# Patient Record
Sex: FEMALE | Race: BLACK OR AFRICAN AMERICAN | HISPANIC OR LATINO | URBAN - METROPOLITAN AREA
[De-identification: names, ages, dates, MRNs, and addresses within clinical notes are randomized per-mention and may not be internally consistent; named-entity substitution may affect disease eponyms.]

---

## 2021-03-25 ENCOUNTER — OFFICE VISIT (OUTPATIENT)
Dept: URGENT CARE | Age: 51
End: 2021-03-25
Payer: COMMERCIAL

## 2021-03-25 ENCOUNTER — NURSE TRIAGE (OUTPATIENT)
Dept: OTHER | Facility: OTHER | Age: 51
End: 2021-03-25

## 2021-03-25 VITALS — TEMPERATURE: 96.3 F | RESPIRATION RATE: 20 BRPM | OXYGEN SATURATION: 97 % | HEART RATE: 99 BPM

## 2021-03-25 DIAGNOSIS — Z20.822 CONTACT WITH AND (SUSPECTED) EXPOSURE TO COVID-19: Primary | ICD-10-CM

## 2021-03-25 PROCEDURE — U0003 INFECTIOUS AGENT DETECTION BY NUCLEIC ACID (DNA OR RNA); SEVERE ACUTE RESPIRATORY SYNDROME CORONAVIRUS 2 (SARS-COV-2) (CORONAVIRUS DISEASE [COVID-19]), AMPLIFIED PROBE TECHNIQUE, MAKING USE OF HIGH THROUGHPUT TECHNOLOGIES AS DESCRIBED BY CMS-2020-01-R: HCPCS | Performed by: NURSE PRACTITIONER

## 2021-03-25 PROCEDURE — 99213 OFFICE O/P EST LOW 20 MIN: CPT | Performed by: NURSE PRACTITIONER

## 2021-03-25 PROCEDURE — U0005 INFEC AGEN DETEC AMPLI PROBE: HCPCS | Performed by: NURSE PRACTITIONER

## 2021-03-25 NOTE — TELEPHONE ENCOUNTER
Reason for Disposition   [1] HIGH RISK patient (e g , age > 59 years, diabetes, heart or lung disease, weak immune system) AND [2] new or worsening symptoms    Protocols used: CORONAVIRUS (COVID-19) DIAGNOSED OR SUSPECTED-ADULT-[place]

## 2021-03-25 NOTE — TELEPHONE ENCOUNTER
1  Were you within 6 feet or less, for up to 15 minutes or more with a person that has a confirmed COVID-19 test? A friend tested positive 3/24/2021    2  What was the date of your exposure? 3/14/2021  3  Are you experiencing any symptoms attributed to the virus?  (Assess for SOB, cough, fever, difficulty breathing)   Started 3/22/2021  Headache  Diarrhea on 3/22/2021  Vomiting for 3 days  Vomited 6-7 times in past 24 hours  Keeping some fluids down  Inside of mouth is moist  Last urine output was 15 minutes ago  Blood glucose was 192 @ 1055  Body aches  Denies cough, fever or SOB  4  HIGH RISK: Do you have any history heart or lung conditions, weakened immune system, diabetes, Asthma, CHF, HIV, COPD, Chemo, renal failure, sickle cell, etc?   Type II Diabetes  Atrial Fib  Sick sinus syndrome  5  PREGNANCY: Are you pregnant or did you recently give birth?   Denied

## 2021-03-25 NOTE — PROGRESS NOTES
[employer] [facility]        NAME: Tushar Lock is a 46 y o  female  : 1970    MRN: [id number]  DATE: 2021  TIME: 3:13 PM    Assessment and Plan   Contact with and (suspected) exposure to covid-19 [Z20 822]  1  Contact with and (suspected) exposure to covid-19  Novel Coronavirus (Covid-19),PCR [facility] - Office Collection         Patient Instructions     Covid tested; results in 2-3 days via MyChart  Stay quarantined  OTC med for symptoms  Follow up with PCP in 3-5 days  Proceed to  ER if symptoms worsen  Chief Complaint     Chief Complaint   Patient presents with    Diarrhea     pt states started with symptoms 3 days ago, exposed to covid on 3/14    Headache    Generalized Body Aches         History of Present Illness       HPI   Reports she was exposed top someone 2 weeks ago and that person just got diagnosed with covid  Currently having intermittent diarrhea, HA and generalized body-aches  Hx of asthma  Has inhalers at home  Requesting testing for covid 19    Review of Systems   Review of Systems   Constitutional: Negative for appetite change and fever  HENT: Negative for sore throat and trouble swallowing  Respiratory: Negative for cough, shortness of breath and wheezing  Cardiovascular: Negative for chest pain  Gastrointestinal: Positive for diarrhea  Negative for abdominal pain, nausea and vomiting  Musculoskeletal: Positive for myalgias  Neurological: Positive for headaches  Current Medications     No current outpatient medications on file      Current Allergies     Allergies as of 2021 - Reviewed 2021   Allergen Reaction Noted    Clarithromycin GI Intolerance 2020    Acetaminophen GI Intolerance 2017    Cephalosporins Hives 2017    Latex Hives 2017    Oxycodone-acetaminophen GI Intolerance 2017    Penicillins Diarrhea 2017    Trazodone Diarrhea 2017    Naproxen Palpitations 2017 The following portions of the patient's history were reviewed and updated as appropriate: allergies, current medications, past family history, past medical history, past social history, past surgical history and problem list      Past Medical History:   Diagnosis Date    Atrial fibrillation ([facility] )     Diabetes mellitus ([facility] )     Fibromyalgia     Migraines     Neuromuscular disorder ([facility] )     Sick sinus syndrome ([facility] )        History reviewed  No pertinent surgical history  History reviewed  No pertinent family history  Medications have been verified  Objective   Pulse 99   Temp (!) 96 3 °F (35 7 °C)   Resp 20   SpO2 97%   No LMP recorded  Physical Exam     Physical Exam  HENT:      Right Ear: Tympanic membrane and ear canal normal       Left Ear: Tympanic membrane and ear canal normal       Nose: Rhinorrhea present  Mouth/Throat:      Pharynx: No posterior oropharyngeal erythema  Cardiovascular:      Rate and Rhythm: Regular rhythm  Heart sounds: Normal heart sounds  Pulmonary:      Effort: Pulmonary effort is normal  No respiratory distress  Breath sounds: Wheezing present  Neurological:      Mental Status: She is alert

## 2021-03-27 LAB — SARS-COV-2 RNA RESP QL NAA+PROBE: NEGATIVE

## 2021-04-29 ENCOUNTER — NURSE TRIAGE (OUTPATIENT)
Dept: OTHER | Facility: OTHER | Age: 51
End: 2021-04-29

## 2021-04-29 DIAGNOSIS — Z20.822 SUSPECTED SEVERE ACUTE RESPIRATORY SYNDROME CORONAVIRUS 2 (SARS-COV-2) INFECTION: Primary | ICD-10-CM

## 2021-04-29 PROCEDURE — U0005 INFEC AGEN DETEC AMPLI PROBE: HCPCS | Performed by: FAMILY MEDICINE

## 2021-04-29 PROCEDURE — U0003 INFECTIOUS AGENT DETECTION BY NUCLEIC ACID (DNA OR RNA); SEVERE ACUTE RESPIRATORY SYNDROME CORONAVIRUS 2 (SARS-COV-2) (CORONAVIRUS DISEASE [COVID-19]), AMPLIFIED PROBE TECHNIQUE, MAKING USE OF HIGH THROUGHPUT TECHNOLOGIES AS DESCRIBED BY CMS-2020-01-R: HCPCS | Performed by: FAMILY MEDICINE

## 2021-04-29 NOTE — TELEPHONE ENCOUNTER
1  Were you within 6 feet or less, for up to 15 minutes or more with a person that has a confirmed COVID-19 test?   Was notified by the [employer] staff that she had an exposure  2  What was the date of your exposure? 4/22/2021  3  Are you experiencing any symptoms attributed to the virus?  (Assess for SOB, cough, fever, difficulty breathing) Started 4/25/2021  Headache  Diarrhea  4  HIGH RISK: Do you have any history heart or lung conditions, weakened immune system, diabetes, Asthma, CHF, HIV, COPD, Chemo, renal failure, sickle cell, etc?   Type Diabetes  Atrial flutter and fib  Asthma  5  PREGNANCY: Are you pregnant or did you recently give birth? N/A - hysterectomy

## 2021-04-29 NOTE — TELEPHONE ENCOUNTER
Regarding: COVID-19 -Symptomatic - Headache-diarrhea 1 of 2  ----- Message from Madison Gama sent at 4/29/2021  9:24 AM EDT -----  " I have a headache and I started with diarrhea last night '

## 2021-04-30 LAB — SARS-COV-2 RNA RESP QL NAA+PROBE: NEGATIVE

## 2022-06-28 RX ORDER — ALBUTEROL SULFATE 2.5 MG/3ML
2.5 SOLUTION RESPIRATORY (INHALATION) EVERY 4 HOURS PRN
COMMUNITY
Start: 2022-01-11 | End: 2022-08-01 | Stop reason: SDUPTHER

## 2022-06-28 RX ORDER — ALBUTEROL SULFATE 90 UG/1
2 AEROSOL, METERED RESPIRATORY (INHALATION) EVERY 4 HOURS PRN
COMMUNITY
Start: 2022-06-04

## 2022-06-29 ENCOUNTER — OFFICE VISIT (OUTPATIENT)
Dept: INTERNAL MEDICINE CLINIC | Facility: CLINIC | Age: 52
End: 2022-06-29
Payer: COMMERCIAL

## 2022-06-29 ENCOUNTER — TELEPHONE (OUTPATIENT)
Dept: INTERNAL MEDICINE CLINIC | Facility: CLINIC | Age: 52
End: 2022-06-29

## 2022-06-29 VITALS
SYSTOLIC BLOOD PRESSURE: 128 MMHG | WEIGHT: 122.2 LBS | DIASTOLIC BLOOD PRESSURE: 82 MMHG | BODY MASS INDEX: 19.64 KG/M2 | HEIGHT: 66 IN | OXYGEN SATURATION: 99 % | TEMPERATURE: 98.5 F | HEART RATE: 48 BPM

## 2022-06-29 DIAGNOSIS — Z12.31 ENCOUNTER FOR SCREENING MAMMOGRAM FOR BREAST CANCER: ICD-10-CM

## 2022-06-29 DIAGNOSIS — Z12.4 SCREENING FOR CERVICAL CANCER: ICD-10-CM

## 2022-06-29 DIAGNOSIS — E78.2 MIXED HYPERLIPIDEMIA: ICD-10-CM

## 2022-06-29 DIAGNOSIS — I48.92 ATRIAL FLUTTER, UNSPECIFIED TYPE (HCC): ICD-10-CM

## 2022-06-29 DIAGNOSIS — M54.50 LUMBAR PAIN: ICD-10-CM

## 2022-06-29 DIAGNOSIS — Z79.4 TYPE 2 DIABETES MELLITUS WITH HYPOGLYCEMIA WITHOUT COMA, WITH LONG-TERM CURRENT USE OF INSULIN (HCC): ICD-10-CM

## 2022-06-29 DIAGNOSIS — E11.649 TYPE 2 DIABETES MELLITUS WITH HYPOGLYCEMIA WITHOUT COMA, WITH LONG-TERM CURRENT USE OF INSULIN (HCC): ICD-10-CM

## 2022-06-29 DIAGNOSIS — F63.81 INTERMITTENT EXPLOSIVE DISORDER: ICD-10-CM

## 2022-06-29 DIAGNOSIS — K58.9 IRRITABLE BOWEL SYNDROME, UNSPECIFIED TYPE: ICD-10-CM

## 2022-06-29 DIAGNOSIS — M06.9 RHEUMATOID ARTHRITIS INVOLVING BOTH HANDS, UNSPECIFIED WHETHER RHEUMATOID FACTOR PRESENT (HCC): ICD-10-CM

## 2022-06-29 DIAGNOSIS — I48.0 PAROXYSMAL A-FIB (HCC): Primary | ICD-10-CM

## 2022-06-29 DIAGNOSIS — R74.8 ELEVATED LIVER ENZYMES: ICD-10-CM

## 2022-06-29 DIAGNOSIS — F17.200 SMOKING: ICD-10-CM

## 2022-06-29 DIAGNOSIS — Z11.59 NEED FOR HEPATITIS C SCREENING TEST: ICD-10-CM

## 2022-06-29 DIAGNOSIS — F31.9 BIPOLAR AFFECTIVE DISORDER, REMISSION STATUS UNSPECIFIED (HCC): ICD-10-CM

## 2022-06-29 DIAGNOSIS — G43.909 MIGRAINE WITHOUT STATUS MIGRAINOSUS, NOT INTRACTABLE, UNSPECIFIED MIGRAINE TYPE: ICD-10-CM

## 2022-06-29 DIAGNOSIS — F41.9 ANXIETY: ICD-10-CM

## 2022-06-29 DIAGNOSIS — Z11.4 SCREENING FOR HIV (HUMAN IMMUNODEFICIENCY VIRUS): ICD-10-CM

## 2022-06-29 PROBLEM — E11.40 DIABETIC NEUROPATHY ASSOCIATED WITH TYPE 2 DIABETES MELLITUS (HCC): Status: ACTIVE | Noted: 2022-06-29

## 2022-06-29 PROBLEM — I49.5 SICK SINUS SYNDROME (HCC): Status: ACTIVE | Noted: 2022-06-29

## 2022-06-29 PROBLEM — F43.10 PTSD (POST-TRAUMATIC STRESS DISORDER): Status: ACTIVE | Noted: 2022-06-29

## 2022-06-29 PROBLEM — E11.9 DM (DIABETES MELLITUS), TYPE 2 (HCC): Status: ACTIVE | Noted: 2022-06-29

## 2022-06-29 PROCEDURE — 99205 OFFICE O/P NEW HI 60 MIN: CPT

## 2022-06-29 PROCEDURE — 3074F SYST BP LT 130 MM HG: CPT

## 2022-06-29 RX ORDER — AZELASTINE 1 MG/ML
1 SPRAY, METERED NASAL 2 TIMES DAILY
COMMUNITY

## 2022-06-29 RX ORDER — BLOOD-GLUCOSE METER
EACH MISCELLANEOUS
COMMUNITY
Start: 2022-05-03

## 2022-06-29 RX ORDER — NALOXONE HYDROCHLORIDE 0.4 MG/ML
INJECTION, SOLUTION INTRAMUSCULAR; INTRAVENOUS; SUBCUTANEOUS
COMMUNITY
Start: 2022-06-22 | End: 2022-06-30 | Stop reason: CLARIF

## 2022-06-29 RX ORDER — SYRINGE, DISPOSABLE, 3 ML
SYRINGE, EMPTY DISPOSABLE MISCELLANEOUS
COMMUNITY
Start: 2022-05-25

## 2022-06-29 RX ORDER — NICOTINE 21 MG/24HR
1 PATCH, TRANSDERMAL 24 HOURS TRANSDERMAL EVERY 24 HOURS
Qty: 28 PATCH | Refills: 0 | Status: SHIPPED | OUTPATIENT
Start: 2022-06-29

## 2022-06-29 RX ORDER — PREDNISONE 10 MG/1
TABLET ORAL
COMMUNITY
Start: 2022-04-13 | End: 2022-07-21 | Stop reason: ALTCHOICE

## 2022-06-29 RX ORDER — CETIRIZINE HYDROCHLORIDE 10 MG/1
10 TABLET ORAL DAILY
COMMUNITY

## 2022-06-29 RX ORDER — PEN NEEDLE, DIABETIC 32GX 5/32"
NEEDLE, DISPOSABLE MISCELLANEOUS
COMMUNITY
Start: 2022-04-05

## 2022-06-29 RX ORDER — SULFAMETHOXAZOLE AND TRIMETHOPRIM 800; 160 MG/1; MG/1
TABLET ORAL
COMMUNITY
Start: 2022-04-27 | End: 2022-07-21 | Stop reason: ALTCHOICE

## 2022-06-29 RX ORDER — HYDROXYZINE 50 MG/1
TABLET, FILM COATED ORAL
COMMUNITY
Start: 2022-04-05 | End: 2022-07-21 | Stop reason: ALTCHOICE

## 2022-06-29 RX ORDER — PRAZOSIN HYDROCHLORIDE 5 MG/1
5 CAPSULE ORAL
COMMUNITY
Start: 2022-03-02

## 2022-06-29 RX ORDER — TROSPIUM CHLORIDE 20 MG/1
20 TABLET, FILM COATED ORAL 2 TIMES DAILY
COMMUNITY
Start: 2021-11-17 | End: 2022-08-01 | Stop reason: SDUPTHER

## 2022-06-29 RX ORDER — RISPERIDONE 2 MG/1
2 TABLET, FILM COATED ORAL
COMMUNITY
Start: 2022-04-13 | End: 2022-08-01 | Stop reason: SDUPTHER

## 2022-06-29 RX ORDER — METOPROLOL SUCCINATE 50 MG/1
50 TABLET, EXTENDED RELEASE ORAL 2 TIMES DAILY
COMMUNITY

## 2022-06-29 RX ORDER — LORATADINE 10 MG/1
10 TABLET ORAL DAILY
COMMUNITY
Start: 2022-03-02 | End: 2022-06-30 | Stop reason: CLARIF

## 2022-06-29 RX ORDER — OLOPATADINE HYDROCHLORIDE 7 MG/ML
SOLUTION OPHTHALMIC DAILY
COMMUNITY

## 2022-06-29 RX ORDER — LOVASTATIN 10 MG/1
10 TABLET ORAL
COMMUNITY

## 2022-06-29 RX ORDER — MORPHINE SULFATE 15 MG/1
15 TABLET, FILM COATED, EXTENDED RELEASE ORAL 2 TIMES DAILY
COMMUNITY
Start: 2022-04-19 | End: 2022-06-30 | Stop reason: CLARIF

## 2022-06-29 RX ORDER — BLOOD SUGAR DIAGNOSTIC
STRIP MISCELLANEOUS
COMMUNITY
Start: 2022-06-22

## 2022-06-29 RX ORDER — FLASH GLUCOSE SENSOR
KIT MISCELLANEOUS
Qty: 2 EACH | Refills: 11 | Status: SHIPPED | OUTPATIENT
Start: 2022-06-29

## 2022-06-29 RX ORDER — FEXOFENADINE HCL 180 MG/1
180 TABLET ORAL DAILY
COMMUNITY
End: 2022-08-01 | Stop reason: SDUPTHER

## 2022-06-29 RX ORDER — SUMATRIPTAN 100 MG/1
TABLET, FILM COATED ORAL
COMMUNITY
Start: 2022-06-22

## 2022-06-29 RX ORDER — INSULIN GLARGINE 100 [IU]/ML
55 INJECTION, SOLUTION SUBCUTANEOUS 2 TIMES DAILY
COMMUNITY
Start: 2022-05-24

## 2022-06-29 RX ORDER — NYSTATIN 100000 U/G
CREAM TOPICAL 2 TIMES DAILY
COMMUNITY
Start: 2022-01-14

## 2022-06-29 RX ORDER — CLOTRIMAZOLE AND BETAMETHASONE DIPROPIONATE 10; .64 MG/G; MG/G
CREAM TOPICAL 2 TIMES DAILY
COMMUNITY
Start: 2021-12-09 | End: 2022-12-09

## 2022-06-29 RX ORDER — MECLIZINE HYDROCHLORIDE 25 MG/1
25 TABLET ORAL 3 TIMES DAILY PRN
COMMUNITY

## 2022-06-29 RX ORDER — TRIAMCINOLONE ACETONIDE 1 MG/G
1 CREAM TOPICAL 2 TIMES DAILY
COMMUNITY
Start: 2022-04-27 | End: 2023-04-27

## 2022-06-29 RX ORDER — MONTELUKAST SODIUM 10 MG/1
10 TABLET ORAL
COMMUNITY
End: 2022-08-01 | Stop reason: SDUPTHER

## 2022-06-29 RX ORDER — FLUTICASONE PROPIONATE 250 UG/1
1 POWDER, METERED RESPIRATORY (INHALATION) 2 TIMES DAILY
COMMUNITY
End: 2022-08-01 | Stop reason: SDUPTHER

## 2022-06-29 RX ORDER — OXYCODONE HYDROCHLORIDE 10 MG/1
TABLET ORAL
COMMUNITY
Start: 2022-04-06 | End: 2022-06-30 | Stop reason: CLARIF

## 2022-06-29 RX ORDER — SERTRALINE HYDROCHLORIDE 100 MG/1
100 TABLET, FILM COATED ORAL DAILY
COMMUNITY
Start: 2022-04-13 | End: 2022-08-01 | Stop reason: SDUPTHER

## 2022-06-29 RX ORDER — NEEDLES, DISPOSABLE 25GX5/8"
NEEDLE, DISPOSABLE MISCELLANEOUS
COMMUNITY
Start: 2022-05-25

## 2022-06-29 RX ORDER — CLOBETASOL PROPIONATE 0.46 MG/ML
SOLUTION TOPICAL 2 TIMES DAILY
COMMUNITY
Start: 2021-11-11 | End: 2022-11-11

## 2022-06-29 RX ORDER — DOXYCYCLINE HYCLATE 100 MG
TABLET ORAL
COMMUNITY
Start: 2022-05-03 | End: 2022-07-21 | Stop reason: ALTCHOICE

## 2022-06-29 RX ORDER — LANCETS 33 GAUGE
EACH MISCELLANEOUS 2 TIMES DAILY
COMMUNITY
Start: 2022-06-22

## 2022-06-29 RX ORDER — FLASH GLUCOSE SENSOR
1 KIT MISCELLANEOUS ONCE
Qty: 1 EACH | Refills: 0 | Status: SHIPPED | OUTPATIENT
Start: 2022-06-29 | End: 2022-06-29

## 2022-06-29 RX ORDER — DULAGLUTIDE 1.5 MG/.5ML
INJECTION, SOLUTION SUBCUTANEOUS
COMMUNITY
Start: 2022-06-24

## 2022-06-29 RX ORDER — ARIPIPRAZOLE 10 MG/1
10 TABLET ORAL DAILY
COMMUNITY
Start: 2022-04-13 | End: 2022-07-21 | Stop reason: SDUPTHER

## 2022-06-29 RX ORDER — NIACINAMIDE 500 MG
500 TABLET ORAL
COMMUNITY
Start: 2021-10-12

## 2022-06-29 RX ORDER — GABAPENTIN 400 MG/1
400 CAPSULE ORAL 3 TIMES DAILY
COMMUNITY
Start: 2022-02-16 | End: 2022-07-21 | Stop reason: ALTCHOICE

## 2022-06-29 RX ORDER — INSULIN LISPRO 200 [IU]/ML
50 INJECTION, SOLUTION SUBCUTANEOUS
COMMUNITY
Start: 2022-04-13

## 2022-06-29 RX ORDER — LISINOPRIL 5 MG/1
5 TABLET ORAL DAILY
COMMUNITY

## 2022-06-29 RX ORDER — INSULIN GLARGINE 100 [IU]/ML
INJECTION, SOLUTION SUBCUTANEOUS
COMMUNITY
Start: 2022-06-22

## 2022-06-29 RX ORDER — ZOLPIDEM TARTRATE 10 MG/1
10 TABLET ORAL DAILY
COMMUNITY
Start: 2022-04-13 | End: 2022-07-21 | Stop reason: SDUPTHER

## 2022-06-29 RX ORDER — EPINEPHRINE 0.3 MG/.3ML
0.3 INJECTION SUBCUTANEOUS
COMMUNITY

## 2022-06-29 RX ORDER — AMOXICILLIN 250 MG
1 CAPSULE ORAL DAILY
COMMUNITY
Start: 2022-05-25 | End: 2023-05-25

## 2022-06-29 RX ORDER — FLUOCINONIDE 0.5 MG/G
OINTMENT TOPICAL 2 TIMES DAILY
COMMUNITY
Start: 2021-12-09 | End: 2022-12-09

## 2022-06-29 RX ORDER — HYDRALAZINE HYDROCHLORIDE 10 MG/1
10 TABLET, FILM COATED ORAL 2 TIMES DAILY
COMMUNITY
End: 2022-07-21 | Stop reason: ALTCHOICE

## 2022-06-29 RX ORDER — BUSPIRONE HYDROCHLORIDE 15 MG/1
15 TABLET ORAL 3 TIMES DAILY
COMMUNITY
Start: 2022-04-13 | End: 2022-07-21 | Stop reason: SDUPTHER

## 2022-06-29 RX ORDER — ASCORBIC ACID 250 MG
250 TABLET ORAL DAILY
COMMUNITY

## 2022-06-29 RX ORDER — FAMOTIDINE 20 MG/1
20 TABLET, FILM COATED ORAL 2 TIMES DAILY PRN
COMMUNITY
End: 2022-08-01 | Stop reason: SDUPTHER

## 2022-06-29 RX ORDER — NITROGLYCERIN 0.4 MG/1
0.4 TABLET SUBLINGUAL
COMMUNITY
Start: 2022-04-11

## 2022-06-29 RX ORDER — BLOOD PRESSURE TEST KIT
KIT MISCELLANEOUS
COMMUNITY
Start: 2022-06-22

## 2022-06-29 RX ORDER — ALPRAZOLAM 1 MG/1
TABLET ORAL
COMMUNITY
Start: 2022-04-17 | End: 2022-06-30 | Stop reason: SDUPTHER

## 2022-06-29 RX ORDER — DARIFENACIN HYDROBROMIDE 15 MG/1
15 TABLET, EXTENDED RELEASE ORAL DAILY
COMMUNITY
Start: 2022-04-05

## 2022-06-29 RX ORDER — INHALER,ASSIST DEVICE,MED MASK
SPACER (EA) MISCELLANEOUS
COMMUNITY
Start: 2022-05-25
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
## 2022-06-29 NOTE — TELEPHONE ENCOUNTER
PT stated that she was to remind Minus Moulding to check into a Bipap machine for her      Please Advise:    [phone]

## 2022-06-30 ENCOUNTER — TELEPHONE (OUTPATIENT)
Dept: INTERNAL MEDICINE CLINIC | Facility: CLINIC | Age: 52
End: 2022-06-30

## 2022-06-30 DIAGNOSIS — F43.10 PTSD (POST-TRAUMATIC STRESS DISORDER): Primary | ICD-10-CM

## 2022-06-30 DIAGNOSIS — F41.9 ANXIETY: ICD-10-CM

## 2022-06-30 RX ORDER — ALPRAZOLAM 1 MG/1
0.5 TABLET ORAL 3 TIMES DAILY
Qty: 90 TABLET | Refills: 0 | Status: SHIPPED | OUTPATIENT
Start: 2022-06-30 | End: 2022-08-01 | Stop reason: SDUPTHER

## 2022-06-30 NOTE — TELEPHONE ENCOUNTER
Message for [place]; continuous blood glucose    Also: FreeStyle Lydia [place]    It requires prior auth from [employer]

## 2022-06-30 NOTE — TELEPHONE ENCOUNTER
Spoke to administration about patients comments to Lennox Cash  It is advised that we do not Rx any controlled substances for this patient due to the concerns of behavior  She should be getting [place] meds from her psychiatrist   We will not rx any controlled substances

## 2022-07-05 NOTE — TELEPHONE ENCOUNTER
PA has been started for the patients [address] [place] [address], patients key is: [id number]    Also completed the PA for the [facility] portion of the Kit, patients Key is: [id number]       Will check status for the order in a few days for an outcome, in the meantime the patients chart notes and prescription have been faxed to her prescription coverage company

## 2022-07-06 ENCOUNTER — TELEPHONE (OUTPATIENT)
Dept: SLEEP CENTER | Facility: CLINIC | Age: 52
End: 2022-07-06

## 2022-07-06 NOTE — TELEPHONE ENCOUNTER
----- Message from Denise Javier MD sent at 7/5/2022  3:47 PM EDT -----  Approved    ----- Message -----  From: Juwan Yeboah  Sent: 6/30/2022   8:50 AM EDT  To: Sleep Medicine [facility] Provider    This Home sleep study needs approval      If approved please sign and return to clerical pool  If denied please include reasons why  Also provide alternative testing if warranted  Please sign and return to clerical pool

## 2022-07-11 ENCOUNTER — TELEPHONE (OUTPATIENT)
Dept: SLEEP CENTER | Facility: CLINIC | Age: 52
End: 2022-07-11

## 2022-07-11 DIAGNOSIS — I48.92 ATRIAL FLUTTER, UNSPECIFIED TYPE (HCC): Primary | ICD-10-CM

## 2022-07-11 NOTE — TELEPHONE ENCOUNTER
Patient's insurance will not cover a home sleep study  Please place an order for a diagnostic sleep study (SLE3)

## 2022-07-16 ENCOUNTER — HOSPITAL ENCOUNTER (EMERGENCY)
Facility: HOSPITAL | Age: 52
Discharge: HOME | End: 2022-07-16
Attending: EMERGENCY MEDICINE
Payer: COMMERCIAL

## 2022-07-16 ENCOUNTER — APPOINTMENT (EMERGENCY)
Dept: CT IMAGING | Facility: HOSPITAL | Age: 52
End: 2022-07-16
Payer: COMMERCIAL

## 2022-07-16 ENCOUNTER — APPOINTMENT (EMERGENCY)
Dept: RADIOLOGY | Facility: HOSPITAL | Age: 52
End: 2022-07-16
Payer: COMMERCIAL

## 2022-07-16 VITALS
SYSTOLIC BLOOD PRESSURE: 207 MMHG | OXYGEN SATURATION: 99 % | HEART RATE: 99 BPM | RESPIRATION RATE: 18 BRPM | WEIGHT: 122.14 LBS | DIASTOLIC BLOOD PRESSURE: 105 MMHG | TEMPERATURE: 97.9 F | BODY MASS INDEX: 20.02 KG/M2

## 2022-07-16 DIAGNOSIS — G89.29 CHRONIC BACK PAIN: Primary | ICD-10-CM

## 2022-07-16 DIAGNOSIS — R07.89 CHEST TIGHTNESS: ICD-10-CM

## 2022-07-16 DIAGNOSIS — R51.9 ACUTE HEADACHE: ICD-10-CM

## 2022-07-16 DIAGNOSIS — I48.92 ATRIAL FLUTTER (HCC): ICD-10-CM

## 2022-07-16 DIAGNOSIS — M54.9 CHRONIC BACK PAIN: Primary | ICD-10-CM

## 2022-07-16 LAB
2HR DELTA HS TROPONIN: 0 NG/L
ALBUMIN SERPL BCP-MCNC: 3.3 G/DL (ref 3.5–5)
ALP SERPL-CCNC: 80 U/L (ref 46–116)
ALT SERPL W P-5'-P-CCNC: 10 U/L (ref 12–78)
ANION GAP SERPL CALCULATED.3IONS-SCNC: 9 MMOL/L (ref 4–13)
AST SERPL W P-5'-P-CCNC: 13 U/L (ref 5–45)
ATRIAL RATE: 101 BPM
ATRIAL RATE: 99 BPM
BASOPHILS # BLD AUTO: 0.06 THOUSANDS/ΜL (ref 0–0.1)
BASOPHILS NFR BLD AUTO: 1 % (ref 0–1)
BILIRUB DIRECT SERPL-MCNC: 0.09 MG/DL (ref 0–0.2)
BILIRUB SERPL-MCNC: 0.3 MG/DL (ref 0.2–1)
BUN SERPL-MCNC: 6 MG/DL (ref 5–25)
CALCIUM SERPL-MCNC: 8.7 MG/DL (ref 8.3–10.1)
CARDIAC TROPONIN I PNL SERPL HS: 9 NG/L
CARDIAC TROPONIN I PNL SERPL HS: 9 NG/L
CHLORIDE SERPL-SCNC: 104 MMOL/L (ref 100–108)
CO2 SERPL-SCNC: 30 MMOL/L (ref 21–32)
CREAT SERPL-MCNC: 0.83 MG/DL (ref 0.6–1.3)
EOSINOPHIL # BLD AUTO: 0.22 THOUSAND/ΜL (ref 0–0.61)
EOSINOPHIL NFR BLD AUTO: 2 % (ref 0–6)
ERYTHROCYTE [DISTWIDTH] IN BLOOD BY AUTOMATED COUNT: 14.8 % (ref 11.6–15.1)
GFR SERPL CREATININE-BSD FRML MDRD: 80 ML/MIN/1.73SQ M
GLUCOSE SERPL-MCNC: 109 MG/DL (ref 65–140)
HCT VFR BLD AUTO: 41.8 % (ref 34.8–46.1)
HGB BLD-MCNC: 14.1 G/DL (ref 11.5–15.4)
IMM GRANULOCYTES # BLD AUTO: 0.03 THOUSAND/UL (ref 0–0.2)
IMM GRANULOCYTES NFR BLD AUTO: 0 % (ref 0–2)
LYMPHOCYTES # BLD AUTO: 2.18 THOUSANDS/ΜL (ref 0.6–4.47)
LYMPHOCYTES NFR BLD AUTO: 23 % (ref 14–44)
MAGNESIUM SERPL-MCNC: 1.6 MG/DL (ref 1.6–2.6)
MCH RBC QN AUTO: 30.2 PG (ref 26.8–34.3)
MCHC RBC AUTO-ENTMCNC: 33.7 G/DL (ref 31.4–37.4)
MCV RBC AUTO: 90 FL (ref 82–98)
MONOCYTES # BLD AUTO: 0.62 THOUSAND/ΜL (ref 0.17–1.22)
MONOCYTES NFR BLD AUTO: 7 % (ref 4–12)
NEUTROPHILS # BLD AUTO: 6.48 THOUSANDS/ΜL (ref 1.85–7.62)
NEUTS SEG NFR BLD AUTO: 67 % (ref 43–75)
NRBC BLD AUTO-RTO: 0 /100 WBCS
P AXIS: 59 DEGREES
P AXIS: 62 DEGREES
PLATELET # BLD AUTO: 224 THOUSANDS/UL (ref 149–390)
PMV BLD AUTO: 10.9 FL (ref 8.9–12.7)
POTASSIUM SERPL-SCNC: 3 MMOL/L (ref 3.5–5.3)
PR INTERVAL: 152 MS
PR INTERVAL: 176 MS
PROT SERPL-MCNC: 6.6 G/DL (ref 6.4–8.2)
QRS AXIS: 79 DEGREES
QRS AXIS: 83 DEGREES
QRSD INTERVAL: 84 MS
QRSD INTERVAL: 84 MS
QT INTERVAL: 384 MS
QT INTERVAL: 396 MS
QTC INTERVAL: 497 MS
QTC INTERVAL: 508 MS
RBC # BLD AUTO: 4.67 MILLION/UL (ref 3.81–5.12)
SODIUM SERPL-SCNC: 143 MMOL/L (ref 136–145)
T WAVE AXIS: 78 DEGREES
T WAVE AXIS: 91 DEGREES
VENTRICULAR RATE: 101 BPM
VENTRICULAR RATE: 99 BPM
WBC # BLD AUTO: 9.59 THOUSAND/UL (ref 4.31–10.16)

## 2022-07-16 PROCEDURE — 83735 ASSAY OF MAGNESIUM: CPT | Performed by: EMERGENCY MEDICINE

## 2022-07-16 PROCEDURE — 99285 EMERGENCY DEPT VISIT HI MDM: CPT | Performed by: EMERGENCY MEDICINE

## 2022-07-16 PROCEDURE — 93005 ELECTROCARDIOGRAM TRACING: CPT

## 2022-07-16 PROCEDURE — 96375 TX/PRO/DX INJ NEW DRUG ADDON: CPT

## 2022-07-16 PROCEDURE — 71046 X-RAY EXAM CHEST 2 VIEWS: CPT

## 2022-07-16 PROCEDURE — G1004 CDSM NDSC: HCPCS

## 2022-07-16 PROCEDURE — 84484 ASSAY OF TROPONIN QUANT: CPT | Performed by: EMERGENCY MEDICINE

## 2022-07-16 PROCEDURE — 70450 CT HEAD/BRAIN W/O DYE: CPT

## 2022-07-16 PROCEDURE — 99284 EMERGENCY DEPT VISIT MOD MDM: CPT

## 2022-07-16 PROCEDURE — 80076 HEPATIC FUNCTION PANEL: CPT | Performed by: EMERGENCY MEDICINE

## 2022-07-16 PROCEDURE — 85025 COMPLETE CBC W/AUTO DIFF WBC: CPT | Performed by: EMERGENCY MEDICINE

## 2022-07-16 PROCEDURE — 36415 COLL VENOUS BLD VENIPUNCTURE: CPT | Performed by: EMERGENCY MEDICINE

## 2022-07-16 PROCEDURE — 80048 BASIC METABOLIC PNL TOTAL CA: CPT | Performed by: EMERGENCY MEDICINE

## 2022-07-16 PROCEDURE — 96365 THER/PROPH/DIAG IV INF INIT: CPT

## 2022-07-16 PROCEDURE — 93010 ELECTROCARDIOGRAM REPORT: CPT | Performed by: INTERNAL MEDICINE

## 2022-07-16 RX ORDER — KETOROLAC TROMETHAMINE 30 MG/ML
15 INJECTION, SOLUTION INTRAMUSCULAR; INTRAVENOUS ONCE
Status: COMPLETED | OUTPATIENT
Start: 2022-07-16 | End: 2022-07-16

## 2022-07-16 RX ORDER — DIPHENHYDRAMINE HYDROCHLORIDE 50 MG/ML
25 INJECTION INTRAMUSCULAR; INTRAVENOUS ONCE
Status: COMPLETED | OUTPATIENT
Start: 2022-07-16 | End: 2022-07-16

## 2022-07-16 RX ORDER — METOCLOPRAMIDE 10 MG/1
10 TABLET ORAL EVERY 6 HOURS PRN
Qty: 30 TABLET | Refills: 0 | Status: SHIPPED | OUTPATIENT
Start: 2022-07-16 | End: 2022-08-01 | Stop reason: CLARIF

## 2022-07-16 RX ORDER — METOPROLOL SUCCINATE 25 MG/1
50 TABLET, EXTENDED RELEASE ORAL DAILY
Qty: 30 TABLET | Refills: 0 | Status: SHIPPED | OUTPATIENT
Start: 2022-07-16 | End: 2022-08-15 | Stop reason: ALTCHOICE

## 2022-07-16 RX ORDER — METOPROLOL SUCCINATE 25 MG/1
50 TABLET, EXTENDED RELEASE ORAL DAILY
Qty: 30 TABLET | Refills: 0 | Status: SHIPPED | OUTPATIENT
Start: 2022-07-16 | End: 2022-07-16 | Stop reason: SDUPTHER

## 2022-07-16 RX ORDER — MORPHINE SULFATE 15 MG/1
15 TABLET ORAL ONCE
Status: COMPLETED | OUTPATIENT
Start: 2022-07-16 | End: 2022-07-16

## 2022-07-16 RX ORDER — POTASSIUM CHLORIDE 20 MEQ/1
40 TABLET, EXTENDED RELEASE ORAL ONCE
Status: COMPLETED | OUTPATIENT
Start: 2022-07-16 | End: 2022-07-16

## 2022-07-16 RX ORDER — IBUPROFEN 600 MG/1
600 TABLET ORAL EVERY 6 HOURS PRN
Qty: 30 TABLET | Refills: 0 | Status: SHIPPED | OUTPATIENT
Start: 2022-07-16 | End: 2022-07-16 | Stop reason: SDUPTHER

## 2022-07-16 RX ORDER — METOCLOPRAMIDE HYDROCHLORIDE 5 MG/ML
10 INJECTION INTRAMUSCULAR; INTRAVENOUS ONCE
Status: COMPLETED | OUTPATIENT
Start: 2022-07-16 | End: 2022-07-16

## 2022-07-16 RX ORDER — MAGNESIUM SULFATE HEPTAHYDRATE 40 MG/ML
2 INJECTION, SOLUTION INTRAVENOUS ONCE
Status: COMPLETED | OUTPATIENT
Start: 2022-07-16 | End: 2022-07-16

## 2022-07-16 RX ORDER — IBUPROFEN 600 MG/1
600 TABLET ORAL EVERY 6 HOURS PRN
Qty: 30 TABLET | Refills: 0 | Status: SHIPPED | OUTPATIENT
Start: 2022-07-16 | End: 2022-09-12 | Stop reason: SDUPTHER

## 2022-07-16 RX ORDER — METOCLOPRAMIDE 10 MG/1
10 TABLET ORAL EVERY 6 HOURS PRN
Qty: 30 TABLET | Refills: 0 | Status: SHIPPED | OUTPATIENT
Start: 2022-07-16 | End: 2022-07-16 | Stop reason: SDUPTHER

## 2022-07-16 RX ADMIN — DIPHENHYDRAMINE HYDROCHLORIDE 25 MG: 50 INJECTION, SOLUTION INTRAMUSCULAR; INTRAVENOUS at 09:43

## 2022-07-16 RX ADMIN — POTASSIUM CHLORIDE 40 MEQ: 1500 TABLET, EXTENDED RELEASE ORAL at 11:52

## 2022-07-16 RX ADMIN — METOCLOPRAMIDE 10 MG: 5 INJECTION, SOLUTION INTRAMUSCULAR; INTRAVENOUS at 09:44

## 2022-07-16 RX ADMIN — MORPHINE SULFATE 15 MG: 15 TABLET ORAL at 09:42

## 2022-07-16 RX ADMIN — KETOROLAC TROMETHAMINE 15 MG: 30 INJECTION, SOLUTION INTRAMUSCULAR at 10:18

## 2022-07-16 RX ADMIN — SODIUM CHLORIDE 1000 ML: 0.9 INJECTION, SOLUTION INTRAVENOUS at 09:42

## 2022-07-16 RX ADMIN — MAGNESIUM SULFATE HEPTAHYDRATE 2 G: 40 INJECTION, SOLUTION INTRAVENOUS at 09:44

## 2022-07-16 NOTE — ED NOTES
Patient transported to [address] [place] [address]  [facility]  07/16/22 6444

## 2022-07-16 NOTE — ED PROVIDER NOTES
History  Chief Complaint   Patient presents with    Back Pain     Pt came in EMS  EMS reports pt c/o chronic back pain, and had chest pain but had taken 2 nitro and xanax and the pain went away  Patient is a 22-year-old female with past medical history of chronic back pain, fibromyalgia, neuromuscular disorder, occipital neuralgia, insulin-dependent diabetes, hypertension, hyperlipidemia, anxiety, intermittent explosive disorder, atrial fibrillation, migraines, prior intracranial hemorrhage when on Coumadin in 2014, coronary artery disease, presents to the emergency department by ambulance with multiple complaints  Patient states that she woke up today with a headache that she feels in the back of her neck that radiates her head all the way to the frontal region behind her eyes  She states she has had blurry vision and nausea with the headache  She tried taking Aleve without relief  She then started to get very shaky and developed chest tightness and heaviness as well as some difficulty breathing  She took 1 sublingual nitroglycerin and Xanax which did not help so she called 911  Patient did check her glucose this morning and it was 82 prior to eating but after eating it was only 71  She reports this is very low for her  She admits to recently moving to [place] from [place] in May and her new endocrinologist changed around her insulin regimen  Review of systems, she reports chronic back pain as well as chronic poor appetite for the past 1-2 years ever since having COVID infection in 2020  She also reports chronic nausea and vomiting for the past 1 year  She states she last vomited yesterday  Patient reports she is supposed to be on Xarelto but has not been on it since May when she moved  She also has out of her gabapentin which she reports she takes for seizures        History provided by:  Patient and EMS personnel   used: No        Prior to Admission Medications Prescriptions Last Dose Informant Patient Reported? Taking? ALPRAZolam (XANAX) 1 mg tablet   No No   Sig: Take 0 5 tablets (0 5 mg total) by mouth 3 (three) times a day   ARIPiprazole (ABILIFY) 10 mg tablet  Self Yes No   Sig: Take 10 mg by mouth daily   Alcohol Swabs PADS  Self Yes No   Si (five) times a day   BD Pen Needle Nkechi 2nd Gen 32G X 4 MM MISC  Self Yes No   Sig: USE AS DIRECTED FOR BEFORE A MEAL AND AT BEDTIME GLUCOSE INJECTION   BD PrecisionGlide Needle 23G X 1-1/2" MISC  Self Yes No   Sig: USE AS DIRECTED TO ADMINISTER NALOXONE INJECTION  MAY DISPENSE 23-25 GAUGE 1-1 5" NEEDLE  Blood Glucose Monitoring Suppl (ONE TOUCH ULTRA 2) w/Device KIT  Self Yes No   Sig: Use as directed   Cholecalciferol 125 MCG (5000 UT) TABS  Self Yes No   Sig: every 24 hours   Continuous Blood Gluc Sensor (FreeStyle Lydia Sensor System) MISC   No No   Si- 14 day sensors   EPINEPHrine (EPIPEN) 0 3 mg/0 3 mL SOAJ  Self Yes No   Sig: Inject 0 3 mg into a muscle   Fluticasone Propionate, Inhal, (Flovent Diskus) 250 MCG/BLIST AEPB  Self Yes No   Sig: Inhale 1 puff 2 (two) times a day   Lantus SoloStar 100 units/mL injection pen  Self Yes No   Sig: INJECT 55 UNITS UNDER THE SKIN 2 (TWO) TIMES A DAY AT LUNCH AND AT BEDTIME  Olopatadine HCl (Pataday) 0 7 % SOLN  Self Yes No   Sig: Apply to eye daily   OneTouch Delica Lancets 56U MISC  Self Yes No   Si (two) times a day   OneTouch Ultra test strip  Self Yes No   Sig: TEST TWICE DAILY OR AS DIRECTED BY MD   SUMAtriptan (IMITREX) 100 mg tablet  Self Yes No   Sig: TAKE 1 TABLET (100 MG TOTAL) BY MOUTH ONE TIME AS NEEDED FOR MIGRAINE  MAY REPEAT IN 2 HOURS IF UNRESOLVED  DO NOT EXCEED 200 MG IN 24 HOURS   Spacer/Aero-Holding Chambers (AeroChamber Plus Geoff-Vu w/Mask) MISC  Self Yes No   Si EACH (1 APPLICATION TOTAL) BY MISCELLANEOUS ROUTE DAILY AS NEEDED (ASTHMA EXACERBATION)     Syringe, Disposable, (2-3CC SYRINGE) 3 ML MISC  Self Yes No   Sig: USE AS DIRECTED TO ADMINISTER NALOXONE INJECTION  Trulicity 1 5 SP/0 2PR SOPN  Self Yes No   Sig: INJECT 1 5 MG UNDER THE SKIN EVERY 7 DAYS  albuterol (2 5 mg/3 mL) 0 083 % nebulizer solution  Self Yes No   Sig: Inhale 2 5 mg every 4 (four) hours as needed   albuterol (PROVENTIL HFA,VENTOLIN HFA) 90 mcg/act inhaler  Self Yes No   Sig: Inhale 2 puffs every 4 (four) hours as needed   ascorbic acid (VITAMIN C) 250 MG tablet  Self Yes No   Sig: Take 250 mg by mouth daily   azelastine (ASTELIN) 0 1 % nasal spray  Self Yes No   Si spray into each nostril 2 (two) times a day   busPIRone (BUSPAR) 15 mg tablet  Self Yes No   Sig: Take 15 mg by mouth Three times a day   cetirizine (ZyrTEC) 10 mg tablet  Self Yes No   Sig: Take 10 mg by mouth daily   clobetasol (TEMOVATE) 0 05 % external solution  Self Yes No   Sig: Apply topically 2 (two) times a day   clotrimazole-betamethasone (LOTRISONE) 1-0 05 % cream  Self Yes No   Sig: Apply topically 2 (two) times a day   darifenacin (ENABLEX) 15 MG 24 hr tablet  Self Yes No   Sig: Take 15 mg by mouth daily   doxycycline hyclate (VIBRA-TABS) 100 mg tablet  Self Yes No   Sig: TAKE 1 TABLET (100 MG TOTAL) BY MOUTH 2 (TWO) TIMES A DAY FOR 7 DAYS     famotidine (PEPCID) 20 mg tablet  Self Yes No   Sig: Take 20 mg by mouth 2 (two) times a day as needed   fexofenadine (ALLEGRA) 180 MG tablet  Self Yes No   Sig: Take 180 mg by mouth daily   fluocinonide (LIDEX) 0 05 % ointment  Self Yes No   Sig: Apply topically 2 (two) times a day   gabapentin (NEURONTIN) 400 mg capsule  Self Yes No   Sig: Take 400 mg by mouth Three times a day   hydrALAZINE (APRESOLINE) 10 mg tablet  Self Yes No   Sig: Take 10 mg by mouth 2 (two) times a day   hydrOXYzine HCL (ATARAX) 50 mg tablet  Self Yes No   insulin glargine (Lantus SoloStar) 100 units/mL injection pen  Self Yes No   Sig: Inject 55 Units under the skin 2 (two) times a day   insuln lispro (HumaLOG KwikPen) 200 units/mL CONCENTRATED U-200 injection pen  Self Yes No Sig: Inject 50 Units under the skin   ipratropium (ATROVENT) 0 02 % nebulizer solution  Self Yes No   Sig: USE 1 VIAL VIA NEBULIZER EVERY 6 HOURS AS NEEDED FOR SHORTNESS OF BREATH   lisinopril (ZESTRIL) 5 mg tablet  Self Yes No   Sig: Take 5 mg by mouth daily   lovastatin (MEVACOR) 10 MG tablet  Self Yes No   Sig: Take 10 mg by mouth   meclizine (ANTIVERT) 25 mg tablet  Self Yes No   Sig: Take 25 mg by mouth Three times daily as needed   metoprolol succinate (TOPROL-XL) 50 mg 24 hr tablet  Self Yes No   Sig: Take 50 mg by mouth 2 (two) times a day   montelukast (SINGULAIR) 10 mg tablet  Self Yes No   Sig: Take 10 mg by mouth   niacinamide 500 mg tablet  Self Yes No   Sig: Take 500 mg by mouth   nicotine (NICODERM CQ) 21 mg/24 hr TD 24 hr patch  Self No No   Sig: Place 1 patch on the skin every 24 hours   nitroglycerin (NITROSTAT) 0 4 mg SL tablet  Self Yes No   Sig: Place 0 4 mg under the tongue   nystatin (MYCOSTATIN) cream  Self Yes No   Sig: Apply topically 2 (two) times a day   prazosin (MINIPRESS) 5 mg capsule  Self Yes No   Sig: Take 5 mg by mouth   predniSONE 10 mg tablet  Self Yes No   Si po qd for 3 days, then 5 po qd X 3 days, then 4 po qd X 3 day, then 3 po qd X 3 days, then 2 po qd X 3 days, then 1 po qd X 3 days Pls dispense #63   risperiDONE (RisperDAL) 2 mg tablet  Self Yes No   Sig: Take 2 mg by mouth   rivaroxaban (XARELTO) 20 mg tablet  Self Yes No   Sig: Take 20 mg by mouth   senna-docusate sodium (SENOKOT S) 8 6-50 mg per tablet  Self Yes No   Sig: Take 1 tablet by mouth daily   sertraline (ZOLOFT) 100 mg tablet  Self Yes No   Sig: Take 100 mg by mouth daily   sulfamethoxazole-trimethoprim (BACTRIM DS) 800-160 mg per tablet  Self Yes No   triamcinolone (KENALOG) 0 1 % cream  Self Yes No   Sig: Apply 1 application topically 2 (two) times a day   trospium chloride (SANCTURA) 20 mg tablet  Self Yes No   Sig: Take 20 mg by mouth 2 (two) times a day   zolpidem (AMBIEN) 10 mg tablet  Self Yes No Sig: Take 10 mg by mouth daily      Facility-Administered Medications: None       Past Medical History:   Diagnosis Date    Atrial fibrillation ([facility] )     Diabetes mellitus ([facility]     Fibromyalgia     Migraines     Neuromuscular disorder ([facility])     Sick sinus syndrome ([facility] )        History reviewed  No pertinent surgical history  History reviewed  No pertinent family history  I have reviewed and agree with the history as documented  E-Cigarette/Vaping    E-Cigarette Use Never User      E-Cigarette/Vaping Substances    Nicotine No     THC No     CBD No     Flavoring No     Other No     Unknown No      Social History     Tobacco Use    Smoking status: Current Every Day Smoker     Packs/day: 2 00     Types: Cigarettes     Start date: 1992    Smokeless tobacco: Never Used    Tobacco comment: Patient stated that she is ready to quit smoking and she needs help because she smokes about 2 packs and a half each and everyday   Vaping Use    Vaping Use: Never used   Substance Use Topics    Alcohol use: Not Currently    Drug use: Never       Review of Systems   Constitutional: Positive for appetite change  Negative for chills, diaphoresis and fever  HENT: Negative for congestion, ear pain, hearing loss, rhinorrhea, sore throat and tinnitus  Eyes: Positive for visual disturbance  Negative for photophobia and redness  Respiratory: Positive for chest tightness  Negative for cough, shortness of breath and wheezing  Cardiovascular: Positive for chest pain  Negative for palpitations and leg swelling  Gastrointestinal: Positive for nausea and vomiting  Negative for abdominal distention, abdominal pain, blood in stool, constipation and diarrhea  Genitourinary: Negative for dysuria, flank pain, frequency and hematuria  Musculoskeletal: Positive for back pain  Negative for neck pain and neck stiffness  Skin: Negative for color change, pallor, rash and wound     Allergic/Immunologic: Negative for immunocompromised state  Neurological: Positive for headaches  Negative for dizziness, seizures, syncope, facial asymmetry, speech difficulty, weakness, light-headedness and numbness  Hematological: Negative for adenopathy  Does not bruise/bleed easily  Psychiatric/Behavioral: Negative for confusion and decreased concentration  All other systems reviewed and are negative  Physical Exam  Physical Exam  Vitals and nursing note reviewed  Constitutional:       General: She is not in acute distress  Appearance: Normal appearance  She is well-developed  She is not ill-appearing, toxic-appearing or diaphoretic  HENT:      Head: Normocephalic and atraumatic  Right Ear: External ear normal       Left Ear: External ear normal       Mouth/Throat:      Mouth: Mucous membranes are dry  Pharynx: Oropharynx is clear  No oropharyngeal exudate  Eyes:      Extraocular Movements: Extraocular movements intact  Conjunctiva/sclera: Conjunctivae normal    Neck:      Vascular: No JVD  Cardiovascular:      Rate and Rhythm: Normal rate and regular rhythm  Pulses: Normal pulses  Heart sounds: Normal heart sounds  No murmur heard  No friction rub  No gallop  Pulmonary:      Effort: Pulmonary effort is normal  No respiratory distress  Breath sounds: Normal breath sounds  No wheezing, rhonchi or rales  Abdominal:      General: There is no distension  Palpations: Abdomen is soft  Tenderness: There is no abdominal tenderness  There is no guarding or rebound  Musculoskeletal:         General: No swelling or tenderness  Normal range of motion  Cervical back: Normal range of motion and neck supple  No rigidity  Right lower leg: No edema  Left lower leg: No edema  Skin:     General: Skin is warm and dry  Coloration: Skin is not pale  Findings: No erythema or rash  Neurological:      General: No focal deficit present        Mental Status: She is alert and oriented to person, place, and time  Sensory: No sensory deficit  Motor: No weakness     Psychiatric:         Mood and Affect: Mood normal          Behavior: Behavior normal          Vital Signs  ED Triage Vitals   Temperature Pulse Respirations Blood Pressure SpO2   07/16/22 0858 07/16/22 0858 07/16/22 0858 07/16/22 0858 07/16/22 0858   97 9 °F (36 6 °C) 105 18 (!) 194/98 99 %      Temp Source Heart Rate Source Patient Position - Orthostatic VS BP Location FiO2 (%)   07/16/22 0858 07/16/22 0858 07/16/22 1245 07/16/22 1245 --   Oral Monitor Lying Right arm       Pain Score       07/16/22 0942       7         Vitals:    07/16/22 0858 07/16/22 1245   BP: (!) 194/98 (!) 207/105   BP Location:  Right arm   Pulse: 105 99   Resp: 18 18   Temp: 97 9 °F (36 6 °C)    TempSrc: Oral    SpO2: 99% 99%   Weight: 55 4 kg (122 lb 2 2 oz)        Visual Acuity  Visual Acuity    Flowsheet Row Most Recent Value   R Pupil Size (mm) 2          ED Medications  Medications   sodium chloride 0 9 % bolus 1,000 mL (0 mL Intravenous Stopped 7/16/22 1045)   diphenhydrAMINE (BENADRYL) injection 25 mg (25 mg Intravenous Given 7/16/22 0943)   metoclopramide (REGLAN) injection 10 mg (10 mg Intravenous Given 7/16/22 0944)   magnesium sulfate 2 g/50 mL IVPB (premix) 2 g (0 g Intravenous Stopped 7/16/22 1044)   morphine (MSIR) IR tablet 15 mg (15 mg Oral Given 7/16/22 0942)   ketorolac (TORADOL) injection 15 mg (15 mg Intravenous Given 7/16/22 1018)   potassium chloride (K-DUR,KLOR-CON) CR tablet 40 mEq (40 mEq Oral Given 7/16/22 1152)       Diagnostic Studies  Results Reviewed     Procedure Component Value Units Date/Time    HS Troponin I 2hr [[id number]]  (Normal) Collected: 07/16/22 1152    Lab Status: Final result Specimen: Blood from Arm, Right Updated: 07/16/22 1223     hs TnI 2hr 9 ng/L      Delta 2hr hsTnI 0 ng/L     HS Troponin 0hr (reflex protocol) [[id number]]  (Normal) Collected: 07/16/22 0949    Lab Status: Final result Specimen: Blood from Arm, Right Updated: 07/16/22 1025     hs TnI 0hr 9 ng/L     Basic metabolic panel [[id number]]  (Abnormal) Collected: 07/16/22 0949    Lab Status: Final result Specimen: Blood from Arm, Right Updated: 07/16/22 1022     Sodium 143 mmol/L      Potassium 3 0 mmol/L      Chloride 104 mmol/L      CO2 30 mmol/L      ANION GAP 9 mmol/L      BUN 6 mg/dL      Creatinine 0 83 mg/dL      Glucose 109 mg/dL      Calcium 8 7 mg/dL      eGFR 80 ml/min/1 73sq m     Narrative:      [facility] guidelines for Chronic Kidney Disease (CKD):     Stage 1 with normal or high GFR (GFR > 90 mL/min/1 73 square meters)    Stage 2 Mild CKD (GFR = 60-89 mL/min/1 73 square meters)    Stage 3A Moderate CKD (GFR = 45-59 mL/min/1 73 square meters)    Stage 3B Moderate CKD (GFR = 30-44 mL/min/1 73 square meters)    Stage 4 Severe CKD (GFR = 15-29 mL/min/1 73 square meters)    Stage 5 End Stage CKD (GFR <15 mL/min/1 73 square meters)  Note: GFR calculation is accurate only with a steady state creatinine    Hepatic function panel [[id number]]  (Abnormal) Collected: 07/16/22 0949    Lab Status: Final result Specimen: Blood from Arm, Right Updated: 07/16/22 1022     Total Bilirubin 0 30 mg/dL      Bilirubin, Direct 0 09 mg/dL      Alkaline Phosphatase 80 U/L      AST 13 U/L      ALT 10 U/L      Total Protein 6 6 g/dL      Albumin 3 3 g/dL     Magnesium [[id number]]  (Normal) Collected: 07/16/22 0949    Lab Status: Final result Specimen: Blood from Arm, Right Updated: 07/16/22 1022     Magnesium 1 6 mg/dL     CBC and differential [[id number]] Collected: 07/16/22 0949    Lab Status: Final result Specimen: Blood from Arm, Right Updated: 07/16/22 0957     WBC 9 59 Thousand/uL      RBC 4 67 Million/uL      Hemoglobin 14 1 g/dL      Hematocrit 41 8 %      MCV 90 fL      MCH 30 2 pg      MCHC 33 7 g/dL      RDW 14 8 %      MPV 10 9 fL      Platelets 610 Thousands/uL      nRBC 0 /100 WBCs      Neutrophils Relative 67 %      Immat GRANS % 0 %      Lymphocytes Relative 23 %      Monocytes Relative 7 %      Eosinophils Relative 2 %      Basophils Relative 1 %      Neutrophils Absolute 6 48 Thousands/µL      Immature Grans Absolute 0 03 Thousand/uL      Lymphocytes Absolute 2 18 Thousands/µL      Monocytes Absolute 0 62 Thousand/µL      Eosinophils Absolute 0 22 Thousand/µL      Basophils Absolute 0 06 Thousands/µL                  XR chest 2 views   ED Interpretation by Eros Ritchie DO (07/16 0945)   No acute abnormality in the chest       Final Result by Tom Deleon MD (07/16 1007)      No acute cardiopulmonary disease  Workstation performed: [id number]         CT head wo contrast   Final Result by Teodoro Russell DO (07/16 9576)      No acute intracranial abnormality                    Workstation performed: [id number]                    Procedures  ECG 12 Lead Documentation Only    Date/Time: 7/16/2022 10:04 AM  Performed by: Eros Ritchie DO  Authorized by: Eros Ritchie DO     ECG reviewed by me, the ED Provider: yes    Patient location:  ED  Previous ECG:     Previous ECG:  Unavailable  Rate:     ECG rate:  101    ECG rate assessment: tachycardic    Rhythm:     Rhythm: sinus tachycardia    Ectopy:     Ectopy: PAC    QRS:     QRS axis:  Normal    QRS intervals:  Normal  Conduction:     Conduction: normal    ST segments:     ST segments:  Normal  T waves:     T waves: normal      ECG 12 Lead Documentation Only    Date/Time: 7/16/2022 12:42 PM  Performed by: Eros Ritchie DO  Authorized by: Eros Ritchie DO     ECG reviewed by me, the ED Provider: yes    Patient location:  ED  Previous ECG:     Previous ECG:  Compared to current    Similarity:  No change  Rate:     ECG rate:  99    ECG rate assessment: normal    Rhythm:     Rhythm: sinus rhythm    Ectopy:     Ectopy: PAC    QRS:     QRS axis:  Normal    QRS intervals: Normal  Conduction:     Conduction: normal    ST segments:     ST segments:  Normal  T waves:     T waves: normal    Other findings:     Other findings: prolonged qTc interval               ED Course  ED Course as of 07/16/22 1249   Sat Jul 16, 2022   1045 hs TnI 0hr: 9  Will repeat in 2 hours  1056 Glucose, Random: 821   6394 Patient reassessed and updated of normal workup  Patient is feeling a lot better and has improvement in her headache and chest discomfort  Discussed symptom management at home and when to return to the ER  HEART Risk Score    Flowsheet Row Most Recent Value   Heart Score Risk Calculator    History 0 Filed at: 07/16/2022 1241   ECG 0 Filed at: 07/16/2022 1241   Age 1 Filed at: 07/16/2022 1241   Risk Factors 2 Filed at: 07/16/2022 1241   Troponin 0 Filed at: 07/16/2022 1241   HEART Score 3 Filed at: 07/16/2022 1241                    MDM  Number of Diagnoses or Management Options  Acute headache  Atrial flutter (HCC)  Chest tightness  Chronic back pain  Diagnosis management comments: 66-year-old female with multiple medical comorbidities including chronic pain however has been out of her narcotic pain medication for 1-2 months, presents to the emergency department for acute headache upon awakening as well as chest tightness, dyspnea and shakiness  She did not get much relief from sublingual nitro which she took at home and also admits to taking Xanax  Patient does not appear to be in any distress and has normal neurologic and cardiopulmonary examination  Will workup with head CT given her history of intracranial hemorrhage in 2014  Most likely this is a migraine headache  In regards to the chest tightness, this could be anxiety, panic, ACS, symptomatic hypo or hyperglycemia  Will workup with cardiac labs, fingerstick glucose, chest x-ray, EKG  Will give IV fluids and migraine cocktail excluding Decadron due to risk of hyperglycemia    Patient is requesting pain medication and will give p o  Morphine for her chronic back pain in the ED however will be unable to prescribe any narcotics for home use and will refer to pain management specialist     Prior to discharge, patient requested refill for her metoprolol  I advised she follow up with her PCP to get her other medications re-prescribed  Amount and/or Complexity of Data Reviewed  Clinical lab tests: ordered and reviewed  Tests in the radiology section of CPT®: ordered and reviewed  Tests in the medicine section of CPT®: reviewed and ordered  Obtain history from someone other than the patient: yes  Independent visualization of images, tracings, or specimens: yes        Disposition  Final diagnoses:   Chronic back pain   Chest tightness   Acute headache   Atrial flutter ([address] )     Time reflects when diagnosis was documented in both MDM as applicable and the Disposition within this note     Time User Action Codes Description Comment    7/16/2022 12:39 PM Sherrilee Erie E Add [M54 9,  G89 29] Chronic back pain     7/16/2022 12:39 PM Sherrilee Bayron E Add [R07 89] Chest tightness     7/16/2022 12:39 PM Sherrilee Bayron E Add [R51 9] Acute headache     7/16/2022 12:49 PM Susana Filter Add [I48 92] Atrial flutter [facility])       ED Disposition     ED Disposition   Discharge    Condition   Stable    Date/Time   Sat Jul 16, 2022 12:39 PM    [address] [place] discharge to home/self care                 Follow-up Information     Follow up With Specialties Details Why Contact Info Additional Information    CRISTINO Pantoja Nurse Practitioner Schedule an appointment as soon as possible for a visit   [address] Pain Medicine Schedule an appointment as soon as possible for a visit   [address] [facility] [phone] [place], [address] [facility] Emergency Department Emergency Medicine Go to  If symptoms worsen [address] Emergency Department, [address] [place] [address], [place], [place], [address]          Patient's Medications   Discharge Prescriptions    IBUPROFEN (MOTRIN) 600 MG TABLET    Take 1 tablet (600 mg total) by mouth every 6 (six) hours as needed for mild pain or moderate pain       Start Date: 7/16/2022 End Date: --       Order Dose: 600 mg       Quantity: 30 tablet    Refills: 0    METOCLOPRAMIDE (REGLAN) 10 MG TABLET    Take 1 tablet (10 mg total) by mouth every 6 (six) hours as needed (nausea or vomiting, or migraine headache)       Start Date: 7/16/2022 End Date: --       Order Dose: 10 mg       Quantity: 30 tablet    Refills: 0    METOPROLOL SUCCINATE (TOPROL-XL) 25 MG 24 HR TABLET    Take 2 tablets (50 mg total) by mouth daily       Start Date: 7/16/2022 End Date: --       Order Dose: 50 mg       Quantity: 30 tablet    Refills: 0       No discharge procedures on file      PDMP Review       Value Time User    PDMP Reviewed  Yes 6/30/2022  3:58 PM Elidia Bello, [address]          ED Provider  Electronically Signed by           Maria C Allison, DO  07/16/22 30[address] [facility], DO  07/16/22 3021 [facility], DO  07/16/22 1249

## 2022-07-21 ENCOUNTER — TELEMEDICINE (OUTPATIENT)
Dept: INTERNAL MEDICINE CLINIC | Facility: CLINIC | Age: 52
End: 2022-07-21
Payer: COMMERCIAL

## 2022-07-21 DIAGNOSIS — Z79.4 TYPE 2 DIABETES MELLITUS WITH HYPOGLYCEMIA WITHOUT COMA, WITH LONG-TERM CURRENT USE OF INSULIN (HCC): ICD-10-CM

## 2022-07-21 DIAGNOSIS — H44.50: ICD-10-CM

## 2022-07-21 DIAGNOSIS — E46 PROTEIN-CALORIE MALNUTRITION, UNSPECIFIED SEVERITY (HCC): Primary | ICD-10-CM

## 2022-07-21 DIAGNOSIS — K02.62 DENTAL CARIES EXTENDING INTO DENTIN: ICD-10-CM

## 2022-07-21 DIAGNOSIS — F43.10 PTSD (POST-TRAUMATIC STRESS DISORDER): Primary | ICD-10-CM

## 2022-07-21 DIAGNOSIS — F41.9 ANXIETY: ICD-10-CM

## 2022-07-21 DIAGNOSIS — E11.649 TYPE 2 DIABETES MELLITUS WITH HYPOGLYCEMIA WITHOUT COMA, WITH LONG-TERM CURRENT USE OF INSULIN (HCC): ICD-10-CM

## 2022-07-21 PROCEDURE — 99215 OFFICE O/P EST HI 40 MIN: CPT

## 2022-07-21 RX ORDER — BUSPIRONE HYDROCHLORIDE 15 MG/1
15 TABLET ORAL 3 TIMES DAILY
Qty: 90 TABLET | Refills: 3 | Status: SHIPPED | OUTPATIENT
Start: 2022-07-21

## 2022-07-21 RX ORDER — ZOLPIDEM TARTRATE 10 MG/1
10 TABLET ORAL DAILY
Qty: 30 TABLET | Refills: 0 | Status: SHIPPED | OUTPATIENT
Start: 2022-07-21 | End: 2022-09-01 | Stop reason: SDUPTHER

## 2022-07-21 RX ORDER — GABAPENTIN 400 MG/1
400 CAPSULE ORAL 3 TIMES DAILY
Qty: 90 CAPSULE | Refills: 3 | Status: SHIPPED | OUTPATIENT
Start: 2022-07-21 | End: 2022-08-15

## 2022-07-21 RX ORDER — ARIPIPRAZOLE 10 MG/1
10 TABLET ORAL DAILY
Qty: 90 TABLET | Refills: 3 | Status: SHIPPED | OUTPATIENT
Start: 2022-07-21

## 2022-07-21 RX ORDER — LACTOSE-REDUCED FOOD 0.05 G-1.5
1 LIQUID (ML) ORAL 3 TIMES DAILY
Qty: 296 ML | Refills: 5 | Status: SHIPPED | OUTPATIENT
Start: 2022-07-21

## 2022-07-21 RX ORDER — OLOPATADINE HYDROCHLORIDE 1 MG/ML
1 SOLUTION/ DROPS OPHTHALMIC 2 TIMES DAILY
Qty: 5 ML | Refills: 5 | Status: SHIPPED | OUTPATIENT
Start: 2022-07-21 | End: 2022-08-15 | Stop reason: ALTCHOICE

## 2022-07-21 RX ORDER — DOXYCYCLINE HYCLATE 100 MG
100 TABLET ORAL 2 TIMES DAILY
Qty: 14 TABLET | Refills: 0 | Status: SHIPPED | OUTPATIENT
Start: 2022-07-21 | End: 2022-07-28

## 2022-07-21 NOTE — PROGRESS NOTES
Virtual Regular Visit    Verification of patient location:    Patient is located in the following state in which I hold an active license [place]      Assessment/Plan:  Psychiatric health  History of PTSD, intermittent explosive disorder, and bipolar  I discussed with her that until she is established with providers in this state we will only fill the Abilify as well as BuSpar on a regular basis however the benzodiazepine will only be filled short-term until she can get established with a psychiatrist   Patient is understanding of these instructions    Diabetes  Patient has been monitoring her blood sugars daily they have been ranging between   She has significantly changed her diet however she does drink regular Pepsi occasionally  She recently saw endocrinology  Hypertension  On metoprolol and lisinopril  She does not check her blood pressures at home      Problem List Items Addressed This Visit        Endocrine    DM (diabetes mellitus), type 2 (HCC)    Relevant Medications    gabapentin (NEURONTIN) 400 mg capsule       Other    Anxiety    Relevant Medications    ARIPiprazole (ABILIFY) 10 mg tablet    busPIRone (BUSPAR) 15 mg tablet      Other Visit Diagnoses     Protein-calorie malnutrition, unspecified severity ([facility] [address] )    -  Primary    Relevant Medications    Nutritional Supplements (Glucerna Hunger Smart Shake) LIQD    Eye degeneration        Relevant Medications    olopatadine (PATANOL) 0 1 % ophthalmic solution    Dental caries extending into dentin        Relevant Medications    doxycycline hyclate (VIBRA-TABS) 100 mg tablet               Reason for visit is No chief complaint on file  Encounter provider CRISTINO Velazquez    Provider located at [address] [place] [address]      Recent Visits  No visits were found meeting these conditions    Showing recent visits within past 7 days and meeting all other requirements  Future Appointments  No visits were found meeting these conditions  Showing future appointments within next 150 days and meeting all other requirements       The patient was identified by name and date of birth  Dede Evans was informed that this is a telemedicine visit and that the visit is being conducted through [address] [place] [address] [facility] and patient was informed that this is a secure, HIPAA-compliant platform  She agrees to proceed     My office door was closed  No one else was in the room  She acknowledged consent and understanding of privacy and security of the video platform  The patient has agreed to participate and understands they can discontinue the visit at any time  Patient is aware this is a billable service  Subjective  Dahlia Jaime Morrissey is a 45 y o  female   Patient presents today via video visit for a follow-up  He recently was in the emergency room  due to low blood sugars  She recently saw her endocrinologist who changed her insulins around put her on lispro, took her off her sliding scale, kept her on Lantus as well as her Trulicity  Patient states that her blood sugar dropped into the 40s due to these changes  She is insistent on not following the recommendation of the endocrinologist and she is just now taking Trulicity  Her blood sugars for 10 days were reported as 140, 128, 104, 113, 113, 96, and 102, 123, 169, 129  I did di her hemoglobin A1c was 12 therefore the recommended medication from the endocrinologist is what can help bring this down  Patient states that she has been feeling better since she has been taking the medications away she is on her own  She has significantly changed her diet  And has been increasing her intake of protein through to Glucerna    Patient is waiting for share ride so she can come into the office and have a face-to-face visit she is also waiting for her continuous glucose monitor which is being held up by insurance approval   She is sending me a updated list of her medications and what medication she needs she did move here from [place] and does not have a psychiatrist to prescribe a certain medications for her PTSD, intermittent explosive disorder, and bipolar  I did discuss with her that I can refill her Abilify as well as BuSpar but as far as the benzodiazepine it would be a short interval of these medications until she can get established with Psychiatry  Past Medical History:   Diagnosis Date    Atrial fibrillation ([facility] )     [facility] mellitus [facility])     Fibromyalgia     Migraines     Neuromuscular disorder ([facility] )     Sick sinus syndrome ([facility] )        History reviewed  No pertinent surgical history      Current Outpatient Medications   Medication Sig Dispense Refill    ARIPiprazole (ABILIFY) 10 mg tablet Take 1 tablet (10 mg total) by mouth daily 90 tablet 3    busPIRone (BUSPAR) 15 mg tablet Take 1 tablet (15 mg total) by mouth 3 (three) times a day 90 tablet 3    doxycycline hyclate (VIBRA-TABS) 100 mg tablet Take 1 tablet (100 mg total) by mouth 2 (two) times a day for 7 days 14 tablet 0    gabapentin (NEURONTIN) 400 mg capsule Take 1 capsule (400 mg total) by mouth 3 (three) times a day 90 capsule 3    Nutritional Supplements (Glucerna Hunger Smart Shake) LIQD Take 1 Can by mouth 3 (three) times a day 296 mL 5    olopatadine (PATANOL) 0 1 % ophthalmic solution Administer 1 drop to both eyes 2 (two) times a day 5 mL 5    albuterol (2 5 mg/3 mL) 0 083 % nebulizer solution Inhale 2 5 mg every 4 (four) hours as needed      albuterol (PROVENTIL HFA,VENTOLIN HFA) 90 mcg/act inhaler Inhale 2 puffs every 4 (four) hours as needed      Alcohol Swabs PADS 5 (five) times a day      ALPRAZolam (XANAX) 1 mg tablet Take 0 5 tablets (0 5 mg total) by mouth 3 (three) times a day 90 tablet 0    ascorbic acid (VITAMIN C) 250 MG tablet Take 250 mg by mouth daily      azelastine (ASTELIN) 0 1 % nasal spray 1 spray into each nostril 2 (two) times a day      BD Pen Needle Nkechi 2nd Gen 32G X 4 MM MISC USE AS DIRECTED FOR BEFORE A MEAL AND AT BEDTIME GLUCOSE INJECTION      BD PrecisionGlide Needle 23G X 1-1/2" MIS USE AS DIRECTED TO ADMINISTER NALOXONE INJECTION  MAY DISPENSE 23-25 GAUGE 1-1 5" NEEDLE   Blood Glucose Monitoring Suppl (ONE TOUCH ULTRA 2) w/Device KIT Use as directed      cetirizine (ZyrTEC) 10 mg tablet Take 10 mg by mouth daily      Cholecalciferol 125 MCG (5000 UT) TABS every 24 hours      clobetasol (TEMOVATE) 0 05 % external solution Apply topically 2 (two) times a day      clotrimazole-betamethasone (LOTRISONE) 1-0 05 % cream Apply topically 2 (two) times a day      Continuous Blood Gluc Sensor ([employer] Sensor System) MISC 2- 14 day sensors 2 each 11    darifenacin (ENABLEX) 15 MG 24 hr tablet Take 15 mg by mouth daily      EPINEPHrine (EPIPEN) 0 3 mg/0 3 mL SOAJ Inject 0 3 mg into a muscle      famotidine (PEPCID) 20 mg tablet Take 20 mg by mouth 2 (two) times a day as needed      fexofenadine (ALLEGRA) 180 MG tablet Take 180 mg by mouth daily      fluocinonide (LIDEX) 0 05 % ointment Apply topically 2 (two) times a day      Fluticasone Propionate, Inhal, (Flovent Diskus) 250 MCG/BLIST AEPB Inhale 1 puff 2 (two) times a day      ibuprofen (MOTRIN) 600 mg tablet Take 1 tablet (600 mg total) by mouth every 6 (six) hours as needed for mild pain or moderate pain 30 tablet 0    insulin glargine (Lantus SoloStar) 100 units/mL injection pen Inject 55 Units under the skin 2 (two) times a day      insuln lispro (HumaLOG KwikPen) 200 units/mL CONCENTRATED U-200 injection pen Inject 50 Units under the skin      ipratropium (ATROVENT) 0 02 % nebulizer solution USE 1 VIAL VIA NEBULIZER EVERY 6 HOURS AS NEEDED FOR SHORTNESS OF BREATH      Lantus SoloStar 100 units/mL injection pen INJECT 55 UNITS UNDER THE SKIN 2 (TWO) TIMES A DAY AT LUNCH AND AT BEDTIME        lisinopril (ZESTRIL) 5 mg tablet Take 5 mg by mouth daily      lovastatin (MEVACOR) 10 MG tablet Take 10 mg by mouth      meclizine (ANTIVERT) 25 mg tablet Take 25 mg by mouth Three times daily as needed      metoclopramide (Reglan) 10 mg tablet Take 1 tablet (10 mg total) by mouth every 6 (six) hours as needed (nausea or vomiting, or migraine headache) 30 tablet 0    metoprolol succinate (TOPROL-XL) 25 mg 24 hr tablet Take 2 tablets (50 mg total) by mouth daily 30 tablet 0    metoprolol succinate (TOPROL-XL) 50 mg 24 hr tablet Take 50 mg by mouth 2 (two) times a day      montelukast (SINGULAIR) 10 mg tablet Take 10 mg by mouth      niacinamide 500 mg tablet Take 500 mg by mouth      nicotine (NICODERM CQ) 21 mg/24 hr TD 24 hr patch Place 1 patch on the skin every 24 hours 28 patch 0    nitroglycerin (NITROSTAT) 0 4 mg SL tablet Place 0 4 mg under the tongue      nystatin (MYCOSTATIN) cream Apply topically 2 (two) times a day      Olopatadine HCl (Pataday) 0 7 % SOLN Apply to eye daily      OneTouch Delica Lancets 22Z MISC 2 (two) times a day      OneTouch Ultra test strip TEST TWICE DAILY OR AS DIRECTED BY MD      prazosin (MINIPRESS) 5 mg capsule Take 5 mg by mouth      risperiDONE (RisperDAL) 2 mg tablet Take 2 mg by mouth      rivaroxaban (XARELTO) 20 mg tablet Take 20 mg by mouth      senna-docusate sodium (SENOKOT S) 8 6-50 mg per tablet Take 1 tablet by mouth daily      sertraline (ZOLOFT) 100 mg tablet Take 100 mg by mouth daily      Spacer/Aero-Holding Chambers (AeroChamber Plus Geoff-Vu w/Mask) MISC 1 EACH (1 APPLICATION TOTAL) BY MISCELLANEOUS ROUTE DAILY AS NEEDED (ASTHMA EXACERBATION)   SUMAtriptan (IMITREX) 100 mg tablet TAKE 1 TABLET (100 MG TOTAL) BY MOUTH ONE TIME AS NEEDED FOR MIGRAINE  MAY REPEAT IN 2 HOURS IF UNRESOLVED  DO NOT EXCEED 200 MG IN 24 HOURS      Syringe, Disposable, (2-3CC SYRINGE) 3 ML MISC USE AS DIRECTED TO ADMINISTER NALOXONE INJECTION        triamcinolone (KENALOG) 0 1 % cream Apply 1 application topically 2 (two) times a day      trospium chloride (SANCTURA) 20 mg tablet Take 20 mg by mouth 2 (two) times a day      Trulicity 1 5 CP/8 6NZ SOPN INJECT 1 5 MG UNDER THE SKIN EVERY 7 DAYS   zolpidem (AMBIEN) 10 mg tablet Take 10 mg by mouth daily       No current facility-administered medications for this visit  Allergies   Allergen Reactions    Other Anaphylaxis     Capzasin HP -- severe burning and swelling of the skin     Clarithromycin GI Intolerance    Acetaminophen GI Intolerance    Bactrim [Sulfamethoxazole-Trimethoprim] Itching, GI Intolerance, Dizziness, Confusion and Lightheadedness     Patient passes out when on this medication for several days     Cephalosporins Hives    Latex Hives    Oxycodone-Acetaminophen GI Intolerance    Penicillins Diarrhea    Trazodone Diarrhea    Capsaicin Rash    Naproxen Palpitations       Review of Systems   Constitutional: Negative for appetite change, chills, diaphoresis, fatigue, fever and unexpected weight change  HENT: Negative for postnasal drip and sneezing  Eyes: Negative for visual disturbance  Respiratory: Negative for chest tightness and shortness of breath  Cardiovascular: Negative for chest pain, palpitations and leg swelling  Gastrointestinal: Negative for abdominal pain and blood in stool  Endocrine: Negative for cold intolerance, heat intolerance, polydipsia, polyphagia and polyuria  Genitourinary: Negative for difficulty urinating, dysuria, frequency and urgency  Musculoskeletal: Negative for arthralgias and myalgias  Skin: Negative for rash and wound  Neurological: Negative for dizziness, weakness, light-headedness and headaches  Hematological: Negative for adenopathy  Psychiatric/Behavioral: Negative for confusion, dysphoric mood and sleep disturbance  The patient is nervous/anxious  Video Exam    There were no vitals filed for this visit      Physical Exam  Constitutional: General: She is not in acute distress  Appearance: She is well-developed  She is not diaphoretic  Eyes:      General: No scleral icterus  Right eye: No discharge  Left eye: No discharge  Conjunctiva/sclera: Conjunctivae normal    Pulmonary:      Effort: Pulmonary effort is normal  No respiratory distress  Neurological:      Mental Status: She is alert and oriented to person, place, and time  Psychiatric:         Behavior: Behavior normal           I spent 30 minutes directly with the patient during this visit    [address] verbally agrees to participate in [employer]  Pt is aware that [employer] could be limited without vital signs or the ability to perform a full hands-on physical [place] understands she or the provider may request at any time to terminate the video visit and request the patient to seek care or treatment in person

## 2022-07-29 DIAGNOSIS — F41.9 ANXIETY: ICD-10-CM

## 2022-07-29 RX ORDER — ALPRAZOLAM 1 MG/1
0.5 TABLET ORAL 3 TIMES DAILY
Qty: 90 TABLET | Refills: 0 | OUTPATIENT
Start: 2022-07-29

## 2022-08-01 ENCOUNTER — TELEPHONE (OUTPATIENT)
Dept: INTERNAL MEDICINE CLINIC | Facility: CLINIC | Age: 52
End: 2022-08-01

## 2022-08-01 DIAGNOSIS — K21.9 GASTROESOPHAGEAL REFLUX DISEASE WITHOUT ESOPHAGITIS: ICD-10-CM

## 2022-08-01 DIAGNOSIS — F41.9 ANXIETY: ICD-10-CM

## 2022-08-01 DIAGNOSIS — N32.81 OVERACTIVE BLADDER: ICD-10-CM

## 2022-08-01 DIAGNOSIS — T78.40XS ALLERGY, SEQUELA: ICD-10-CM

## 2022-08-01 DIAGNOSIS — R06.00 DYSPNEA, UNSPECIFIED TYPE: ICD-10-CM

## 2022-08-01 DIAGNOSIS — F63.81 INTERMITTENT EXPLOSIVE DISORDER: Primary | ICD-10-CM

## 2022-08-01 DIAGNOSIS — F17.200 SMOKING: ICD-10-CM

## 2022-08-01 DIAGNOSIS — F43.10 PTSD (POST-TRAUMATIC STRESS DISORDER): ICD-10-CM

## 2022-08-01 DIAGNOSIS — M54.9 BACK PAIN, UNSPECIFIED BACK LOCATION, UNSPECIFIED BACK PAIN LATERALITY, UNSPECIFIED CHRONICITY: ICD-10-CM

## 2022-08-01 RX ORDER — SERTRALINE HYDROCHLORIDE 100 MG/1
100 TABLET, FILM COATED ORAL DAILY
Qty: 90 TABLET | Refills: 3 | Status: SHIPPED | OUTPATIENT
Start: 2022-08-01

## 2022-08-01 RX ORDER — RISPERIDONE 2 MG/1
2 TABLET, FILM COATED ORAL
Qty: 90 TABLET | Refills: 3 | Status: SHIPPED | OUTPATIENT
Start: 2022-08-01

## 2022-08-01 RX ORDER — TROSPIUM CHLORIDE 20 MG/1
20 TABLET, FILM COATED ORAL 2 TIMES DAILY
Qty: 90 TABLET | Refills: 3 | Status: SHIPPED | OUTPATIENT
Start: 2022-08-01

## 2022-08-01 RX ORDER — HYDRALAZINE HYDROCHLORIDE 50 MG/1
50 TABLET, FILM COATED ORAL 3 TIMES DAILY
COMMUNITY

## 2022-08-01 RX ORDER — FAMOTIDINE 20 MG/1
20 TABLET, FILM COATED ORAL 2 TIMES DAILY PRN
Qty: 90 TABLET | Refills: 3 | Status: SHIPPED | OUTPATIENT
Start: 2022-08-01

## 2022-08-01 RX ORDER — ALBUTEROL SULFATE 2.5 MG/3ML
2.5 SOLUTION RESPIRATORY (INHALATION) EVERY 4 HOURS PRN
Qty: 3 ML | Refills: 5 | Status: SHIPPED | OUTPATIENT
Start: 2022-08-01 | End: 2022-08-29

## 2022-08-01 RX ORDER — CYCLOBENZAPRINE HCL 5 MG
5 TABLET ORAL 3 TIMES DAILY PRN
COMMUNITY
End: 2022-08-01 | Stop reason: SDUPTHER

## 2022-08-01 RX ORDER — FEXOFENADINE HCL 180 MG/1
180 TABLET ORAL DAILY
Qty: 90 TABLET | Refills: 3 | Status: SHIPPED | OUTPATIENT
Start: 2022-08-01

## 2022-08-01 RX ORDER — MONTELUKAST SODIUM 10 MG/1
10 TABLET ORAL
Qty: 90 TABLET | Refills: 3 | Status: SHIPPED | OUTPATIENT
Start: 2022-08-01

## 2022-08-01 RX ORDER — CYCLOBENZAPRINE HCL 5 MG
5 TABLET ORAL 3 TIMES DAILY PRN
Qty: 90 TABLET | Refills: 0 | Status: SHIPPED | OUTPATIENT
Start: 2022-08-01 | End: 2022-09-02

## 2022-08-01 RX ORDER — ALPRAZOLAM 1 MG/1
0.5 TABLET ORAL 3 TIMES DAILY
Qty: 45 TABLET | Refills: 0 | Status: SHIPPED | OUTPATIENT
Start: 2022-08-01 | End: 2022-08-15

## 2022-08-01 RX ORDER — PANTOPRAZOLE SODIUM 40 MG/1
40 TABLET, DELAYED RELEASE ORAL DAILY
COMMUNITY
End: 2022-08-01 | Stop reason: SDUPTHER

## 2022-08-01 RX ORDER — PANTOPRAZOLE SODIUM 40 MG/1
40 TABLET, DELAYED RELEASE ORAL DAILY
Qty: 90 TABLET | Refills: 3 | Status: SHIPPED | OUTPATIENT
Start: 2022-08-01

## 2022-08-05 DIAGNOSIS — E55.9 VITAMIN D DEFICIENCY: Primary | ICD-10-CM

## 2022-08-05 RX ORDER — ERGOCALCIFEROL 1.25 MG/1
50000 CAPSULE ORAL WEEKLY
Qty: 12 CAPSULE | Refills: 0 | OUTPATIENT
Start: 2022-08-05

## 2022-08-05 NOTE — TELEPHONE ENCOUNTER
----- Message from Angelo sent at 8/5/2022  6:45 AM EDT -----  Regarding: Alok Gavin I am attaching copy of pill bottle of medication I need   Thank you

## 2022-08-05 NOTE — TELEPHONE ENCOUNTER
Quinton Garcia see a recent vitamin D level, the request for 50,000 units of Vit D is usually only after a vit d level is completed and the levels are less than 16  We can do a level when you get blood work next time   For now I would take Vit D 2,000 units daily

## 2022-08-10 ENCOUNTER — PATIENT MESSAGE (OUTPATIENT)
Dept: INTERNAL MEDICINE CLINIC | Facility: CLINIC | Age: 52
End: 2022-08-10

## 2022-08-10 DIAGNOSIS — R21 RASH: Primary | ICD-10-CM

## 2022-08-10 RX ORDER — PREDNISONE 10 MG/1
TABLET ORAL
Qty: 28 TABLET | Refills: 0 | Status: SHIPPED | OUTPATIENT
Start: 2022-08-10 | End: 2022-09-15 | Stop reason: ALTCHOICE

## 2022-08-15 ENCOUNTER — OFFICE VISIT (OUTPATIENT)
Dept: INTERNAL MEDICINE CLINIC | Facility: CLINIC | Age: 52
End: 2022-08-15
Payer: COMMERCIAL

## 2022-08-15 ENCOUNTER — TELEPHONE (OUTPATIENT)
Dept: OTHER | Facility: OTHER | Age: 52
End: 2022-08-15

## 2022-08-15 ENCOUNTER — TELEPHONE (OUTPATIENT)
Dept: INTERNAL MEDICINE CLINIC | Facility: CLINIC | Age: 52
End: 2022-08-15

## 2022-08-15 ENCOUNTER — APPOINTMENT (OUTPATIENT)
Dept: LAB | Facility: CLINIC | Age: 52
End: 2022-08-15
Payer: COMMERCIAL

## 2022-08-15 VITALS
OXYGEN SATURATION: 97 % | DIASTOLIC BLOOD PRESSURE: 88 MMHG | HEIGHT: 66 IN | WEIGHT: 138 LBS | HEART RATE: 84 BPM | BODY MASS INDEX: 22.18 KG/M2 | SYSTOLIC BLOOD PRESSURE: 142 MMHG | TEMPERATURE: 98.9 F

## 2022-08-15 DIAGNOSIS — T78.2XXS ANAPHYLAXIS, SEQUELA: ICD-10-CM

## 2022-08-15 DIAGNOSIS — M06.9 RHEUMATOID ARTHRITIS INVOLVING BOTH HANDS, UNSPECIFIED WHETHER RHEUMATOID FACTOR PRESENT (HCC): ICD-10-CM

## 2022-08-15 DIAGNOSIS — Z79.4 TYPE 2 DIABETES MELLITUS WITH HYPOGLYCEMIA WITHOUT COMA, WITH LONG-TERM CURRENT USE OF INSULIN (HCC): Primary | ICD-10-CM

## 2022-08-15 DIAGNOSIS — I48.0 PAROXYSMAL A-FIB (HCC): ICD-10-CM

## 2022-08-15 DIAGNOSIS — E13.42 DIABETIC POLYNEUROPATHY ASSOCIATED WITH OTHER SPECIFIED DIABETES MELLITUS (HCC): ICD-10-CM

## 2022-08-15 DIAGNOSIS — F41.9 ANXIETY: ICD-10-CM

## 2022-08-15 DIAGNOSIS — Z79.4 ENCOUNTER FOR LONG-TERM (CURRENT) USE OF INSULIN (HCC): ICD-10-CM

## 2022-08-15 DIAGNOSIS — F43.10 PTSD (POST-TRAUMATIC STRESS DISORDER): ICD-10-CM

## 2022-08-15 DIAGNOSIS — E55.9 VITAMIN D DEFICIENCY: ICD-10-CM

## 2022-08-15 DIAGNOSIS — E78.2 MIXED HYPERLIPIDEMIA: ICD-10-CM

## 2022-08-15 DIAGNOSIS — M79.672 LEFT FOOT PAIN: ICD-10-CM

## 2022-08-15 DIAGNOSIS — Z12.4 SCREENING FOR CERVICAL CANCER: ICD-10-CM

## 2022-08-15 DIAGNOSIS — Z12.11 SCREENING FOR COLORECTAL CANCER: ICD-10-CM

## 2022-08-15 DIAGNOSIS — E11.649 TYPE 2 DIABETES MELLITUS WITH HYPOGLYCEMIA WITHOUT COMA, WITH LONG-TERM CURRENT USE OF INSULIN (HCC): Primary | ICD-10-CM

## 2022-08-15 DIAGNOSIS — Z12.12 SCREENING FOR COLORECTAL CANCER: ICD-10-CM

## 2022-08-15 DIAGNOSIS — R44.3 HALLUCINATION: ICD-10-CM

## 2022-08-15 DIAGNOSIS — J45.909 UNCOMPLICATED ASTHMA, UNSPECIFIED ASTHMA SEVERITY, UNSPECIFIED WHETHER PERSISTENT: ICD-10-CM

## 2022-08-15 DIAGNOSIS — F63.81 INTERMITTENT EXPLOSIVE DISORDER: ICD-10-CM

## 2022-08-15 DIAGNOSIS — Z13.29 SCREENING FOR THYROID DISORDER: ICD-10-CM

## 2022-08-15 LAB
ALBUMIN SERPL BCP-MCNC: 3.5 G/DL (ref 3.5–5)
ALP SERPL-CCNC: 118 U/L (ref 46–116)
ALT SERPL W P-5'-P-CCNC: 16 U/L (ref 12–78)
ANION GAP SERPL CALCULATED.3IONS-SCNC: 7 MMOL/L (ref 4–13)
AST SERPL W P-5'-P-CCNC: 12 U/L (ref 5–45)
BACTERIA UR QL AUTO: ABNORMAL /HPF
BILIRUB SERPL-MCNC: 0.29 MG/DL (ref 0.2–1)
BILIRUB UR QL STRIP: NEGATIVE
BUN SERPL-MCNC: 18 MG/DL (ref 5–25)
CALCIUM SERPL-MCNC: 9.1 MG/DL (ref 8.3–10.1)
CAOX CRY URNS QL MICRO: ABNORMAL /HPF
CHLORIDE SERPL-SCNC: 109 MMOL/L (ref 96–108)
CHOLEST SERPL-MCNC: 102 MG/DL
CLARITY UR: CLEAR
CO2 SERPL-SCNC: 27 MMOL/L (ref 21–32)
COLOR UR: YELLOW
CREAT SERPL-MCNC: 1 MG/DL (ref 0.6–1.3)
GFR SERPL CREATININE-BSD FRML MDRD: 64 ML/MIN/1.73SQ M
GLUCOSE P FAST SERPL-MCNC: 94 MG/DL (ref 65–99)
GLUCOSE UR STRIP-MCNC: NEGATIVE MG/DL
HDLC SERPL-MCNC: 42 MG/DL
HGB UR QL STRIP.AUTO: NEGATIVE
KETONES UR STRIP-MCNC: NEGATIVE MG/DL
LDLC SERPL CALC-MCNC: 38 MG/DL (ref 0–100)
LEUKOCYTE ESTERASE UR QL STRIP: ABNORMAL
MUCOUS THREADS UR QL AUTO: ABNORMAL
NITRITE UR QL STRIP: NEGATIVE
NON-SQ EPI CELLS URNS QL MICRO: ABNORMAL /HPF
NONHDLC SERPL-MCNC: 60 MG/DL
PH UR STRIP.AUTO: 6.5 [PH]
POTASSIUM SERPL-SCNC: 3.3 MMOL/L (ref 3.5–5.3)
PROT SERPL-MCNC: 6.9 G/DL (ref 6.4–8.4)
PROT UR STRIP-MCNC: ABNORMAL MG/DL
RBC #/AREA URNS AUTO: ABNORMAL /HPF
SL AMB POCT HEMOGLOBIN AIC: 6.5 (ref ?–6.5)
SODIUM SERPL-SCNC: 143 MMOL/L (ref 135–147)
SP GR UR STRIP.AUTO: 1.02 (ref 1–1.03)
T4 FREE SERPL-MCNC: 1.11 NG/DL (ref 0.76–1.46)
TRIGL SERPL-MCNC: 108 MG/DL
TSH SERPL DL<=0.05 MIU/L-ACNC: 0.91 UIU/ML (ref 0.45–4.5)
UROBILINOGEN UR STRIP-ACNC: <2 MG/DL
WBC #/AREA URNS AUTO: ABNORMAL /HPF

## 2022-08-15 PROCEDURE — 84443 ASSAY THYROID STIM HORMONE: CPT

## 2022-08-15 PROCEDURE — 84439 ASSAY OF FREE THYROXINE: CPT

## 2022-08-15 PROCEDURE — 80061 LIPID PANEL: CPT

## 2022-08-15 PROCEDURE — 83036 HEMOGLOBIN GLYCOSYLATED A1C: CPT

## 2022-08-15 PROCEDURE — 81001 URINALYSIS AUTO W/SCOPE: CPT

## 2022-08-15 PROCEDURE — 80053 COMPREHEN METABOLIC PANEL: CPT

## 2022-08-15 PROCEDURE — 99215 OFFICE O/P EST HI 40 MIN: CPT

## 2022-08-15 PROCEDURE — 36415 COLL VENOUS BLD VENIPUNCTURE: CPT

## 2022-08-15 RX ORDER — OLOPATADINE HYDROCHLORIDE 7 MG/ML
SOLUTION OPHTHALMIC
Qty: 2.5 ML | Refills: 3 | Status: SHIPPED | OUTPATIENT
Start: 2022-08-15

## 2022-08-15 RX ORDER — ALPRAZOLAM 1 MG/1
1 TABLET ORAL 3 TIMES DAILY
Qty: 60 TABLET | Refills: 0 | Status: SHIPPED | OUTPATIENT
Start: 2022-08-15 | End: 2022-09-12 | Stop reason: SDUPTHER

## 2022-08-15 RX ORDER — GABAPENTIN 400 MG/1
CAPSULE ORAL
Qty: 90 CAPSULE | Refills: 3 | Status: SHIPPED | OUTPATIENT
Start: 2022-08-15

## 2022-08-15 RX ORDER — VITAMIN B COMPLEX
TABLET ORAL
Qty: 60 TABLET | Refills: 3 | Status: SHIPPED | OUTPATIENT
Start: 2022-08-15

## 2022-08-15 RX ORDER — EPINEPHRINE 0.3 MG/.3ML
0.3 INJECTION SUBCUTANEOUS ONCE
Qty: 1 EACH | Refills: 0 | Status: SHIPPED | OUTPATIENT
Start: 2022-08-15 | End: 2022-09-15

## 2022-08-15 RX ORDER — PRAZOSIN HYDROCHLORIDE 5 MG/1
5 CAPSULE ORAL
Qty: 30 CAPSULE | Refills: 3 | Status: SHIPPED | OUTPATIENT
Start: 2022-08-15

## 2022-08-15 NOTE — TELEPHONE ENCOUNTER
Called the patients prescription coverage plan to see if an appeals could be completed for the patients [employer] and reader kit  Spoke with Shanti who transferred me to [facility] and then I was transferred to another agent  I decided to try and complete the form via fax and written exchange for a possible appeals   Paperwork has been faxed to the patients coverage

## 2022-08-15 NOTE — TELEPHONE ENCOUNTER
Patient called in to schedule OV appointment with referrals for all specialties from PCP  Please follow up with patient

## 2022-08-15 NOTE — PROGRESS NOTES
INTERNAL MEDICINE FOLLOW-UP VISIT  [facility] - [facility] [address]    NAME: Rosie Mcmillan  AGE: 45 y o  SEX: female  : 1970     DATE: 8/15/2022     Assessment and Plan:     Type 2 diabetes mellitus with hypoglycemia without coma, with long-term current use of insulin (HCC)  HGA1C 6 5  Sees endocrinology  UA and culture ordered today due to patient stating she fell and has some hallucinations    Paroxysmal A-fib ([address] )  On Xarelto    Rheumatoid arthritis involving both hands, unspecified whether rheumatoid factor present [facility])  Consulted rheumatology for f/u    Intermittent explosive disorder  Awaiting psychiatry visit, they are awaiting records from previous providers in [address]  Xanax as needed, started on a smaller dose when she established however patient states this is not effective, will prescribe this medication only until she is established with a psychiatry provider    Mixed hyperlipidemia  Heart healthy diet    Uncomplicated asthma, unspecified asthma severity, unspecified whether persistent  Nebulizer ordered through [facility]ie [facility] will contact patient     Referral for gynecology provided  Referral for ortho due to foot pain, was seen in urgent care and is requesting to be followed up with ortho    Patient was counseled that benzodiazepine, and ambien are high risk medications and controlled substances and pain medication will not be prescribed with this combination  She is awaiting pain management consult  She may take tylenol for pain  Gabapentin was increased to 800 mg for pain control to BLE       Chief Complaint:     Chief Complaint   Patient presents with    Follow-up     Patient needs nebulizer machine ordered and prescribed    Blood Pressure Check     Patient needs a BP machine ordered and prescribed       History of Present Illness:     Patient presents today in the office for a follow-up  She comes in today with several issues   She is requesting pain medication  She was previously seen by pain management prior to moving to this area  She is also on benzodiazepine as well as Ambien regularly  She states that her insurance company said we can prescribe pain medication as long as urine drug screen is performed  I discussed with patient that with the medication she is on she is at high risk for polypharmacy and drug interactions that may cause injury as well as fatality  I discussed with patient that I will only continue to prescribe the benzodiazepine until she sees psychiatry  She has been eating better, her HGA1C is down to 6 5, she follows with endocrinology  She recently saw cardiology as well  She is in need of rheumatology to follow due to her RA  The following portions of the patient's history were reviewed and updated as appropriate: allergies, current medications, past family history, past medical history, past social history, past surgical history and problem list      Review of Systems:     Review of Systems   Constitutional: Negative for appetite change, chills, diaphoresis, fatigue, fever and unexpected weight change  HENT: Negative for postnasal drip and sneezing  Eyes: Negative for visual disturbance  Respiratory: Negative for chest tightness and shortness of breath  Cardiovascular: Negative for chest pain, palpitations and leg swelling  Gastrointestinal: Negative for abdominal pain and blood in stool  Endocrine: Negative for cold intolerance, heat intolerance, polydipsia, polyphagia and polyuria  Genitourinary: Negative for difficulty urinating, dysuria, frequency and urgency  Musculoskeletal: Negative for arthralgias and myalgias  Skin: Negative for rash and wound  Neurological: Negative for dizziness, weakness, light-headedness and headaches  Hematological: Negative for adenopathy  Psychiatric/Behavioral: Negative for confusion, dysphoric mood and sleep disturbance   The patient is not nervous/anxious  Past Medical History:     Past Medical History:   Diagnosis Date    Atrial fibrillation ([facility] )     Diabetes mellitus ([facility] )     Fibromyalgia     Migraines     Neuromuscular disorder (HCC)     Sick sinus syndrome (HCC)         Current Medications:     Current Outpatient Medications:     albuterol (2 5 mg/3 mL) 0 083 % nebulizer solution, Take 3 mL (2 5 mg total) by nebulization every 4 (four) hours as needed for shortness of breath, Disp: 3 mL, Rfl: 5    albuterol (PROVENTIL HFA,VENTOLIN HFA) 90 mcg/act inhaler, Inhale 2 puffs every 4 (four) hours as needed, Disp: , Rfl:     Alcohol Swabs PADS, 5 (five) times a day, Disp: , Rfl:     ALPRAZolam (XANAX) 1 mg tablet, Take 1 tablet (1 mg total) by mouth 3 (three) times a day, Disp: 60 tablet, Rfl: 0    ARIPiprazole (ABILIFY) 10 mg tablet, Take 1 tablet (10 mg total) by mouth daily, Disp: 90 tablet, Rfl: 3    ascorbic acid (VITAMIN C) 250 MG tablet, Take 250 mg by mouth daily, Disp: , Rfl:     atorvastatin (LIPITOR) 10 mg tablet, Take 10 mg by mouth daily, Disp: , Rfl:     BD Pen Needle Nkechi 2nd Gen 32G X 4 MM MISC, USE AS DIRECTED FOR BEFORE A MEAL AND AT BEDTIME GLUCOSE INJECTION, Disp: , Rfl:     BD PrecisionGlide Needle 23G X 1-1/2" MISC, USE AS DIRECTED TO ADMINISTER NALOXONE INJECTION    MAY DISPENSE 23-25 GAUGE 1-1 5" NEEDLE , Disp: , Rfl:     Blood Glucose Monitoring Suppl (ONE TOUCH ULTRA 2) w/Device KIT, Use as directed, Disp: , Rfl:     busPIRone (BUSPAR) 15 mg tablet, Take 1 tablet (15 mg total) by mouth 3 (three) times a day, Disp: 90 tablet, Rfl: 3    cholecalciferol (VITAMIN D3) 25 mcg (1,000 units) tablet, Take 2 capsules daily, Disp: 60 tablet, Rfl: 3    Cholecalciferol 125 MCG (5000 UT) TABS, every 24 hours, Disp: , Rfl:     Continuous Blood Gluc Sensor ([employer] Lydia Sensor System) MISC, 2- 14 day sensors, Disp: 2 each, Rfl: 11    cyclobenzaprine (FLEXERIL) 5 mg tablet, Take 1 tablet (5 mg total) by mouth 3 (three) times a day as needed for muscle spasms, Disp: 90 tablet, Rfl: 0    EPINEPHrine (EPIPEN) 0 3 mg/0 3 mL SOAJ, Inject 0 3 mL (0 3 mg total) into a muscle once for 1 dose, Disp: 1 each, Rfl: 0    fexofenadine (ALLEGRA) 180 MG tablet, Take 1 tablet (180 mg total) by mouth daily, Disp: 90 tablet, Rfl: 3    gabapentin (NEURONTIN) 400 mg capsule, Take 2 capsules 3 times a day, Disp: 90 capsule, Rfl: 3    hydrALAZINE (APRESOLINE) 50 mg tablet, Take 50 mg by mouth 3 (three) times a day, Disp: , Rfl:     ibuprofen (MOTRIN) 600 mg tablet, Take 1 tablet (600 mg total) by mouth every 6 (six) hours as needed for mild pain or moderate pain, Disp: 30 tablet, Rfl: 0    insulin glargine (Lantus SoloStar) 100 units/mL injection pen, Inject 55 Units under the skin 2 (two) times a day, Disp: , Rfl:     insuln lispro (HumaLOG KwikPen) 200 units/mL CONCENTRATED U-200 injection pen, Inject 50 Units under the skin, Disp: , Rfl:     ipratropium (ATROVENT) 0 02 % nebulizer solution, USE 1 VIAL VIA NEBULIZER EVERY 6 HOURS AS NEEDED FOR SHORTNESS OF BREATH, Disp: , Rfl:     Lantus SoloStar 100 units/mL injection pen, INJECT 55 UNITS UNDER THE SKIN 2 (TWO) TIMES A DAY AT LUNCH AND AT BEDTIME , Disp: , Rfl:     metoprolol succinate (TOPROL-XL) 50 mg 24 hr tablet, Take 100 mg by mouth 2 (two) times a day, Disp: , Rfl:     nitroglycerin (NITROSTAT) 0 4 mg SL tablet, Place 0 4 mg under the tongue, Disp: , Rfl:     Nutritional Supplements (Glucerna Hunger Smart Shake) LIQD, Take 1 Can by mouth 3 (three) times a day, Disp: 296 mL, Rfl: 5    Olopatadine HCl (Pataday) 0 7 % SOLN, 1 drop each eye twice a day, Disp: 2 5 mL, Rfl: 3    OneTouch Delica Lancets 13O MISC, 2 (two) times a day, Disp: , Rfl:     OneTouch Ultra test strip, TEST TWICE DAILY OR AS DIRECTED BY MD, Disp: , Rfl:     pantoprazole (PROTONIX) 40 mg tablet, Take 1 tablet (40 mg total) by mouth daily, Disp: 90 tablet, Rfl: 3    prazosin (MINIPRESS) 5 mg capsule, Take 1 capsule (5 mg total) by mouth daily at bedtime, Disp: 30 capsule, Rfl: 3    risperiDONE (RisperDAL) 2 mg tablet, Take 1 tablet (2 mg total) by mouth daily at bedtime, Disp: 90 tablet, Rfl: 3    rivaroxaban (XARELTO) 20 mg tablet, Take 20 mg by mouth, Disp: , Rfl:     sertraline (ZOLOFT) 100 mg tablet, Take 1 tablet (100 mg total) by mouth daily, Disp: 90 tablet, Rfl: 3    trospium chloride (SANCTURA) 20 mg tablet, Take 1 tablet (20 mg total) by mouth 2 (two) times a day, Disp: 90 tablet, Rfl: 3    clobetasol (TEMOVATE) 0 05 % external solution, Apply topically 2 (two) times a day (Patient not taking: Reported on 8/15/2022), Disp: , Rfl:     clotrimazole-betamethasone (LOTRISONE) 1-0 05 % cream, Apply topically 2 (two) times a day (Patient not taking: Reported on 8/15/2022), Disp: , Rfl:     darifenacin (ENABLEX) 15 MG 24 hr tablet, Take 15 mg by mouth daily, Disp: , Rfl:     famotidine (PEPCID) 20 mg tablet, Take 1 tablet (20 mg total) by mouth 2 (two) times a day as needed for indigestion, Disp: 90 tablet, Rfl: 3    fluocinonide (LIDEX) 0 05 % ointment, Apply topically 2 (two) times a day, Disp: , Rfl:     Fluticasone Propionate, Inhal, (Flovent Diskus) 250 MCG/BLIST AEPB, Inhale 1 puff 2 (two) times a day, Disp: 60 blister, Rfl: 3    lisinopril (ZESTRIL) 5 mg tablet, Take 5 mg by mouth daily, Disp: , Rfl:     lovastatin (MEVACOR) 10 MG tablet, Take 10 mg by mouth, Disp: , Rfl:     meclizine (ANTIVERT) 25 mg tablet, Take 25 mg by mouth Three times daily as needed, Disp: , Rfl:     metoclopramide (REGLAN) 10 mg tablet, TAKE 1 TABLET (10 MG TOTAL) BY MOUTH EVERY 6 (SIX) HOURS AS NEEDED (NAUSEA OR VOMITING, OR MIGRAINE HEADACHE), Disp: , Rfl:     montelukast (SINGULAIR) 10 mg tablet, Take 1 tablet (10 mg total) by mouth daily at bedtime, Disp: 90 tablet, Rfl: 3    naloxone (NARCAN) 0 4 mg/mL injection, INJECT 1 ML IN SHOULDER OR THIGH   REPEAT AFTER 2-3 MINUTES IF NO OR MINIMAL RESPONSE , Disp: , Rfl:     nystatin (MYCOSTATIN) cream, Apply topically 2 (two) times a day, Disp: , Rfl:     Prasterone (Intrarosa) 6 5 MG INST, Insert 6 5 mg into the vagina daily, Disp: , Rfl:     predniSONE 10 mg tablet, Take 4 tablets for 3 days then 3 tablets for 3 days then 2 tablet for 3 days 1 for 1 day, Disp: 28 tablet, Rfl: 0    senna-docusate sodium (SENOKOT S) 8 6-50 mg per tablet, Take 1 tablet by mouth daily, Disp: , Rfl:     Spacer/Aero-Holding Chambers (AeroChamber Plus Geoff-Vu w/Mask) MISC, 1 EACH (1 APPLICATION TOTAL) BY MISCELLANEOUS ROUTE DAILY AS NEEDED (ASTHMA EXACERBATION)  , Disp: , Rfl:     SUMAtriptan (IMITREX) 100 mg tablet, TAKE 1 TABLET (100 MG TOTAL) BY MOUTH ONE TIME AS NEEDED FOR MIGRAINE  MAY REPEAT IN 2 HOURS IF UNRESOLVED  DO NOT EXCEED 200 MG IN 24 HOURS, Disp: , Rfl:     Syringe, Disposable, (2-3CC SYRINGE) 3 ML MISC, USE AS DIRECTED TO ADMINISTER NALOXONE INJECTION  , Disp: , Rfl:     triamcinolone (KENALOG) 0 1 % cream, Apply 1 application topically 2 (two) times a day, Disp: , Rfl:     triamcinolone (KENALOG) 0 5 % cream, , Disp: , Rfl:     Trulicity 1 4 YO/8 9GV SOPN, INJECT 1 5 MG UNDER THE SKIN EVERY 7 DAYS , Disp: , Rfl:     zolpidem (AMBIEN) 10 mg tablet, Take 1 tablet (10 mg total) by mouth daily, Disp: 30 tablet, Rfl: 0     Allergies:      Allergies   Allergen Reactions    Other Anaphylaxis     Capzasin HP -- severe burning and swelling of the skin     Clarithromycin GI Intolerance    Acetaminophen GI Intolerance    Bactrim [Sulfamethoxazole-Trimethoprim] Itching, GI Intolerance, Dizziness, Confusion and Lightheadedness     Patient passes out when on this medication for several days     Cephalosporins Hives    Latex Hives    Oxycodone-Acetaminophen GI Intolerance    Penicillins Diarrhea    Trazodone Diarrhea    Capsaicin Rash    Naproxen Palpitations        Physical Exam:     /88 (BP Location: Left arm, Patient Position: Sitting, Cuff Size: Standard) Comment: bp Pulse 84   Temp 98 9 °F (37 2 °C) (Temporal) Comment: no  Ht 5' 5 5" (1 664 m)   Wt 62 6 kg (138 lb)   SpO2 97%   BMI 22 62 kg/m²     Physical Exam  Constitutional:       Appearance: She is well-developed  HENT:      Head: Normocephalic and atraumatic  Eyes:      Pupils: Pupils are equal, round, and reactive to light  Neck:      Thyroid: No thyromegaly  Cardiovascular:      Rate and Rhythm: Normal rate and regular rhythm  Heart sounds: No murmur heard  Pulmonary:      Effort: Pulmonary effort is normal       Breath sounds: Normal breath sounds  Abdominal:      General: Bowel sounds are normal       Palpations: Abdomen is soft  Musculoskeletal:         General: Normal range of motion  Cervical back: Normal range of motion and neck supple  Lymphadenopathy:      Cervical: No cervical adenopathy  Skin:     General: Skin is warm and dry  Neurological:      Mental Status: She is alert and oriented to person, place, and time  Data:     Laboratory Results: I have personally reviewed the pertinent laboratory results/reports   Radiology/Other Diagnostic Testing Results: I have personally reviewed pertinent reports        CRISTINO Jimenez  [facility]

## 2022-08-16 ENCOUNTER — TELEPHONE (OUTPATIENT)
Dept: INTERNAL MEDICINE CLINIC | Facility: CLINIC | Age: 52
End: 2022-08-16

## 2022-08-16 LAB
EST. AVERAGE GLUCOSE BLD GHB EST-MCNC: 137 MG/DL
HBA1C MFR BLD: 6.4 %

## 2022-08-16 PROCEDURE — 3044F HG A1C LEVEL LT 7.0%: CPT

## 2022-08-16 NOTE — TELEPHONE ENCOUNTER
----- Message from Jennifer Garcia, [address] sent at 8/16/2022  7:59 AM EDT -----  The urine is showing some bacteria and white blood cells    we need to wait for the urine culture to come back and see if any bacteria grew or if it is just a contaminated specimen meaning some of your skin cells got mixed in

## 2022-08-16 NOTE — TELEPHONE ENCOUNTER
CALLED PT  AND WAS NOTIFIED AND STATED JUST SEND HER ONE MSG   BACK ON ALL HER TEST RESULTS SHE WAS SENDING YOU MSG'S EVERYTIME THEY WOULD SEND HER RESULTS AND THEY HAD RED ON THEM

## 2022-08-17 ENCOUNTER — TELEPHONE (OUTPATIENT)
Dept: INTERNAL MEDICINE CLINIC | Facility: CLINIC | Age: 52
End: 2022-08-17

## 2022-08-17 NOTE — TELEPHONE ENCOUNTER
She had this the other day  Ill wait for pix  If she has no change in SOB or new chest pain we can just watch it  Dont eat salt, limit processed already made foods, drink lots of water, keep elevated  If SOB or chest pain of course she should be seen a tt ER since we have no availability   No virtual, I would want her assessed if she is complaining of this

## 2022-08-17 NOTE — TELEPHONE ENCOUNTER
Pt has edema in her ankles and feet  She saw Janeth Norwood on Mon Aug 15  She's sending pics attached through her My Chart    I did offer her an appt- she wanted to do a virtual   I said we only do those for patients that have COVID

## 2022-08-18 LAB
DME PARACHUTE DELIVERY DATE ACTUAL: NORMAL
DME PARACHUTE DELIVERY DATE REQUESTED: NORMAL
DME PARACHUTE ITEM DESCRIPTION: NORMAL
DME PARACHUTE ORDER STATUS: NORMAL
DME PARACHUTE SUPPLIER NAME: NORMAL
DME PARACHUTE SUPPLIER PHONE: NORMAL

## 2022-08-26 ENCOUNTER — TELEPHONE (OUTPATIENT)
Dept: INTERNAL MEDICINE CLINIC | Facility: CLINIC | Age: 52
End: 2022-08-26

## 2022-08-26 RX ORDER — INSULIN LISPRO 100 [IU]/ML
INJECTION, SOLUTION INTRAVENOUS; SUBCUTANEOUS
COMMUNITY
Start: 2022-07-30 | End: 2022-09-15 | Stop reason: ALTCHOICE

## 2022-08-26 RX ORDER — METOPROLOL SUCCINATE 100 MG/1
100 TABLET, EXTENDED RELEASE ORAL DAILY
COMMUNITY
Start: 2022-08-02

## 2022-08-26 RX ORDER — PEN NEEDLE, DIABETIC 31 GX5/16"
NEEDLE, DISPOSABLE MISCELLANEOUS
COMMUNITY
Start: 2022-07-27 | End: 2022-10-18

## 2022-08-26 RX ORDER — AZITHROMYCIN 500 MG/1
TABLET, FILM COATED ORAL
COMMUNITY
Start: 2022-08-16 | End: 2022-11-01

## 2022-08-26 RX ORDER — AZITHROMYCIN 500 MG/1
TABLET, FILM COATED ORAL
COMMUNITY
Start: 2022-08-16 | End: 2022-11-29

## 2022-08-27 DIAGNOSIS — T78.40XS ALLERGY, SEQUELA: ICD-10-CM

## 2022-08-29 RX ORDER — ALBUTEROL SULFATE 2.5 MG/3ML
2.5 SOLUTION RESPIRATORY (INHALATION) EVERY 4 HOURS PRN
Qty: 90 ML | Refills: 5 | Status: SHIPPED | OUTPATIENT
Start: 2022-08-29 | End: 2022-09-30 | Stop reason: SDUPTHER

## 2022-08-30 ENCOUNTER — TELEPHONE (OUTPATIENT)
Dept: PSYCHIATRY | Facility: CLINIC | Age: 52
End: 2022-08-30

## 2022-08-30 NOTE — TELEPHONE ENCOUNTER
contacted patient in regards to referral and in attempts to add  pattient to proper waitlist  spoke w  patient whom stated they already have seeked services elsewhere

## 2022-08-31 ENCOUNTER — PREP FOR PROCEDURE (OUTPATIENT)
Dept: GASTROENTEROLOGY | Facility: CLINIC | Age: 52
End: 2022-08-31

## 2022-08-31 ENCOUNTER — OFFICE VISIT (OUTPATIENT)
Dept: GASTROENTEROLOGY | Facility: CLINIC | Age: 52
End: 2022-08-31
Payer: COMMERCIAL

## 2022-08-31 VITALS
DIASTOLIC BLOOD PRESSURE: 84 MMHG | WEIGHT: 147 LBS | HEART RATE: 88 BPM | BODY MASS INDEX: 23.63 KG/M2 | SYSTOLIC BLOOD PRESSURE: 132 MMHG | HEIGHT: 66 IN | OXYGEN SATURATION: 98 %

## 2022-08-31 DIAGNOSIS — E83.110 HEREDITARY HEMOCHROMATOSIS (HCC): Primary | ICD-10-CM

## 2022-08-31 DIAGNOSIS — Z80.0 FAMILY HISTORY OF COLON CANCER: ICD-10-CM

## 2022-08-31 PROCEDURE — 99244 OFF/OP CNSLTJ NEW/EST MOD 40: CPT | Performed by: INTERNAL MEDICINE

## 2022-08-31 NOTE — PATIENT INSTRUCTIONS
Scheduled date of EGD/colonoscopy (as of today):11/10/22  Physician performing EGD/colonoscopy:Sarbjit  Location of EGD/colonoscopy:[facility]  Desired bowel prep reviewed with patient:Colt/Miralax  Instructions reviewed with patient by:[facility]  Clearances:   none

## 2022-08-31 NOTE — PROGRESS NOTES
[facility] - Outpatient Consultation  Edith Angulo 45 y o  female MRN: [id number]  Encounter: [id number]          ASSESSMENT AND PLAN:      1  Hereditary hemochromatosis ([address] )  - Iron Panel (Includes Ferritin, Iron Sat%, Iron, and TIBC); Future  - This diagnosis is questionable  Current Iron sat low normal despite the fact that she hasn't had a phlebotomy in 1 1/2 years    2  Family history of colon cancer  - Colonoscopy; Future    3  Increased alk phos    4  Gastroesophageal reflux disease  - EGD      ______________________________________________________________________    HPI:  This 15-year-old female comes to see me for the 1st time  She has a history of irritable bowel syndrome as well as gastroesophageal reflux disease  She states that her the irritable bowel syndrome is characterized mostly by constipation  She denies any significant weight loss  She has rare episodes of rectal bleeding  She admits to both nausea and vomiting but states that her heartburn symptoms are under control as long she takes her pantoprazole  She admits to lower abdominal pains  Her last colonoscopy was 2017 and it showed no evidence of polyps  She also tells me that she was diagnosed with hereditary hemochromatosis but the diagnosis was not based on biopsy  She states she was receiving phlebotomies up until 1 and half years ago and has not been phlebotomized since  Her most recent iron saturation level was a 24%  Her most recent liver enzyme panel showed an alkaline phosphatase 2 points higher than normal and both her bilirubin level and aminotransferase levels were normal   There is a positive family history for colon cancer involving her paternal grandfather  REVIEW OF SYSTEMS:    CONSTITUTIONAL: Denies any fever, chills, rigors, and weight loss  HEENT: No earache or tinnitus  Denies hearing loss or visual disturbances  CARDIOVASCULAR: No chest pain or palpitations  RESPIRATORY: Denies any cough, hemoptysis, shortness of breath or dyspnea on exertion  GASTROINTESTINAL: As noted in the History of Present Illness  GENITOURINARY: No problems with urination  Denies any hematuria or dysuria  NEUROLOGIC: No dizziness or vertigo, denies headaches  MUSCULOSKELETAL: Denies any muscle or joint pain  SKIN: Denies skin rashes or itching  ENDOCRINE: Denies excessive thirst  Denies intolerance to heat or cold  PSYCHOSOCIAL: Denies depression or anxiety  Denies any recent memory loss  Historical Information   Past Medical History:   Diagnosis Date    Atrial fibrillation ([facility] )     Diabetes mellitus ([facility])     Fibromyalgia     Migraines     Neuromuscular disorder ([facility])     Sick sinus syndrome ([facility] )      No past surgical history on file  Social History   Social History     Substance and Sexual Activity   Alcohol Use Not Currently     Social History     Substance and Sexual Activity   Drug Use Never     Social History     Tobacco Use   Smoking Status Current Every Day Smoker    Packs/day: 2 00    Types: Cigarettes    Start date: 1992   Smokeless Tobacco Never Used   Tobacco Comment    Patient stated that she is ready to quit smoking and she needs help because she smokes about 2 packs and a half each and everyday     No family history on file      Meds/Allergies       Current Outpatient Medications:     atorvastatin (LIPITOR) 10 mg tablet    busPIRone (BUSPAR) 15 mg tablet    EPINEPHrine (EPIPEN) 0 3 mg/0 3 mL SOAJ    gabapentin (NEURONTIN) 400 mg capsule    metoprolol succinate (TOPROL-XL) 100 mg 24 hr tablet    montelukast (SINGULAIR) 10 mg tablet    Multiple Vitamins-Minerals (Centrum Silver 50+Women) TABS    pantoprazole (PROTONIX) 40 mg tablet    prazosin (MINIPRESS) 5 mg capsule    risperiDONE (RisperDAL) 2 mg tablet    rivaroxaban (XARELTO) 20 mg tablet    sertraline (ZOLOFT) 100 mg tablet    Spacer/Aero-Holding Chambers (AeroChamber Plus Geoff-Vu w/Mask) MISC    SUMAtriptan (IMITREX) 100 mg tablet    trospium chloride (SANCTURA) 20 mg tablet    Trulicity 1 5 AD/1 4KK SOPN    zolpidem (AMBIEN) 10 mg tablet    albuterol (2 5 mg/3 mL) 0 083 % nebulizer solution    albuterol (PROVENTIL HFA,VENTOLIN HFA) 90 mcg/act inhaler    Alcohol Swabs PADS    ALPRAZolam (XANAX) 1 mg tablet    ARIPiprazole (ABILIFY) 10 mg tablet    ascorbic acid (VITAMIN C) 250 MG tablet    azithromycin (ZITHROMAX) 500 MG tablet    azithromycin (ZITHROMAX) 500 MG tablet    B-D ULTRAFINE III SHORT PEN 31G X 8 MM MISC    BD Pen Needle Nkechi 2nd Gen 32G X 4 MM MISC    BD PrecisionGlide Needle 23G X 1-1/2" MISC    Blood Glucose Monitoring Suppl (ONE TOUCH ULTRA 2) w/Device KIT    cholecalciferol (VITAMIN D3) 25 mcg (1,000 units) tablet    Cholecalciferol (Vitamin D3) 25 MCG (1000 UT) CAPS    Cholecalciferol 125 MCG (5000 UT) TABS    clobetasol (TEMOVATE) 0 05 % external solution    clotrimazole-betamethasone (LOTRISONE) 1-0 05 % cream    Continuous Blood Gluc Sensor ([employer] Lydia Sensor System) MISC    cyclobenzaprine (FLEXERIL) 5 mg tablet    darifenacin (ENABLEX) 15 MG 24 hr tablet    famotidine (PEPCID) 20 mg tablet    fexofenadine (ALLEGRA) 180 MG tablet    fluocinonide (LIDEX) 0 05 % ointment    Fluticasone Propionate, Inhal, (Flovent Diskus) 250 MCG/BLIST AEPB    hydrALAZINE (APRESOLINE) 50 mg tablet    ibuprofen (MOTRIN) 600 mg tablet    insulin glargine (Lantus SoloStar) 100 units/mL injection pen    insulin lispro (HumaLOG) 100 units/mL injection pen    insuln lispro (HumaLOG KwikPen) 200 units/mL CONCENTRATED U-200 injection pen    ipratropium (ATROVENT) 0 02 % nebulizer solution    Lantus SoloStar 100 units/mL injection pen    lisinopril (ZESTRIL) 5 mg tablet    lovastatin (MEVACOR) 10 MG tablet    meclizine (ANTIVERT) 25 mg tablet    metoclopramide (REGLAN) 10 mg tablet    metoprolol succinate (TOPROL-XL) 50 mg 24 hr tablet    naloxone (NARCAN) 0 4 mg/mL injection    nitroglycerin (NITROSTAT) 0 4 mg SL tablet    Nutritional Supplements (Glucerna Hunger Smart Shake) LIQD    nystatin (MYCOSTATIN) cream    Olopatadine HCl (Pataday) 0 7 % SOLN    OneTouch Delica Lancets 69N MISC    OneTouch Ultra test strip    potassium chloride (K-DUR,KLOR-CON) 20 mEq tablet    Prasterone (Intrarosa) 6 5 MG INST    predniSONE 10 mg tablet    senna-docusate sodium (SENOKOT S) 8 6-50 mg per tablet    Syringe, Disposable, (2-3CC SYRINGE) 3 ML MISC    triamcinolone (KENALOG) 0 1 % cream    triamcinolone (KENALOG) 0 5 % cream    Allergies   Allergen Reactions    Other Anaphylaxis     Capzasin HP -- severe burning and swelling of the skin     Clarithromycin GI Intolerance    Acetaminophen GI Intolerance    Bactrim [Sulfamethoxazole-Trimethoprim] Itching, GI Intolerance, Dizziness, Confusion and Lightheadedness     Patient passes out when on this medication for several days     Cephalosporins Hives    Latex Hives    Oxycodone-Acetaminophen GI Intolerance    Penicillins Diarrhea    Trazodone Diarrhea    Capsaicin Rash    Naproxen Palpitations           Objective     Blood pressure 132/84, pulse 88, height 5' 5 5" (1 664 m), weight 66 7 kg (147 lb), SpO2 98 %  Body mass index is 24 09 kg/m²  PHYSICAL EXAM:      General Appearance:   Alert, cooperative, no distress   HEENT:   Normocephalic, atraumatic, anicteric      Neck:  Supple, symmetrical, trachea midline   Lungs:   Clear to auscultation bilaterally; no rales, rhonchi or wheezing; respirations unlabored    Heart[de-identified]   Regular rate and rhythm; no murmur, rub, or gallop     Abdomen:   Soft, non-tender, non-distended; normal bowel sounds; no masses, no organomegaly    Genitalia:   Deferred    Rectal:   Deferred    Extremities:  No cyanosis, clubbing or edema    Pulses:  2+ and symmetric    Skin:  No jaundice, rashes, or lesions    Lymph nodes:  No palpable cervical lymphadenopathy        Lab Results:   No visits with results within 1 Day(s) from this visit  Latest known visit with results is:   Appointment on 08/15/2022   Component Date Value    Sodium 08/15/2022 143     Potassium 08/15/2022 3 3 (A)    Chloride 08/15/2022 109 (A)    CO2 08/15/2022 27     ANION GAP 08/15/2022 7     BUN 08/15/2022 18     Creatinine 08/15/2022 1 00     Glucose, Fasting 08/15/2022 94     Calcium 08/15/2022 9 1     AST 08/15/2022 12     ALT 08/15/2022 16     Alkaline Phosphatase 08/15/2022 118 (A)    Total Protein 08/15/2022 6 9     Albumin 08/15/2022 3 5     Total Bilirubin 08/15/2022 0 29     eGFR 08/15/2022 64     Hemoglobin A1C 08/15/2022 6 4 (A)    EAG 08/15/2022 137     Cholesterol 08/15/2022 102     Triglycerides 08/15/2022 108     HDL, Direct 08/15/2022 42 (A)    LDL Calculated 08/15/2022 38     Non-HDL-Chol (CHOL-HDL) 08/15/2022 60     TSH 3RD GENERATON 08/15/2022 0 906     Free T4 08/15/2022 1 11          Radiology Results:   No results found

## 2022-08-31 NOTE — LETTER
September 2, 2022     [address], [address] [place] [address]    Patient: Dede Evans   YOB: 1970   Date of Visit: 8/31/2022       Dear Dr Cristy Lucas: Thank you for referring Dede Evans to me for evaluation  Below are my notes for this consultation  If you have questions, please do not hesitate to call me  I look forward to following your patient along with you  Sincerely,        Almas Thompson DO        CC: No Recipients  Odalis Ortiz  8/31/2022  3:33 PM  Signed  [facility] - Outpatient Consultation  Dede Evans 45 y o  female MRN: [id number]  Encounter: [id number]          ASSESSMENT AND PLAN:      1  Hereditary hemochromatosis ([address] )  - Iron Panel (Includes Ferritin, Iron Sat%, Iron, and TIBC); Future  - This diagnosis is questionable  Current Iron sat low normal despite the fact that she hasn't had a phlebotomy in 1 1/2 years    2  Family history of colon cancer  - Colonoscopy; Future    3  Increased alk phos    4  Gastroesophageal reflux disease  - EGD      ______________________________________________________________________    HPI:  This 57-year-old female comes to see me for the 1st time  She has a history of irritable bowel syndrome as well as gastroesophageal reflux disease  She states that her the irritable bowel syndrome is characterized mostly by constipation  She denies any significant weight loss  She has rare episodes of rectal bleeding  She admits to both nausea and vomiting but states that her heartburn symptoms are under control as long she takes her pantoprazole  She admits to lower abdominal pains  Her last colonoscopy was 2017 and it showed no evidence of polyps  She also tells me that she was diagnosed with hereditary hemochromatosis but the diagnosis was not based on biopsy    She states she was receiving phlebotomies up until 1 and half years ago and has not been phlebotomized since  Her most recent iron saturation level was a 24%  Her most recent liver enzyme panel showed an alkaline phosphatase 2 points higher than normal and both her bilirubin level and aminotransferase levels were normal   There is a positive family history for colon cancer involving her paternal grandfather  REVIEW OF SYSTEMS:    CONSTITUTIONAL: Denies any fever, chills, rigors, and weight loss  HEENT: No earache or tinnitus  Denies hearing loss or visual disturbances  CARDIOVASCULAR: No chest pain or palpitations  RESPIRATORY: Denies any cough, hemoptysis, shortness of breath or dyspnea on exertion  GASTROINTESTINAL: As noted in the History of Present Illness  GENITOURINARY: No problems with urination  Denies any hematuria or dysuria  NEUROLOGIC: No dizziness or vertigo, denies headaches  MUSCULOSKELETAL: Denies any muscle or joint pain  SKIN: Denies skin rashes or itching  ENDOCRINE: Denies excessive thirst  Denies intolerance to heat or cold  PSYCHOSOCIAL: Denies depression or anxiety  Denies any recent memory loss  Historical Information   Past Medical History:   Diagnosis Date    Atrial fibrillation ([place] [address] )     Diabetes mellitus ([facility])     Fibromyalgia     Migraines     Neuromuscular disorder ([facility])     Sick sinus syndrome ([place] [address] )      No past surgical history on file  Social History   Social History     Substance and Sexual Activity   Alcohol Use Not Currently     Social History     Substance and Sexual Activity   Drug Use Never     Social History     Tobacco Use   Smoking Status Current Every Day Smoker    Packs/day: 2 00    Types: Cigarettes    Start date: 1992   Smokeless Tobacco Never Used   Tobacco Comment    Patient stated that she is ready to quit smoking and she needs help because she smokes about 2 packs and a half each and everyday     No family history on file      Meds/Allergies       Current Outpatient Medications:     atorvastatin (LIPITOR) 10 mg tablet    busPIRone (BUSPAR) 15 mg tablet    EPINEPHrine (EPIPEN) 0 3 mg/0 3 mL SOAJ    gabapentin (NEURONTIN) 400 mg capsule    metoprolol succinate (TOPROL-XL) 100 mg 24 hr tablet    montelukast (SINGULAIR) 10 mg tablet    Multiple Vitamins-Minerals (Centrum Silver 50+Women) TABS    pantoprazole (PROTONIX) 40 mg tablet    prazosin (MINIPRESS) 5 mg capsule    risperiDONE (RisperDAL) 2 mg tablet    rivaroxaban (XARELTO) 20 mg tablet    sertraline (ZOLOFT) 100 mg tablet    Spacer/Aero-Holding Chambers (AeroChamber Plus Geoff-Vu w/Mask) MISC    SUMAtriptan (IMITREX) 100 mg tablet    trospium chloride (SANCTURA) 20 mg tablet    Trulicity 1 5 SA/6 6LA SOPN    zolpidem (AMBIEN) 10 mg tablet    albuterol (2 5 mg/3 mL) 0 083 % nebulizer solution    albuterol (PROVENTIL HFA,VENTOLIN HFA) 90 mcg/act inhaler    Alcohol Swabs PADS    ALPRAZolam (XANAX) 1 mg tablet    ARIPiprazole (ABILIFY) 10 mg tablet    ascorbic acid (VITAMIN C) 250 MG tablet    azithromycin (ZITHROMAX) 500 MG tablet    azithromycin (ZITHROMAX) 500 MG tablet    B-D ULTRAFINE III SHORT PEN 31G X 8 MM MISC    BD Pen Needle Nkechi 2nd Gen 32G X 4 MM MISC    BD PrecisionGlide Needle 23G X 1-1/2" MISC    Blood Glucose Monitoring Suppl (ONE TOUCH ULTRA 2) w/Device KIT    cholecalciferol (VITAMIN D3) 25 mcg (1,000 units) tablet    Cholecalciferol (Vitamin D3) 25 MCG (1000 UT) CAPS    Cholecalciferol 125 MCG (5000 UT) TABS    clobetasol (TEMOVATE) 0 05 % external solution    clotrimazole-betamethasone (LOTRISONE) 1-0 05 % cream    Continuous Blood Gluc Sensor (Love Home Swap Lydia Sensor System) MISC    cyclobenzaprine (FLEXERIL) 5 mg tablet    darifenacin (ENABLEX) 15 MG 24 hr tablet    famotidine (PEPCID) 20 mg tablet    fexofenadine (ALLEGRA) 180 MG tablet    fluocinonide (LIDEX) 0 05 % ointment    Fluticasone Propionate, Inhal, (Flovent Diskus) 250 MCG/BLIST AEPB    hydrALAZINE (APRESOLINE) 50 mg tablet    ibuprofen (MOTRIN) 600 mg tablet    insulin glargine (Lantus SoloStar) 100 units/mL injection pen    insulin lispro (HumaLOG) 100 units/mL injection pen    insuln lispro (HumaLOG KwikPen) 200 units/mL CONCENTRATED U-200 injection pen    ipratropium (ATROVENT) 0 02 % nebulizer solution    Lantus SoloStar 100 units/mL injection pen    lisinopril (ZESTRIL) 5 mg tablet    lovastatin (MEVACOR) 10 MG tablet    meclizine (ANTIVERT) 25 mg tablet    metoclopramide (REGLAN) 10 mg tablet    metoprolol succinate (TOPROL-XL) 50 mg 24 hr tablet    naloxone (NARCAN) 0 4 mg/mL injection    nitroglycerin (NITROSTAT) 0 4 mg SL tablet    Nutritional Supplements (Glucerna Hunger Smart Shake) LIQD    nystatin (MYCOSTATIN) cream    Olopatadine HCl (Pataday) 0 7 % SOLN    OneTouch Delica Lancets 45E MISC    OneTouch Ultra test strip    potassium chloride (K-DUR,KLOR-CON) 20 mEq tablet    Prasterone (Intrarosa) 6 5 MG INST    predniSONE 10 mg tablet    senna-docusate sodium (SENOKOT S) 8 6-50 mg per tablet    Syringe, Disposable, (2-3CC SYRINGE) 3 ML MISC    triamcinolone (KENALOG) 0 1 % cream    triamcinolone (KENALOG) 0 5 % cream    Allergies   Allergen Reactions    Other Anaphylaxis     Capzasin HP -- severe burning and swelling of the skin     Clarithromycin GI Intolerance    Acetaminophen GI Intolerance    Bactrim [Sulfamethoxazole-Trimethoprim] Itching, GI Intolerance, Dizziness, Confusion and Lightheadedness     Patient passes out when on this medication for several days     Cephalosporins Hives    Latex Hives    Oxycodone-Acetaminophen GI Intolerance    Penicillins Diarrhea    Trazodone Diarrhea    Capsaicin Rash    Naproxen Palpitations           Objective     Blood pressure 132/84, pulse 88, height 5' 5 5" (1 664 m), weight 66 7 kg (147 lb), SpO2 98 %  Body mass index is 24 09 kg/m²          PHYSICAL EXAM:      General Appearance:   Alert, cooperative, no distress   HEENT:   Normocephalic, atraumatic, anicteric    Neck:  Supple, symmetrical, trachea midline   Lungs:   Clear to auscultation bilaterally; no rales, rhonchi or wheezing; respirations unlabored    Heart[de-identified]   Regular rate and rhythm; no murmur, rub, or gallop  Abdomen:   Soft, non-tender, non-distended; normal bowel sounds; no masses, no organomegaly    Genitalia:   Deferred    Rectal:   Deferred    Extremities:  No cyanosis, clubbing or edema    Pulses:  2+ and symmetric    Skin:  No jaundice, rashes, or lesions    Lymph nodes:  No palpable cervical lymphadenopathy        Lab Results:   No visits with results within 1 Day(s) from this visit  Latest known visit with results is:   Appointment on 08/15/2022   Component Date Value    Sodium 08/15/2022 143     Potassium 08/15/2022 3 3 (A)    Chloride 08/15/2022 109 (A)    CO2 08/15/2022 27     ANION GAP 08/15/2022 7     BUN 08/15/2022 18     Creatinine 08/15/2022 1 00     Glucose, Fasting 08/15/2022 94     Calcium 08/15/2022 9 1     AST 08/15/2022 12     ALT 08/15/2022 16     Alkaline Phosphatase 08/15/2022 118 (A)    Total Protein 08/15/2022 6 9     Albumin 08/15/2022 3 5     Total Bilirubin 08/15/2022 0 29     eGFR 08/15/2022 64     Hemoglobin A1C 08/15/2022 6 4 (A)    EAG 08/15/2022 137     Cholesterol 08/15/2022 102     Triglycerides 08/15/2022 108     HDL, Direct 08/15/2022 42 (A)    LDL Calculated 08/15/2022 38     Non-HDL-Chol (CHOL-HDL) 08/15/2022 60     TSH 3RD GENERATON 08/15/2022 0 906     Free T4 08/15/2022 1 11          Radiology Results:   No results found

## 2022-09-01 ENCOUNTER — OFFICE VISIT (OUTPATIENT)
Dept: OBGYN CLINIC | Facility: CLINIC | Age: 52
End: 2022-09-01
Payer: COMMERCIAL

## 2022-09-01 ENCOUNTER — TELEPHONE (OUTPATIENT)
Dept: INTERNAL MEDICINE CLINIC | Facility: CLINIC | Age: 52
End: 2022-09-01

## 2022-09-01 ENCOUNTER — APPOINTMENT (OUTPATIENT)
Dept: RADIOLOGY | Facility: CLINIC | Age: 52
End: 2022-09-01
Payer: COMMERCIAL

## 2022-09-01 VITALS
WEIGHT: 146 LBS | HEART RATE: 92 BPM | BODY MASS INDEX: 23.46 KG/M2 | HEIGHT: 66 IN | SYSTOLIC BLOOD PRESSURE: 128 MMHG | DIASTOLIC BLOOD PRESSURE: 88 MMHG

## 2022-09-01 DIAGNOSIS — S93.505A SPRAIN OF FIFTH TOE OF LEFT FOOT, INITIAL ENCOUNTER: ICD-10-CM

## 2022-09-01 DIAGNOSIS — M19.072 ARTHRITIS OF FIRST METATARSOPHALANGEAL (MTP) JOINT OF LEFT FOOT: ICD-10-CM

## 2022-09-01 DIAGNOSIS — F43.10 PTSD (POST-TRAUMATIC STRESS DISORDER): ICD-10-CM

## 2022-09-01 DIAGNOSIS — R20.0 NUMBNESS AND TINGLING OF BOTH LOWER EXTREMITIES: ICD-10-CM

## 2022-09-01 DIAGNOSIS — M25.872 ANKLE IMPINGEMENT SYNDROME, LEFT: ICD-10-CM

## 2022-09-01 DIAGNOSIS — S90.32XA CONTUSION OF LEFT FOOT, INITIAL ENCOUNTER: ICD-10-CM

## 2022-09-01 DIAGNOSIS — M54.9 BACK PAIN, UNSPECIFIED BACK LOCATION, UNSPECIFIED BACK PAIN LATERALITY, UNSPECIFIED CHRONICITY: ICD-10-CM

## 2022-09-01 DIAGNOSIS — R20.2 NUMBNESS AND TINGLING OF BOTH LOWER EXTREMITIES: ICD-10-CM

## 2022-09-01 DIAGNOSIS — M19.072 ARTHRITIS OF LEFT ANKLE: ICD-10-CM

## 2022-09-01 DIAGNOSIS — M79.672 LEFT FOOT PAIN: ICD-10-CM

## 2022-09-01 DIAGNOSIS — M25.372 ANKLE INSTABILITY, LEFT: ICD-10-CM

## 2022-09-01 DIAGNOSIS — M25.372 ANKLE INSTABILITY, LEFT: Primary | ICD-10-CM

## 2022-09-01 PROCEDURE — 73610 X-RAY EXAM OF ANKLE: CPT

## 2022-09-01 PROCEDURE — 99203 OFFICE O/P NEW LOW 30 MIN: CPT | Performed by: FAMILY MEDICINE

## 2022-09-01 RX ORDER — ZOLPIDEM TARTRATE 10 MG/1
10 TABLET ORAL DAILY
Qty: 30 TABLET | Refills: 0 | Status: SHIPPED | OUTPATIENT
Start: 2022-09-01 | End: 2022-10-03

## 2022-09-01 NOTE — TELEPHONE ENCOUNTER
Pharmacy needs a call back if patient is still taking this med zolpidem (AMBIEN) 10 mg tablet you can reach them at [phone]

## 2022-09-01 NOTE — PROGRESS NOTES
Assessment/Plan:  Assessment/Plan   Diagnoses and all orders for this visit:    Ankle instability, left  -     Ambulatory Referral to Orthopedic Surgery  -     Cancel: XR foot 3+ vw left; Future  -     Brace    Arthritis of left ankle  -     Diclofenac Sodium (VOLTAREN) 1 %; Apply 2 g topically 4 (four) times a day    Ankle impingement syndrome, left    Numbness and tingling of both lower extremities  -     EMG 2 limb lower extremity; Future        58-year-old female with left ankle pain and instability more than several years duration  Discussed with patient physical exam, radiographs, impression and plan  X-rays left ankle noted for degenerative changes with osteophytes at the medial aspect talus and lateral aspect of the talus with narrowing at the medial malleolar-talar space and lateral malleolus-fibular spaces  Physical exam left ankle noted for healed longitudinal incision along the retromalleolar aspect of the peroneal tendon  She has tenderness at the anterior ankle, ATFL, lateral malleolus, and peroneal tendon  She has intact range of motion and strength of the foot and ankle  Passive external rotation and syndesmosis squeeze are unremarkable  She is intact neurovascularly  Clinical impression is that she may be symptomatic combination of degenerative changes and impingement/altered mechanics in the ankle  I provided her lace-up ankle brace to help with stability in the ankle  She is to apply topical diclofenac gel 3 times a day for  I will refer her for EMG study to evaluate for neuropathy/nerve impingement  She will follow up with me after getting EMG study done  Subjective:   Patient ID: Jason Sorto is a 45 y o  female  Chief Complaint   Patient presents with    Left Ankle - Pain    Left Foot - Numbness    Right Foot - Numbness       58-year-old female presents evaluation of left ankle pain and instability more than several years duration    She is status post ankle surgery several years ago  She has been sitting with pain described as localized to the lateral aspect the ankle and radiating to the anterior aspect, worse with prolonged standing and ambulation, associated with instability, associated with numbness and tingling, and improved with resting  She was previously wearing ankle braces however she lost lot of her medical equipment in the process of moving  She also reports having done formal therapy in the past   Reports numbness both lower extremities, left worse than right that is most bothersome at nighttime  Ankle Pain  This is a chronic problem  The current episode started more than 1 year ago  The problem occurs daily  The problem has been gradually worsening  Associated symptoms include arthralgias and numbness  Pertinent negatives include no abdominal pain, chest pain, chills, fever, joint swelling, rash, sore throat or weakness  The symptoms are aggravated by standing and walking  She has tried rest for the symptoms  The following portions of the patient's history were reviewed and updated as appropriate: She  has a past medical history of Atrial fibrillation ([facility] ), Diabetes mellitus ([facility] ), Fibromyalgia, Migraines, [facility]rom[facility] ), and Sick sinus syndrome ([facility] )  She  has no past surgical history on file  Her family history is not on file  She  reports that she has been smoking cigarettes  She started smoking about 30 years ago  She has been smoking about 2 00 packs per day  She has never used smokeless tobacco  She reports previous alcohol use  She reports that she does not use drugs  She is allergic to other, clarithromycin, acetaminophen, bactrim [sulfamethoxazole-trimethoprim], cephalosporins, latex, oxycodone-acetaminophen, penicillins, trazodone, capsaicin, and naproxen       Review of Systems   Constitutional: Negative for chills and fever  HENT: Negative for sore throat  Eyes: Negative for visual disturbance     Respiratory: Negative for shortness of breath  Cardiovascular: Negative for chest pain  Gastrointestinal: Negative for abdominal pain  Genitourinary: Negative for flank pain  Musculoskeletal: Positive for arthralgias  Negative for joint swelling  Skin: Negative for rash and wound  Neurological: Positive for numbness  Negative for weakness  Hematological: Does not bruise/bleed easily  Psychiatric/Behavioral: Negative for self-injury  Objective:  Vitals:    09/01/22 0947   BP: 128/88   Pulse: 92   Weight: 66 2 kg (146 lb)   Height: 5' 5 5" (1 664 m)     Left Ankle Exam     Muscle Strength   The patient has normal left ankle strength  Observations   Left Ankle/Foot   Negative for deformity  Tenderness   Left Ankle/Foot   Tenderness in the anterior ankle, anterior talofibular ligament, lateral malleolus and peroneal tendon  No tenderness in the Achilles insertion, fifth metatarsal base, calcaneofibular ligament, deltoid ligament, dorsum foot, medial malleolus, posterior tibial tendon, posterior talofibular ligament and proximal Achilles  Active Range of Motion   Left Ankle/Foot   Normal active range of motion    Strength/Myotome Testing     Left Ankle/Foot   Normal strength    Tests   Left Ankle/Foot   Positive for metatarsal squeeze  Negative for anterior drawer, calcaneal squeeze, posterior drawer, syndesmosis squeeze and syndesmosis external rotation  Physical Exam  Vitals and nursing note reviewed  Constitutional:       General: She is not in acute distress  Appearance: She is well-developed  She is not ill-appearing or diaphoretic  HENT:      Head: Normocephalic  Right Ear: External ear normal       Left Ear: External ear normal    Eyes:      Conjunctiva/sclera: Conjunctivae normal    Neck:      Trachea: No tracheal deviation  Cardiovascular:      Rate and Rhythm: Normal rate  Pulmonary:      Effort: Pulmonary effort is normal  No respiratory distress  Abdominal:      General: There is no distension  Musculoskeletal:         General: Tenderness present  No swelling, deformity or signs of injury  Left ankle: Tenderness present over the lateral malleolus and ATF ligament  No medial malleolus, CF ligament, posterior TF ligament or base of 5th metatarsal tenderness  Anterior drawer test negative  Left foot: No deformity  Skin:     General: Skin is warm and dry  Coloration: Skin is not jaundiced or pale  Neurological:      Mental Status: She is alert and oriented to person, place, and time  Psychiatric:         Mood and Affect: Mood normal          Behavior: Behavior normal          Thought Content: Thought content normal          Judgment: Judgment normal          I have personally reviewed pertinent films in PACS and my interpretation is Medial and lateral talar osteophytes

## 2022-09-02 RX ORDER — CYCLOBENZAPRINE HCL 5 MG
5 TABLET ORAL 3 TIMES DAILY PRN
Qty: 90 TABLET | Refills: 0 | Status: SHIPPED | OUTPATIENT
Start: 2022-09-02

## 2022-09-07 ENCOUNTER — TELEPHONE (OUTPATIENT)
Dept: PAIN MEDICINE | Facility: CLINIC | Age: 52
End: 2022-09-07

## 2022-09-07 NOTE — TELEPHONE ENCOUNTER
Provider leaving practive (Dr Luna Mercado)    [facility] for pt to cb and reschedule with another provider

## 2022-09-12 DIAGNOSIS — F41.9 ANXIETY: ICD-10-CM

## 2022-09-12 DIAGNOSIS — R51.9 ACUTE HEADACHE: ICD-10-CM

## 2022-09-12 RX ORDER — ALPRAZOLAM 1 MG/1
1 TABLET ORAL 4 TIMES DAILY PRN
Qty: 60 TABLET | Refills: 0 | Status: SHIPPED | OUTPATIENT
Start: 2022-09-12

## 2022-09-12 RX ORDER — IBUPROFEN 600 MG/1
600 TABLET ORAL EVERY 6 HOURS PRN
Qty: 30 TABLET | Refills: 0 | Status: SHIPPED | OUTPATIENT
Start: 2022-09-12 | End: 2022-09-29

## 2022-09-12 NOTE — TELEPHONE ENCOUNTER
Potassium chloride- trouble swallowing  20mEq  Cuts in half and still has trouble swallowing     XANAX should be taken 4 times a day    MICHELEL pen needles; shorter ones- never ordered by Edward Birch

## 2022-09-15 ENCOUNTER — TELEPHONE (OUTPATIENT)
Dept: INTERNAL MEDICINE CLINIC | Facility: CLINIC | Age: 52
End: 2022-09-15

## 2022-09-15 ENCOUNTER — APPOINTMENT (OUTPATIENT)
Dept: LAB | Facility: CLINIC | Age: 52
End: 2022-09-15
Payer: COMMERCIAL

## 2022-09-15 ENCOUNTER — OFFICE VISIT (OUTPATIENT)
Dept: INTERNAL MEDICINE CLINIC | Facility: CLINIC | Age: 52
End: 2022-09-15
Payer: COMMERCIAL

## 2022-09-15 VITALS
BODY MASS INDEX: 23.46 KG/M2 | HEART RATE: 93 BPM | DIASTOLIC BLOOD PRESSURE: 82 MMHG | HEIGHT: 66 IN | WEIGHT: 146 LBS | OXYGEN SATURATION: 98 % | TEMPERATURE: 98.4 F | SYSTOLIC BLOOD PRESSURE: 122 MMHG

## 2022-09-15 DIAGNOSIS — Z11.59 NEED FOR HEPATITIS C SCREENING TEST: ICD-10-CM

## 2022-09-15 DIAGNOSIS — G43.909 MIGRAINE WITHOUT STATUS MIGRAINOSUS, NOT INTRACTABLE, UNSPECIFIED MIGRAINE TYPE: Primary | ICD-10-CM

## 2022-09-15 DIAGNOSIS — Z79.4 TYPE 2 DIABETES MELLITUS WITH HYPOGLYCEMIA WITHOUT COMA, WITH LONG-TERM CURRENT USE OF INSULIN (HCC): ICD-10-CM

## 2022-09-15 DIAGNOSIS — E11.319 DIABETIC RETINOPATHY OF LEFT EYE ASSOCIATED WITH TYPE 2 DIABETES MELLITUS, MACULAR EDEMA PRESENCE UNSPECIFIED, UNSPECIFIED RETINOPATHY SEVERITY (HCC): Primary | ICD-10-CM

## 2022-09-15 DIAGNOSIS — E11.649 TYPE 2 DIABETES MELLITUS WITH HYPOGLYCEMIA WITHOUT COMA, WITH LONG-TERM CURRENT USE OF INSULIN (HCC): ICD-10-CM

## 2022-09-15 DIAGNOSIS — E11.44 DIABETIC AMYOTROPHY ASSOCIATED WITH TYPE 2 DIABETES MELLITUS (HCC): Primary | ICD-10-CM

## 2022-09-15 DIAGNOSIS — R44.3 HALLUCINATION: ICD-10-CM

## 2022-09-15 DIAGNOSIS — I48.0 PAROXYSMAL A-FIB (HCC): ICD-10-CM

## 2022-09-15 DIAGNOSIS — F31.9 BIPOLAR AFFECTIVE DISORDER, REMISSION STATUS UNSPECIFIED (HCC): ICD-10-CM

## 2022-09-15 DIAGNOSIS — G43.909 MIGRAINE WITHOUT STATUS MIGRAINOSUS, NOT INTRACTABLE, UNSPECIFIED MIGRAINE TYPE: ICD-10-CM

## 2022-09-15 DIAGNOSIS — Z12.4 SCREENING FOR CERVICAL CANCER: ICD-10-CM

## 2022-09-15 DIAGNOSIS — E78.2 MIXED HYPERLIPIDEMIA: ICD-10-CM

## 2022-09-15 DIAGNOSIS — Z11.4 SCREENING FOR HIV (HUMAN IMMUNODEFICIENCY VIRUS): ICD-10-CM

## 2022-09-15 DIAGNOSIS — E83.110 HEREDITARY HEMOCHROMATOSIS (HCC): ICD-10-CM

## 2022-09-15 DIAGNOSIS — Z78.0 ENCOUNTER FOR OSTEOPOROSIS SCREENING IN ASYMPTOMATIC POSTMENOPAUSAL PATIENT: ICD-10-CM

## 2022-09-15 DIAGNOSIS — Z13.820 ENCOUNTER FOR OSTEOPOROSIS SCREENING IN ASYMPTOMATIC POSTMENOPAUSAL PATIENT: ICD-10-CM

## 2022-09-15 DIAGNOSIS — N32.89 BLADDER SPASM: ICD-10-CM

## 2022-09-15 DIAGNOSIS — E87.6 LOW BLOOD POTASSIUM: ICD-10-CM

## 2022-09-15 DIAGNOSIS — R21 RASH: ICD-10-CM

## 2022-09-15 DIAGNOSIS — M06.9 RHEUMATOID ARTHRITIS INVOLVING BOTH HANDS, UNSPECIFIED WHETHER RHEUMATOID FACTOR PRESENT (HCC): ICD-10-CM

## 2022-09-15 DIAGNOSIS — K58.9 IRRITABLE BOWEL SYNDROME, UNSPECIFIED TYPE: Primary | ICD-10-CM

## 2022-09-15 LAB
FERRITIN SERPL-MCNC: 56 NG/ML (ref 8–388)
IRON SATN MFR SERPL: 24 % (ref 15–50)
IRON SERPL-MCNC: 81 UG/DL (ref 50–170)
LEFT EYE DIABETIC RETINOPATHY: ABNORMAL
LEFT EYE IMAGE QUALITY: ABNORMAL
LEFT EYE MACULAR EDEMA: ABNORMAL
LEFT EYE OTHER RETINOPATHY: ABNORMAL
RIGHT EYE DIABETIC RETINOPATHY: ABNORMAL
RIGHT EYE IMAGE QUALITY: ABNORMAL
RIGHT EYE MACULAR EDEMA: ABNORMAL
RIGHT EYE OTHER RETINOPATHY: ABNORMAL
SEVERITY (EYE EXAM): ABNORMAL
TIBC SERPL-MCNC: 343 UG/DL (ref 250–450)

## 2022-09-15 PROCEDURE — 83540 ASSAY OF IRON: CPT

## 2022-09-15 PROCEDURE — 92250 FUNDUS PHOTOGRAPHY W/I&R: CPT

## 2022-09-15 PROCEDURE — 99214 OFFICE O/P EST MOD 30 MIN: CPT

## 2022-09-15 PROCEDURE — 83550 IRON BINDING TEST: CPT

## 2022-09-15 PROCEDURE — 2024F 7 FLD RTA PHOTO EVC RTNOPTHY: CPT | Performed by: PODIATRIST

## 2022-09-15 PROCEDURE — 36415 COLL VENOUS BLD VENIPUNCTURE: CPT

## 2022-09-15 PROCEDURE — 82728 ASSAY OF FERRITIN: CPT

## 2022-09-15 PROCEDURE — 87389 HIV-1 AG W/HIV-1&-2 AB AG IA: CPT

## 2022-09-15 PROCEDURE — 86803 HEPATITIS C AB TEST: CPT

## 2022-09-15 PROCEDURE — 87086 URINE CULTURE/COLONY COUNT: CPT

## 2022-09-15 RX ORDER — DARIFENACIN HYDROBROMIDE 15 MG/1
15 TABLET, EXTENDED RELEASE ORAL DAILY
Qty: 90 TABLET | Refills: 3 | Status: SHIPPED | OUTPATIENT
Start: 2022-09-15

## 2022-09-15 RX ORDER — GABAPENTIN 100 MG/1
100 CAPSULE ORAL
Qty: 30 CAPSULE | Refills: 3 | Status: SHIPPED | OUTPATIENT
Start: 2022-09-15

## 2022-09-15 RX ORDER — POTASSIUM CHLORIDE 750 MG/1
20 CAPSULE, EXTENDED RELEASE ORAL 2 TIMES DAILY
Qty: 90 CAPSULE | Refills: 3 | Status: SHIPPED | OUTPATIENT
Start: 2022-09-15 | End: 2022-10-03

## 2022-09-15 RX ORDER — TRIAMCINOLONE ACETONIDE 1 MG/G
1 CREAM TOPICAL 2 TIMES DAILY
Qty: 30 G | Refills: 1 | Status: SHIPPED | OUTPATIENT
Start: 2022-09-15 | End: 2023-09-15

## 2022-09-15 NOTE — TELEPHONE ENCOUNTER
Spoke with: patient  Re:  IRIS results / instructions  Provider's message/resuts/instructions/inquiries relayed in full detail    Comments:

## 2022-09-15 NOTE — PROGRESS NOTES
INTERNAL MEDICINE FOLLOW-UP VISIT  [facility] - [facility] OF [address] [place] [address]    NAME: Aneesh Garcia  AGE: 45 y o  SEX: female  : 1970     DATE: 9/15/2022     Assessment and Plan:   1  Irritable bowel syndrome, unspecified type  No issues at this time  Monitoring her diet    2  Type 2 diabetes mellitus with hypoglycemia without coma, with long-term current use of insulin ([facility] [address] )  Follows with endocrinology  - IRIS Diabetic eye exam- done today  - Ambulatory Referral to Podiatry; Future- has decreased sensation in bilateral heels  Do not walk barefooted always wear shoes and socks  3  Paroxysmal A-fib (HCC)  On metoprolol as well as Xarelto  Follows with cardiology    4  Migraine without status migrainosus, not intractable, unspecified migraine type  Stable    5  Bipolar affective disorder, remission status unspecified ([facility] [address] )  Follows with therapy online virtually  Has an appointment with Psychiatry who will be starting to take care of her medications    6  Rheumatoid arthritis involving both hands, unspecified whether rheumatoid factor present [facility])  Referral provided for Rheumatology, instructed patient to make appointment    7  Mixed hyperlipidemia  Tolerating the statin  LDL at goal    Health maintenance  Colonoscopy scheduled in November  DEXA scan scheduled in October  Mammogram scheduled for September  OBGYN appointment for cervical screening referral provided today    Follow-up in 4 months     Chief Complaint:     Chief Complaint   Patient presents with    Follow-up     Was in ED     Medication Refill     Patient stated that the Potassium pill is too big and wants to know if she can get another prescription from another  that may have a smaller pill that can be taken, or if she can get it in liquid       History of Present Illness:     Patient presents today in the office for a follow-up  She has chronic complaints of generalized pain    She also has areas of lipomas on her lower extremity and arms  They cause no pain  She was given a referral for Rheumatology to follow up with her RA  She has been following with therapy and is scheduled for Psychiatry as well  The following portions of the patient's history were reviewed and updated as appropriate: allergies, current medications, past family history, past medical history, past social history, past surgical history and problem list      Review of Systems:     Review of Systems   Constitutional: Negative for appetite change, chills, diaphoresis, fatigue, fever and unexpected weight change  HENT: Negative for postnasal drip and sneezing  Eyes: Negative for visual disturbance  Respiratory: Negative for chest tightness and shortness of breath  Cardiovascular: Negative for chest pain, palpitations and leg swelling  Gastrointestinal: Negative for abdominal pain and blood in stool  Endocrine: Negative for cold intolerance, heat intolerance, polydipsia, polyphagia and polyuria  Genitourinary: Negative for difficulty urinating, dysuria, frequency and urgency  Musculoskeletal: Negative for arthralgias and myalgias  Skin: Negative for rash and wound  Neurological: Negative for dizziness, weakness, light-headedness and headaches  Hematological: Negative for adenopathy  Psychiatric/Behavioral: Negative for confusion, dysphoric mood and sleep disturbance  The patient is not nervous/anxious           Past Medical History:     Past Medical History:   Diagnosis Date    Atrial fibrillation ([facility] [address] )     Diabetes mellitus (HCC)     Fibromyalgia     Migraines     Neuromuscular disorder (HCC)     Sick sinus syndrome (HCC)         Current Medications:     Current Outpatient Medications:     albuterol (2 5 mg/3 mL) 0 083 % nebulizer solution, TAKE 3 ML (2 5 MG TOTAL) BY NEBULIZATION EVERY 4 (FOUR) HOURS AS NEEDED FOR SHORTNESS OF BREATH, Disp: 90 mL, Rfl: 5    albuterol (PROVENTIL HFA,VENTOLIN HFA) 90 mcg/act inhaler, Inhale 2 puffs every 4 (four) hours as needed, Disp: , Rfl:     ALPRAZolam (XANAX) 1 mg tablet, Take 1 tablet (1 mg total) by mouth 4 (four) times a day as needed for anxiety, Disp: 60 tablet, Rfl: 0    ARIPiprazole (ABILIFY) 10 mg tablet, Take 1 tablet (10 mg total) by mouth daily, Disp: 90 tablet, Rfl: 3    ascorbic acid (VITAMIN C) 250 MG tablet, Take 250 mg by mouth daily, Disp: , Rfl:     atorvastatin (LIPITOR) 10 mg tablet, Take 10 mg by mouth daily, Disp: , Rfl:     azithromycin (ZITHROMAX) 500 MG tablet, , Disp: , Rfl:     azithromycin (ZITHROMAX) 500 MG tablet, Take one hour before dental work, Disp: , Rfl:     B-D ULTRAFINE III SHORT PEN 31G X 8 MM MISC, USE WITH INSULIN 5 TIMES A DAY, Disp: , Rfl:     BD Pen Needle Nkechi 2nd Gen 32G X 4 MM MISC, USE AS DIRECTED FOR BEFORE A MEAL AND AT BEDTIME GLUCOSE INJECTION, Disp: , Rfl:     BD PrecisionGlide Needle 23G X 1-1/2" MISC, USE AS DIRECTED TO ADMINISTER NALOXONE INJECTION    MAY DISPENSE 23-25 GAUGE 1-1 5" NEEDLE , Disp: , Rfl:     Blood Glucose Monitoring Suppl (ONE TOUCH ULTRA 2) w/Device KIT, Use as directed, Disp: , Rfl:     busPIRone (BUSPAR) 15 mg tablet, Take 1 tablet (15 mg total) by mouth 3 (three) times a day, Disp: 90 tablet, Rfl: 3    cholecalciferol (VITAMIN D3) 25 mcg (1,000 units) tablet, Take 2 capsules daily, Disp: 60 tablet, Rfl: 3    Cholecalciferol (Vitamin D3) 25 MCG (1000 UT) CAPS, Take 2 capsules by mouth daily, Disp: , Rfl:     Cholecalciferol 125 MCG (5000 UT) TABS, every 24 hours, Disp: , Rfl:     Continuous Blood Gluc Sensor (FreeStyle Lydia Sensor System) MISC, 2- 14 day sensors, Disp: 2 each, Rfl: 11    cyclobenzaprine (FLEXERIL) 5 mg tablet, TAKE 1 TABLET (5 MG TOTAL) BY MOUTH 3 (THREE) TIMES A DAY AS NEEDED FOR MUSCLE SPASMS, Disp: 90 tablet, Rfl: 0    darifenacin (ENABLEX) 15 MG 24 hr tablet, Take 1 tablet (15 mg total) by mouth daily, Disp: 90 tablet, Rfl: 3    Diclofenac Sodium (VOLTAREN) 1 %, Apply 2 g topically 4 (four) times a day, Disp: 150 g, Rfl: 1    EPINEPHrine (EPIPEN) 0 3 mg/0 3 mL SOAJ, Inject 0 3 mL (0 3 mg total) into a muscle once for 1 dose, Disp: 1 each, Rfl: 0    famotidine (PEPCID) 20 mg tablet, Take 1 tablet (20 mg total) by mouth 2 (two) times a day as needed for indigestion, Disp: 90 tablet, Rfl: 3    fexofenadine (ALLEGRA) 180 MG tablet, Take 1 tablet (180 mg total) by mouth daily, Disp: 90 tablet, Rfl: 3    Fluticasone Propionate, Inhal, (Flovent Diskus) 250 MCG/BLIST AEPB, Inhale 1 puff 2 (two) times a day, Disp: 60 blister, Rfl: 3    gabapentin (NEURONTIN) 400 mg capsule, Take 2 capsules 3 times a day, Disp: 90 capsule, Rfl: 3    hydrALAZINE (APRESOLINE) 50 mg tablet, Take 50 mg by mouth 3 (three) times a day, Disp: , Rfl:     ibuprofen (MOTRIN) 600 mg tablet, Take 1 tablet (600 mg total) by mouth every 6 (six) hours as needed for mild pain or moderate pain, Disp: 30 tablet, Rfl: 0    Lantus SoloStar 100 units/mL injection pen, INJECT 55 UNITS UNDER THE SKIN 2 (TWO) TIMES A DAY AT LUNCH AND AT BEDTIME , Disp: , Rfl:     lisinopril (ZESTRIL) 5 mg tablet, Take 5 mg by mouth daily, Disp: , Rfl:     lovastatin (MEVACOR) 10 MG tablet, Take 10 mg by mouth, Disp: , Rfl:     meclizine (ANTIVERT) 25 mg tablet, Take 25 mg by mouth Three times daily as needed, Disp: , Rfl:     metoclopramide (REGLAN) 10 mg tablet, TAKE 1 TABLET (10 MG TOTAL) BY MOUTH EVERY 6 (SIX) HOURS AS NEEDED (NAUSEA OR VOMITING, OR MIGRAINE HEADACHE), Disp: , Rfl:     metoprolol succinate (TOPROL-XL) 100 mg 24 hr tablet, Take 100 mg by mouth daily, Disp: , Rfl:     montelukast (SINGULAIR) 10 mg tablet, Take 1 tablet (10 mg total) by mouth daily at bedtime, Disp: 90 tablet, Rfl: 3    Multiple Vitamins-Minerals (Centrum Silver 50+Women) TABS, Take 1 tablet by mouth daily, Disp: , Rfl:     naloxone (NARCAN) 0 4 mg/mL injection, INJECT 1 ML IN SHOULDER OR THIGH   REPEAT AFTER 2-3 MINUTES IF NO OR MINIMAL RESPONSE , Disp: , Rfl:     Nutritional Supplements (Glucerna Hunger Smart Shake) LIQD, Take 1 Can by mouth 3 (three) times a day, Disp: 296 mL, Rfl: 5    nystatin (MYCOSTATIN) cream, Apply topically 2 (two) times a day, Disp: , Rfl:     Olopatadine HCl (Pataday) 0 7 % SOLN, 1 drop each eye twice a day, Disp: 2 5 mL, Rfl: 3    OneTouch Delica Lancets 40B MISC, 2 (two) times a day, Disp: , Rfl:     OneTouch Ultra test strip, TEST TWICE DAILY OR AS DIRECTED BY MD, Disp: , Rfl:     pantoprazole (PROTONIX) 40 mg tablet, Take 1 tablet (40 mg total) by mouth daily, Disp: 90 tablet, Rfl: 3    potassium chloride (K-DUR,KLOR-CON) 20 mEq tablet, Take 1 tablet (20 mEq total) by mouth daily, Disp: 30 tablet, Rfl: 1    potassium chloride (MICRO-K) 10 MEQ CR capsule, Take 2 capsules (20 mEq total) by mouth 2 (two) times a day, Disp: 90 capsule, Rfl: 3    Prasterone (Intrarosa) 6 5 MG INST, Insert 6 5 mg into the vagina daily, Disp: , Rfl:     prazosin (MINIPRESS) 5 mg capsule, Take 1 capsule (5 mg total) by mouth daily at bedtime, Disp: 30 capsule, Rfl: 3    risperiDONE (RisperDAL) 2 mg tablet, Take 1 tablet (2 mg total) by mouth daily at bedtime, Disp: 90 tablet, Rfl: 3    rivaroxaban (XARELTO) 20 mg tablet, Take 20 mg by mouth, Disp: , Rfl:     senna-docusate sodium (SENOKOT S) 8 6-50 mg per tablet, Take 1 tablet by mouth daily, Disp: , Rfl:     sertraline (ZOLOFT) 100 mg tablet, Take 1 tablet (100 mg total) by mouth daily, Disp: 90 tablet, Rfl: 3    Spacer/Aero-Holding Chambers (AeroChamber Plus Geoff-Vu w/Mask) MISC, 1 EACH (1 APPLICATION TOTAL) BY MISCELLANEOUS ROUTE DAILY AS NEEDED (ASTHMA EXACERBATION)  , Disp: , Rfl:     SUMAtriptan (IMITREX) 100 mg tablet, TAKE 1 TABLET (100 MG TOTAL) BY MOUTH ONE TIME AS NEEDED FOR MIGRAINE  MAY REPEAT IN 2 HOURS IF UNRESOLVED   DO NOT EXCEED 200 MG IN 24 HOURS, Disp: , Rfl:     Syringe, Disposable, (2-3CC SYRINGE) 3 ML MISC, USE AS DIRECTED TO ADMINISTER NALOXONE INJECTION  , Disp: , Rfl:     triamcinolone (KENALOG) 0 1 % cream, Apply 1 application topically 2 (two) times a day, Disp: , Rfl:     triamcinolone (KENALOG) 0 5 % cream, , Disp: , Rfl:     trospium chloride (SANCTURA) 20 mg tablet, Take 1 tablet (20 mg total) by mouth 2 (two) times a day, Disp: 90 tablet, Rfl: 3    Trulicity 1 5 FI/2 9UY SOPN, INJECT 1 5 MG UNDER THE SKIN EVERY 7 DAYS , Disp: , Rfl:     zolpidem (AMBIEN) 10 mg tablet, Take 1 tablet (10 mg total) by mouth daily, Disp: 30 tablet, Rfl: 0     Allergies: Allergies   Allergen Reactions    Other Anaphylaxis     Capzasin HP -- severe burning and swelling of the skin     Clarithromycin GI Intolerance    Acetaminophen GI Intolerance    Bactrim [Sulfamethoxazole-Trimethoprim] Itching, GI Intolerance, Dizziness, Confusion and Lightheadedness     Patient passes out when on this medication for several days     Cephalosporins Hives    Latex Hives    Oxycodone-Acetaminophen GI Intolerance    Penicillins Diarrhea    Trazodone Diarrhea    Capsaicin Rash    Naproxen Palpitations        Physical Exam:     /82 (BP Location: Left arm, Patient Position: Sitting, Cuff Size: Standard) Comment: bp  Pulse 93   Temp 98 4 °F (36 9 °C) (Temporal) Comment: no  Ht 5' 5 5" (1 664 m)   Wt 66 2 kg (146 lb)   SpO2 98%   BMI 23 93 kg/m²     Physical Exam  Constitutional:       Appearance: She is well-developed  HENT:      Head: Normocephalic and atraumatic  Eyes:      Pupils: Pupils are equal, round, and reactive to light  Neck:      Thyroid: No thyromegaly  Cardiovascular:      Rate and Rhythm: Normal rate and regular rhythm  Pulses: no weak pulses          Dorsalis pedis pulses are 2+ on the right side and 2+ on the left side  Posterior tibial pulses are 2+ on the right side and 2+ on the left side  Heart sounds: No murmur heard  Pulmonary:      Effort: Pulmonary effort is normal       Breath sounds: Normal breath sounds  Abdominal:      General: Bowel sounds are normal       Palpations: Abdomen is soft  Musculoskeletal:         General: Normal range of motion  Cervical back: Normal range of motion and neck supple  Feet:      Right foot:      Skin integrity: No ulcer, skin breakdown, erythema, warmth, callus or dry skin  Left foot:      Skin integrity: No ulcer, skin breakdown, erythema, warmth, callus or dry skin  Lymphadenopathy:      Cervical: No cervical adenopathy  Skin:     General: Skin is warm and dry  Neurological:      Mental Status: She is alert and oriented to person, place, and time  Diabetic Foot Exam    Patient's shoes and socks removed  Right Foot/Ankle   Right Foot Inspection  Skin Exam: skin normal and skin intact  No dry skin, no warmth, no callus, no erythema, no maceration, no abnormal color, no pre-ulcer, no ulcer and no callus  Toe Exam: ROM and strength within normal limits  Sensory   Vibration: diminished  Monofilament testing: diminished    Vascular  Capillary refills: < 3 seconds  The right DP pulse is 2+  The right PT pulse is 2+  Left Foot/Ankle  Left Foot Inspection  Skin Exam: skin normal and skin intact  No dry skin, no warmth, no erythema, no maceration, normal color, no pre-ulcer, no ulcer and no callus  Toe Exam: ROM and strength within normal limits  Sensory   Vibration: diminished  Monofilament testing: diminished    Vascular  Capillary refills: < 3 seconds  The left DP pulse is 2+  The left PT pulse is 2+  Assign Risk Category  No deformity present  Loss of protective sensation  No weak pulses  Risk: 1       Data:     Laboratory Results: I have personally reviewed the pertinent laboratory results/reports   Radiology/Other Diagnostic Testing Results: I have personally reviewed pertinent reports        CRISTINO Velazquez  [facility] [address] [place] [address]

## 2022-09-15 NOTE — TELEPHONE ENCOUNTER
PA has been started for the [facility] system via covermymeds  Patient key is: [id number]  Paperwork has been signed and will be faxed to [facility] for the system  PA for the Glucerna Hunger Smart Shake liquid has been started and the patients vo is: Zara Willingham   Paperwork will be signed by the physician and then faxed to [place] [id number]

## 2022-09-15 NOTE — TELEPHONE ENCOUNTER
----- Message from Edis Ram, [address] sent at 9/15/2022  2:13 PM EDT -----  Please let her know that she has signs of diabetic retinopathy to her left eye and she needs to follow with an eye doctor   I will refer her to [facility]

## 2022-09-15 NOTE — RESULT ENCOUNTER NOTE
Please let her know that she has signs of diabetic retinopathy to her left eye and she needs to follow with an eye doctor   I will refer her to [facility]

## 2022-09-16 LAB
BACTERIA UR CULT: NORMAL
HCV AB SER QL: NORMAL
HIV 1+2 AB+HIV1 P24 AG SERPL QL IA: NORMAL

## 2022-09-20 ENCOUNTER — OFFICE VISIT (OUTPATIENT)
Dept: VASCULAR SURGERY | Facility: CLINIC | Age: 52
End: 2022-09-20
Payer: COMMERCIAL

## 2022-09-20 VITALS
RESPIRATION RATE: 22 BRPM | BODY MASS INDEX: 24.27 KG/M2 | SYSTOLIC BLOOD PRESSURE: 126 MMHG | HEART RATE: 92 BPM | DIASTOLIC BLOOD PRESSURE: 88 MMHG | WEIGHT: 151 LBS | HEIGHT: 66 IN

## 2022-09-20 DIAGNOSIS — M79.89 LEG SWELLING: Primary | ICD-10-CM

## 2022-09-20 PROCEDURE — 99205 OFFICE O/P NEW HI 60 MIN: CPT | Performed by: SURGERY

## 2022-09-20 NOTE — ASSESSMENT & PLAN NOTE
Leg swelling per report, unimpressive on exam   Has a lymphedema pump currently and has questions on settings  Will refer to PT for lymphedema treatment  rx for stockings today  Will order venous insufficiency duplex and see back in 3 months

## 2022-09-20 NOTE — PATIENT INSTRUCTIONS
Refer to PT for lymphedema therapy  Venous insufficiency duplex  RTC  3months with Vascular  Rx for compression stockings

## 2022-09-20 NOTE — PROGRESS NOTES
Assessment/Plan:    Leg swelling  Leg swelling per report, unimpressive on exam   Has a lymphedema pump currently and has questions on settings  Will refer to PT for lymphedema treatment  rx for stockings today  Will order venous insufficiency duplex and see back in 3 months       Diagnoses and all orders for this visit:    Leg swelling          Subjective:      Patient ID: Arpita Treviño is a 45 y o  female  Patient is new to our practice and was self referred for edema  Pt has not had testing  Pt c/o BLE pain and swelling  Pt has a lymphedema pump and uses it daily  Pt does not wear compression stockings  Pt smokes 1 ppd  HPI   History of leg swelling and has lymphedema pump at home  She relocated here and had questions on how to use the pump  Denies prior vein surgery, denies varicose veins, denies claudication, hx of DVT, TIA/CVA and family history of AAA  The following portions of the patient's history were reviewed and updated as appropriate: allergies, current medications, past family history, past medical history, past social history, past surgical history and problem list     Review of Systems   Constitutional: Negative  HENT: Negative  Eyes: Negative  Respiratory: Negative  Cardiovascular: Positive for leg swelling  Gastrointestinal: Negative  Endocrine: Negative  Genitourinary: Negative  Musculoskeletal: Positive for arthralgias, back pain, gait problem, joint swelling, myalgias, neck pain and neck stiffness  Skin: Negative  Allergic/Immunologic: Negative  Neurological: Positive for weakness and numbness  Negative for dizziness  Hematological: Negative  Psychiatric/Behavioral: Negative  I have reviewed the ROS above and made changes as needed        Objective:      /88 (BP Location: Left arm, Patient Position: Sitting)   Pulse 92   Resp 22   Ht 5' 5 5" (1 664 m)   Wt 68 5 kg (151 lb)   BMI 24 75 kg/m²          Physical Exam General  Exam: alert, awake, oriented, no distress, consistent with stated age    [de-identified]  Exam: warm, dry, no gross lesions, no bruises and normal color    Head and Neck  Exam: supple, no bruits, trachea midline, no JVD, no mass or palpable nodes    Eye  Exam: extraoccular movements intact, no scleral icterus, sclera clear, pupils equal round and reactive to light    ENMT  Exam: oral mucosa pink and moist    Chest and Lung  Exam: chest normal without deformity, bilaterally expansive, clear to auscultation    Cardiovascular  Exam: regular rate, regular rhythm, no murmurs, no rubs or gallops    Adbomen  Exam: soft, non-tender, non-distended, no pulsatile abdominal masses, no abdominal bruit    Peripheral Vascular  Exam: no clubbing of the digits of the upper extremity, no cyanosis, no edema, both feet are warm, radial pulses 2+ bilaterally, skin well perfused, without and no varicosities      No widened popliteal pulse noted bilaterally    Upper Extremity:  Palpation: Radial pulse- Bilateral 2+    Lower Extremity:  Palpation: Femoral pulse- Bilateral 2+         Popliteal pulse - Bilateral 2+        Dorsalis Pedis - Bilateral 2+        Posterior tibial - Bilateral 2+    Neurologic  Exam:alert, non-focal, oriented x 3, cranial nerves II-XII grossly intact

## 2022-09-21 DIAGNOSIS — E11.649 TYPE 2 DIABETES MELLITUS WITH HYPOGLYCEMIA WITHOUT COMA, WITH LONG-TERM CURRENT USE OF INSULIN (HCC): Primary | ICD-10-CM

## 2022-09-21 DIAGNOSIS — Z79.4 TYPE 2 DIABETES MELLITUS WITH HYPOGLYCEMIA WITHOUT COMA, WITH LONG-TERM CURRENT USE OF INSULIN (HCC): Primary | ICD-10-CM

## 2022-09-21 DIAGNOSIS — M79.89 LEG SWELLING: Primary | ICD-10-CM

## 2022-09-21 NOTE — PROGRESS NOTES
Pt called to request rx for compression stockings be changed to 3 pairs   New order placed and faxed to [address] [phone] per pt request

## 2022-09-27 ENCOUNTER — OFFICE VISIT (OUTPATIENT)
Dept: PODIATRY | Facility: CLINIC | Age: 52
End: 2022-09-27
Payer: COMMERCIAL

## 2022-09-27 VITALS
WEIGHT: 154.2 LBS | DIASTOLIC BLOOD PRESSURE: 98 MMHG | HEART RATE: 98 BPM | HEIGHT: 65 IN | BODY MASS INDEX: 25.69 KG/M2 | SYSTOLIC BLOOD PRESSURE: 126 MMHG

## 2022-09-27 DIAGNOSIS — Z79.4 TYPE 2 DIABETES MELLITUS WITH HYPOGLYCEMIA WITHOUT COMA, WITH LONG-TERM CURRENT USE OF INSULIN (HCC): ICD-10-CM

## 2022-09-27 DIAGNOSIS — E11.649 TYPE 2 DIABETES MELLITUS WITH HYPOGLYCEMIA WITHOUT COMA, WITH LONG-TERM CURRENT USE OF INSULIN (HCC): ICD-10-CM

## 2022-09-27 DIAGNOSIS — E11.42 DIABETIC POLYNEUROPATHY ASSOCIATED WITH TYPE 2 DIABETES MELLITUS (HCC): Primary | ICD-10-CM

## 2022-09-27 PROCEDURE — 3074F SYST BP LT 130 MM HG: CPT | Performed by: PODIATRIST

## 2022-09-27 PROCEDURE — 3080F DIAST BP >= 90 MM HG: CPT | Performed by: PODIATRIST

## 2022-09-27 PROCEDURE — 99243 OFF/OP CNSLTJ NEW/EST LOW 30: CPT | Performed by: PODIATRIST

## 2022-09-27 NOTE — LETTER
September 27, 2022     Jeana Osborn, [address] [place] [address]    Patient: Ricardo Hammond   YOB: 1970   Date of Visit: 9/27/2022       Dear Dr Jamil [facility]: Thank you for referring Ricardo Hammond to me for evaluation  Below are my notes for this consultation  If you have questions, please do not hesitate to call me  I look forward to following your patient along with you  Sincerely,        Jesús Higuera DPM        CC: No Recipients  Balwinder Haider  9/27/2022  1:10 PM  Signed  Assessment/Plan:    A diabetic foot examination was performed of the patient  She is in the moderate risk category  I agree with venous insufficiency ultrasound testing to her bilateral extremities  She is also scheduled for physical therapy for her bilateral lower extremity lymphedema  She is already on gabapentin for diabetic peripheral neuropathy pain  She states that she was recently increased to 100 mg during the day and 900 mg at night  Her hemoglobin A1c was reviewed note the at 6 4  Her hemoglobin A1c back in August 17, 2021 was at 12 3  I suggested referral to pain management and she states that she has multiple referrals to the service but is having trouble getting her records faxed over to the doctor's office from [place]  Should currently debride her own toenails and states that she has no issues doing so  Does occasionally have ingrown toenails and I expressed to her that if this were to occur she is to follow up with my office for further management  Recommend follow-up in about 3 months for repeat diabetic foot evaluation  A prescription was given to the patient today her new diabetic shoes with custom-molded insoles  Her last pair was done in [place] over a year ago and are very worn  Diabetic Foot Exam    Patient's shoes and socks removed      Right Foot/Ankle   Right Foot Inspection  Skin Exam: skin normal and skin intact  No dry skin, no warmth, no callus, no erythema, no maceration, no abnormal color, no pre-ulcer, no ulcer and no callus  Toe Exam: tenderness  No swelling and erythema    Sensory   Vibration: diminished  Proprioception: intact  Monofilament testing: diminished    Vascular  Capillary refills: < 3 seconds  The right DP pulse is 2+  The right PT pulse is 1+  Left Foot/Ankle  Left Foot Inspection  Skin Exam: skin normal and skin intact  No dry skin, no warmth, no erythema, no maceration, normal color, no pre-ulcer, no ulcer and no callus  Toe Exam: tenderness  No swelling and no erythema  Sensory   Vibration: diminished  Proprioception: intact  Monofilament testing: diminished    Vascular  Capillary refills: < 3 seconds  The left DP pulse is 2+  The left PT pulse is 1+  Assign Risk Category  No deformity present  Loss of protective sensation  No weak pulses  Risk: 1     Diagnoses and all orders for this visit:    Diabetic polyneuropathy associated with type 2 diabetes mellitus ([facility] [address] )  -     Diabetic Shoe    Type 2 diabetes mellitus with hypoglycemia without coma, with long-term current use of insulin ([facility] [address] )  -     Ambulatory Referral to Podiatry  -     Diabetic Shoe          Subjective:      Patient ID: Leobardo Erickson is a 45 y o  female  The patient presents today for a diabetic foot evaluation  Her chief complaint is need for new diabetic shoe gear  She also notes a history of chronic pain to her bilateral lower extremities  She was affect recently evaluated by vascular surgeon who recommended referral to physical therapy for lymphedema management and ordered venous Doppler studies to rule out venous insufficiency  She notes bilateral foot pain that is worse at night that is consistent with peripheral neuropathic pain likely stemming from heard longstanding diabetes        The following portions of the patient's history were reviewed and updated as appropriate: allergies, current medications, past family history, past medical history, past social history, past surgical history and problem list       PAST MEDICAL HISTORY:  Past Medical History:   Diagnosis Date    Atrial fibrillation ([facility] )     Diabetes mellitus ([place] [address] )     Fibromyalgia     Migraines     Neuromuscular disorder ([place] [address] )     Sick sinus syndrome ([place] [address] )        PAST SURGICAL HISTORY:  History reviewed  No pertinent surgical history  ALLERGIES:  Other, Clarithromycin, Acetaminophen, Bactrim [sulfamethoxazole-trimethoprim], Cephalosporins, Latex, Oxycodone-acetaminophen, Penicillins, Trazodone, Capsaicin, and Naproxen    MEDICATIONS:  Current Outpatient Medications   Medication Sig Dispense Refill    albuterol (2 5 mg/3 mL) 0 083 % nebulizer solution TAKE 3 ML (2 5 MG TOTAL) BY NEBULIZATION EVERY 4 (FOUR) HOURS AS NEEDED FOR SHORTNESS OF BREATH 90 mL 5    albuterol (PROVENTIL HFA,VENTOLIN HFA) 90 mcg/act inhaler Inhale 2 puffs every 4 (four) hours as needed      ALPRAZolam (XANAX) 1 mg tablet Take 1 tablet (1 mg total) by mouth 4 (four) times a day as needed for anxiety 60 tablet 0    ARIPiprazole (ABILIFY) 10 mg tablet Take 1 tablet (10 mg total) by mouth daily 90 tablet 3    ascorbic acid (VITAMIN C) 250 MG tablet Take 250 mg by mouth daily      atorvastatin (LIPITOR) 10 mg tablet Take 10 mg by mouth daily      azithromycin (ZITHROMAX) 500 MG tablet       azithromycin (ZITHROMAX) 500 MG tablet Take one hour before dental work      B-D ULTRAFINE III SHORT PEN 31G X 8 MM MISC USE WITH INSULIN 5 TIMES A DAY      BD Pen Needle Nkechi 2nd Gen 32G X 4 MM MISC USE AS DIRECTED FOR BEFORE A MEAL AND AT BEDTIME GLUCOSE INJECTION      BD PrecisionGlide Needle 23G X 1-1/2" MISC USE AS DIRECTED TO ADMINISTER NALOXONE INJECTION  MAY DISPENSE 23-25 GAUGE 1-1 5" NEEDLE        Blood Glucose Monitoring Suppl (ONE TOUCH ULTRA 2) w/Device KIT Use as directed      busPIRone (BUSPAR) 15 mg tablet Take 1 tablet (15 mg total) by mouth 3 (three) times a day 90 tablet 3    cholecalciferol (VITAMIN D3) 25 mcg (1,000 units) tablet Take 2 capsules daily 60 tablet 3    Cholecalciferol (Vitamin D3) 25 MCG (1000 UT) CAPS Take 2 capsules by mouth daily      Cholecalciferol 125 MCG (5000 UT) TABS every 24 hours      Continuous Blood Gluc Sensor (KnockaTV Lydia Sensor System) MISC 2- 14 day sensors 2 each 11    cyclobenzaprine (FLEXERIL) 5 mg tablet TAKE 1 TABLET (5 MG TOTAL) BY MOUTH 3 (THREE) TIMES A DAY AS NEEDED FOR MUSCLE SPASMS 90 tablet 0    darifenacin (ENABLEX) 15 MG 24 hr tablet Take 1 tablet (15 mg total) by mouth daily 90 tablet 3    Diclofenac Sodium (VOLTAREN) 1 % Apply 2 g topically 4 (four) times a day 150 g 1    famotidine (PEPCID) 20 mg tablet Take 1 tablet (20 mg total) by mouth 2 (two) times a day as needed for indigestion 90 tablet 3    fexofenadine (ALLEGRA) 180 MG tablet Take 1 tablet (180 mg total) by mouth daily 90 tablet 3    Fluticasone Propionate, Inhal, (Flovent Diskus) 250 MCG/BLIST AEPB Inhale 1 puff 2 (two) times a day 60 blister 3    gabapentin (Neurontin) 100 mg capsule Take 1 capsule (100 mg total) by mouth daily at bedtime Take additional 100 mg capsule to total 900 mg at bedtime 30 capsule 3    gabapentin (NEURONTIN) 400 mg capsule Take 2 capsules 3 times a day 90 capsule 3    hydrALAZINE (APRESOLINE) 50 mg tablet Take 50 mg by mouth 3 (three) times a day      ibuprofen (MOTRIN) 600 mg tablet Take 1 tablet (600 mg total) by mouth every 6 (six) hours as needed for mild pain or moderate pain 30 tablet 0    Lantus SoloStar 100 units/mL injection pen INJECT 55 UNITS UNDER THE SKIN 2 (TWO) TIMES A DAY AT LUNCH AND AT BEDTIME        lisinopril (ZESTRIL) 5 mg tablet Take 5 mg by mouth daily      lovastatin (MEVACOR) 10 MG tablet Take 10 mg by mouth      meclizine (ANTIVERT) 25 mg tablet Take 25 mg by mouth Three times daily as needed      metoclopramide (REGLAN) 10 mg tablet TAKE 1 TABLET (10 MG TOTAL) BY MOUTH EVERY 6 (SIX) HOURS AS NEEDED (NAUSEA OR VOMITING, OR MIGRAINE HEADACHE)      metoprolol succinate (TOPROL-XL) 100 mg 24 hr tablet Take 100 mg by mouth daily      montelukast (SINGULAIR) 10 mg tablet Take 1 tablet (10 mg total) by mouth daily at bedtime 90 tablet 3    Multiple Vitamins-Minerals (Centrum Silver 50+Women) TABS Take 1 tablet by mouth daily      naloxone (NARCAN) 0 4 mg/mL injection INJECT 1 ML IN SHOULDER OR THIGH  REPEAT AFTER 2-3 MINUTES IF NO OR MINIMAL RESPONSE   Nutritional Supplements (Glucerna Hunger Smart Shake) LIQD Take 1 Can by mouth 3 (three) times a day 296 mL 5    nystatin (MYCOSTATIN) cream Apply topically 2 (two) times a day      Olopatadine HCl (Pataday) 0 7 % SOLN 1 drop each eye twice a day 2 5 mL 3    OneTouch Delica Lancets 32E MISC 2 (two) times a day      OneTouch Ultra test strip TEST TWICE DAILY OR AS DIRECTED BY MD      pantoprazole (PROTONIX) 40 mg tablet Take 1 tablet (40 mg total) by mouth daily 90 tablet 3    potassium chloride (K-DUR,KLOR-CON) 20 mEq tablet Take 1 tablet (20 mEq total) by mouth daily 30 tablet 1    potassium chloride (MICRO-K) 10 MEQ CR capsule Take 2 capsules (20 mEq total) by mouth 2 (two) times a day 90 capsule 3    Prasterone (Intrarosa) 6 5 MG INST Insert 6 5 mg into the vagina daily      prazosin (MINIPRESS) 5 mg capsule Take 1 capsule (5 mg total) by mouth daily at bedtime 30 capsule 3    risperiDONE (RisperDAL) 2 mg tablet Take 1 tablet (2 mg total) by mouth daily at bedtime 90 tablet 3    rivaroxaban (XARELTO) 20 mg tablet Take 20 mg by mouth      senna-docusate sodium (SENOKOT S) 8 6-50 mg per tablet Take 1 tablet by mouth daily      sertraline (ZOLOFT) 100 mg tablet Take 1 tablet (100 mg total) by mouth daily 90 tablet 3    Spacer/Aero-Holding Chambers (AeroChamber Plus Geoff-Vu w/Mask) MISC 1 EACH (1 APPLICATION TOTAL) BY MISCELLANEOUS ROUTE DAILY AS NEEDED (ASTHMA EXACERBATION)        SUMAtriptan (IMITREX) 100 mg tablet TAKE 1 TABLET (100 MG TOTAL) BY MOUTH ONE TIME AS NEEDED FOR MIGRAINE  MAY REPEAT IN 2 HOURS IF UNRESOLVED  DO NOT EXCEED 200 MG IN 24 HOURS      Syringe, Disposable, (2-3CC SYRINGE) 3 ML MISC USE AS DIRECTED TO ADMINISTER NALOXONE INJECTION   triamcinolone (KENALOG) 0 1 % cream Apply 1 application topically 2 (two) times a day 30 g 1    triamcinolone (KENALOG) 0 5 % cream       trospium chloride (SANCTURA) 20 mg tablet Take 1 tablet (20 mg total) by mouth 2 (two) times a day 90 tablet 3    Trulicity 1 5 NI/8 9LS SOPN INJECT 1 5 MG UNDER THE SKIN EVERY 7 DAYS   zolpidem (AMBIEN) 10 mg tablet Take 1 tablet (10 mg total) by mouth daily 30 tablet 0    EPINEPHrine (EPIPEN) 0 3 mg/0 3 mL SOAJ Inject 0 3 mL (0 3 mg total) into a muscle once for 1 dose 1 each 0     No current facility-administered medications for this visit         SOCIAL HISTORY:  Social History     Socioeconomic History    Marital status: Single     Spouse name: None    Number of children: None    Years of education: None    Highest education level: None   Occupational History    None   Tobacco Use    Smoking status: Current Every Day Smoker     Packs/day: 2 00     Types: Cigarettes     Start date: 10    Smokeless tobacco: Never Used    Tobacco comment: Patient stated that she is ready to quit smoking and she needs help because she smokes about 2 packs and a half each and everyday   Vaping Use    Vaping Use: Never used   Substance and Sexual Activity    Alcohol use: Not Currently    Drug use: Never    Sexual activity: None   Other Topics Concern    None   Social History Narrative    None     Social Determinants of Health     Financial Resource Strain: Not on file   Food Insecurity: Not on file   Transportation Needs: Not on file   Physical Activity: Not on file   Stress: Not on file   Social Connections: Not on file   Intimate Partner Violence: Not on file   Housing Stability: Not on file        Review of Systems Constitutional: Negative for chills and fever  HENT: Negative for ear pain and sore throat  Eyes: Negative for pain and visual disturbance  Respiratory: Negative for cough and shortness of breath  Cardiovascular: Negative for chest pain and palpitations  Gastrointestinal: Negative for abdominal pain and vomiting  Musculoskeletal: Negative for arthralgias and back pain  Skin: Negative for color change and rash  Neurological: Negative for seizures and syncope  Psychiatric/Behavioral: Negative  All other systems reviewed and are negative  Objective:      /98   Pulse 98   Ht 5' 5" (1 651 m) Comment: verbal  Wt 69 9 kg (154 lb 3 2 oz)   BMI 25 66 kg/m²          Physical Exam  Constitutional:       Appearance: Normal appearance  HENT:      Head: Normocephalic and atraumatic  Nose: Nose normal    Cardiovascular:      Pulses: no weak pulses          Dorsalis pedis pulses are 2+ on the right side and 2+ on the left side  Posterior tibial pulses are 1+ on the right side and 1+ on the left side  Pulmonary:      Effort: Pulmonary effort is normal    Musculoskeletal:      Right foot: Decreased range of motion  Left foot: Decreased range of motion  Feet:      Right foot:      Skin integrity: No ulcer, skin breakdown, erythema, warmth, callus or dry skin  Left foot:      Skin integrity: No ulcer, skin breakdown, erythema, warmth, callus or dry skin  Comments: A diabetic foot examination was performed of the patient  She is in the moderate risk category  There are no open skin lesions no pre trophic changes to either foot  The patient does exhibit neuritic pain to both feet with decreased epicritic and vibratory sensation bilaterally  There are no gross deformities to either foot  Mild equinus is noted bilaterally  I encouraged gentle stretching of the Achilles tendons bilaterally  Skin:     General: Skin is warm        Capillary Refill: Capillary refill takes less than 2 seconds  Neurological:      General: No focal deficit present  Mental Status: She is alert and oriented to person, place, and time  Psychiatric:         Mood and Affect: Mood normal          Behavior: Behavior normal          Thought Content:  Thought content normal

## 2022-09-27 NOTE — PROGRESS NOTES
Assessment/Plan:    A diabetic foot examination was performed of the patient  She is in the moderate risk category  I agree with venous insufficiency ultrasound testing to her bilateral extremities  She is also scheduled for physical therapy for her bilateral lower extremity lymphedema  She is already on gabapentin for diabetic peripheral neuropathy pain  She states that she was recently increased to 100 mg during the day and 900 mg at night  Her hemoglobin A1c was reviewed note the at 6 4  Her hemoglobin A1c back in August 17, 2021 was at 12 3  I suggested referral to pain management and she states that she has multiple referrals to the service but is having trouble getting her records faxed over to the doctor's office from [place]  Should currently debride her own toenails and states that she has no issues doing so  Does occasionally have ingrown toenails and I expressed to her that if this were to occur she is to follow up with my office for further management  Recommend follow-up in about 3 months for repeat diabetic foot evaluation  A prescription was given to the patient today her new diabetic shoes with custom-molded insoles  Her last pair was done in [place] over a year ago and are very worn  Diabetic Foot Exam    Patient's shoes and socks removed  Right Foot/Ankle   Right Foot Inspection  Skin Exam: skin normal and skin intact  No dry skin, no warmth, no callus, no erythema, no maceration, no abnormal color, no pre-ulcer, no ulcer and no callus  Toe Exam: tenderness  No swelling and erythema    Sensory   Vibration: diminished  Proprioception: intact  Monofilament testing: diminished    Vascular  Capillary refills: < 3 seconds  The right DP pulse is 2+  The right PT pulse is 1+  Left Foot/Ankle  Left Foot Inspection  Skin Exam: skin normal and skin intact  No dry skin, no warmth, no erythema, no maceration, normal color, no pre-ulcer, no ulcer and no callus       Toe Exam: tenderness  No swelling and no erythema  Sensory   Vibration: diminished  Proprioception: intact  Monofilament testing: diminished    Vascular  Capillary refills: < 3 seconds  The left DP pulse is 2+  The left PT pulse is 1+  Assign Risk Category  No deformity present  Loss of protective sensation  No weak pulses  Risk: 1     Diagnoses and all orders for this visit:    Diabetic polyneuropathy associated with type 2 diabetes mellitus ([place] [address] )  -     Diabetic Shoe    Type 2 diabetes mellitus with hypoglycemia without coma, with long-term current use of insulin ([place] [address] )  -     Ambulatory Referral to Podiatry  -     Diabetic Shoe          Subjective:      Patient ID: Fernanda Simon is a 45 y o  female  The patient presents today for a diabetic foot evaluation  Her chief complaint is need for new diabetic shoe gear  She also notes a history of chronic pain to her bilateral lower extremities  She was affect recently evaluated by vascular surgeon who recommended referral to physical therapy for lymphedema management and ordered venous Doppler studies to rule out venous insufficiency  She notes bilateral foot pain that is worse at night that is consistent with peripheral neuropathic pain likely stemming from heard longstanding diabetes  The following portions of the patient's history were reviewed and updated as appropriate: allergies, current medications, past family history, past medical history, past social history, past surgical history and problem list       PAST MEDICAL HISTORY:  Past Medical History:   Diagnosis Date    Atrial fibrillation ([place] [address] )     Diabetes mellitus ([place] [address] )     Fibromyalgia     Migraines     Neuromuscular disorder ([place] [address] )     Sick sinus syndrome ([place] [address] )        PAST SURGICAL HISTORY:  History reviewed  No pertinent surgical history       ALLERGIES:  Other, Clarithromycin, Acetaminophen, Bactrim [sulfamethoxazole-trimethoprim], Cephalosporins, Latex, Oxycodone-acetaminophen, Penicillins, Trazodone, Capsaicin, and Naproxen    MEDICATIONS:  Current Outpatient Medications   Medication Sig Dispense Refill    albuterol (2 5 mg/3 mL) 0 083 % nebulizer solution TAKE 3 ML (2 5 MG TOTAL) BY NEBULIZATION EVERY 4 (FOUR) HOURS AS NEEDED FOR SHORTNESS OF BREATH 90 mL 5    albuterol (PROVENTIL HFA,VENTOLIN HFA) 90 mcg/act inhaler Inhale 2 puffs every 4 (four) hours as needed      ALPRAZolam (XANAX) 1 mg tablet Take 1 tablet (1 mg total) by mouth 4 (four) times a day as needed for anxiety 60 tablet 0    ARIPiprazole (ABILIFY) 10 mg tablet Take 1 tablet (10 mg total) by mouth daily 90 tablet 3    ascorbic acid (VITAMIN C) 250 MG tablet Take 250 mg by mouth daily      atorvastatin (LIPITOR) 10 mg tablet Take 10 mg by mouth daily      azithromycin (ZITHROMAX) 500 MG tablet       azithromycin (ZITHROMAX) 500 MG tablet Take one hour before dental work      B-D ULTRAFINE III SHORT PEN 31G X 8 MM MISC USE WITH INSULIN 5 TIMES A DAY      BD Pen Needle Nkechi 2nd Gen 32G X 4 MM MISC USE AS DIRECTED FOR BEFORE A MEAL AND AT BEDTIME GLUCOSE INJECTION      BD PrecisionGlide Needle 23G X 1-1/2" MISC USE AS DIRECTED TO ADMINISTER NALOXONE INJECTION  MAY DISPENSE 23-25 GAUGE 1-1 5" NEEDLE        Blood Glucose Monitoring Suppl (ONE TOUCH ULTRA 2) w/Device KIT Use as directed      busPIRone (BUSPAR) 15 mg tablet Take 1 tablet (15 mg total) by mouth 3 (three) times a day 90 tablet 3    cholecalciferol (VITAMIN D3) 25 mcg (1,000 units) tablet Take 2 capsules daily 60 tablet 3    Cholecalciferol (Vitamin D3) 25 MCG (1000 UT) CAPS Take 2 capsules by mouth daily      Cholecalciferol 125 MCG (5000 UT) TABS every 24 hours      Continuous Blood Gluc Sensor ([employer]se Sensor System) MISC 2- 14 day sensors 2 each 11    cyclobenzaprine (FLEXERIL) 5 mg tablet TAKE 1 TABLET (5 MG TOTAL) BY MOUTH 3 (THREE) TIMES A DAY AS NEEDED FOR MUSCLE SPASMS 90 tablet 0    darifenacin (ENABLEX) 15 MG 24 hr tablet Take 1 tablet (15 mg total) by mouth daily 90 tablet 3    Diclofenac Sodium (VOLTAREN) 1 % Apply 2 g topically 4 (four) times a day 150 g 1    famotidine (PEPCID) 20 mg tablet Take 1 tablet (20 mg total) by mouth 2 (two) times a day as needed for indigestion 90 tablet 3    fexofenadine (ALLEGRA) 180 MG tablet Take 1 tablet (180 mg total) by mouth daily 90 tablet 3    Fluticasone Propionate, Inhal, (Flovent Diskus) 250 MCG/BLIST AEPB Inhale 1 puff 2 (two) times a day 60 blister 3    gabapentin (Neurontin) 100 mg capsule Take 1 capsule (100 mg total) by mouth daily at bedtime Take additional 100 mg capsule to total 900 mg at bedtime 30 capsule 3    gabapentin (NEURONTIN) 400 mg capsule Take 2 capsules 3 times a day 90 capsule 3    hydrALAZINE (APRESOLINE) 50 mg tablet Take 50 mg by mouth 3 (three) times a day      ibuprofen (MOTRIN) 600 mg tablet Take 1 tablet (600 mg total) by mouth every 6 (six) hours as needed for mild pain or moderate pain 30 tablet 0    Lantus SoloStar 100 units/mL injection pen INJECT 55 UNITS UNDER THE SKIN 2 (TWO) TIMES A DAY AT LUNCH AND AT BEDTIME   lisinopril (ZESTRIL) 5 mg tablet Take 5 mg by mouth daily      lovastatin (MEVACOR) 10 MG tablet Take 10 mg by mouth      meclizine (ANTIVERT) 25 mg tablet Take 25 mg by mouth Three times daily as needed      metoclopramide (REGLAN) 10 mg tablet TAKE 1 TABLET (10 MG TOTAL) BY MOUTH EVERY 6 (SIX) HOURS AS NEEDED (NAUSEA OR VOMITING, OR MIGRAINE HEADACHE)      metoprolol succinate (TOPROL-XL) 100 mg 24 hr tablet Take 100 mg by mouth daily      montelukast (SINGULAIR) 10 mg tablet Take 1 tablet (10 mg total) by mouth daily at bedtime 90 tablet 3    Multiple Vitamins-Minerals (Centrum Silver 50+Women) TABS Take 1 tablet by mouth daily      naloxone (NARCAN) 0 4 mg/mL injection INJECT 1 ML IN SHOULDER OR THIGH  REPEAT AFTER 2-3 MINUTES IF NO OR MINIMAL RESPONSE        Nutritional Supplements (Glucerna Hunger Smart Shake) LIQD Take 1 Can by mouth 3 (three) times a day 296 mL 5    nystatin (MYCOSTATIN) cream Apply topically 2 (two) times a day      Olopatadine HCl (Pataday) 0 7 % SOLN 1 drop each eye twice a day 2 5 mL 3    OneTouch Delica Lancets 28Y MISC 2 (two) times a day      OneTouch Ultra test strip TEST TWICE DAILY OR AS DIRECTED BY MD      pantoprazole (PROTONIX) 40 mg tablet Take 1 tablet (40 mg total) by mouth daily 90 tablet 3    potassium chloride (K-DUR,KLOR-CON) 20 mEq tablet Take 1 tablet (20 mEq total) by mouth daily 30 tablet 1    potassium chloride (MICRO-K) 10 MEQ CR capsule Take 2 capsules (20 mEq total) by mouth 2 (two) times a day 90 capsule 3    Prasterone (Intrarosa) 6 5 MG INST Insert 6 5 mg into the vagina daily      prazosin (MINIPRESS) 5 mg capsule Take 1 capsule (5 mg total) by mouth daily at bedtime 30 capsule 3    risperiDONE (RisperDAL) 2 mg tablet Take 1 tablet (2 mg total) by mouth daily at bedtime 90 tablet 3    rivaroxaban (XARELTO) 20 mg tablet Take 20 mg by mouth      senna-docusate sodium (SENOKOT S) 8 6-50 mg per tablet Take 1 tablet by mouth daily      sertraline (ZOLOFT) 100 mg tablet Take 1 tablet (100 mg total) by mouth daily 90 tablet 3    Spacer/Aero-Holding Chambers (AeroChamber Plus Geoff-Vu w/Mask) MISC 1 EACH (1 APPLICATION TOTAL) BY MISCELLANEOUS ROUTE DAILY AS NEEDED (ASTHMA EXACERBATION)   SUMAtriptan (IMITREX) 100 mg tablet TAKE 1 TABLET (100 MG TOTAL) BY MOUTH ONE TIME AS NEEDED FOR MIGRAINE  MAY REPEAT IN 2 HOURS IF UNRESOLVED  DO NOT EXCEED 200 MG IN 24 HOURS      Syringe, Disposable, (2-3CC SYRINGE) 3 ML MISC USE AS DIRECTED TO ADMINISTER NALOXONE INJECTION        triamcinolone (KENALOG) 0 1 % cream Apply 1 application topically 2 (two) times a day 30 g 1    triamcinolone (KENALOG) 0 5 % cream       trospium chloride (SANCTURA) 20 mg tablet Take 1 tablet (20 mg total) by mouth 2 (two) times a day 90 tablet 3    Trulicity 1 5 LP/1 0OA SOPN INJECT 1 5 MG UNDER THE SKIN EVERY 7 DAYS   zolpidem (AMBIEN) 10 mg tablet Take 1 tablet (10 mg total) by mouth daily 30 tablet 0    EPINEPHrine (EPIPEN) 0 3 mg/0 3 mL SOAJ Inject 0 3 mL (0 3 mg total) into a muscle once for 1 dose 1 each 0     No current facility-administered medications for this visit  SOCIAL HISTORY:  Social History     Socioeconomic History    Marital status: Single     Spouse name: None    Number of children: None    Years of education: None    Highest education level: None   Occupational History    None   Tobacco Use    Smoking status: Current Every Day Smoker     Packs/day: 2 00     Types: Cigarettes     Start date: 12    Smokeless tobacco: Never Used    Tobacco comment: Patient stated that she is ready to quit smoking and she needs help because she smokes about 2 packs and a half each and everyday   Vaping Use    Vaping Use: Never used   Substance and Sexual Activity    Alcohol use: Not Currently    Drug use: Never    Sexual activity: None   Other Topics Concern    None   Social History Narrative    None     Social Determinants of Health     Financial Resource Strain: Not on file   Food Insecurity: Not on file   Transportation Needs: Not on file   Physical Activity: Not on file   Stress: Not on file   Social Connections: Not on file   Intimate Partner Violence: Not on file   Housing Stability: Not on file        Review of Systems   Constitutional: Negative for chills and fever  HENT: Negative for ear pain and sore throat  Eyes: Negative for pain and visual disturbance  Respiratory: Negative for cough and shortness of breath  Cardiovascular: Negative for chest pain and palpitations  Gastrointestinal: Negative for abdominal pain and vomiting  Musculoskeletal: Negative for arthralgias and back pain  Skin: Negative for color change and rash  Neurological: Negative for seizures and syncope  Psychiatric/Behavioral: Negative  All other systems reviewed and are negative  Objective:      /98   Pulse 98   Ht 5' 5" (1 651 m) Comment: verbal  Wt 69 9 kg (154 lb 3 2 oz)   BMI 25 66 kg/m²          Physical Exam  Constitutional:       Appearance: Normal appearance  HENT:      Head: Normocephalic and atraumatic  Nose: Nose normal    Cardiovascular:      Pulses: no weak pulses          Dorsalis pedis pulses are 2+ on the right side and 2+ on the left side  Posterior tibial pulses are 1+ on the right side and 1+ on the left side  Pulmonary:      Effort: Pulmonary effort is normal    Musculoskeletal:      Right foot: Decreased range of motion  Left foot: Decreased range of motion  Feet:      Right foot:      Skin integrity: No ulcer, skin breakdown, erythema, warmth, callus or dry skin  Left foot:      Skin integrity: No ulcer, skin breakdown, erythema, warmth, callus or dry skin  Comments: A diabetic foot examination was performed of the patient  She is in the moderate risk category  There are no open skin lesions no pre trophic changes to either foot  The patient does exhibit neuritic pain to both feet with decreased epicritic and vibratory sensation bilaterally  There are no gross deformities to either foot  Mild equinus is noted bilaterally  I encouraged gentle stretching of the Achilles tendons bilaterally  Skin:     General: Skin is warm  Capillary Refill: Capillary refill takes less than 2 seconds  Neurological:      General: No focal deficit present  Mental Status: She is alert and oriented to person, place, and time  Psychiatric:         Mood and Affect: Mood normal          Behavior: Behavior normal          Thought Content:  Thought content normal

## 2022-09-28 ENCOUNTER — HOSPITAL ENCOUNTER (OUTPATIENT)
Dept: SLEEP CENTER | Facility: CLINIC | Age: 52
Discharge: HOME | End: 2022-09-28

## 2022-09-29 DIAGNOSIS — R51.9 ACUTE HEADACHE: ICD-10-CM

## 2022-09-29 DIAGNOSIS — G47.33 OSA (OBSTRUCTIVE SLEEP APNEA): Primary | ICD-10-CM

## 2022-09-29 DIAGNOSIS — T78.40XS ALLERGY, SEQUELA: ICD-10-CM

## 2022-09-29 RX ORDER — IBUPROFEN 600 MG/1
600 TABLET ORAL EVERY 6 HOURS PRN
Qty: 30 TABLET | Refills: 0 | Status: SHIPPED | OUTPATIENT
Start: 2022-09-29

## 2022-09-29 NOTE — PROGRESS NOTES
Called PT at 8:30 PM   She states she did not have transportation and did not have number to call Sleep Lab to postpone sleep study  She will call after 9 AM to reschedule

## 2022-09-30 DIAGNOSIS — E11.649 TYPE 2 DIABETES MELLITUS WITH HYPOGLYCEMIA WITHOUT COMA, WITH LONG-TERM CURRENT USE OF INSULIN (HCC): Primary | ICD-10-CM

## 2022-09-30 DIAGNOSIS — Z79.4 TYPE 2 DIABETES MELLITUS WITH HYPOGLYCEMIA WITHOUT COMA, WITH LONG-TERM CURRENT USE OF INSULIN (HCC): Primary | ICD-10-CM

## 2022-09-30 RX ORDER — ALBUTEROL SULFATE 2.5 MG/3ML
2.5 SOLUTION RESPIRATORY (INHALATION) EVERY 4 HOURS PRN
Qty: 90 ML | Refills: 5 | Status: SHIPPED | OUTPATIENT
Start: 2022-09-30 | End: 2022-12-29

## 2022-09-30 NOTE — TELEPHONE ENCOUNTER
Called [facility] for an update on the patients PA for the [employer] system and the [address]  Patient has been denied the [employer] system because her A1C is under 7 and I was provided a fax number to fax request for health [employer] to their DME department at : [phone]  Patient requested a copy of her referral for Rheumatology and for her Sleep Study  Fax has also been sent over to the office for the Sleep study because the patient stated they did not receive the faxed referral that was originally sent  Patient was notified and patient stated that she needs replacements for the Caporal  tunnel bracelets and she needs arthritis gloves because her hands have been hurting her really bad due to the cold weather change and she has lost hers or they have been stolen  Patient requesting 2 pairs instead of one for the gloves, please advise

## 2022-10-01 DIAGNOSIS — E87.6 LOW BLOOD POTASSIUM: ICD-10-CM

## 2022-10-01 DIAGNOSIS — F43.10 PTSD (POST-TRAUMATIC STRESS DISORDER): ICD-10-CM

## 2022-10-03 ENCOUNTER — TELEPHONE (OUTPATIENT)
Dept: INTERNAL MEDICINE CLINIC | Facility: CLINIC | Age: 52
End: 2022-10-03

## 2022-10-03 ENCOUNTER — EVALUATION (OUTPATIENT)
Dept: PHYSICAL THERAPY | Age: 52
End: 2022-10-03
Payer: COMMERCIAL

## 2022-10-03 DIAGNOSIS — G43.909 MIGRAINE WITHOUT STATUS MIGRAINOSUS, NOT INTRACTABLE, UNSPECIFIED MIGRAINE TYPE: Primary | ICD-10-CM

## 2022-10-03 DIAGNOSIS — I89.0 LYMPHEDEMA: Primary | ICD-10-CM

## 2022-10-03 PROCEDURE — 97163 PT EVAL HIGH COMPLEX 45 MIN: CPT

## 2022-10-03 PROCEDURE — 97110 THERAPEUTIC EXERCISES: CPT

## 2022-10-03 RX ORDER — IBUPROFEN 600 MG/1
600 TABLET ORAL EVERY 6 HOURS PRN
Qty: 30 TABLET | Refills: 0 | Status: SHIPPED | OUTPATIENT
Start: 2022-10-03

## 2022-10-03 RX ORDER — POTASSIUM CHLORIDE 750 MG/1
20 CAPSULE, EXTENDED RELEASE ORAL 2 TIMES DAILY
Qty: 90 CAPSULE | Refills: 3 | Status: SHIPPED | OUTPATIENT
Start: 2022-10-03

## 2022-10-03 RX ORDER — PEN NEEDLE, DIABETIC 32GX 5/32"
NEEDLE, DISPOSABLE MISCELLANEOUS
Qty: 100 EACH | Refills: 3 | OUTPATIENT
Start: 2022-10-03

## 2022-10-03 RX ORDER — NEEDLES, DISPOSABLE 25GX5/8"
NEEDLE, DISPOSABLE MISCELLANEOUS
Refills: 0 | OUTPATIENT
Start: 2022-10-03

## 2022-10-03 RX ORDER — ZOLPIDEM TARTRATE 10 MG/1
10 TABLET ORAL DAILY
Qty: 30 TABLET | Refills: 0 | Status: SHIPPED | OUTPATIENT
Start: 2022-10-03 | End: 2023-01-11

## 2022-10-03 RX ORDER — PEN NEEDLE, DIABETIC 31 GX5/16"
NEEDLE, DISPOSABLE MISCELLANEOUS
Refills: 0 | OUTPATIENT
Start: 2022-10-03

## 2022-10-03 NOTE — TELEPHONE ENCOUNTER
Called the patient and informed her that she needs to contact her Orthopedic Specialist, Dr Fontana for the prescription for the Carpol Tunnel bracelets and the Arthritic gloves

## 2022-10-04 RX ORDER — METOCLOPRAMIDE 10 MG/1
10 TABLET ORAL 4 TIMES DAILY
Qty: 30 TABLET | Refills: 0 | Status: SHIPPED | OUTPATIENT
Start: 2022-10-04

## 2022-10-04 NOTE — TELEPHONE ENCOUNTER
Patient stated that her whole body is swollen and her throat and (L) ear has been hurting so she thinks she maybe getting a ear infection and wants to know if you could prescribe something for that     Also, she stated that she sent you a message on [employer] about getting a refill of Metoclopramide 10 mg tablets that were prescribed to her in the ED for headaches and vomiting as well, please advise

## 2022-10-04 NOTE — PROGRESS NOTES
PT Evaluation     Today's date: 10/3/2022  Patient name: Zainab Menon  : 1970  MRN: [id number]  Referring provider: Isra Perez DO  Dx:   Encounter Diagnosis     ICD-10-CM    1  Lymphedema  I89 0                   Assessment/Plan    Subjective Evaluation    History of Present Illness  Onset date: exacerbation of bilateral le lymphedema in the past 1 year , present since at least 2017  Recurrent probem    Quality of life: fair          Objective    Flowsheet Rows    Flowsheet Row Most Recent Value   PT/OT G-Codes    Current Score 46   Projected Score 49   Assessment Type Evaluation   G code set Mobility: Walking & Moving Around   Mobility: Walking and Moving Around Current Status () CK   Mobility: Walking and Moving Around Goal Status () CK             Precautions:      Allergies  Reviewed by Prosper Al at 11:28 PM   Severity Reactions Comments   Other High Anaphylaxis Capzasin HP -- severe burning and swelling of the skin    Clarithromycin Medium GI Intolerance    Acetaminophen Not Specified GI Intolerance    Bactrim [sulfamethoxazole-trimethoprim] Not Specified Itching, GI Intolerance, Dizziness, Confusion, Lightheadedness Patient passes out when on this medication for several days    Cephalosporins Not Specified Hives    Latex Not Specified Hives    Oxycodone-acetaminophen Not Specified GI Intolerance    Penicillins Not Specified Diarrhea    Trazodone Not Specified Diarrhea    Capsaicin Low Rash    Naproxen              Manuals 10/3/22             I eval             Mld massage and soft tissue mobilization both le's              Pt has prescription for bilateral compression socks                           Neuro Re-Ed, ,there exer              Nu step              Le lymphedema there exer  10 reps see packet                                                                             Ther Ex Ther Activity                                       Gait Training                                       Modalities             Trial bilateral le compression pump with le elevation 30-40 mmHG

## 2022-10-05 NOTE — TELEPHONE ENCOUNTER
Patient has an appointment on the 13th of this month but Alfonso said he will call her to see if she would like to come in sooner since she doesn't feel well and you have a spot open for tomorrow

## 2022-10-10 ENCOUNTER — HOSPITAL ENCOUNTER (OUTPATIENT)
Dept: NON INVASIVE DIAGNOSTICS | Facility: CLINIC | Age: 52
Discharge: HOME | End: 2022-10-10
Payer: COMMERCIAL

## 2022-10-10 DIAGNOSIS — M79.89 LEG SWELLING: ICD-10-CM

## 2022-10-10 PROCEDURE — 93970 EXTREMITY STUDY: CPT

## 2022-10-10 PROCEDURE — 93970 EXTREMITY STUDY: CPT | Performed by: SURGERY

## 2022-10-11 DIAGNOSIS — Z79.4 TYPE 2 DIABETES MELLITUS WITH HYPOGLYCEMIA WITHOUT COMA, WITH LONG-TERM CURRENT USE OF INSULIN (HCC): Primary | ICD-10-CM

## 2022-10-11 DIAGNOSIS — E11.649 TYPE 2 DIABETES MELLITUS WITH HYPOGLYCEMIA WITHOUT COMA, WITH LONG-TERM CURRENT USE OF INSULIN (HCC): Primary | ICD-10-CM

## 2022-10-11 RX ORDER — FLASH GLUCOSE SCANNING READER
1 EACH MISCELLANEOUS CONTINUOUS
Qty: 1 EACH | Refills: 0 | Status: SHIPPED | OUTPATIENT
Start: 2022-10-11

## 2022-10-11 RX ORDER — FLASH GLUCOSE SENSOR
1 KIT MISCELLANEOUS
Qty: 1 EACH | Refills: 6 | Status: SHIPPED | OUTPATIENT
Start: 2022-10-11

## 2022-10-11 NOTE — TELEPHONE ENCOUNTER
Received an additional notice of denial from [employer] which is the patients prescription coverage plan  Requested to speak with a pharmacist from the patients prescription coverage to see if we could get the PA overturned  Spoke with Donna Crawford who contacted the pharmacy physician through [employer] and the denial for the patients [employer] system has been overturned to approval  Phil Farfan in the DME department denies getting a fax for PA for the patients nutritional drinks so that will be refaxed over to their department today   Will lucretia them tomorrow to check if the fax has been received

## 2022-10-12 ENCOUNTER — TELEPHONE (OUTPATIENT)
Dept: INTERNAL MEDICINE CLINIC | Facility: CLINIC | Age: 52
End: 2022-10-12

## 2022-10-12 NOTE — TELEPHONE ENCOUNTER
Contacted [facility] to see if the patients suleiman GOMEZ had been received via fax that was sent over yesterday  I spoke with Jeanne Painter, she transferred me to the DME department for further assistance, spoke with Shira Irwin and she stated that they were still sifting through their faxes from yesterday and recommended that I fax it to another number because it would process faster as per, Shira Irwin from their DME dept  I faxed the form to [phone] and then to the number she provided today which was [phone] as a back up to make sure they would receive the patients information  Paperwork has been faxed over one again

## 2022-10-16 ENCOUNTER — HOSPITAL ENCOUNTER (OUTPATIENT)
Dept: ULTRASOUND IMAGING | Facility: HOSPITAL | Age: 52
Discharge: HOME | End: 2022-10-16
Payer: COMMERCIAL

## 2022-10-16 DIAGNOSIS — R74.8 ELEVATED LIVER ENZYMES: ICD-10-CM

## 2022-10-16 PROCEDURE — 76700 US EXAM ABDOM COMPLETE: CPT

## 2022-10-18 DIAGNOSIS — Z79.4 TYPE 2 DIABETES MELLITUS WITH HYPOGLYCEMIA WITHOUT COMA, WITH LONG-TERM CURRENT USE OF INSULIN (HCC): Primary | ICD-10-CM

## 2022-10-18 DIAGNOSIS — E11.649 TYPE 2 DIABETES MELLITUS WITH HYPOGLYCEMIA WITHOUT COMA, WITH LONG-TERM CURRENT USE OF INSULIN (HCC): Primary | ICD-10-CM

## 2022-10-18 RX ORDER — PEN NEEDLE, DIABETIC 31 GX5/16"
NEEDLE, DISPOSABLE MISCELLANEOUS
Qty: 200 EACH | Refills: 3 | Status: SHIPPED | OUTPATIENT
Start: 2022-10-18

## 2022-10-18 RX ORDER — INSULIN LISPRO 100 [IU]/ML
INJECTION, SOLUTION INTRAVENOUS; SUBCUTANEOUS
Qty: 15 ML | Refills: 3 | Status: SHIPPED | OUTPATIENT
Start: 2022-10-18

## 2022-10-18 RX ORDER — DULAGLUTIDE 1.5 MG/.5ML
INJECTION, SOLUTION SUBCUTANEOUS
Qty: 2 ML | Refills: 3 | Status: SHIPPED | OUTPATIENT
Start: 2022-10-18

## 2022-10-18 RX ORDER — INSULIN GLARGINE 100 [IU]/ML
INJECTION, SOLUTION SUBCUTANEOUS
Qty: 45 ML | Refills: 3 | Status: SHIPPED | OUTPATIENT
Start: 2022-10-18

## 2022-10-20 ENCOUNTER — TELEPHONE (OUTPATIENT)
Dept: INTERNAL MEDICINE CLINIC | Facility: CLINIC | Age: 52
End: 2022-10-20

## 2022-10-20 NOTE — TELEPHONE ENCOUNTER
----- Message from Jennifer Garcia, [address] sent at 10/20/2022 12:53 PM EDT -----  US abdomen showing possible gallstone with some "sludge" which is a thick substance that your gallbladder gets congested with  You are currently a patient with Dr Lidia Mccormack, you can follow with him as he is GI

## 2022-10-24 ENCOUNTER — OFFICE VISIT (OUTPATIENT)
Dept: PHYSICAL THERAPY | Age: 52
End: 2022-10-24
Payer: COMMERCIAL

## 2022-10-24 DIAGNOSIS — I89.0 LYMPHEDEMA: Primary | ICD-10-CM

## 2022-10-24 PROCEDURE — 97110 THERAPEUTIC EXERCISES: CPT

## 2022-10-24 NOTE — PROGRESS NOTES
Daily Note     Today's date: 10/24/2022  Patient name: Amador Doss  : 1970  MRN: [id number]  Referring provider: Rena Sharma DO  Dx:   Encounter Diagnosis     ICD-10-CM    1  Lymphedema  I89 0                   Subjective: Pt  Having blood sugar issues today with low numbers and needing several pieces of candy, with blood sugar rising to 57  Unable to work with patient today  Monitored patients vitals and discussed food  Pt  States her bus came 40 minutes early and she was unable to eat her breakfast     Objective: See treatment diary below      Assessment: Tolerated treatment well  Patient would benefit from continued PT      Plan: Continue per plan of care  Precautions:      Allergies  Reviewed by Ale Poole at 11:28 PM   Severity Reactions Comments   Other High Anaphylaxis Capzasin HP -- severe burning and swelling of the skin    Clarithromycin Medium GI Intolerance    Acetaminophen Not Specified GI Intolerance    Bactrim [sulfamethoxazole-trimethoprim] Not Specified Itching, GI Intolerance, Dizziness, Confusion, Lightheadedness Patient passes out when on this medication for several days    Cephalosporins Not Specified Hives    Latex Not Specified Hives    Oxycodone-acetaminophen Not Specified GI Intolerance    Penicillins Not Specified Diarrhea    Trazodone Not Specified Diarrhea    Capsaicin Low Rash    Naproxen              Manuals 10/3/22 10/24            I eval             Mld massage and soft tissue mobilization both le's              Pt has prescription for bilateral compression socks                           Neuro Re-Ed, ,there exer              Nu step   13 min           Le lymphedema there exer  10 reps see packet                                                                             Ther Ex                                                                                                                     Ther Activity                                       Gait Training Modalities             Trial bilateral le compression pump with le elevation 30-40 mmHG

## 2022-11-01 ENCOUNTER — OFFICE VISIT (OUTPATIENT)
Dept: PHYSICAL THERAPY | Age: 52
End: 2022-11-01

## 2022-11-01 DIAGNOSIS — I89.0 LYMPHEDEMA: Primary | ICD-10-CM

## 2022-11-01 NOTE — PROGRESS NOTES
Daily Note     Today's date: 2022  Patient name: Don Burns  : 1970  MRN: [id number]  Referring provider: Prosper Dumont DO  Dx:   Encounter Diagnosis     ICD-10-CM    1  Lymphedema  I89 0                   Subjective: "My sugar level is good today 121 " Pt feeling well today  Objective: See treatment diary below      Assessment: Tolerated treatment well  Patient would benefit from continued PT      Plan: Continue per plan of care  Precautions:      Allergies  Reviewed by Jakub Garcia at 11:28 PM   Severity Reactions Comments   Other High Anaphylaxis Capzasin HP -- severe burning and swelling of the skin    Clarithromycin Medium GI Intolerance    Acetaminophen Not Specified GI Intolerance    Bactrim [sulfamethoxazole-trimethoprim] Not Specified Itching, GI Intolerance, Dizziness, Confusion, Lightheadedness Patient passes out when on this medication for several days    Cephalosporins Not Specified Hives    Latex Not Specified Hives    Oxycodone-acetaminophen Not Specified GI Intolerance    Penicillins Not Specified Diarrhea    Trazodone Not Specified Diarrhea    Capsaicin Low Rash    Naproxen              Manuals 10/3/22 10/24 11/1           RUSTY engel             Mld massage and soft tissue mobilization both le's    45 min man          Pt has prescription for bilateral compression socks                           Neuro Re-Ed, ,there exer              Nu step   13 min 15 min r 3          Le lymphedema there exer  10 reps see packet                                                                             Ther Ex                                                                                                                     Ther Activity                                       Gait Training                                       Modalities             Trial bilateral le compression pump with le elevation 30-40 mmHG

## 2022-11-04 DIAGNOSIS — M06.9 RHEUMATOID ARTHRITIS INVOLVING BOTH HANDS, UNSPECIFIED WHETHER RHEUMATOID FACTOR PRESENT (HCC): Primary | ICD-10-CM

## 2022-11-07 ENCOUNTER — APPOINTMENT (OUTPATIENT)
Dept: RADIOLOGY | Facility: CLINIC | Age: 52
End: 2022-11-07

## 2022-11-07 ENCOUNTER — OFFICE VISIT (OUTPATIENT)
Dept: PHYSICAL THERAPY | Age: 52
End: 2022-11-07

## 2022-11-07 ENCOUNTER — OFFICE VISIT (OUTPATIENT)
Dept: OBGYN CLINIC | Facility: CLINIC | Age: 52
End: 2022-11-07

## 2022-11-07 VITALS
DIASTOLIC BLOOD PRESSURE: 98 MMHG | SYSTOLIC BLOOD PRESSURE: 160 MMHG | HEART RATE: 100 BPM | BODY MASS INDEX: 27.16 KG/M2 | HEIGHT: 65 IN | WEIGHT: 163 LBS

## 2022-11-07 DIAGNOSIS — M65.332 ACQUIRED TRIGGER FINGER OF BOTH MIDDLE FINGERS: ICD-10-CM

## 2022-11-07 DIAGNOSIS — M77.8 TENDINITIS OF BOTH WRISTS: Primary | ICD-10-CM

## 2022-11-07 DIAGNOSIS — G56.03 CARPAL TUNNEL SYNDROME ON BOTH SIDES: ICD-10-CM

## 2022-11-07 DIAGNOSIS — I89.0 LYMPHEDEMA: Primary | ICD-10-CM

## 2022-11-07 DIAGNOSIS — M06.9 RHEUMATOID ARTHRITIS INVOLVING BOTH HANDS, UNSPECIFIED WHETHER RHEUMATOID FACTOR PRESENT (HCC): ICD-10-CM

## 2022-11-07 DIAGNOSIS — M65.331 ACQUIRED TRIGGER FINGER OF BOTH MIDDLE FINGERS: ICD-10-CM

## 2022-11-07 DIAGNOSIS — M79.641 PAIN IN BOTH HANDS: ICD-10-CM

## 2022-11-07 DIAGNOSIS — M79.642 PAIN IN BOTH HANDS: ICD-10-CM

## 2022-11-07 RX ADMIN — LIDOCAINE HYDROCHLORIDE 0.5 ML: 10 INJECTION, SOLUTION INFILTRATION; PERINEURAL at 16:26

## 2022-11-07 RX ADMIN — TRIAMCINOLONE ACETONIDE 20 MG: 40 INJECTION, SUSPENSION INTRA-ARTICULAR; INTRAMUSCULAR at 16:26

## 2022-11-07 NOTE — PROGRESS NOTES
Assessment/Plan:  Assessment/Plan   Diagnoses and all orders for this visit:    Tendinitis of both wrists  -     Ambulatory Referral to Rheumatology  -     XR hand 3+ vw left; Future  -     XR hand 3+ vw right; Future    Acquired trigger finger of both middle fingers  -     Hand/upper extremity injection: bilateral long A1    Carpal tunnel syndrome on both sides        45year-old right-dominant female with pain and swelling both hands more than few months duration  Discussed with patient physical exam, radiographs, impression and plan  X-rays of the hands are unremarkable for significant degenerative changes  Physical exam right hand noted for mild tenderness at the wrist and dorsum of the hand as well as 3rd digit  She has generalized tenderness volar aspect both wrist and at the 3rd digit A1 pulley both hands  There is palpable flexor tendon nodule with reproduction of clicking with flexion and extension  She has intact range of motion and strength both wrist and digits both hands  She is intact neurovascularly  There is positive carpal tunnel compression bilaterally  Clinical impression is that she is symptomatic from tendinitis contributing to trigger finger and carpal tunnel  I offered patient steroid injections to which she agreed  I administered mixtures 0 5 cc 1% lidocaine and 0 5 cc Kenalog to the 3rd digit A1 pulley both hands without complication  She was advised she is allowed up to 2 injections to the same site prior to surgery being recommended  Recommend she continue with applying topical diclofenac gel  She is to see Rheumatology as referred by primary care provider  She will follow up me as needed  Subjective:   Patient ID: Celestino Ferrell is a 45 y o  female    Chief Complaint   Patient presents with   • Right Hand - Pain, Swelling   • Left Hand - Pain       78-year-old right-hand-dominant female presents evaluation of pain and swelling both hands more than few months duration  She denies particular trauma or inciting event  Pain described as gradual in onset, generalized to the wrist radiating distally to the palm of the hand and 3rd digit both hands, associated with swelling, associated stiffness that he worse in the morning, worse with gripping activities, and improved resting  She was reports locking and snapping 3rd digit both hands  She has been applying ice to help with symptoms  She reports that heat did not help  She was referred to Rheumatology by primary care provider  Hand Pain  This is a new problem  The current episode started more than 1 month ago  The problem occurs daily  The problem has been gradually worsening  Associated symptoms include arthralgias, joint swelling and weakness  Pertinent negatives include no numbness  Exacerbated by: Gripping  She has tried rest, ice and heat for the symptoms  The treatment provided mild relief  The following portions of the patient's history were reviewed and updated as appropriate: She  has a past medical history of Atrial fibrillation ([facility] [address] ), Diabetes mellitus ([facility] ), Fibromyalgia, Migraines, Neuromuscular disorder ([facility] [address] [address] ), and Sick sinus syndrome ([facility] [address] )  She  has no past surgical history on file  Her family history is not on file  She  reports that she has been smoking cigarettes  She started smoking about 30 years ago  She has been smoking about 2 00 packs per day  She has never used smokeless tobacco  She reports previous alcohol use  She reports that she does not use drugs  She is allergic to other, clarithromycin, acetaminophen, bactrim [sulfamethoxazole-trimethoprim], cephalosporins, latex, oxycodone-acetaminophen, penicillins, trazodone, capsaicin, and naproxen       Review of Systems   Musculoskeletal: Positive for arthralgias and joint swelling  Neurological: Positive for weakness  Negative for numbness         Objective:  Vitals:    11/07/22 1515 11/07/22 1523   BP: (!) 161/101 160/98   Pulse: 101 100   Weight: 73 9 kg (163 lb)    Height: 5' 5" (1 651 m)      Right Hand Exam     Muscle Strength   The patient has normal right wrist strength  Other   Sensation: normal  Pulse: present      Left Hand Exam     Muscle Strength   The patient has normal left wrist strength  Other   Sensation: normal  Pulse: present          Strength/Myotome Testing     Left Wrist/Hand   Normal wrist strength    Right Wrist/Hand   Normal wrist strength      Physical Exam  Vitals and nursing note reviewed  Constitutional:       General: She is not in acute distress  Appearance: She is well-developed  She is not ill-appearing or diaphoretic  HENT:      Head: Normocephalic  Right Ear: External ear normal       Left Ear: External ear normal    Eyes:      Conjunctiva/sclera: Conjunctivae normal    Neck:      Trachea: No tracheal deviation  Cardiovascular:      Rate and Rhythm: Normal rate  Pulmonary:      Effort: Pulmonary effort is normal  No respiratory distress  Abdominal:      General: There is no distension  Musculoskeletal:         General: Swelling and tenderness present  No deformity or signs of injury  Comments:  mild tenderness at the wrist and dorsum of the hand as well as 3rd digit  She has generalized tenderness volar aspect both wrist and at the 3rd digit A1 pulley both hands  There is palpable flexor tendon nodule with reproduction of clicking with flexion and extension  She has intact range of motion and strength both wrist and digits both hands  Skin:     General: Skin is warm and dry  Coloration: Skin is not jaundiced or pale  Neurological:      Mental Status: She is alert and oriented to person, place, and time  Psychiatric:         Mood and Affect: Mood normal          Behavior: Behavior normal          Thought Content:  Thought content normal          Judgment: Judgment normal          I have personally reviewed pertinent films in PACS and my interpretation is No significant degenerative changes of the hands  Hand/upper extremity injection: bilateral long A1  Universal Protocol:  Consent: Verbal consent obtained  Risks and benefits: risks, benefits and alternatives were discussed  Consent given by: patient  Time out: Immediately prior to procedure a "time out" was called to verify the correct patient, procedure, equipment, support staff and site/side marked as required  Patient understanding: patient states understanding of the procedure being performed  Patient consent: the patient's understanding of the procedure matches consent given  Procedure consent: procedure consent matches procedure scheduled  Relevant documents: relevant documents present and verified  Test results: test results available and properly labeled  Site marked: the operative site was marked  Radiology Images displayed and confirmed   If images not available, report reviewed: imaging studies available  Required items: required blood products, implants, devices, and special equipment available  Patient identity confirmed: verbally with patient    Supporting Documentation  Indications: pain and tendon swelling   Procedure Details  Condition:trigger finger Location: long finger - bilateral long A1   Preparation: Patient was prepped and draped in the usual sterile fashion  Needle size: 27 G  Ultrasound guidance: no  Approach: volar    Medications (Right): 0 5 mL lidocaine 1 %; 20 mg triamcinolone acetonide 40 mg/mLMedications (Left): 0 5 mL lidocaine 1 %; 20 mg triamcinolone acetonide 40 mg/mL   Patient tolerance: patient tolerated the procedure well with no immediate complications  Dressing:  Sterile dressing applied

## 2022-11-08 ENCOUNTER — APPOINTMENT (OUTPATIENT)
Dept: PHYSICAL THERAPY | Age: 52
End: 2022-11-08

## 2022-11-09 RX ORDER — LIDOCAINE HYDROCHLORIDE 10 MG/ML
0.5 INJECTION, SOLUTION INFILTRATION; PERINEURAL
Status: COMPLETED | OUTPATIENT
Start: 2022-11-07 | End: 2022-11-07

## 2022-11-09 RX ORDER — TRIAMCINOLONE ACETONIDE 40 MG/ML
20 INJECTION, SUSPENSION INTRA-ARTICULAR; INTRAMUSCULAR
Status: COMPLETED | OUTPATIENT
Start: 2022-11-07 | End: 2022-11-07

## 2022-11-15 ENCOUNTER — TELEPHONE (OUTPATIENT)
Dept: NEUROLOGY | Facility: CLINIC | Age: 52
End: 2022-11-15

## 2022-11-15 ENCOUNTER — TELEPHONE (OUTPATIENT)
Dept: SLEEP CENTER | Facility: CLINIC | Age: 52
End: 2022-11-15

## 2022-11-15 DIAGNOSIS — G43.909 MIGRAINE WITHOUT STATUS MIGRAINOSUS, NOT INTRACTABLE, UNSPECIFIED MIGRAINE TYPE: ICD-10-CM

## 2022-11-15 RX ORDER — METOCLOPRAMIDE 10 MG/1
10 TABLET ORAL 4 TIMES DAILY
Qty: 30 TABLET | Refills: 0 | Status: SHIPPED | OUTPATIENT
Start: 2022-11-15 | End: 2022-11-15 | Stop reason: SDUPTHER

## 2022-11-15 NOTE — TELEPHONE ENCOUNTER
PT called in the r/s her EMG so its done in [facility]  I transferred her to central scheduling to r/s that appt

## 2022-11-15 NOTE — TELEPHONE ENCOUNTER
Patient is getting tested today for Covid  I told her if she is symptomatic or test positive she will not be able to get study  I told her to call us asap if needing to cancel and she said she would

## 2022-11-16 RX ORDER — METOCLOPRAMIDE 10 MG/1
10 TABLET ORAL 4 TIMES DAILY
Qty: 30 TABLET | Refills: 0 | Status: SHIPPED | OUTPATIENT
Start: 2022-11-16

## 2022-11-16 RX ORDER — IBUPROFEN 600 MG/1
600 TABLET ORAL EVERY 6 HOURS PRN
Qty: 30 TABLET | Refills: 0 | Status: SHIPPED | OUTPATIENT
Start: 2022-11-16

## 2022-11-20 ENCOUNTER — HOSPITAL ENCOUNTER (OUTPATIENT)
Dept: SLEEP CENTER | Facility: CLINIC | Age: 52
Discharge: HOME | End: 2022-11-20

## 2022-11-20 DIAGNOSIS — I48.92 ATRIAL FLUTTER, UNSPECIFIED TYPE (HCC): ICD-10-CM

## 2022-11-20 DIAGNOSIS — G47.33 OSA (OBSTRUCTIVE SLEEP APNEA): ICD-10-CM

## 2022-11-21 NOTE — PROGRESS NOTES
P    Sleep Study Documentation    Pre-Sleep Study       Sleep testing procedure explained to patient:YES    Patient napped prior to study:YES- less than 30 minutes  Napped after 2PM: no    Caffeine:Dayshift worker after 12PM   Caffeine use:YES- coffee  6 ounces and soda  12 to 26 ounces    Alcohol:Dayshift workers after 5PM: Alcohol use:NO    Typical day for patient:YES       Study Documentation    Sleep Study Indications: Pt was on Bipap put lost it since 2022 Tired snoring witnessed apneas    Sleep Study: Diagnostic   Snore: Moderate  Supplemental O2: no      Minimum SaO2 88  Baseline SaO2 93            EKG abnormalities: yes:  EPOCH example and comments:  947 pac s    EEG abnormalities: no    Sleep Study Recorded < 2 hours: N/A    Sleep Study Recorded > 2 hours but incomplete study: N/A    Sleep Study Recorded 6 hours but no sleep obtained: NO          Post-Sleep Study    Medication used at bedtime or during sleep study:YES prescription sleep aid and other prescription medications    Patient reports time it took to fall asleep:less than 20 minutes    Patient reports waking up during study:1 to 2 times  Patient reports returning to sleep without difficulty  Patient reports sleeping 4 to 6 hours without dreaming  Patient reports sleep during study:better than usual    Patient rated sleepiness: Not sleepy or tired    PAP treatment:no

## 2022-11-22 ENCOUNTER — TELEPHONE (OUTPATIENT)
Dept: INTERNAL MEDICINE CLINIC | Facility: CLINIC | Age: 52
End: 2022-11-22

## 2022-11-22 NOTE — TELEPHONE ENCOUNTER
Needs dr to  referral for tomorrow- has an appt with Neurology  Diagnosis:   E11 4    F#: [phone]

## 2022-11-23 ENCOUNTER — TELEPHONE (OUTPATIENT)
Dept: SLEEP CENTER | Facility: CLINIC | Age: 52
End: 2022-11-23

## 2022-11-23 DIAGNOSIS — E11.649 TYPE 2 DIABETES MELLITUS WITH HYPOGLYCEMIA WITHOUT COMA, WITH LONG-TERM CURRENT USE OF INSULIN (HCC): ICD-10-CM

## 2022-11-23 DIAGNOSIS — G56.03 CARPAL TUNNEL SYNDROME ON BOTH SIDES: Primary | ICD-10-CM

## 2022-11-23 DIAGNOSIS — Z79.4 TYPE 2 DIABETES MELLITUS WITH HYPOGLYCEMIA WITHOUT COMA, WITH LONG-TERM CURRENT USE OF INSULIN (HCC): ICD-10-CM

## 2022-11-23 RX ORDER — GABAPENTIN 400 MG/1
CAPSULE ORAL
Qty: 90 CAPSULE | Refills: 3 | Status: SHIPPED | OUTPATIENT
Start: 2022-11-23

## 2022-11-23 NOTE — TELEPHONE ENCOUNTER
----- Message from David Rayo MD sent at 11/22/2022  9:03 PM EST -----  Approve      ----- Message -----  From: Roberto Gamble  Sent: 11/15/2022   9:11 AM EST  To: Sleep Medicine [facility] Provider    This Diagnostic sleep study needs approval      If approved please sign and return to clerical pool  If denied please include reasons why  Also provide alternative testing if warranted  Please sign and return to clerical pool

## 2022-11-28 ENCOUNTER — TELEPHONE (OUTPATIENT)
Dept: INTERNAL MEDICINE CLINIC | Facility: CLINIC | Age: 52
End: 2022-11-28

## 2022-11-28 ENCOUNTER — APPOINTMENT (OUTPATIENT)
Dept: PHYSICAL THERAPY | Age: 52
End: 2022-11-28

## 2022-11-28 LAB

## 2022-11-28 NOTE — TELEPHONE ENCOUNTER
"Requested Prescriptions   Pending Prescriptions Disp Refills     simvastatin (ZOCOR) 20 MG tablet [Pharmacy Med Name: SIMVASTATIN 20MG TABLETS] 90 tablet 3     Sig: TAKE 1 TABLET(20 MG) BY MOUTH DAILY  Last Written Prescription Date:  12/27/19  Last Fill Quantity: 90,  # refills: 3   Last office visit: 12/10/2019 with prescribing provider:  Lexii   Future Office Visit:   Next 5 appointments (look out 90 days)    Feb 20, 2020 12:00 PM CST  Return Visit with Trice Cole [facility] ([facility]) [address] [id number]  [phone]              Statins Protocol Failed - 12/27/2019 11:24 AM        Failed - LDL on file in past 12 months     Recent Labs   Lab Test 12/06/18  1120   *             Passed - No abnormal creatine kinase in past 12 months     Recent Labs   Lab Test 02/15/13  1350                   Passed - Recent (12 mo) or future (30 days) visit within the authorizing provider's specialty     Patient has had an office visit with the authorizing provider or a provider within the authorizing providers department within the previous 12 mos or has a future within next 30 days. See \"Patient Info\" tab in inbasket, or \"Choose Columns\" in Meds & Orders section of the refill encounter.              Passed - Medication is active on med list        Passed - Patient is age 18 or older        Passed - No active pregnancy on record        Passed - No positive pregnancy test in past 12 months        " Patient notified of order placed through [facility] for CPAP and will be on the look out for delivery

## 2022-11-28 NOTE — TELEPHONE ENCOUNTER
----- Message from [id number] sent at 11/28/2022 10:54 AM EST -----  Order for CPAP placed through [facility],

## 2022-11-29 ENCOUNTER — OFFICE VISIT (OUTPATIENT)
Dept: INTERNAL MEDICINE CLINIC | Facility: CLINIC | Age: 52
End: 2022-11-29

## 2022-11-29 ENCOUNTER — TELEPHONE (OUTPATIENT)
Dept: INTERNAL MEDICINE CLINIC | Facility: CLINIC | Age: 52
End: 2022-11-29

## 2022-11-29 ENCOUNTER — APPOINTMENT (OUTPATIENT)
Dept: PHYSICAL THERAPY | Age: 52
End: 2022-11-29

## 2022-11-29 VITALS
HEART RATE: 77 BPM | HEIGHT: 65 IN | BODY MASS INDEX: 26.76 KG/M2 | TEMPERATURE: 98.4 F | WEIGHT: 160.6 LBS | DIASTOLIC BLOOD PRESSURE: 60 MMHG | SYSTOLIC BLOOD PRESSURE: 96 MMHG | OXYGEN SATURATION: 97 %

## 2022-11-29 DIAGNOSIS — G47.33 OSA (OBSTRUCTIVE SLEEP APNEA): ICD-10-CM

## 2022-11-29 DIAGNOSIS — Z23 ENCOUNTER FOR IMMUNIZATION: Primary | ICD-10-CM

## 2022-11-29 DIAGNOSIS — J45.909 ASTHMA, UNSPECIFIED ASTHMA SEVERITY, UNSPECIFIED WHETHER COMPLICATED, UNSPECIFIED WHETHER PERSISTENT: Primary | ICD-10-CM

## 2022-11-29 DIAGNOSIS — M06.9 RHEUMATOID ARTHRITIS INVOLVING BOTH HANDS, UNSPECIFIED WHETHER RHEUMATOID FACTOR PRESENT (HCC): ICD-10-CM

## 2022-11-29 DIAGNOSIS — I48.0 PAROXYSMAL A-FIB (HCC): ICD-10-CM

## 2022-11-29 DIAGNOSIS — E11.42 DIABETIC POLYNEUROPATHY ASSOCIATED WITH TYPE 2 DIABETES MELLITUS (HCC): ICD-10-CM

## 2022-11-29 DIAGNOSIS — E78.2 MIXED HYPERLIPIDEMIA: ICD-10-CM

## 2022-11-29 PROBLEM — I45.9 CARDIAC CONDUCTION DISORDER: Status: ACTIVE | Noted: 2022-04-08

## 2022-11-29 PROBLEM — Z59.9 FINANCIAL DIFFICULTIES: Status: ACTIVE | Noted: 2022-04-27

## 2022-11-29 PROBLEM — J41.8 MIXED SIMPLE AND MUCOPURULENT CHRONIC BRONCHITIS (HCC): Status: ACTIVE | Noted: 2019-01-08

## 2022-11-29 PROBLEM — F51.04 PSYCHOPHYSIOLOGICAL INSOMNIA: Status: ACTIVE | Noted: 2017-08-11

## 2022-11-29 PROBLEM — N32.81 OVERACTIVE BLADDER: Status: ACTIVE | Noted: 2021-04-15

## 2022-11-29 PROBLEM — L50.1 CHRONIC IDIOPATHIC URTICARIA: Status: ACTIVE | Noted: 2021-08-17

## 2022-11-29 PROBLEM — G47.39 TREATMENT-EMERGENT CENTRAL SLEEP APNEA: Status: ACTIVE | Noted: 2019-03-08

## 2022-11-29 PROBLEM — G56.02 LEFT CARPAL TUNNEL SYNDROME: Status: ACTIVE | Noted: 2022-02-16

## 2022-11-29 PROBLEM — U07.1 COVID-19: Status: ACTIVE | Noted: 2020-12-06

## 2022-11-29 PROBLEM — E78.00 PURE HYPERCHOLESTEROLEMIA: Status: ACTIVE | Noted: 2017-09-22

## 2022-11-29 PROBLEM — N60.12 BILATERAL FIBROCYSTIC BREAST DISEASE (FCBD): Status: ACTIVE | Noted: 2017-09-06

## 2022-11-29 PROBLEM — J42 CHRONIC BRONCHITIS (HCC): Status: ACTIVE | Noted: 2021-04-27

## 2022-11-29 PROBLEM — I25.10 CORONARY ARTERY DISEASE INVOLVING NATIVE CORONARY ARTERY OF NATIVE HEART WITHOUT ANGINA PECTORIS: Status: ACTIVE | Noted: 2022-08-02

## 2022-11-29 PROBLEM — H04.123 DRY EYE SYNDROME, BILATERAL: Status: ACTIVE | Noted: 2018-09-12

## 2022-11-29 PROBLEM — N39.46 MIXED STRESS AND URGE URINARY INCONTINENCE: Status: ACTIVE | Noted: 2017-09-06

## 2022-11-29 PROBLEM — J30.1 SEASONAL ALLERGIC RHINITIS DUE TO POLLEN: Status: ACTIVE | Noted: 2017-09-06

## 2022-11-29 PROBLEM — G89.4 CHRONIC PAIN DISORDER: Status: ACTIVE | Noted: 2022-08-02

## 2022-11-29 PROBLEM — M47.26 OSTEOARTHRITIS OF SPINE WITH RADICULOPATHY, LUMBAR REGION: Status: ACTIVE | Noted: 2021-09-01

## 2022-11-29 PROBLEM — F17.200 CURRENT EVERY DAY SMOKER: Status: ACTIVE | Noted: 2021-04-27

## 2022-11-29 PROBLEM — N60.11 BILATERAL FIBROCYSTIC BREAST DISEASE (FCBD): Status: ACTIVE | Noted: 2017-09-06

## 2022-11-29 PROBLEM — R53.82 CHRONIC FATIGUE: Status: ACTIVE | Noted: 2021-11-11

## 2022-11-29 PROBLEM — F11.90 CHRONIC, CONTINUOUS USE OF OPIOIDS: Status: ACTIVE | Noted: 2021-09-01

## 2022-11-29 PROBLEM — I51.9 HEART DISEASE: Status: ACTIVE | Noted: 2021-04-27

## 2022-11-29 PROBLEM — M51.16 LUMBAR DISC DISEASE WITH RADICULOPATHY: Status: ACTIVE | Noted: 2021-09-01

## 2022-11-29 PROBLEM — I63.9 CEREBROVASCULAR ACCIDENT (CVA) (HCC): Status: ACTIVE | Noted: 2022-04-08

## 2022-11-29 LAB — SL AMB POCT HEMOGLOBIN AIC: 7.2 (ref ?–6.5)

## 2022-11-29 RX ORDER — PREDNISONE 10 MG/1
TABLET ORAL
Qty: 18 TABLET | Refills: 0 | Status: SHIPPED | OUTPATIENT
Start: 2022-11-29

## 2022-11-29 RX ORDER — PREDNISONE 10 MG/1
TABLET ORAL
Qty: 30 TABLET | Refills: 0 | Status: SHIPPED | OUTPATIENT
Start: 2022-11-29

## 2022-11-29 NOTE — PROGRESS NOTES
INTERNAL MEDICINE FOLLOW-UP VISIT  [facility]    NAME: Alejo Bradford  AGE: 45 y o  SEX: female  : 1970     DATE: 2022     Assessment and Plan:   1  Diabetic polyneuropathy associated with type 2 diabetes mellitus (HCC)  7 2 HGA1C today in the office, big improvement  Continue to limit sweets and sugars in your diet, limit carbohydrates  Increase physical activity such as walking continue on same prescribed medication    2  XU (obstructive sleep apnea)  Sleep study completed, needs to set up with sleep doctor, needs BiPAP as she said CPAP does not work    3  Paroxysmal A-fib (HCC)  Xarelto use, seek medical attention if blood is found in bowel or bladder  Follows with cardiology     4  Mixed hyperlipidemia  Lipitor use, limit carbs in diet, limit sweets     5  Rheumatoid arthritis involving both hands, unspecified whether rheumatoid factor present [facility])  Awaiting for appt which is   Will start low-dose steroid 10 mg per day  Also consider Cymbalta after discussion with Psychiatry    6  Behavior health  Hindu counseling       Gallbladder sludge found on CT scan, will follow with GI      BMI Counseling: Body mass index is 26 73 kg/m²  The BMI is above normal  Nutrition recommendations include decreasing portion sizes, encouraging healthy choices of fruits and vegetables, consuming healthier snacks, limiting drinks that contain sugar, moderation in carbohydrate intake, reducing intake of saturated and trans fat and reducing intake of cholesterol  Exercise recommendations include exercising 3-5 times per week  No pharmacotherapy was ordered  Rationale for BMI follow-up plan is due to patient being overweight or obese  Tobacco Cessation Counseling: Tobacco cessation counseling was provided  The patient is sincerely urged to quit consumption of tobacco  She is not ready to quit tobacco  Medication options not discussed          Return in about 3 months (around 2/28/2023) for Annual physical        Chief Complaint:     Chief Complaint   Patient presents with   • Follow-up     Long visit and evaluation      History of Present Illness:     Patient is here today for a follow-up  She continues to have chronic pain issues  She was seen by pain management however was disappointed to find out he did not prescribe her medications  She seen several pain management specialist through the years and no other treatment is helped her except opioid management  She has been controlling her diabetes well with diet and medication  She recently had a sleep study  She is waiting for sleep provider to follow up    The following portions of the patient's history were reviewed and updated as appropriate: allergies, current medications, past family history, past medical history, past social history, past surgical history and problem list      Review of Systems:     Review of Systems   Constitutional: Negative for appetite change, chills, diaphoresis, fatigue, fever and unexpected weight change  HENT: Negative for postnasal drip and sneezing  Eyes: Negative for visual disturbance  Respiratory: Negative for chest tightness and shortness of breath  Cardiovascular: Negative for chest pain, palpitations and leg swelling  Gastrointestinal: Negative for abdominal pain and blood in stool  Endocrine: Negative for cold intolerance, heat intolerance, polydipsia, polyphagia and polyuria  Genitourinary: Negative for difficulty urinating, dysuria, frequency and urgency  Musculoskeletal: Positive for arthralgias and myalgias  Skin: Negative for rash and wound  Neurological: Negative for dizziness, weakness, light-headedness and headaches  Hematological: Negative for adenopathy  Psychiatric/Behavioral: Negative for confusion, dysphoric mood and sleep disturbance  The patient is not nervous/anxious           Past Medical History:     Past Medical History: Diagnosis Date   • [facility] ([facility] )    • Diabetes mellitus ([facility] )    • Fibromyalgia    • Migraines    • Neuromuscular disorder (HCC)    • Sick sinus syndrome (HCC)         Current Medications:     Current Outpatient Medications:   •  albuterol (2 5 mg/3 mL) 0 083 % nebulizer solution, TAKE 3 ML (2 5 MG TOTAL) BY NEBULIZATION EVERY 4 (FOUR) HOURS AS NEEDED FOR SHORTNESS OF BREATH, Disp: 90 mL, Rfl: 5  •  albuterol (PROVENTIL HFA,VENTOLIN HFA) 90 mcg/act inhaler, Inhale 2 puffs every 4 (four) hours as needed, Disp: , Rfl:   •  Alcohol Swabs (Pharmacist Choice Alcohol) PADS, 5 (five) times a day, Disp: , Rfl:   •  ALPRAZolam (XANAX) 1 mg tablet, Take 1 tablet (1 mg total) by mouth 4 (four) times a day as needed for anxiety, Disp: 60 tablet, Rfl: 0  •  ARIPiprazole (ABILIFY) 10 mg tablet, Take 1 tablet (10 mg total) by mouth daily, Disp: 90 tablet, Rfl: 3  •  ascorbic acid (VITAMIN C) 250 MG tablet, Take 250 mg by mouth daily, Disp: , Rfl:   •  atorvastatin (LIPITOR) 10 mg tablet, Take 10 mg by mouth daily, Disp: , Rfl:   •  B-D ULTRAFINE III SHORT PEN 31G X 8 MM MISC, USE WITH INSULIN 5 TIMES A DAY, Disp: 200 each, Rfl: 3  •  BD Pen Needle Nkechi 2nd Gen 32G X 4 MM MISC, USE AS DIRECTED FOR BEFORE A MEAL AND AT BEDTIME GLUCOSE INJECTION, Disp: , Rfl:   •  BD PrecisionGlide Needle 23G X 1-1/2" MISC, USE AS DIRECTED TO ADMINISTER NALOXONE INJECTION    MAY DISPENSE 23-25 GAUGE 1-1 5" NEEDLE , Disp: , Rfl:   •  Blood Glucose Monitoring Suppl (ONE TOUCH ULTRA 2) w/Device KIT, Use as directed, Disp: , Rfl:   •  busPIRone (BUSPAR) 15 mg tablet, Take 1 tablet (15 mg total) by mouth 3 (three) times a day, Disp: 90 tablet, Rfl: 3  •  Cholecalciferol (Vitamin D3) 25 MCG (1000 UT) CAPS, Take 2 capsules by mouth daily, Disp: , Rfl:   •  Cholecalciferol 125 MCG (5000 UT) TABS, every 24 hours, Disp: , Rfl:   •  Continuous Blood Gluc  (FreeStyle Lydia 2 Yancey) ALEKS, Use 1 Device continuous, Disp: 1 each, Rfl: 0  • Continuous Blood Gluc Sensor (FreeStyle Lydia 2 Sensor) MISC, Use 1 each every 14 (fourteen) days, Disp: 1 each, Rfl: 6  •  cyclobenzaprine (FLEXERIL) 5 mg tablet, TAKE 1 TABLET (5 MG TOTAL) BY MOUTH 3 (THREE) TIMES A DAY AS NEEDED FOR MUSCLE SPASMS, Disp: 90 tablet, Rfl: 0  •  darifenacin (ENABLEX) 15 MG 24 hr tablet, Take 1 tablet (15 mg total) by mouth daily, Disp: 90 tablet, Rfl: 3  •  Diclofenac Sodium (VOLTAREN) 1 %, Apply 2 g topically 4 (four) times a day, Disp: 150 g, Rfl: 1  •  famotidine (PEPCID) 20 mg tablet, Take 1 tablet (20 mg total) by mouth 2 (two) times a day as needed for indigestion, Disp: 90 tablet, Rfl: 3  •  fexofenadine (ALLEGRA) 180 MG tablet, Take 1 tablet (180 mg total) by mouth daily, Disp: 90 tablet, Rfl: 3  •  Fluticasone Propionate, Inhal, (Flovent Diskus) 250 MCG/BLIST AEPB, Inhale 1 puff 2 (two) times a day, Disp: 60 blister, Rfl: 3  •  gabapentin (Neurontin) 100 mg capsule, Take 1 capsule (100 mg total) by mouth daily at bedtime Take additional 100 mg capsule to total 900 mg at bedtime, Disp: 30 capsule, Rfl: 3  •  gabapentin (NEURONTIN) 400 mg capsule, TAKE 2 CAPSULES 3 TIMES A DAY (Patient taking differently: 800 mg Patient takes 800mg in the morning and 900mg in at bedtime), Disp: 90 capsule, Rfl: 3  •  hydrALAZINE (APRESOLINE) 50 mg tablet, Take 50 mg by mouth 3 (three) times a day, Disp: , Rfl:   •  ibuprofen (MOTRIN) 600 mg tablet, Take 1 tablet (600 mg total) by mouth every 6 (six) hours as needed for mild pain, Disp: 30 tablet, Rfl: 0  •  insulin lispro (HumaLOG) 100 units/mL injection pen, INJECT 20 UNITS UNDER THE SKIN 3 (THREE) TIMES A DAY BEFORE MEALS INDICATIONS: TYPE 2 DIABETES MELLITUS , Disp: 15 mL, Rfl: 3  •  Lantus SoloStar 100 units/mL SOPN, INJECT 55 UNITS UNDER THE SKIN 2 (TWO) TIMES A DAY AT LUNCH AND AT BEDTIME , Disp: 45 mL, Rfl: 3  •  lisinopril (ZESTRIL) 5 mg tablet, Take 5 mg by mouth daily, Disp: , Rfl:   •  lovastatin (MEVACOR) 10 MG tablet, Take 10 mg by mouth, Disp: , Rfl:   •  meclizine (ANTIVERT) 25 mg tablet, Take 25 mg by mouth Three times daily as needed, Disp: , Rfl:   •  metoclopramide (REGLAN) 10 mg tablet, Take 1 tablet (10 mg total) by mouth 4 (four) times a day, Disp: 30 tablet, Rfl: 0  •  metoprolol succinate (TOPROL-XL) 100 mg 24 hr tablet, Take 100 mg by mouth daily, Disp: , Rfl:   •  montelukast (SINGULAIR) 10 mg tablet, Take 1 tablet (10 mg total) by mouth daily at bedtime, Disp: 90 tablet, Rfl: 3  •  Multiple Vitamins-Minerals (Centrum Silver 50+Women) TABS, Take 1 tablet by mouth daily, Disp: , Rfl:   •  naloxone (NARCAN) 0 4 mg/mL injection, INJECT 1 ML IN SHOULDER OR THIGH   REPEAT AFTER 2-3 MINUTES IF NO OR MINIMAL RESPONSE , Disp: , Rfl:   •  Nutritional Supplements (Glucerna Hunger Smart Shake) LIQD, Take 1 Can by mouth 3 (three) times a day, Disp: 296 mL, Rfl: 5  •  nystatin (MYCOSTATIN) cream, Apply topically 2 (two) times a day, Disp: , Rfl:   •  Olopatadine HCl (Pataday) 0 7 % SOLN, 1 drop each eye twice a day, Disp: 2 5 mL, Rfl: 3  •  OneTouch Delica Lancets 43I MISC, 2 (two) times a day, Disp: , Rfl:   •  OneTouch Ultra test strip, TEST TWICE DAILY OR AS DIRECTED BY MD, Disp: , Rfl:   •  pantoprazole (PROTONIX) 40 mg tablet, Take 1 tablet (40 mg total) by mouth daily, Disp: 90 tablet, Rfl: 3  •  potassium chloride (K-DUR,KLOR-CON) 20 mEq tablet, Take 1 tablet (20 mEq total) by mouth daily, Disp: 30 tablet, Rfl: 1  •  potassium chloride (MICRO-K) 10 MEQ CR capsule, TAKE 2 CAPSULES (20 MEQ TOTAL) BY MOUTH 2 (TWO) TIMES A DAY, Disp: 90 capsule, Rfl: 3  •  Prasterone (Intrarosa) 6 5 MG INST, Insert 6 5 mg into the vagina daily, Disp: , Rfl:   •  prazosin (MINIPRESS) 5 mg capsule, Take 1 capsule (5 mg total) by mouth daily at bedtime, Disp: 30 capsule, Rfl: 3  •  predniSONE 10 mg tablet, Take 3 tablets for 3 days then 2 tablets for 3 days then 1 tablet for 3 days, Disp: 18 tablet, Rfl: 0  •  predniSONE 10 mg tablet, 1 tablet daily, start this medication after prednisone taper is completed, Disp: 30 tablet, Rfl: 0  •  risperiDONE (RisperDAL) 2 mg tablet, Take 1 tablet (2 mg total) by mouth daily at bedtime, Disp: 90 tablet, Rfl: 3  •  rivaroxaban (XARELTO) 20 mg tablet, Take 20 mg by mouth, Disp: , Rfl:   •  senna-docusate sodium (SENOKOT S) 8 6-50 mg per tablet, Take 1 tablet by mouth daily, Disp: , Rfl:   •  sertraline (ZOLOFT) 100 mg tablet, Take 1 tablet (100 mg total) by mouth daily, Disp: 90 tablet, Rfl: 3  •  Spacer/Aero-Holding Chambers (AeroChamber Plus Geoff-Vu w/Mask) MISC, 1 EACH (1 APPLICATION TOTAL) BY MISCELLANEOUS ROUTE DAILY AS NEEDED (ASTHMA EXACERBATION)  , Disp: , Rfl:   •  SUMAtriptan (IMITREX) 100 mg tablet, TAKE 1 TABLET (100 MG TOTAL) BY MOUTH ONE TIME AS NEEDED FOR MIGRAINE  MAY REPEAT IN 2 HOURS IF UNRESOLVED  DO NOT EXCEED 200 MG IN 24 HOURS, Disp: , Rfl:   •  Syringe, Disposable, (2-3CC SYRINGE) 3 ML MISC, USE AS DIRECTED TO ADMINISTER NALOXONE INJECTION  , Disp: , Rfl:   •  triamcinolone (KENALOG) 0 1 % cream, Apply 1 application topically 2 (two) times a day, Disp: 30 g, Rfl: 1  •  triamcinolone (KENALOG) 0 5 % cream, , Disp: , Rfl:   •  trospium chloride (SANCTURA) 20 mg tablet, Take 1 tablet (20 mg total) by mouth 2 (two) times a day, Disp: 90 tablet, Rfl: 3  •  Trulicity 1 5 NE/5 8SG SOPN, INJECT 1 5 MG UNDER THE SKIN EVERY 7 DAYS , Disp: 2 mL, Rfl: 3  •  zolpidem (AMBIEN) 10 mg tablet, TAKE 1 TABLET (10 MG TOTAL) BY MOUTH DAILY, Disp: 30 tablet, Rfl: 0  •  EPINEPHrine (EPIPEN) 0 3 mg/0 3 mL SOAJ, Inject 0 3 mL (0 3 mg total) into a muscle once for 1 dose, Disp: 1 each, Rfl: 0     Allergies:      Allergies   Allergen Reactions   • Other Anaphylaxis     Capzasin HP -- severe burning and swelling of the skin    • Clarithromycin GI Intolerance   • Acetaminophen GI Intolerance   • Bactrim [Sulfamethoxazole-Trimethoprim] Itching, GI Intolerance, Dizziness, Confusion and Lightheadedness     Patient passes out when on this medication for several days    • Cephalosporins Hives   • Latex Hives   • Oxycodone-Acetaminophen GI Intolerance   • Penicillins Diarrhea   • Trazodone Diarrhea   • Capsaicin Rash   • Naproxen Palpitations        Physical Exam:     BP 96/60 (BP Location: Left arm, Patient Position: Sitting, Cuff Size: Standard)   Pulse 77   Temp 98 4 °F (36 9 °C) (Temporal)   Ht 5' 5" (1 651 m)   Wt 72 8 kg (160 lb 9 6 oz)   SpO2 97%   BMI 26 73 kg/m²     Physical Exam  Constitutional:       Appearance: She is well-developed and well-nourished  HENT:      Head: Normocephalic and atraumatic  Eyes:      Pupils: Pupils are equal, round, and reactive to light  Neck:      Thyroid: No thyromegaly  Cardiovascular:      Rate and Rhythm: Normal rate and regular rhythm  Heart sounds: No murmur heard  Pulmonary:      Effort: Pulmonary effort is normal       Breath sounds: Normal breath sounds  Abdominal:      General: Bowel sounds are normal       Palpations: Abdomen is soft  Musculoskeletal:         General: Normal range of motion  Cervical back: Normal range of motion and neck supple  Lymphadenopathy:      Cervical: No cervical adenopathy  Skin:     General: Skin is warm and dry  Neurological:      Mental Status: She is alert and oriented to person, place, and time  Data:     Laboratory Results: I have personally reviewed the pertinent laboratory results/reports   Radiology/Other Diagnostic Testing Results: I have personally reviewed pertinent reports        CRISTINO Rodriguez  [facility]

## 2022-11-30 ENCOUNTER — TELEPHONE (OUTPATIENT)
Dept: INTERNAL MEDICINE CLINIC | Facility: CLINIC | Age: 52
End: 2022-11-30

## 2022-11-30 NOTE — TELEPHONE ENCOUNTER
First message: Wants to know the status of her compression gloves; saw Dania Wise yesterday      2nd message:   Dania Wise has to change the diagnosis for the pain management referral- they won't see her unless it says: other pain condition

## 2022-12-01 DIAGNOSIS — M79.18 MYOFASCIAL PAIN SYNDROME: ICD-10-CM

## 2022-12-01 DIAGNOSIS — M79.18 MYOFASCIAL PAIN SYNDROME: Primary | ICD-10-CM

## 2022-12-01 DIAGNOSIS — M51.36 LUMBAR DEGENERATIVE DISC DISEASE: Primary | ICD-10-CM

## 2022-12-01 DIAGNOSIS — G89.4 CHRONIC PAIN DISORDER: ICD-10-CM

## 2022-12-01 NOTE — TELEPHONE ENCOUNTER
Called pt   And was notified and stated she did this yesterday and she stopped the zoloft and thought you were ordering the Cymbalta for her

## 2022-12-05 ENCOUNTER — TELEPHONE (OUTPATIENT)
Dept: INTERNAL MEDICINE CLINIC | Facility: CLINIC | Age: 52
End: 2022-12-05

## 2022-12-05 DIAGNOSIS — E11.9 INSULIN DEPENDENT TYPE 2 DIABETES MELLITUS (HCC): Primary | ICD-10-CM

## 2022-12-05 DIAGNOSIS — Z79.4 INSULIN DEPENDENT TYPE 2 DIABETES MELLITUS (HCC): Primary | ICD-10-CM

## 2022-12-05 RX ORDER — BLOOD-GLUCOSE SENSOR
EACH MISCELLANEOUS
Qty: 3 EACH | Refills: 6 | Status: SHIPPED | OUTPATIENT
Start: 2022-12-05

## 2022-12-05 RX ORDER — BLOOD-GLUCOSE,RECEIVER,CONT
1 EACH MISCELLANEOUS
Qty: 1 EACH | Refills: 6 | Status: SHIPPED | OUTPATIENT
Start: 2022-12-05

## 2022-12-05 NOTE — TELEPHONE ENCOUNTER
CALLED AND HAVING TROUBLE WITH THE [facility]STYLE 2, THEY WILL COVER DEXCOM NEEDS NEW ORDER SENT IN FOR TRANSMITTER AN DEVICE FOR 90 DAYS AND NEEDS SENSOR #3FOR 30 DAYS

## 2022-12-05 NOTE — TELEPHONE ENCOUNTER
Pharmacy needs a call back in regards to devices that are not covered under her insurance    Call [facility] [address] at [phone]

## 2022-12-07 DIAGNOSIS — F43.10 PTSD (POST-TRAUMATIC STRESS DISORDER): ICD-10-CM

## 2022-12-07 DIAGNOSIS — F17.200 SMOKING: ICD-10-CM

## 2022-12-07 RX ORDER — PRAZOSIN HYDROCHLORIDE 5 MG/1
5 CAPSULE ORAL
Qty: 30 CAPSULE | Refills: 3 | Status: SHIPPED | OUTPATIENT
Start: 2022-12-07

## 2022-12-07 RX ORDER — FLUTICASONE PROPIONATE 250 UG/1
POWDER, METERED RESPIRATORY (INHALATION)
Qty: 60 EACH | Refills: 1 | Status: SHIPPED | OUTPATIENT
Start: 2022-12-07

## 2022-12-08 ENCOUNTER — TELEPHONE (OUTPATIENT)
Dept: OTHER | Facility: OTHER | Age: 52
End: 2022-12-08

## 2022-12-08 NOTE — TELEPHONE ENCOUNTER
Patient called saying that she is calling back the office because she had got a message through her my chart stating that she needs to give the office a call to schedule an new patient appointment, patient is asking if the office can give her a call back to schedule her new patient appointment

## 2022-12-19 ENCOUNTER — TELEPHONE (OUTPATIENT)
Dept: INTERNAL MEDICINE CLINIC | Facility: CLINIC | Age: 52
End: 2022-12-19

## 2022-12-19 DIAGNOSIS — Z79.4 TYPE 2 DIABETES MELLITUS WITH HYPOGLYCEMIA WITHOUT COMA, WITH LONG-TERM CURRENT USE OF INSULIN (HCC): ICD-10-CM

## 2022-12-19 DIAGNOSIS — R35.0 FREQUENT URINATION: Primary | ICD-10-CM

## 2022-12-19 DIAGNOSIS — E11.649 TYPE 2 DIABETES MELLITUS WITH HYPOGLYCEMIA WITHOUT COMA, WITH LONG-TERM CURRENT USE OF INSULIN (HCC): ICD-10-CM

## 2022-12-19 LAB

## 2022-12-19 RX ORDER — INSULIN LISPRO 100 [IU]/ML
INJECTION, SOLUTION INTRAVENOUS; SUBCUTANEOUS
Qty: 15 ML | Refills: 3 | Status: SHIPPED | OUTPATIENT
Start: 2022-12-19

## 2022-12-19 NOTE — TELEPHONE ENCOUNTER
[facility]  Is someone going to deliver it? Needs a contact p# with who's handling it

## 2022-12-21 DIAGNOSIS — E87.6 LOW BLOOD POTASSIUM: ICD-10-CM

## 2022-12-21 DIAGNOSIS — F43.10 PTSD (POST-TRAUMATIC STRESS DISORDER): ICD-10-CM

## 2022-12-21 RX ORDER — POTASSIUM CHLORIDE 750 MG/1
20 CAPSULE, EXTENDED RELEASE ORAL 2 TIMES DAILY
Qty: 90 CAPSULE | Refills: 3 | Status: SHIPPED | OUTPATIENT
Start: 2022-12-21

## 2022-12-21 RX ORDER — ZOLPIDEM TARTRATE 10 MG/1
10 TABLET ORAL DAILY
Qty: 30 TABLET | Refills: 0 | Status: SHIPPED | OUTPATIENT
Start: 2022-12-21 | End: 2023-03-31

## 2022-12-27 ENCOUNTER — TELEPHONE (OUTPATIENT)
Dept: UROLOGY | Facility: AMBULATORY SURGERY CENTER | Age: 52
End: 2022-12-27

## 2022-12-27 LAB
DME PARACHUTE DELIVERY DATE REQUESTED: NORMAL
DME PARACHUTE DELIVERY DATE REQUESTED: NORMAL
DME PARACHUTE ITEM DESCRIPTION: NORMAL
DME PARACHUTE ORDER STATUS: NORMAL
DME PARACHUTE ORDER STATUS: NORMAL
DME PARACHUTE SUPPLIER NAME: NORMAL
DME PARACHUTE SUPPLIER NAME: NORMAL
DME PARACHUTE SUPPLIER PHONE: NORMAL
DME PARACHUTE SUPPLIER PHONE: NORMAL

## 2022-12-27 NOTE — TELEPHONE ENCOUNTER
What is the reason for the patient’s appointment? NP- Frequent Urination    Patient can be reached at [phone]    What office location does the patient prefer? [facility] we accept the patient's insurance or is the patient Self-Pay? Has the patient had any previous Urologist(s)? Yes, in [place]    Have patient records been requested? No    Has the patient had any outside testing done? No    Does the patient have a personal history of cancer?  No

## 2023-01-03 ENCOUNTER — TELEPHONE (OUTPATIENT)
Dept: NEUROLOGY | Facility: CLINIC | Age: 53
End: 2023-01-03

## 2023-01-06 ENCOUNTER — TELEPHONE (OUTPATIENT)
Dept: INTERNAL MEDICINE CLINIC | Facility: CLINIC | Age: 53
End: 2023-01-06

## 2023-01-06 NOTE — TELEPHONE ENCOUNTER
is Nadeen  I'm calling from [address]    looking to get a copy of the Sleep Study sent to us  You sent an order over or a CPAP  I don't know if she still needs a CPAP, if she needs a bipap, if she needs a bipap, we also need the titration studies with that  If somebody could  call back at [phone] with an update on this patient so I can finish processing this order OR   if you could just push a whole new order through [facility] along with all the testings that we need

## 2023-01-10 ENCOUNTER — OFFICE VISIT (OUTPATIENT)
Dept: VASCULAR SURGERY | Facility: CLINIC | Age: 53
End: 2023-01-10

## 2023-01-10 VITALS
WEIGHT: 161 LBS | SYSTOLIC BLOOD PRESSURE: 120 MMHG | HEIGHT: 65 IN | HEART RATE: 80 BPM | DIASTOLIC BLOOD PRESSURE: 82 MMHG | BODY MASS INDEX: 26.82 KG/M2 | TEMPERATURE: 98.2 F

## 2023-01-10 DIAGNOSIS — M79.89 LEG SWELLING: Primary | ICD-10-CM

## 2023-01-10 NOTE — ASSESSMENT & PLAN NOTE
Reported left leg swelling    LEVDR negative for insufficiency     Established for lymphedema therapy with PT    Continue compression stockings  Follow up with Vascular PRN

## 2023-01-10 NOTE — PATIENT INSTRUCTIONS
Normal venous insufficiency duplex  Continue lymphedema therapy  Will call in new rx for compression stockings

## 2023-01-10 NOTE — PROGRESS NOTES
Assessment/Plan:    Leg swelling  Reported left leg swelling    LEVDR negative for insufficiency     Established for lymphedema therapy with PT    Continue compression stockings  Follow up with Vascular PRN       Diagnoses and all orders for this visit:    Leg swelling  -     Compression Stocking          Subjective:      Patient ID: Reese Spears is a 45 y o  female  Patient presents today for f/u visit to re-assess BLE swelling w/ use of compression  Also review venous insuffiencey duplex done 10/10  Patient states that she never got the compression stockings  She was however able to start lymph therapy, which she states has helped w/ swelling  HPI    The following portions of the patient's history were reviewed and updated as appropriate: allergies, current medications, past family history, past medical history, past social history, past surgical history and problem list     Review of Systems   Constitutional: Negative  HENT: Negative  Eyes: Negative  Respiratory: Positive for cough and shortness of breath  Cardiovascular: Positive for leg swelling  Gastrointestinal: Negative  Endocrine: Negative  Genitourinary: Negative  Musculoskeletal: Positive for back pain, gait problem and neck pain  Skin: Negative  Allergic/Immunologic: Negative  Hematological: Negative  Psychiatric/Behavioral: Negative  I have reviewed the ROS above and made changes as needed        Objective:      /82 (BP Location: Left arm, Patient Position: Sitting)   Pulse 80   Temp 98 2 °F (36 8 °C) (Tympanic)   Ht 5' 5" (1 651 m)   Wt 73 kg (161 lb)   BMI 26 79 kg/m²          Physical Exam      General  Exam: alert, awake, oriented, no distress, consistent with stated age    Integumentary  Exam: warm, dry, no gross lesions, no bruises and normal color    Head and Neck  Exam: supple, no bruits, trachea midline, no JVD, no mass or palpable nodes    Eye  Exam: extraoccular movements intact, no scleral icterus, sclera clear, pupils equal round and reactive to light    ENMT  Exam: oral mucosa pink and moist    Chest and Lung  Exam: chest normal without deformity, bilaterally expansive, clear to auscultation    Cardiovascular  Exam: regular rate, regular rhythm, no murmurs, no rubs or gallops    Adbomen  Exam: soft, non-tender, non-distended, no pulsatile abdominal masses, no abdominal bruit    Peripheral Vascular  Exam: no clubbing of the digits of the upper extremity, no cyanosis, no edema, both feet are warm, radial pulses 2+ bilaterally, skin well perfused, without and no varicosities      No widened popliteal pulse noted bilaterally    Upper Extremity:  Palpation: Radial pulse- Bilateral 2+    Lower Extremity:  Palpation: Femoral pulse- Bilateral 2+         Popliteal pulse - Bilateral 2+        Dorsalis Pedis - Bilateral 2+        Posterior tibial - Bilateral 2+    Neurologic  Exam:alert, non-focal, oriented x 3, cranial nerves II-XII grossly intact

## 2023-01-11 DIAGNOSIS — K21.9 GASTROESOPHAGEAL REFLUX DISEASE WITHOUT ESOPHAGITIS: ICD-10-CM

## 2023-01-11 DIAGNOSIS — Z79.4 TYPE 2 DIABETES MELLITUS WITH HYPOGLYCEMIA WITHOUT COMA, WITH LONG-TERM CURRENT USE OF INSULIN (HCC): ICD-10-CM

## 2023-01-11 DIAGNOSIS — T78.2XXS ANAPHYLAXIS, SEQUELA: ICD-10-CM

## 2023-01-11 DIAGNOSIS — E11.649 TYPE 2 DIABETES MELLITUS WITH HYPOGLYCEMIA WITHOUT COMA, WITH LONG-TERM CURRENT USE OF INSULIN (HCC): ICD-10-CM

## 2023-01-11 DIAGNOSIS — N32.81 OVERACTIVE BLADDER: ICD-10-CM

## 2023-01-11 RX ORDER — FAMOTIDINE 20 MG/1
20 TABLET, FILM COATED ORAL 2 TIMES DAILY PRN
Qty: 90 TABLET | Refills: 3 | Status: SHIPPED | OUTPATIENT
Start: 2023-01-11

## 2023-01-11 RX ORDER — INSULIN LISPRO 100 [IU]/ML
INJECTION, SOLUTION INTRAVENOUS; SUBCUTANEOUS
Qty: 15 ML | Refills: 3 | Status: SHIPPED | OUTPATIENT
Start: 2023-01-11

## 2023-01-11 RX ORDER — INSULIN GLARGINE 100 [IU]/ML
INJECTION, SOLUTION SUBCUTANEOUS
Qty: 45 ML | Refills: 3 | Status: SHIPPED | OUTPATIENT
Start: 2023-01-11

## 2023-01-11 RX ORDER — EPINEPHRINE 0.3 MG/.3ML
0.3 INJECTION SUBCUTANEOUS ONCE
Qty: 2 ML | Refills: 0 | Status: SHIPPED | OUTPATIENT
Start: 2023-01-11 | End: 2023-01-11

## 2023-01-11 RX ORDER — PEN NEEDLE, DIABETIC 32GX 5/32"
NEEDLE, DISPOSABLE MISCELLANEOUS
Qty: 200 EACH | Refills: 3 | Status: SHIPPED | OUTPATIENT
Start: 2023-01-11

## 2023-01-11 RX ORDER — DULAGLUTIDE 1.5 MG/.5ML
INJECTION, SOLUTION SUBCUTANEOUS
Qty: 2 ML | Refills: 3 | Status: SHIPPED | OUTPATIENT
Start: 2023-01-11

## 2023-01-11 RX ORDER — TROSPIUM CHLORIDE 20 MG/1
20 TABLET, FILM COATED ORAL 2 TIMES DAILY
Qty: 90 TABLET | Refills: 3 | Status: SHIPPED | OUTPATIENT
Start: 2023-01-11

## 2023-01-16 DIAGNOSIS — F43.10 PTSD (POST-TRAUMATIC STRESS DISORDER): ICD-10-CM

## 2023-01-16 DIAGNOSIS — E11.649 TYPE 2 DIABETES MELLITUS WITH HYPOGLYCEMIA WITHOUT COMA, WITH LONG-TERM CURRENT USE OF INSULIN (HCC): ICD-10-CM

## 2023-01-16 DIAGNOSIS — Z79.4 TYPE 2 DIABETES MELLITUS WITH HYPOGLYCEMIA WITHOUT COMA, WITH LONG-TERM CURRENT USE OF INSULIN (HCC): ICD-10-CM

## 2023-01-16 DIAGNOSIS — E11.44 DIABETIC AMYOTROPHY ASSOCIATED WITH TYPE 2 DIABETES MELLITUS (HCC): ICD-10-CM

## 2023-01-16 RX ORDER — GABAPENTIN 400 MG/1
CAPSULE ORAL
Qty: 90 CAPSULE | Refills: 3 | Status: SHIPPED | OUTPATIENT
Start: 2023-01-16

## 2023-01-16 RX ORDER — ZOLPIDEM TARTRATE 10 MG/1
10 TABLET ORAL DAILY
Qty: 30 TABLET | Refills: 0 | Status: SHIPPED | OUTPATIENT
Start: 2023-01-16 | End: 2023-04-26

## 2023-01-16 RX ORDER — GABAPENTIN 100 MG/1
100 CAPSULE ORAL
Qty: 30 CAPSULE | Refills: 3 | Status: SHIPPED | OUTPATIENT
Start: 2023-01-16

## 2023-01-18 DIAGNOSIS — B37.9 YEAST INFECTION: Primary | ICD-10-CM

## 2023-01-18 LAB

## 2023-01-18 RX ORDER — FLUCONAZOLE 150 MG/1
150 TABLET ORAL ONCE
Qty: 1 TABLET | Refills: 0 | Status: SHIPPED | OUTPATIENT
Start: 2023-01-18 | End: 2023-01-18

## 2023-01-25 ENCOUNTER — TELEPHONE (OUTPATIENT)
Dept: INTERNAL MEDICINE CLINIC | Facility: CLINIC | Age: 53
End: 2023-01-25

## 2023-01-25 ENCOUNTER — OFFICE VISIT (OUTPATIENT)
Dept: UROLOGY | Facility: CLINIC | Age: 53
End: 2023-01-25

## 2023-01-25 VITALS — HEIGHT: 65 IN | BODY MASS INDEX: 26.82 KG/M2 | WEIGHT: 161 LBS

## 2023-01-25 DIAGNOSIS — N39.45 CONTINUOUS LEAKAGE OF URINE: Primary | ICD-10-CM

## 2023-01-25 DIAGNOSIS — R31.0 GROSS HEMATURIA: ICD-10-CM

## 2023-01-25 DIAGNOSIS — R35.0 FREQUENT URINATION: ICD-10-CM

## 2023-01-25 LAB
DME PARACHUTE DELIVERY DATE REQUESTED: NORMAL
DME PARACHUTE ITEM DESCRIPTION: NORMAL
DME PARACHUTE ORDER STATUS: NORMAL
DME PARACHUTE SUPPLIER NAME: NORMAL
DME PARACHUTE SUPPLIER PHONE: NORMAL
SL AMB  POCT GLUCOSE, UA: 2000
SL AMB LEUKOCYTE ESTERASE,UA: ABNORMAL
SL AMB POCT BILIRUBIN,UA: ABNORMAL
SL AMB POCT BLOOD,UA: ABNORMAL
SL AMB POCT CLARITY,UA: ABNORMAL
SL AMB POCT COLOR,UA: YELLOW
SL AMB POCT KETONES,UA: ABNORMAL
SL AMB POCT NITRITE,UA: ABNORMAL
SL AMB POCT PH,UA: 5
SL AMB POCT SPECIFIC GRAVITY,UA: 1.01
SL AMB POCT URINE PROTEIN: ABNORMAL
SL AMB POCT UROBILINOGEN: 0.2

## 2023-01-25 RX ORDER — OXYBUTYNIN CHLORIDE 10 MG/1
10 TABLET, EXTENDED RELEASE ORAL
Qty: 90 TABLET | Refills: 3 | Status: SHIPPED | OUTPATIENT
Start: 2023-01-25

## 2023-01-25 RX ORDER — INCONTINENCE PAD,LINER,DISP
EACH MISCELLANEOUS 4 TIMES DAILY PRN
Qty: 120 EACH | Refills: 10 | Status: SHIPPED | OUTPATIENT
Start: 2023-01-25

## 2023-01-25 RX ORDER — RISPERIDONE 3 MG/1
3 TABLET ORAL 2 TIMES DAILY
COMMUNITY
Start: 2023-01-24

## 2023-01-25 RX ORDER — BUSPIRONE HYDROCHLORIDE 30 MG/1
30 TABLET ORAL 2 TIMES DAILY
COMMUNITY
Start: 2023-01-24

## 2023-01-25 RX ORDER — NYSTATIN 100000 U/G
CREAM TOPICAL 2 TIMES DAILY
Qty: 30 G | Refills: 0 | Status: SHIPPED | OUTPATIENT
Start: 2023-01-25

## 2023-01-25 RX ORDER — INCONTINENCE PAD,LINER,DISP
EACH MISCELLANEOUS 4 TIMES DAILY PRN
Qty: 120 EACH | Refills: 10 | Status: SHIPPED | OUTPATIENT
Start: 2023-01-25 | End: 2023-01-25 | Stop reason: SDUPTHER

## 2023-01-25 NOTE — TELEPHONE ENCOUNTER
PHARMACY  NEEDS  TO  KNOW  WHEN  SHE  NEEDS  TO  TAKE  THIS  RX    RISPERIDONE 3 MG     MORNING/ NOON/ OR  NIGHT

## 2023-01-25 NOTE — PROGRESS NOTES
UROLOGY CONSULTATION NOTE     Patient Identifiers: Tyler Burnett (MRN: [id number])  Service Requesting Consultation:    CRISTINO Draper     Service Providing Consultation:  Urology, Nilsa Howard PA-C  Consults  Date of Service: 2023    Reason for Consultation: Incontinence and hematuria    History of Present Illness:     Tyler Burnett is a 45 y o  female with history of uncontrolled diabetes and urinary incontinence  She came from [place] and was on anticholinergics and saw urologist at that time  Given that she do CIC which she apparently was unable to do  She reports continuous leakage and goes through nearly 300 diapers a month  PVR is 100 mL  She reports that her diabetes was uncontrolled for years and her A1c was over 13  It is under better control now with A1c around 7 2  She smokes 2 packs of cigarettes a day  Urine shows blood and leukocytes  He has 3 children all by   Past Medical, Past Surgical History:     Past Medical History:   Diagnosis Date   • Atrial fibrillation ([facility] )    • Diabetes mellitus ([facility] )    • Fibromyalgia    • Migraines    • Neuromuscular disorder ([facility] )    • Sick sinus syndrome ([facility] )    :    History reviewed   No pertinent surgical history :    Medications, Allergies:     Current Outpatient Medications:   •  Incontinence Supply Disposable (Depend Undergarment Ex Absorb) MISC, Use 4 (four) times a day as needed (incontinence), Disp: 120 each, Rfl: 10  •  nystatin (MYCOSTATIN) cream, Apply topically 2 (two) times a day, Disp: 30 g, Rfl: 0  •  oxybutynin (DITROPAN-XL) 10 MG 24 hr tablet, Take 1 tablet (10 mg total) by mouth daily at bedtime, Disp: 90 tablet, Rfl: 3  •  albuterol (PROVENTIL HFA,VENTOLIN HFA) 90 mcg/act inhaler, Inhale 2 puffs every 4 (four) hours as needed, Disp: , Rfl:   •  Alcohol Swabs (Pharmacist Choice Alcohol) PADS, 5 (five) times a day, Disp: , Rfl:   •  ALPRAZolam (XANAX) 1 mg tablet, Take 1 tablet (1 mg total) by mouth 4 (four) times a day as needed for anxiety, Disp: 60 tablet, Rfl: 0  •  ARIPiprazole (ABILIFY) 10 mg tablet, Take 1 tablet (10 mg total) by mouth daily, Disp: 90 tablet, Rfl: 3  •  ascorbic acid (VITAMIN C) 250 MG tablet, Take 250 mg by mouth daily, Disp: , Rfl:   •  atorvastatin (LIPITOR) 10 mg tablet, Take 10 mg by mouth daily, Disp: , Rfl:   •  BD Pen Needle Nkechi 2nd Gen 32G X 4 MM MISC, USE AS DIRECTED FOR BEFORE A MEAL AND AT BEDTIME GLUCOSE INJECTION, Disp: , Rfl:   •  BD Pen Needle Nkechi 2nd Gen 32G X 4 MM MISC, USE WITH INSULIN 5 TIMES A DAY, Disp: 200 each, Rfl: 3  •  BD PrecisionGlide Needle 23G X 1-1/2" MISC, USE AS DIRECTED TO ADMINISTER NALOXONE INJECTION    MAY DISPENSE 23-25 GAUGE 1-1 5" NEEDLE , Disp: , Rfl:   •  Blood Glucose Monitoring Suppl (ONE TOUCH ULTRA 2) w/Device KIT, Use as directed, Disp: , Rfl:   •  busPIRone (BUSPAR) 30 MG tablet, Take 30 mg by mouth 2 (two) times a day, Disp: , Rfl:   •  Cholecalciferol (Vitamin D3) 25 MCG (1000 UT) CAPS, Take 2 capsules by mouth daily, Disp: , Rfl:   •  Cholecalciferol 125 MCG (5000 UT) TABS, every 24 hours, Disp: , Rfl:   •  Continuous Blood Gluc  (Dexcom G6 ) ALEKS, Use 1 Device 4 (four) times a day (with meals and at bedtime), Disp: 1 each, Rfl: 6  •  Continuous Blood Gluc Sensor (Dexcom G6 Sensor) MISC, Change sensor every 10 days, Disp: 3 each, Rfl: 6  •  cyclobenzaprine (FLEXERIL) 5 mg tablet, TAKE 1 TABLET (5 MG TOTAL) BY MOUTH 3 (THREE) TIMES A DAY AS NEEDED FOR MUSCLE SPASMS (Patient not taking: Reported on 1/10/2023), Disp: 90 tablet, Rfl: 0  •  Diclofenac Sodium (VOLTAREN) 1 %, Apply 2 g topically 4 (four) times a day, Disp: 150 g, Rfl: 1  •  EPINEPHrine (EPIPEN) 0 3 mg/0 3 mL SOAJ, INJECT 0 3 ML (0 3 MG TOTAL) INTO A MUSCLE ONCE FOR 1 DOSE, Disp: 2 mL, Rfl: 0  •  famotidine (PEPCID) 20 mg tablet, TAKE 1 TABLET (20 MG TOTAL) BY MOUTH 2 (TWO) TIMES A DAY AS NEEDED FOR INDIGESTION, Disp: 90 tablet, Rfl: 3  • fexofenadine (ALLEGRA) 180 MG tablet, Take 1 tablet (180 mg total) by mouth daily, Disp: 90 tablet, Rfl: 3  •  Flovent Diskus 250 MCG/ACT diskus inhaler, INHALE 1 PUFF 2 (TWO) TIMES A DAY, Disp: 60 each, Rfl: 1  •  gabapentin (NEURONTIN) 100 mg capsule, TAKE 1 CAPSULE (100 MG TOTAL) BY MOUTH DAILY AT BEDTIME TAKE ADDITIONAL 100 MG CAPSULE TO TOTAL 900 MG AT BEDTIME, Disp: 30 capsule, Rfl: 3  •  gabapentin (NEURONTIN) 400 mg capsule, TAKE 2 CAPSULES 3 TIMES A DAY, Disp: 90 capsule, Rfl: 3  •  hydrALAZINE (APRESOLINE) 50 mg tablet, Take 50 mg by mouth 3 (three) times a day, Disp: , Rfl:   •  ibuprofen (MOTRIN) 600 mg tablet, Take 1 tablet (600 mg total) by mouth every 6 (six) hours as needed for mild pain, Disp: 30 tablet, Rfl: 0  •  insulin lispro (HumaLOG) 100 units/mL injection pen, INJECT 20 UNITS UNDER THE SKIN 3 (THREE) TIMES A DAY BEFORE MEALS INDICATIONS: TYPE 2 DIABETES MELLITUS , Disp: 15 mL, Rfl: 3  •  Lantus SoloStar 100 units/mL SOPN, INJECT 55 UNITS UNDER THE SKIN 2 (TWO) TIMES A DAY AT LUNCH AND AT BEDTIME , Disp: 45 mL, Rfl: 3  •  lisinopril (ZESTRIL) 5 mg tablet, Take 5 mg by mouth daily, Disp: , Rfl:   •  lovastatin (MEVACOR) 10 MG tablet, Take 10 mg by mouth, Disp: , Rfl:   •  meclizine (ANTIVERT) 25 mg tablet, Take 25 mg by mouth Three times daily as needed, Disp: , Rfl:   •  metoclopramide (REGLAN) 10 mg tablet, Take 1 tablet (10 mg total) by mouth 4 (four) times a day, Disp: 30 tablet, Rfl: 0  •  metoprolol succinate (TOPROL-XL) 100 mg 24 hr tablet, Take 100 mg by mouth daily, Disp: , Rfl:   •  montelukast (SINGULAIR) 10 mg tablet, Take 1 tablet (10 mg total) by mouth daily at bedtime, Disp: 90 tablet, Rfl: 3  •  Multiple Vitamins-Minerals (Centrum Silver 50+Women) TABS, Take 1 tablet by mouth daily, Disp: , Rfl:   •  naloxone (NARCAN) 0 4 mg/mL injection, INJECT 1 ML IN SHOULDER OR THIGH   REPEAT AFTER 2-3 MINUTES IF NO OR MINIMAL RESPONSE , Disp: , Rfl:   •  Nutritional Supplements (Glucerna Hunger Smart Shake) LIQD, Take 1 Can by mouth 3 (three) times a day, Disp: 296 mL, Rfl: 5  •  nystatin (MYCOSTATIN) cream, Apply topically 2 (two) times a day, Disp: , Rfl:   •  Olopatadine HCl (Pataday) 0 7 % SOLN, 1 drop each eye twice a day, Disp: 2 5 mL, Rfl: 3  •  OneTouch Delica Lancets 72N MISC, 2 (two) times a day, Disp: , Rfl:   •  OneTouch Ultra test strip, TEST TWICE DAILY OR AS DIRECTED BY MD, Disp: , Rfl:   •  pantoprazole (PROTONIX) 40 mg tablet, Take 1 tablet (40 mg total) by mouth daily, Disp: 90 tablet, Rfl: 3  •  potassium chloride (K-DUR,KLOR-CON) 20 mEq tablet, Take 1 tablet (20 mEq total) by mouth daily, Disp: 30 tablet, Rfl: 1  •  potassium chloride (MICRO-K) 10 MEQ CR capsule, TAKE 2 CAPSULES (20 MEQ TOTAL) BY MOUTH 2 (TWO) TIMES A DAY, Disp: 90 capsule, Rfl: 3  •  Prasterone (Intrarosa) 6 5 MG INST, Insert 6 5 mg into the vagina daily, Disp: , Rfl:   •  prazosin (MINIPRESS) 5 mg capsule, TAKE 1 CAPSULE (5 MG TOTAL) BY MOUTH DAILY AT BEDTIME, Disp: 30 capsule, Rfl: 3  •  predniSONE 10 mg tablet, 1 tablet daily, start this medication after prednisone taper is completed, Disp: 30 tablet, Rfl: 0  •  risperiDONE (RisperDAL) 2 mg tablet, Take 1 tablet (2 mg total) by mouth daily at bedtime, Disp: 90 tablet, Rfl: 3  •  rivaroxaban (XARELTO) 20 mg tablet, Take 20 mg by mouth, Disp: , Rfl:   •  senna-docusate sodium (SENOKOT S) 8 6-50 mg per tablet, Take 1 tablet by mouth daily (Patient not taking: Reported on 1/10/2023), Disp: , Rfl:   •  sertraline (ZOLOFT) 100 mg tablet, Take 1 tablet (100 mg total) by mouth daily, Disp: 90 tablet, Rfl: 3  •  Spacer/Aero-Holding Chambers (AeroChamber Plus Geoff-Vu w/Mask) MISC, 1 EACH (1 APPLICATION TOTAL) BY MISCELLANEOUS ROUTE DAILY AS NEEDED (ASTHMA EXACERBATION)  , Disp: , Rfl:   •  SUMAtriptan (IMITREX) 100 mg tablet, TAKE 1 TABLET (100 MG TOTAL) BY MOUTH ONE TIME AS NEEDED FOR MIGRAINE  MAY REPEAT IN 2 HOURS IF UNRESOLVED   DO NOT EXCEED 200 MG IN 24 HOURS, Disp: , Rfl:   •  Syringe, Disposable, (2-3CC SYRINGE) 3 ML MISC, USE AS DIRECTED TO ADMINISTER NALOXONE INJECTION  , Disp: , Rfl:   •  triamcinolone (KENALOG) 0 1 % cream, Apply 1 application topically 2 (two) times a day, Disp: 30 g, Rfl: 1  •  triamcinolone (KENALOG) 0 5 % cream, , Disp: , Rfl:   •  Trulicity 1 5 ZL/9 4ZT injection, INJECT 1 5 MG UNDER THE SKIN EVERY 7 DAYS , Disp: 2 mL, Rfl: 3  •  zolpidem (AMBIEN) 10 mg tablet, TAKE 1 TABLET (10 MG TOTAL) BY MOUTH DAILY, Disp: 30 tablet, Rfl: 0    Allergies: Allergies   Allergen Reactions   • Other Anaphylaxis     Capzasin HP -- severe burning and swelling of the skin    • Clarithromycin GI Intolerance   • Acetaminophen GI Intolerance   • Bactrim [Sulfamethoxazole-Trimethoprim] Itching, GI Intolerance, Dizziness, Confusion and Lightheadedness     Patient passes out when on this medication for several days    • Cephalosporins Hives   • Latex Hives   • Oxycodone-Acetaminophen GI Intolerance   • Penicillins Diarrhea   • Trazodone Diarrhea   • Capsaicin Rash   • Naproxen Palpitations   :    Social and Family History:   Social History:   Social History     Tobacco Use   • Smoking status: Every Day     Packs/day: 1 50     Types: Cigarettes     Start date: 1992   • Smokeless tobacco: Never   • Tobacco comments:     Patient stated that she is ready to quit smoking and she needs help because she smokes about 2 packs and a half each and everyday   Vaping Use   • Vaping Use: Never used   Substance Use Topics   • Alcohol use: Not Currently   • Drug use: Never     Social History     Tobacco Use   Smoking Status Every Day   • Packs/day: 1 50   • Types: Cigarettes   • Start date: 1992   Smokeless Tobacco Never   Tobacco Comments    Patient stated that she is ready to quit smoking and she needs help because she smokes about 2 packs and a half each and everyday       Family History:  History reviewed   No pertinent family history :     Review of Systems:     General: Fever, chills, or night sweats: negative  Cardiac: Negative for chest pain  Pulmonary: Negative for shortness of breath  Gastrointestinal: Abdominal pain negative  Nausea, vomiting, or diarrhea negative,  Genitourinary: See HPI above  Patient does have hematuria  All other systems queried were negative  Physical Exam:   General: Patient is pleasant and in NAD  Awake and alert  Ht 5' 5" (1 651 m)   Wt 73 kg (161 lb)   BMI 26 79 kg/m²   HEENT:  Conjunctiva are clear  Constitutional:  pleasant and cooperative     no apparent distress  Cardiac: Peripheral edema: negative  Pulmonary: Non-labored breathing  Abdomen: Soft, non-tender, non-distended  No surgical scars  No masses, tenderness, hernias noted  Genitourinary: Negative CVA tenderness, negative suprapubic tenderness  Extremities:  Moves all extremities  Neurological:CNII-XII intact  No numbness or tingling  Essentially non focal neurologic exam  Psychiatric:mood affect and behavior normal      Labs:     Lab Results   Component Value Date    HGB 14 1 07/16/2022    HCT 41 8 07/16/2022    WBC 9 59 07/16/2022     07/16/2022   ]    Lab Results   Component Value Date    K 3 3 (L) 08/15/2022     (H) 08/15/2022    CO2 27 08/15/2022    BUN 18 08/15/2022    CREATININE 1 00 08/15/2022    CALCIUM 9 1 08/15/2022   ]    Imaging:   I personally reviewed the images and report of the following studies, and reviewed them with the patient:  None  ASSESSMENT:     #1  Hematuria  #2  UTI  #3  Diabetic cystopathy  #4  Urinary incontinence    PLAN:   -Have ordered a CAT scan for hematuria study and an office cystoscopy  -Urine sent for culture  -Ditropan XL 10 mg at bedtime  -Nystatin cream  -adults under garments reordered    Thank you for allowing me to participate in this patients’ care  Please do not hesitate to call with any additional questions    Suresh Howard PA-C

## 2023-01-26 DIAGNOSIS — F17.200 SMOKING: ICD-10-CM

## 2023-01-26 RX ORDER — FLUTICASONE PROPIONATE 250 UG/1
POWDER, METERED RESPIRATORY (INHALATION)
Qty: 60 EACH | Refills: 1 | Status: SHIPPED | OUTPATIENT
Start: 2023-01-26

## 2023-01-27 ENCOUNTER — TELEPHONE (OUTPATIENT)
Dept: UROLOGY | Facility: AMBULATORY SURGERY CENTER | Age: 53
End: 2023-01-27

## 2023-01-27 DIAGNOSIS — R39.9 UTI SYMPTOMS: Primary | ICD-10-CM

## 2023-01-27 DIAGNOSIS — N39.0 URINARY TRACT INFECTION WITHOUT HEMATURIA, SITE UNSPECIFIED: Primary | ICD-10-CM

## 2023-01-27 RX ORDER — LEVOFLOXACIN 500 MG/1
500 TABLET, FILM COATED ORAL EVERY 24 HOURS
Qty: 7 TABLET | Refills: 0 | Status: SHIPPED | OUTPATIENT
Start: 2023-01-27 | End: 2023-02-24

## 2023-01-27 NOTE — TELEPHONE ENCOUNTER
Was able to call pt back and speak with her, made aware antibiotic sent to pharmacy for positive urine culture

## 2023-01-28 LAB — BACTERIA UR CULT: ABNORMAL

## 2023-01-30 ENCOUNTER — TELEPHONE (OUTPATIENT)
Dept: INTERNAL MEDICINE CLINIC | Facility: CLINIC | Age: 53
End: 2023-01-30

## 2023-01-30 NOTE — TELEPHONE ENCOUNTER
Malachi Yates  I'm with medical supplies  Received a  fax incontinent supplies  It is actually missing a diagnosis code for the insurance requirements  Please add a diagnosis code on rx and refax    Fax # [phone]

## 2023-02-02 LAB — BACTERIA UR CULT: ABNORMAL

## 2023-02-03 LAB

## 2023-02-07 ENCOUNTER — NURSE TRIAGE (OUTPATIENT)
Dept: OTHER | Facility: OTHER | Age: 53
End: 2023-02-07

## 2023-02-07 ENCOUNTER — TELEPHONE (OUTPATIENT)
Dept: INTERNAL MEDICINE CLINIC | Facility: CLINIC | Age: 53
End: 2023-02-07

## 2023-02-07 NOTE — TELEPHONE ENCOUNTER
Regarding: Salmonella  ----- Message from Em Garcia sent at 2/7/2023  3:28 PM EST -----  "I have been notified by the Dept of Health that I Salmonella "

## 2023-02-07 NOTE — TELEPHONE ENCOUNTER
Patient called in to report notification from Dept of Health that she has salmonella detected in urine culture 1/25/23  Patient reports she completed antibiotic therapy and still has moderate intermittent stomach pain, chills, headache, nausea, decreased PO intake past 4 days, and her arms are aching  [place] follow up with patient for continued care advice  Reason for Disposition  • MODERATE pain (e g , interferes with normal activities that comes and goes (cramps) lasts > 24 hours (Exception: pain with Vomiting or Diarrhea - see that Protocol)    Answer Assessment - Initial Assessment Questions  1  LOCATION: "Where does it hurt?"       Stomach hurts  2  RADIATION: "Does the pain shoot anywhere else?" (e g , chest, back)     Up to chest and out to back  3  ONSET: "When did the pain begin?" (e g , minutes, hours or days ago)       Past two weeks; more than usual  4  SUDDEN: "Gradual or sudden onset?"      Gradually  5  PATTERN "Does the pain come and go, or is it constant?"     - If constant: "Is it getting better, staying the same, or worsening?"       (Note: Constant means the pain never goes away completely; most serious pain is constant and it progresses)      - If intermittent: "How long does it last?" "Do you have pain now?"      (Note: Intermittent means the pain goes away completely between bouts)      Comes and goes  6  SEVERITY: "How bad is the pain?"  (e g , Scale 1-10; mild, moderate, or severe)    - MILD (1-3): doesn't interfere with normal activities, abdomen soft and not tender to touch     - MODERATE (4-7): interferes with normal activities or awakens from sleep, tender to touch     - SEVERE (8-10): excruciating pain, doubled over, unable to do any normal activities       Moderate  7  RECURRENT SYMPTOM: "Have you ever had this type of stomach pain before?" If Yes, ask: "When was the last time?" and "What happened that time?"       Denies  8   CAUSE: "What do you think is causing the stomach pain?" Salmonella  9  RELIEVING/AGGRAVATING FACTORS: "What makes it better or worse?" (e g , movement, antacids, bowel movement)      Denies  10  OTHER SYMPTOMS: "Has there been any vomiting, diarrhea, constipation, or urine problems?"        Nausea, decreased PO intake for past four days  Chills, headache, arms are aching    11  PREGNANCY: "Is there any chance you are pregnant?" "When was your last menstrual period?"        *No Answer*    Protocols used: ABDOMINAL PAIN - [facility] - SERGEY GARG

## 2023-02-07 NOTE — TELEPHONE ENCOUNTER
Spoke with patient and relayed Luann's message to her  Patient states she did finish the antibiotic and is not any better  Advised her she will need to go for another urine culture  Order is placed in chart  Patient also wanted to know why we did not inform her of salmonella being in urine  Advised her we routinely tell patients they have UTIs, but never mention what specific bacteria is in the urine unless they ask  Patient states she was contacted by the dept of health due to having salmonella in urine  Office to monitor for urine culture results

## 2023-02-07 NOTE — TELEPHONE ENCOUNTER
Pt states she was notified by Dept of Health that she has salmonella and was told to request an antibiotic       Pt uses [facility]

## 2023-02-07 NOTE — TELEPHONE ENCOUNTER
Patient reports her recent urine culture shows she has salmonella and she would like a call back to discuss

## 2023-02-08 NOTE — TELEPHONE ENCOUNTER
If patient would like sooner appt, she has not seen Dr Pranay Kennedy yet, so she can be scheduled with any doctor and at any location

## 2023-02-10 NOTE — TELEPHONE ENCOUNTER
Offered 2/23/2023 w/Clare at 3pm  She is scheduled for CT 2/24/2023, I advised would need to be moved to prior week  She said that was soonest appt for CT   We kept for 3/10/2023 w/ Lidya Hatfield at 1pm

## 2023-02-14 ENCOUNTER — TELEPHONE (OUTPATIENT)
Dept: OTHER | Facility: OTHER | Age: 53
End: 2023-02-14

## 2023-02-14 ENCOUNTER — APPOINTMENT (OUTPATIENT)
Dept: LAB | Facility: CLINIC | Age: 53
End: 2023-02-14

## 2023-02-14 DIAGNOSIS — F41.9 ANXIETY: ICD-10-CM

## 2023-02-14 DIAGNOSIS — Z13.29 SCREENING FOR THYROID DISORDER: ICD-10-CM

## 2023-02-14 DIAGNOSIS — E78.2 MIXED HYPERLIPIDEMIA: ICD-10-CM

## 2023-02-14 DIAGNOSIS — E11.649 TYPE 2 DIABETES MELLITUS WITH HYPOGLYCEMIA WITHOUT COMA, WITH LONG-TERM CURRENT USE OF INSULIN (HCC): ICD-10-CM

## 2023-02-14 DIAGNOSIS — Z79.4 TYPE 2 DIABETES MELLITUS WITH HYPOGLYCEMIA WITHOUT COMA, WITH LONG-TERM CURRENT USE OF INSULIN (HCC): ICD-10-CM

## 2023-02-14 DIAGNOSIS — R31.0 GROSS HEMATURIA: ICD-10-CM

## 2023-02-14 LAB
ALBUMIN SERPL BCP-MCNC: 3.2 G/DL (ref 3.5–5)
ALP SERPL-CCNC: 144 U/L (ref 46–116)
ALT SERPL W P-5'-P-CCNC: 22 U/L (ref 12–78)
ANION GAP SERPL CALCULATED.3IONS-SCNC: 9 MMOL/L (ref 4–13)
AST SERPL W P-5'-P-CCNC: 12 U/L (ref 5–45)
BASOPHILS # BLD AUTO: 0.05 THOUSANDS/ÂΜL (ref 0–0.1)
BASOPHILS NFR BLD AUTO: 0 % (ref 0–1)
BILIRUB SERPL-MCNC: 0.33 MG/DL (ref 0.2–1)
BUN SERPL-MCNC: 11 MG/DL (ref 5–25)
CALCIUM ALBUM COR SERPL-MCNC: 9.5 MG/DL (ref 8.3–10.1)
CALCIUM SERPL-MCNC: 8.9 MG/DL (ref 8.3–10.1)
CHLORIDE SERPL-SCNC: 92 MMOL/L (ref 96–108)
CHOLEST SERPL-MCNC: 123 MG/DL
CO2 SERPL-SCNC: 26 MMOL/L (ref 21–32)
CREAT SERPL-MCNC: 1.24 MG/DL (ref 0.6–1.3)
EOSINOPHIL # BLD AUTO: 0.1 THOUSAND/ÂΜL (ref 0–0.61)
EOSINOPHIL NFR BLD AUTO: 1 % (ref 0–6)
ERYTHROCYTE [DISTWIDTH] IN BLOOD BY AUTOMATED COUNT: 12.8 % (ref 11.6–15.1)
EST. AVERAGE GLUCOSE BLD GHB EST-MCNC: 315 MG/DL
GFR SERPL CREATININE-BSD FRML MDRD: 49 ML/MIN/1.73SQ M
GLUCOSE P FAST SERPL-MCNC: 715 MG/DL (ref 65–99)
HBA1C MFR BLD: 12.6 %
HCT VFR BLD AUTO: 44.4 % (ref 34.8–46.1)
HDLC SERPL-MCNC: 30 MG/DL
HGB BLD-MCNC: 14.9 G/DL (ref 11.5–15.4)
IMM GRANULOCYTES # BLD AUTO: 0.09 THOUSAND/UL (ref 0–0.2)
IMM GRANULOCYTES NFR BLD AUTO: 1 % (ref 0–2)
LDLC SERPL CALC-MCNC: 36 MG/DL (ref 0–100)
LYMPHOCYTES # BLD AUTO: 1.88 THOUSANDS/ÂΜL (ref 0.6–4.47)
LYMPHOCYTES NFR BLD AUTO: 15 % (ref 14–44)
MCH RBC QN AUTO: 30.1 PG (ref 26.8–34.3)
MCHC RBC AUTO-ENTMCNC: 33.6 G/DL (ref 31.4–37.4)
MCV RBC AUTO: 90 FL (ref 82–98)
MONOCYTES # BLD AUTO: 0.79 THOUSAND/ÂΜL (ref 0.17–1.22)
MONOCYTES NFR BLD AUTO: 6 % (ref 4–12)
NEUTROPHILS # BLD AUTO: 9.4 THOUSANDS/ÂΜL (ref 1.85–7.62)
NEUTS SEG NFR BLD AUTO: 77 % (ref 43–75)
NONHDLC SERPL-MCNC: 93 MG/DL
NRBC BLD AUTO-RTO: 0 /100 WBCS
PLATELET # BLD AUTO: 222 THOUSANDS/UL (ref 149–390)
PMV BLD AUTO: 12.6 FL (ref 8.9–12.7)
POTASSIUM SERPL-SCNC: 5 MMOL/L (ref 3.5–5.3)
PROT SERPL-MCNC: 6.6 G/DL (ref 6.4–8.4)
RBC # BLD AUTO: 4.95 MILLION/UL (ref 3.81–5.12)
SODIUM SERPL-SCNC: 127 MMOL/L (ref 135–147)
TRIGL SERPL-MCNC: 284 MG/DL
TSH SERPL DL<=0.05 MIU/L-ACNC: 0.93 UIU/ML (ref 0.45–4.5)
WBC # BLD AUTO: 12.31 THOUSAND/UL (ref 4.31–10.16)

## 2023-02-15 ENCOUNTER — TELEPHONE (OUTPATIENT)
Dept: OTHER | Facility: OTHER | Age: 53
End: 2023-02-15

## 2023-02-15 ENCOUNTER — TELEPHONE (OUTPATIENT)
Dept: INTERNAL MEDICINE CLINIC | Facility: CLINIC | Age: 53
End: 2023-02-15

## 2023-02-15 DIAGNOSIS — F43.10 PTSD (POST-TRAUMATIC STRESS DISORDER): ICD-10-CM

## 2023-02-15 NOTE — TELEPHONE ENCOUNTER
Pt called in stating she had lab work done yesterday with a urine culture and it came back positive  Pt would like to get treated  However, pt's PCP office has been trying to reach pt as well due to her fasting Blood sugar being over 700  Pt was advised to be seen in the ED as per her PCP request to get her BS under control but pt refused  Pt is more concerned about her continued positive urine cultures and is requesting a abx be sent in ASAP  Pt made fully aware that if she does not treat her alarming blood sugar that it could lead to death  Pt still refused  Please advise

## 2023-02-15 NOTE — TELEPHONE ENCOUNTER
----- Message from [id number] sent at 2/15/2023  7:53 AM EST -----  Can you follow up with ELHAM HERNANDEZ [facility] , he blood sugar fasting was 700  Dr Imtiaz Bess to go to the ER   I want to make sure she went and she is ok

## 2023-02-15 NOTE — TELEPHONE ENCOUNTER
Called and advised patient that her UC is still preliminary so we have to wait for It to finalize prior to sending medication  Additionally, advised patient that her blood sugar is grossly elevated and she needs to go to the ER  Patient reports "I already got it under control" Advised her she should still speak with her PCP  Advised her some of her symptoms may be due to her blood sugar levels  Patient declines help with her blood sugar and declines going to ER

## 2023-02-15 NOTE — TELEPHONE ENCOUNTER
Lab Result: Glucose   Date/Time Drawn: 2/14 @ 9:35   Ordering Provider: Seble Roberto Name: Billy Lomeli       The following critical/stat result was read back to the lab as stated above and [employer] to the on-call provider  The provider confirmed receipt of the message

## 2023-02-15 NOTE — PROGRESS NOTES
Received a call from the lab that her glucose was 715  Called her and left a message as she did not    Told her to go to the ER

## 2023-02-15 NOTE — TELEPHONE ENCOUNTER
Pt happened to call in for her Urologist to have them review her recent urine culture and I saw this message from her PCP office  I relayed this message to the pt about her Fasting BS being over 700 and that she should report to the ED  Pt refused stating she does not want to go and that she knows it is high due to not taking her insulin anymore  I explained to pt the importance of getting her sugar controlled and that it can lead to death  Pt still refused and is more concerned about her urine culture  As of now pt isn't going to the ED nor has any intentions of treating her high BS  Please advise

## 2023-02-16 RX ORDER — ZOLPIDEM TARTRATE 10 MG/1
10 TABLET ORAL DAILY
Qty: 30 TABLET | Refills: 0 | Status: SHIPPED | OUTPATIENT
Start: 2023-02-16 | End: 2023-05-27

## 2023-02-17 LAB — BACTERIA UR CULT: ABNORMAL

## 2023-02-17 NOTE — TELEPHONE ENCOUNTER
Urine Culture >100,000 cfu/ml Salmonella species Abnormal     This organism has been edited  The previous result was Non lactose fermenting gram negative aleks on 2/15/2023 at 1634 EST  Susceptibility     Salmonella species     ASCENCION (Preliminary) Not Specified (Preliminary)     Ampicillin ($$) <=8 00 ug/ml Susceptible       Ceftazidime ($$) <=1 ug/ml Susceptible       Ceftriaxone ($$) <=1 00 ug/ml Susceptible       Levofloxacin ($)   0 250  Susceptible     Trimethoprim + Sulfamethoxazole ($$$) <=0 5/9 5 u   Susceptible                      Specimen Collected: 02/14/23  9:35 AM Last Resulted: 02/17/23  1:50 PM

## 2023-02-18 DIAGNOSIS — Z79.4 TYPE 2 DIABETES MELLITUS WITH HYPOGLYCEMIA WITHOUT COMA, WITH LONG-TERM CURRENT USE OF INSULIN (HCC): ICD-10-CM

## 2023-02-18 DIAGNOSIS — E11.649 TYPE 2 DIABETES MELLITUS WITH HYPOGLYCEMIA WITHOUT COMA, WITH LONG-TERM CURRENT USE OF INSULIN (HCC): ICD-10-CM

## 2023-02-18 RX ORDER — GABAPENTIN 400 MG/1
CAPSULE ORAL
Qty: 90 CAPSULE | Refills: 3 | Status: SHIPPED | OUTPATIENT
Start: 2023-02-18

## 2023-02-20 DIAGNOSIS — F43.10 PTSD (POST-TRAUMATIC STRESS DISORDER): ICD-10-CM

## 2023-02-20 DIAGNOSIS — F17.200 SMOKING: ICD-10-CM

## 2023-02-20 DIAGNOSIS — R39.9 UTI SYMPTOMS: Primary | ICD-10-CM

## 2023-02-20 RX ORDER — PRAZOSIN HYDROCHLORIDE 5 MG/1
5 CAPSULE ORAL
Qty: 30 CAPSULE | Refills: 3 | Status: SHIPPED | OUTPATIENT
Start: 2023-02-20

## 2023-02-20 RX ORDER — FLUTICASONE PROPIONATE 250 UG/1
POWDER, METERED RESPIRATORY (INHALATION)
Qty: 60 EACH | Refills: 1 | Status: SHIPPED | OUTPATIENT
Start: 2023-02-20

## 2023-02-20 RX ORDER — CEFDINIR 300 MG/1
300 CAPSULE ORAL EVERY 12 HOURS SCHEDULED
Qty: 14 CAPSULE | Refills: 0 | Status: SHIPPED | OUTPATIENT
Start: 2023-02-20 | End: 2023-02-21 | Stop reason: SDUPTHER

## 2023-02-20 NOTE — TELEPHONE ENCOUNTER
Patient's urine culture is again positive for Salmonella  A prescription for cefdinir sent to her pharmacy  Patient has multiple antibiotic allergies with limited antibiotic susceptibilities  Would recommend patient monitor for reaction as well as have benadryl on hand     Patient is already coordinated for appropriate workup with CT and cystoscopy  Her diabetes needs to get under better control to clear infection  Would also recommend ID consult if not clearing

## 2023-02-21 DIAGNOSIS — R39.9 UTI SYMPTOMS: ICD-10-CM

## 2023-02-21 RX ORDER — CEFDINIR 300 MG/1
300 CAPSULE ORAL EVERY 12 HOURS SCHEDULED
Qty: 14 CAPSULE | Refills: 0 | Status: SHIPPED | OUTPATIENT
Start: 2023-02-21 | End: 2023-02-28

## 2023-02-24 LAB — BACTERIA UR CULT: ABNORMAL

## 2023-02-27 NOTE — PROGRESS NOTES
Subjective:        Lei Long is a 45 y o  female  Here for Gynecologic Exam (New pt/ yearly exam /Annual- 13 years ago /Last pap Hyst -13 years since last pap smear /Last mammo- has not gone for it /Last colonoscopy-2017 Negative /Last Dexa ordered-Has not gone for this test yet/Patient has Vaginal Discharge , some odor with itching and burning  She gets yeast infection frequently /Patient is incontinent and uses adult diapers and keeps getting yeast infections that are painful  /Hysterectomy /Not currently sexually active/No other questions or concerns/)      GYN HPI  Here for annual gyn exam  No GYN exam in 13 years  Menstrual cycle:  Patient is menopausal since hysterectomy  Done due prolapse uterus  She denies hot flashes  Vaginal c/o: vaginal itching and burning  Hx of frequent yeast infections  Does have some vaginal dryness  Previously used intrarosa, and was helpful  Urinary c/o: + incontinence- wears adult briefs/ Seeing urology for this c/o    Breast complaints:denies  She does not do self breast Exams    Sexually active: not presently  Contraception: N/a- s/p hysterectomy  She reports she feels safe at home  The following portions of the patient's history were reviewed and updated as appropriate: allergies, current medications, past family history, past medical history, past social history, past surgical history, and problem list     Hereditary Cancer Screening  Cancer-related family history includes Cancer in her maternal grandfather, mother, and paternal grandmother  Substance Abuse Screening Completed  See hx and flowsheet  Screens Positive for tobacco use daily          Tobacco Cessation Counseling:  The patient is sincerely urged to quit consumption of tobacco  She is ready to quit tobacco        HEALTH MAINTENANCE SCREENINGS:    History of abnormal pap: No, Last Papanicolaou test:  Not on file  History of abnormal mammogram: No, Last mammogram:  08/09/2017  Last Colon Cancer Screenin2017 Not on file Not on file      Review of Systems   Constitutional: Negative for appetite change, chills, fatigue, fever and unexpected weight change  HENT: Negative  Eyes: Negative  Respiratory: Negative for chest tightness and shortness of breath  Cardiovascular: Negative for chest pain and palpitations  Gastrointestinal: Negative for abdominal pain, constipation and vomiting  Endocrine: Negative for cold intolerance and heat intolerance  Genitourinary:        As per HPI   Musculoskeletal: Negative for back pain, joint swelling and neck pain  Skin: Negative for color change and rash  Neurological: Negative for dizziness, weakness and numbness  Hematological: Does not bruise/bleed easily  Psychiatric/Behavioral: Negative  Objective:  BP 96/70 (BP Location: Left arm, Patient Position: Sitting, Cuff Size: Large)   Ht 5' 5" (1 651 m)   Wt 76 2 kg (168 lb)   BMI 27 96 kg/m²        Physical Exam  Constitutional:       General: She is not in acute distress  Appearance: Normal appearance  Genitourinary:      Vulva and rectum normal       No lesions in the vagina  Genitourinary Comments: Cervix/Uterus surgically absent      Vaginal cuff intact  Vaginal erythema present  No vaginal discharge (scant d/c noted), tenderness or bleeding  Posterior vaginal prolapse present  Mild vaginal atrophy present  Right Adnexa: not tender and no mass present  Left Adnexa: not tender and no mass present  Cervix is absent  Uterus is absent  No urethral prolapse present  Pelvic exam was performed with patient in the lithotomy position  Breasts:     Breasts are symmetrical       Right: No inverted nipple, mass, nipple discharge, skin change or tenderness  Left: No inverted nipple, mass, nipple discharge, skin change or tenderness  HENT:      Head: Normocephalic and atraumatic     Cardiovascular:      Rate and Rhythm: Normal rate  Heart sounds: No murmur heard  Pulmonary:      Effort: Pulmonary effort is normal       Breath sounds: Normal breath sounds  Abdominal:      General: There is no distension  Palpations: Abdomen is soft  Tenderness: There is no abdominal tenderness  Musculoskeletal:         General: Normal range of motion  Cervical back: Normal range of motion  Lymphadenopathy:      Cervical: No cervical adenopathy  Neurological:      Mental Status: She is alert and oriented to person, place, and time  Skin:     General: Skin is warm and dry  Psychiatric:         Mood and Affect: Mood normal          Behavior: Behavior normal    Vitals reviewed  Assessment/Plan:           [place]- Primary  Annual GYN examination completed today  Risk prevention and anticipatory guidance provided including:  • Risk for hereditary cancers: Family history reviewed and patient does not qualify for referral for genetics consult/testing  Referral to genetics oncology offered as indicated  , Colon Cancer Screening: She Is up to date on screening; Next due 2026  • Calcium and vitamin D supplementation  • Dietary and lifestyle recommendations based on her age and weight  body mass index is 27 96 kg/m²       • Tobacco and alcohol use, intervention ordered if applicable  Cervical cancer screening  Previous pap smears and ASCCP screening guidelines have been reviewed  Pap smear is NOT  indicated at this time due to hysterectomy    Contraception   n/a - S/P Hysterectomy    STI Screening  STI screening is declined  STI prevention discussed with use of condoms      Breast exam and breast cancer screening  Breast exam was done; , Reviewed recommendation for yearly screening mammogram (as per ACOG, ACS, and [address] [place] [address] Departments), however, also made her aware that there are other professional organizations that may recommend deferring mammograms until age 48 or screening every other year  CBE should still be done yearly, but may miss some cancers and alone is not as effective as breast imaging  Supplemental testing may also be indicated based on lifetime risk for breast cancer as done by Ashley quiles , She is up to date with screening; Next due now    Problem List Items Addressed This Visit     Vaginal atrophy     intrarosa was previously prescribed and was effective for vaginal dryness  However patient ran out and would like to be represcribed again           Relevant Medications    Prasterone (Intrarosa) 6 5 MG INST   Other Visit Diagnoses     Encntr for gyn exam (general) (routine) w/o abn findings    -  Primary    Encounter for screening mammogram for malignant neoplasm of breast        Relevant Orders    Mammo screening bilateral w 3d & cad    Colon cancer screening        Relevant Orders    Ambulatory referral to Gastroenterology    Screening for cervical cancer        Intertrigo        Relevant Medications    nystatin (MYCOSTATIN) ointment          Orders Placed This Encounter   Procedures   • Mammo screening bilateral w 3d & cad   • Ambulatory referral to Gastroenterology

## 2023-03-01 ENCOUNTER — OFFICE VISIT (OUTPATIENT)
Dept: OBGYN CLINIC | Facility: MEDICAL CENTER | Age: 53
End: 2023-03-01

## 2023-03-01 ENCOUNTER — TELEPHONE (OUTPATIENT)
Dept: OBGYN CLINIC | Facility: CLINIC | Age: 53
End: 2023-03-01

## 2023-03-01 VITALS
BODY MASS INDEX: 27.99 KG/M2 | HEIGHT: 65 IN | WEIGHT: 168 LBS | DIASTOLIC BLOOD PRESSURE: 70 MMHG | SYSTOLIC BLOOD PRESSURE: 96 MMHG

## 2023-03-01 DIAGNOSIS — L30.4 INTERTRIGO: ICD-10-CM

## 2023-03-01 DIAGNOSIS — N95.2 VAGINAL ATROPHY: Primary | ICD-10-CM

## 2023-03-01 DIAGNOSIS — Z12.4 SCREENING FOR CERVICAL CANCER: ICD-10-CM

## 2023-03-01 DIAGNOSIS — Z12.31 ENCOUNTER FOR SCREENING MAMMOGRAM FOR MALIGNANT NEOPLASM OF BREAST: ICD-10-CM

## 2023-03-01 DIAGNOSIS — N89.8 VAGINAL DISCHARGE: ICD-10-CM

## 2023-03-01 DIAGNOSIS — Z12.11 COLON CANCER SCREENING: ICD-10-CM

## 2023-03-01 DIAGNOSIS — Z01.419 ENCNTR FOR GYN EXAM (GENERAL) (ROUTINE) W/O ABN FINDINGS: Primary | ICD-10-CM

## 2023-03-01 DIAGNOSIS — N95.2 VAGINAL ATROPHY: ICD-10-CM

## 2023-03-01 RX ORDER — FLUCONAZOLE 150 MG/1
150 TABLET ORAL ONCE
COMMUNITY
Start: 2023-02-28

## 2023-03-01 RX ORDER — BLOOD-GLUCOSE TRANSMITTER
EACH MISCELLANEOUS
COMMUNITY
Start: 2023-02-20

## 2023-03-01 RX ORDER — PRASTERONE 6.5 MG/1
6.5 INSERT VAGINAL DAILY
Qty: 28 EACH | Refills: 6 | Status: SHIPPED | OUTPATIENT
Start: 2023-03-01

## 2023-03-01 RX ORDER — NYSTATIN 100000 U/G
OINTMENT TOPICAL 2 TIMES DAILY
Qty: 30 G | Refills: 6 | Status: SHIPPED | OUTPATIENT
Start: 2023-03-01

## 2023-03-01 NOTE — ASSESSMENT & PLAN NOTE
intrarosa was previously prescribed and was effective for vaginal dryness  However patient ran out and would like to be represcribed again

## 2023-03-01 NOTE — TELEPHONE ENCOUNTER
Patient stated that Majo Colbert told her to check her insurance to see if they covered medication Bettie Holley ) but they do not - she is asking for a different option

## 2023-03-01 NOTE — TELEPHONE ENCOUNTER
I am a little leary on starting vaginal estrogen with hx of Stroke and other co-morbitities  She would be a good candidate for reveree   Can be found online- does not require a prescription

## 2023-03-02 LAB
C GLABRATA DNA VAG QL NAA+PROBE: POSITIVE
C KRUSEI DNA VAG QL NAA+PROBE: NEGATIVE
CANDIDA SP 6 PNL VAG NAA+PROBE: NEGATIVE
T VAGINALIS DNA VAG QL NAA+PROBE: NEGATIVE
VAGINOSIS/ITIS DNA PNL VAG PROBE+SIG AMP: NEGATIVE

## 2023-03-02 NOTE — TELEPHONE ENCOUNTER
Candida glabrata does not typically cause vaginitis symptoms and does not need treatment  Regarding the vaginal estrogen  i will check with one of the docs and get back to her

## 2023-03-16 DIAGNOSIS — F43.10 PTSD (POST-TRAUMATIC STRESS DISORDER): ICD-10-CM

## 2023-03-17 RX ORDER — ZOLPIDEM TARTRATE 10 MG/1
10 TABLET ORAL DAILY
Qty: 30 TABLET | Refills: 0 | Status: SHIPPED | OUTPATIENT
Start: 2023-03-17 | End: 2023-06-25

## 2023-04-05 DIAGNOSIS — E11.649 TYPE 2 DIABETES MELLITUS WITH HYPOGLYCEMIA WITHOUT COMA, WITH LONG-TERM CURRENT USE OF INSULIN (HCC): ICD-10-CM

## 2023-04-05 DIAGNOSIS — F43.10 PTSD (POST-TRAUMATIC STRESS DISORDER): ICD-10-CM

## 2023-04-05 DIAGNOSIS — F17.200 SMOKING: ICD-10-CM

## 2023-04-05 DIAGNOSIS — Z79.4 TYPE 2 DIABETES MELLITUS WITH HYPOGLYCEMIA WITHOUT COMA, WITH LONG-TERM CURRENT USE OF INSULIN (HCC): ICD-10-CM

## 2023-04-05 RX ORDER — GABAPENTIN 400 MG/1
CAPSULE ORAL
Qty: 90 CAPSULE | Refills: 3 | OUTPATIENT
Start: 2023-04-05

## 2023-04-05 RX ORDER — FLUTICASONE PROPIONATE 250 UG/1
POWDER, METERED RESPIRATORY (INHALATION)
Qty: 60 EACH | Refills: 1 | Status: SHIPPED | OUTPATIENT
Start: 2023-04-05

## 2023-04-05 RX ORDER — PRAZOSIN HYDROCHLORIDE 5 MG/1
5 CAPSULE ORAL
Qty: 30 CAPSULE | Refills: 3 | Status: SHIPPED | OUTPATIENT
Start: 2023-04-05

## 2023-04-05 RX ORDER — DULAGLUTIDE 1.5 MG/.5ML
INJECTION, SOLUTION SUBCUTANEOUS
Qty: 2 ML | Refills: 3 | Status: SHIPPED | OUTPATIENT
Start: 2023-04-05

## 2023-04-05 RX ORDER — INSULIN LISPRO 100 [IU]/ML
INJECTION, SOLUTION INTRAVENOUS; SUBCUTANEOUS
Qty: 15 ML | Refills: 3 | Status: SHIPPED | OUTPATIENT
Start: 2023-04-05

## 2023-04-06 RX ORDER — ZOLPIDEM TARTRATE 10 MG/1
10 TABLET ORAL DAILY
Qty: 90 TABLET | Refills: 1 | Status: SHIPPED | OUTPATIENT
Start: 2023-04-06 | End: 2023-07-15

## 2023-04-17 LAB
LEFT EYE DIABETIC RETINOPATHY: POSITIVE
RIGHT EYE DIABETIC RETINOPATHY: POSITIVE

## 2023-04-17 PROCEDURE — 2022F DILAT RTA XM EVC RTNOPTHY: CPT | Performed by: STUDENT IN AN ORGANIZED HEALTH CARE EDUCATION/TRAINING PROGRAM

## 2023-04-25 ENCOUNTER — PROCEDURE VISIT (OUTPATIENT)
Dept: UROLOGY | Facility: MEDICAL CENTER | Age: 53
End: 2023-04-25

## 2023-04-25 VITALS
BODY MASS INDEX: 26.99 KG/M2 | DIASTOLIC BLOOD PRESSURE: 82 MMHG | WEIGHT: 162 LBS | OXYGEN SATURATION: 99 % | SYSTOLIC BLOOD PRESSURE: 132 MMHG | HEIGHT: 65 IN | HEART RATE: 64 BPM

## 2023-04-25 DIAGNOSIS — N30.01 ACUTE CYSTITIS WITH HEMATURIA: ICD-10-CM

## 2023-04-25 DIAGNOSIS — R31.29 MICROHEMATURIA: Primary | ICD-10-CM

## 2023-04-25 DIAGNOSIS — R32 URINARY INCONTINENCE, UNSPECIFIED TYPE: ICD-10-CM

## 2023-04-25 LAB
BACTERIA UR QL AUTO: ABNORMAL /HPF
BILIRUB UR QL STRIP: NEGATIVE
CLARITY UR: ABNORMAL
COLOR UR: YELLOW
GLUCOSE UR STRIP-MCNC: ABNORMAL MG/DL
GRAN CASTS #/AREA URNS LPF: ABNORMAL /[LPF]
HGB UR QL STRIP.AUTO: NEGATIVE
HYALINE CASTS #/AREA URNS LPF: ABNORMAL /LPF
KETONES UR STRIP-MCNC: NEGATIVE MG/DL
LEUKOCYTE ESTERASE UR QL STRIP: ABNORMAL
MUCOUS THREADS UR QL AUTO: ABNORMAL
NITRITE UR QL STRIP: NEGATIVE
NON-SQ EPI CELLS URNS QL MICRO: ABNORMAL /HPF
PH UR STRIP.AUTO: 6 [PH]
POST-VOID RESIDUAL VOLUME, ML POC: 0 ML
PROT UR STRIP-MCNC: ABNORMAL MG/DL
RBC #/AREA URNS AUTO: ABNORMAL /HPF
SL AMB  POCT GLUCOSE, UA: NORMAL
SL AMB LEUKOCYTE ESTERASE,UA: NORMAL
SL AMB POCT BILIRUBIN,UA: NORMAL
SL AMB POCT BLOOD,UA: NORMAL
SL AMB POCT CLARITY,UA: NORMAL
SL AMB POCT COLOR,UA: YELLOW
SL AMB POCT KETONES,UA: NORMAL
SL AMB POCT NITRITE,UA: NORMAL
SL AMB POCT PH,UA: 5.5
SL AMB POCT SPECIFIC GRAVITY,UA: 1.02
SL AMB POCT URINE PROTEIN: NORMAL
SL AMB POCT UROBILINOGEN: 0.2
SP GR UR STRIP.AUTO: 1.02 (ref 1–1.03)
UROBILINOGEN UR STRIP-ACNC: <2 MG/DL
WBC #/AREA URNS AUTO: ABNORMAL /HPF
WBC CLUMPS # UR AUTO: PRESENT /UL

## 2023-04-25 RX ORDER — CEFTRIAXONE 1 G/1
1000 INJECTION, POWDER, FOR SOLUTION INTRAMUSCULAR; INTRAVENOUS ONCE
Status: COMPLETED | OUTPATIENT
Start: 2023-04-25 | End: 2023-04-25

## 2023-04-25 RX ORDER — CEFDINIR 300 MG/1
300 CAPSULE ORAL EVERY 12 HOURS SCHEDULED
Qty: 14 CAPSULE | Refills: 0 | Status: SHIPPED | OUTPATIENT
Start: 2023-04-25 | End: 2023-05-02

## 2023-04-25 RX ORDER — TROSPIUM CHLORIDE 20 MG/1
20 TABLET, FILM COATED ORAL 2 TIMES DAILY
Qty: 60 TABLET | Refills: 2 | Status: SHIPPED | OUTPATIENT
Start: 2023-04-25 | End: 2023-07-24

## 2023-04-25 RX ADMIN — CEFTRIAXONE 1000 MG: 1 INJECTION, POWDER, FOR SOLUTION INTRAMUSCULAR; INTRAVENOUS at 10:51

## 2023-04-25 NOTE — PROGRESS NOTES
Cystoscopy     Date/Time 4/25/2023 9:55 AM     Performed by  Melissa Cardozo MD     Authorized by Melissa Cardozo MD      Universal Protocol:  Consent: Verbal consent obtained  Written consent obtained  Consent given by: patient  Patient understanding: patient states understanding of the procedure being performed  Patient consent: the patient's understanding of the procedure matches consent given  Procedure consent: procedure consent matches procedure scheduled  Patient identity confirmed: verbally with patient        Procedure Details:  Procedure type: cystoscopy      Office Cystoscopy Procedure Note    Indication:    Microhematuria    Informed consent   The risks, benefits, complications, treatment options, and expected outcomes were discussed with the patient  The patient agreed with the proposed plan and provided informed consent  Anesthesia  Lidocaine jelly 2%    Antibiotic prophylaxis   Rocephin    Procedure  The patient was placed in the supine position, was prepped and draped in the usual manner using sterile technique, and lubricating jelly instilled into the urethra  A 17 F flexible cystoscope was then inserted into the urethra and the urethra and bladder carefully examined  The following findings were noted:    Findings:  Urethra:  Normal in appearance without stricture or other abnormalities  Bladder: The bladder has a diffusely erythematous appearance with slightly more erythema on the right bladder neck anterior to the right ureteral orifice    There also is white fluffy debris within the bladder concerning for infection  Ureteral orifices:  Orthotopic  Other findings:  Normal external genitalia    Specimens: Voided analysis with micro, urine culture, cytology                 Complications:    None; patient tolerated the procedure well           Condition: Stable    Assessment: Abnormal cystoscopy with diffuse inflammatory appearance    Plan: See office note      Melissa Cardozo MD  [address] [facility]

## 2023-04-26 ENCOUNTER — TELEPHONE (OUTPATIENT)
Dept: OTHER | Facility: OTHER | Age: 53
End: 2023-04-26

## 2023-04-26 DIAGNOSIS — N39.0 URINARY TRACT INFECTION WITHOUT HEMATURIA, SITE UNSPECIFIED: Primary | ICD-10-CM

## 2023-04-26 NOTE — TELEPHONE ENCOUNTER
I spoke with the patient and let her know the UC and cytology is pending currently  Will monitor for results

## 2023-04-26 NOTE — TELEPHONE ENCOUNTER
Per patient's phone call, she wants to know when was she suppose to have the urine test that Dr Anika Lemons ordered  She stated that the lab collected the samples yesterday

## 2023-04-27 NOTE — TELEPHONE ENCOUNTER
Urine cytology and UC final results still pending  Pt is to be advised she will need another UC 2 weeks prior to 7/27/23 appt with Dr Lawrence Goodman for cysto

## 2023-04-28 DIAGNOSIS — E11.44 DIABETIC AMYOTROPHY ASSOCIATED WITH TYPE 2 DIABETES MELLITUS (HCC): ICD-10-CM

## 2023-04-28 RX ORDER — NITROFURANTOIN 25; 75 MG/1; MG/1
100 CAPSULE ORAL 2 TIMES DAILY
Qty: 10 CAPSULE | Refills: 0 | Status: SHIPPED | OUTPATIENT
Start: 2023-04-28

## 2023-04-28 NOTE — TELEPHONE ENCOUNTER
Call placed to patient and spoke with her  Informed her of the recommendations of the CRNP  Pt is already taking Cefdinir and is asking if she should continue with this medication or needs to start taking new medication  Pt would also like the results of her urine testing explained to her and what exact bacteria was found  Will forward to provider to review

## 2023-04-28 NOTE — TELEPHONE ENCOUNTER
Returned call to patient advised  Per provider to continue the cefdinir as advised   Our office will contact her when UC are finalized patient verbalized understanding and agreement

## 2023-04-29 RX ORDER — GABAPENTIN 100 MG/1
100 CAPSULE ORAL
Qty: 30 CAPSULE | Refills: 3 | Status: SHIPPED | OUTPATIENT
Start: 2023-04-29

## 2023-05-01 DIAGNOSIS — Z79.4 TYPE 2 DIABETES MELLITUS WITH HYPOGLYCEMIA WITHOUT COMA, WITH LONG-TERM CURRENT USE OF INSULIN (HCC): ICD-10-CM

## 2023-05-01 DIAGNOSIS — E11.649 TYPE 2 DIABETES MELLITUS WITH HYPOGLYCEMIA WITHOUT COMA, WITH LONG-TERM CURRENT USE OF INSULIN (HCC): ICD-10-CM

## 2023-05-01 LAB — BACTERIA UR CULT: ABNORMAL

## 2023-05-02 RX ORDER — GABAPENTIN 400 MG/1
CAPSULE ORAL
Qty: 90 CAPSULE | Refills: 3 | Status: SHIPPED | OUTPATIENT
Start: 2023-05-02

## 2023-05-04 DIAGNOSIS — K21.9 GASTROESOPHAGEAL REFLUX DISEASE WITHOUT ESOPHAGITIS: ICD-10-CM

## 2023-05-04 RX ORDER — PANTOPRAZOLE SODIUM 40 MG/1
40 TABLET, DELAYED RELEASE ORAL DAILY
Qty: 90 TABLET | Refills: 3 | Status: SHIPPED | OUTPATIENT
Start: 2023-05-04

## 2023-05-06 DIAGNOSIS — F17.200 SMOKING: ICD-10-CM

## 2023-05-06 RX ORDER — FLUTICASONE PROPIONATE 250 UG/1
POWDER, METERED RESPIRATORY (INHALATION)
Qty: 60 EACH | Refills: 1 | Status: SHIPPED | OUTPATIENT
Start: 2023-05-06

## 2023-05-09 DIAGNOSIS — Z79.4 INSULIN DEPENDENT TYPE 2 DIABETES MELLITUS (HCC): ICD-10-CM

## 2023-05-09 DIAGNOSIS — E11.9 INSULIN DEPENDENT TYPE 2 DIABETES MELLITUS (HCC): ICD-10-CM

## 2023-05-09 RX ORDER — PROCHLORPERAZINE 25 MG/1
SUPPOSITORY RECTAL
Qty: 3 EACH | Refills: 6 | Status: SHIPPED | OUTPATIENT
Start: 2023-05-09

## 2023-05-15 DIAGNOSIS — E11.649 TYPE 2 DIABETES MELLITUS WITH HYPOGLYCEMIA WITHOUT COMA, WITH LONG-TERM CURRENT USE OF INSULIN (HCC): ICD-10-CM

## 2023-05-15 DIAGNOSIS — T78.40XS ALLERGY, SEQUELA: ICD-10-CM

## 2023-05-15 DIAGNOSIS — Z79.4 TYPE 2 DIABETES MELLITUS WITH HYPOGLYCEMIA WITHOUT COMA, WITH LONG-TERM CURRENT USE OF INSULIN (HCC): ICD-10-CM

## 2023-05-15 RX ORDER — FEXOFENADINE HCL 180 MG/1
180 TABLET ORAL DAILY
Qty: 90 TABLET | Refills: 3 | Status: SHIPPED | OUTPATIENT
Start: 2023-05-15

## 2023-05-15 RX ORDER — PEN NEEDLE, DIABETIC 32GX 5/32"
NEEDLE, DISPOSABLE MISCELLANEOUS
Qty: 200 EACH | Refills: 3 | Status: SHIPPED | OUTPATIENT
Start: 2023-05-15

## 2023-05-16 DIAGNOSIS — K21.9 GASTROESOPHAGEAL REFLUX DISEASE WITHOUT ESOPHAGITIS: ICD-10-CM

## 2023-05-16 RX ORDER — FAMOTIDINE 20 MG/1
20 TABLET, FILM COATED ORAL 2 TIMES DAILY PRN
Qty: 90 TABLET | Refills: 3 | Status: SHIPPED | OUTPATIENT
Start: 2023-05-16

## 2023-05-19 DIAGNOSIS — E87.6 LOW BLOOD POTASSIUM: ICD-10-CM

## 2023-05-22 DIAGNOSIS — E11.649 TYPE 2 DIABETES MELLITUS WITH HYPOGLYCEMIA WITHOUT COMA, WITH LONG-TERM CURRENT USE OF INSULIN (HCC): ICD-10-CM

## 2023-05-22 DIAGNOSIS — Z79.4 TYPE 2 DIABETES MELLITUS WITH HYPOGLYCEMIA WITHOUT COMA, WITH LONG-TERM CURRENT USE OF INSULIN (HCC): ICD-10-CM

## 2023-05-22 RX ORDER — POTASSIUM CHLORIDE 750 MG/1
20 CAPSULE, EXTENDED RELEASE ORAL 2 TIMES DAILY
Qty: 90 CAPSULE | Refills: 3 | Status: SHIPPED | OUTPATIENT
Start: 2023-05-22

## 2023-05-23 RX ORDER — INSULIN GLARGINE 100 [IU]/ML
INJECTION, SOLUTION SUBCUTANEOUS
Qty: 45 ML | Refills: 3 | Status: SHIPPED | OUTPATIENT
Start: 2023-05-23

## 2023-06-08 DIAGNOSIS — E11.649 TYPE 2 DIABETES MELLITUS WITH HYPOGLYCEMIA WITHOUT COMA, WITH LONG-TERM CURRENT USE OF INSULIN (HCC): ICD-10-CM

## 2023-06-08 DIAGNOSIS — Z79.4 TYPE 2 DIABETES MELLITUS WITH HYPOGLYCEMIA WITHOUT COMA, WITH LONG-TERM CURRENT USE OF INSULIN (HCC): ICD-10-CM

## 2023-06-09 ENCOUNTER — CONSULT (OUTPATIENT)
Age: 53
End: 2023-06-09
Payer: COMMERCIAL

## 2023-06-09 VITALS
HEART RATE: 92 BPM | SYSTOLIC BLOOD PRESSURE: 132 MMHG | HEIGHT: 65 IN | WEIGHT: 161 LBS | BODY MASS INDEX: 26.82 KG/M2 | DIASTOLIC BLOOD PRESSURE: 90 MMHG | OXYGEN SATURATION: 98 %

## 2023-06-09 DIAGNOSIS — D36.7 DERMOID CYST OF RIGHT LOWER EXTREMITY: ICD-10-CM

## 2023-06-09 DIAGNOSIS — R21 RASH: Primary | ICD-10-CM

## 2023-06-09 PROCEDURE — 99214 OFFICE O/P EST MOD 30 MIN: CPT

## 2023-06-09 RX ORDER — TRIAMCINOLONE ACETONIDE 5 MG/G
CREAM TOPICAL 2 TIMES DAILY
Qty: 30 G | Refills: 3 | Status: SHIPPED | OUTPATIENT
Start: 2023-06-09

## 2023-06-09 RX ORDER — GABAPENTIN 400 MG/1
CAPSULE ORAL
Qty: 90 CAPSULE | Refills: 3 | Status: SHIPPED | OUTPATIENT
Start: 2023-06-09

## 2023-06-09 NOTE — PROGRESS NOTES
INTERNAL MEDICINE PRE-OPERATIVE EVALUATION  [facility] PHYSICIAN GROUP - [facility]    NAME: Zak Fan  AGE: 52 y o  SEX: female  : 1970     DATE: 2023     Internal Medicine Pre-Operative Evaluation:     Chief Complaint: Pre-operative Evaluation     Surgery: left eye cataract extraction on 2023 and right eye cataract extraction on 2023    Referring Provider: Bal Dixon MD        History of Present Illness:     Zak Fan is a 52 y o  female who presents to the office today for a preoperative consultation at the request of surgeon, Dr Renetta Chaudhary, who plans on performing left eye cataract extraction on 2023 and right eye cataract extraction on 2023   Planned anesthesia is local and IV sedation  Patient has a bleeding risk of: no recent abnormal bleeding  Patient does not have objections to receiving blood products if needed  Current anti-platelet/anti-coagulation medications that the patient is prescribed includes: Rivaroxaban (Xarelto)        Assessment of Chronic Conditions:   - Diabetes Mellitus: Uncontrolled- follows with endocrinology   - Cerebrovascular Disease: Past CVA   - COPD: stable   - XU with CPAP use     Assessment of Cardiac Risk:  · Denies unstable or severe angina or MI in the last 6 weeks or history of stent placement in the last year   · Denies decompensated heart failure (e g  New onset heart failure, NYHA functional class IV heart failure, or worsening existing heart failure)  · Denies significant arrhythmias such as high grade AV block, symptomatic ventricular arrhythmia, newly recognized ventricular tachycardia, supraventricular tachycardia with resting heart rate >100, or symptomatic bradycardia  · Denies severe heart valve disease including aortic stenosis or symptomatic mitral stenosis     Exercise Capacity:  · Able to walk 4 blocks without symptoms?: Yes  · Able to walk 2 flights without symptoms?: Yes    Prior Anesthesia Reactions: No     Personal history of venous thromboembolic disease? No    History of steroid use for >2 weeks within last year? No    STOP-BANG Sleep Apnea Screening Questionnaire:      Do you SNORE loudly (louder than talking or loud enough to be heard through closed doors)? Yes = 1 point   Do you often feel TIRED, fatigued, or sleepy during daytime? No = 0 point   Has anyone OBSERVED you stop breathing during your sleep? Yes = 1 point   Do you have or are you being treated for high blood pressure? Yes = 1 point   BMI more than 35 kg/m2? No = 0 point   AGE over 48years old? Yes = 1 point   NECK circumference > 16 inches (40 cm)? No = 0 point   Male GENDER? No = 0 point   TOTAL SCORE 4= INTERMEDIATE risk of XU       Review of Systems:     Review of Systems   Constitutional: Negative for appetite change, chills, diaphoresis, fatigue, fever and unexpected weight change  HENT: Negative for postnasal drip and sneezing  Eyes: Negative for visual disturbance  Respiratory: Negative for chest tightness and shortness of breath  Cardiovascular: Negative for chest pain, palpitations and leg swelling  Gastrointestinal: Negative for abdominal pain and blood in stool  Endocrine: Negative for cold intolerance, heat intolerance, polydipsia, polyphagia and polyuria  Genitourinary: Negative for difficulty urinating, dysuria, frequency and urgency  Musculoskeletal: Negative for arthralgias and myalgias  Skin: Negative for rash and wound  Neurological: Negative for dizziness, weakness, light-headedness and headaches  Hematological: Negative for adenopathy  Psychiatric/Behavioral: Negative for confusion, dysphoric mood and sleep disturbance  The patient is not nervous/anxious           Problem List:     Patient Active Problem List   Diagnosis   • Intermittent explosive disorder   • DM (diabetes mellitus), type 2 ([facility] )   • PTSD (post-traumatic stress disorder)   • Anxiety   • Atrial flutter ([place] [address] )   • Bipolar affective ([place] [address] )   • Diabetic neuropathy associated with type 2 diabetes mellitus ([facility])   • IBS (irritable bowel syndrome)   • Mixed hyperlipidemia   • Migraine   • Paroxysmal A-fib ([facility])   • RA (rheumatoid arthritis) ([facility])   • Sick sinus syndrome ([facility])   • Leg swelling   • XU (obstructive sleep apnea)   • Benign essential hypertension   • Asthma   • Bilateral fibrocystic breast disease ([facility])   • Bursitis of left hip   • Cardiac conduction disorder   • Cerebrovascular accident (CVA) ([place] [address] )   • Cervical spondylosis   • Chronic bacterial endocarditis   • Chronic bronchitis ([facility])   • Chronic fatigue   • Chronic idiopathic urticaria   • Chronic pain disorder   • Coronary artery disease involving native coronary artery of native heart without angina pectoris   • COVID-19   • Current every day smoker   • Discogenic low back pain   • Dry eye syndrome, bilateral   • Facet syndrome, lumbar   • Financial difficulties   • Osteoarthritis of spine with radiculopathy, lumbar region   • GERD (gastroesophageal reflux disease)   • Heart disease   • Heart palpitations   • Hemochromatosis   • Lactose intolerance   • Left carpal tunnel syndrome   • Lumbar degenerative disc disease   • Lumbar disc disease with radiculopathy   • Mixed simple and mucopurulent chronic bronchitis ([facility])   • Mixed stress and urge urinary incontinence   • Myofascial pain syndrome   • Chronic, continuous use of opioids   • Overactive bladder   • Paresthesias   • Primary osteoarthritis involving multiple joints   • Psychophysiological insomnia   • Pure hypercholesterolemia   • Seasonal allergic rhinitis due to pollen   • Seborrhea   • Seizures ([place] [address] )   • Treatment-emergent central sleep apnea   • Vaginal atrophy        Allergies:      Allergies   Allergen Reactions   • Other Anaphylaxis     Capzasin HP -- severe burning and swelling of the skin    • Clarithromycin GI Intolerance   • Acetaminophen GI Intolerance   • Bactrim "[Sulfamethoxazole-Trimethoprim] Itching, GI Intolerance, Dizziness, Confusion and Lightheadedness     Patient passes out when on this medication for several days    • Cephalosporins Hives     Tolerated Cefdinir 2/2023   • Latex Hives   • Oxycodone-Acetaminophen GI Intolerance   • Penicillins Diarrhea   • Trazodone Diarrhea   • Capsaicin Rash   • Naproxen Palpitations        Current Medications:       Current Outpatient Medications:   •  albuterol (PROVENTIL HFA,VENTOLIN HFA) 90 mcg/act inhaler, Inhale 2 puffs every 4 (four) hours as needed, Disp: , Rfl:   •  ALPRAZolam (XANAX) 1 mg tablet, Take 1 tablet (1 mg total) by mouth 4 (four) times a day as needed for anxiety, Disp: 60 tablet, Rfl: 0  •  Alcohol Swabs (Pharmacist Choice Alcohol) PADS, 5 (five) times a day, Disp: , Rfl:   •  ARIPiprazole (ABILIFY) 10 mg tablet, Take 1 tablet (10 mg total) by mouth daily, Disp: 90 tablet, Rfl: 3  •  ascorbic acid (VITAMIN C) 250 MG tablet, Take 250 mg by mouth daily, Disp: , Rfl:   •  atorvastatin (LIPITOR) 10 mg tablet, Take 10 mg by mouth daily, Disp: , Rfl:   •  BD Pen Needle Nkechi 2nd Gen 32G X 4 MM MISC, USE AS DIRECTED FOR BEFORE A MEAL AND AT BEDTIME GLUCOSE INJECTION, Disp: , Rfl:   •  BD Pen Needle Nkechi 2nd Gen 32G X 4 MM MISC, USE WITH INSULIN 5 TIMES A DAY, Disp: 200 each, Rfl: 3  •  BD PrecisionGlide Needle 23G X 1-1/2\" MISC, USE AS DIRECTED TO ADMINISTER NALOXONE INJECTION    MAY DISPENSE 23-25 GAUGE 1-1 5\" NEEDLE , Disp: , Rfl:   •  Blood Glucose Monitoring Suppl (ONE TOUCH ULTRA 2) w/Device KIT, Use as directed, Disp: , Rfl:   •  busPIRone (BUSPAR) 30 MG tablet, Take 30 mg by mouth 2 (two) times a day, Disp: , Rfl:   •  Cholecalciferol (Vitamin D3) 25 MCG (1000 UT) CAPS, Take 2 capsules by mouth daily (Patient not taking: Reported on 4/25/2023), Disp: , Rfl:   •  Cholecalciferol 125 MCG (5000 UT) TABS, every 24 hours (Patient not taking: Reported on 3/1/2023), Disp: , Rfl:   •  Continuous Blood Gluc  " (Dexcom G6 ) ALEKS, Use 1 Device 4 (four) times a day (with meals and at bedtime), Disp: 1 each, Rfl: 6  •  Continuous Blood Gluc Sensor (Dexcom G6 Sensor) MISC, CHANGE SENSOR EVERY 10 DAYS, Disp: 3 each, Rfl: 6  •  Continuous Blood Gluc Transmit (Dexcom G6 Transmitter) MISC, USE 4 TIMES A DAY (WITH MEALS AND AT BEDTIME), Disp: , Rfl:   •  cyclobenzaprine (FLEXERIL) 5 mg tablet, TAKE 1 TABLET (5 MG TOTAL) BY MOUTH 3 (THREE) TIMES A DAY AS NEEDED FOR MUSCLE SPASMS (Patient not taking: Reported on 1/10/2023), Disp: 90 tablet, Rfl: 0  •  Diclofenac Sodium (VOLTAREN) 1 %, Apply 2 g topically 4 (four) times a day (Patient not taking: Reported on 3/1/2023), Disp: 150 g, Rfl: 1  •  EPINEPHrine (EPIPEN) 0 3 mg/0 3 mL SOAJ, INJECT 0 3 ML (0 3 MG TOTAL) INTO A MUSCLE ONCE FOR 1 DOSE (Patient not taking: Reported on 4/25/2023), Disp: 2 mL, Rfl: 0  •  famotidine (PEPCID) 20 mg tablet, TAKE 1 TABLET (20 MG TOTAL) BY MOUTH 2 (TWO) TIMES A DAY AS NEEDED FOR INDIGESTION, Disp: 90 tablet, Rfl: 3  •  fexofenadine (ALLEGRA) 180 MG tablet, TAKE 1 TABLET (180 MG TOTAL) BY MOUTH DAILY, Disp: 90 tablet, Rfl: 3  •  Flovent Diskus 250 MCG/ACT diskus inhaler, INHALE 1 PUFF 2 (TWO) TIMES A DAY, Disp: 60 each, Rfl: 1  •  fluconazole (DIFLUCAN) 150 mg tablet, Take 150 mg by mouth once (Patient not taking: Reported on 3/1/2023), Disp: , Rfl:   •  gabapentin (NEURONTIN) 100 mg capsule, TAKE 1 CAPSULE (100 MG TOTAL) BY MOUTH DAILY AT BEDTIME TAKE ADDITIONAL 100 MG CAPSULE TO TOTAL 900 MG AT BEDTIME, Disp: 30 capsule, Rfl: 3  •  gabapentin (NEURONTIN) 400 mg capsule, TAKE 2 CAPSULES BY MOUTH 3 TIMES A DAY, Disp: 90 capsule, Rfl: 3  •  hydrALAZINE (APRESOLINE) 50 mg tablet, Take 50 mg by mouth 3 (three) times a day, Disp: , Rfl:   •  ibuprofen (MOTRIN) 600 mg tablet, Take 1 tablet (600 mg total) by mouth every 6 (six) hours as needed for mild pain (Patient not taking: Reported on 4/25/2023), Disp: 30 tablet, Rfl: 0  •  Incontinence Supply Disposable (Depend Undergarment Ex Absorb) MISC, Use 4 (four) times a day as needed (incontinence), Disp: 120 each, Rfl: 10  •  insulin lispro (HumaLOG) 100 units/mL injection pen, INJECT 20 UNITS UNDER THE SKIN 3 (THREE) TIMES A DAY BEFORE MEALS INDICATIONS: TYPE 2 DIABETES MELLITUS , Disp: 15 mL, Rfl: 3  •  Lantus SoloStar 100 units/mL SOPN, INJECT 55 UNITS UNDER THE SKIN 2 (TWO) TIMES A DAY AT LUNCH AND AT BEDTIME , Disp: 45 mL, Rfl: 3  •  lisinopril (ZESTRIL) 5 mg tablet, Take 5 mg by mouth daily, Disp: , Rfl:   •  lovastatin (MEVACOR) 10 MG tablet, Take 10 mg by mouth, Disp: , Rfl:   •  meclizine (ANTIVERT) 25 mg tablet, Take 25 mg by mouth Three times daily as needed, Disp: , Rfl:   •  metoclopramide (REGLAN) 10 mg tablet, Take 1 tablet (10 mg total) by mouth 4 (four) times a day (Patient not taking: Reported on 4/25/2023), Disp: 30 tablet, Rfl: 0  •  metoprolol succinate (TOPROL-XL) 100 mg 24 hr tablet, Take 100 mg by mouth daily, Disp: , Rfl:   •  montelukast (SINGULAIR) 10 mg tablet, TAKE 1 TABLET (10 MG TOTAL) BY MOUTH DAILY AT BEDTIME, Disp: 90 tablet, Rfl: 3  •  Multiple Vitamins-Minerals (Centrum Silver 50+Women) TABS, Take 1 tablet by mouth daily, Disp: , Rfl:   •  naloxone (NARCAN) 0 4 mg/mL injection, INJECT 1 ML IN SHOULDER OR THIGH   REPEAT AFTER 2-3 MINUTES IF NO OR MINIMAL RESPONSE , Disp: , Rfl:   •  nitrofurantoin (MACROBID) 100 mg capsule, Take 1 capsule (100 mg total) by mouth 2 (two) times a day, Disp: 10 capsule, Rfl: 0  •  Nutritional Supplements (Glucerna Hunger Smart Shake) LIQD, Take 1 Can by mouth 3 (three) times a day, Disp: 296 mL, Rfl: 5  •  nystatin (MYCOSTATIN) cream, Apply topically 2 (two) times a day, Disp: , Rfl:   •  nystatin (MYCOSTATIN) ointment, Apply topically 2 (two) times a day, Disp: 30 g, Rfl: 6  •  Olopatadine HCl (Pataday) 0 7 % SOLN, 1 drop each eye twice a day, Disp: 2 5 mL, Rfl: 3  •  OneTouch Delica Lancets 87S MISC, 2 (two) times a day, Disp: , Rfl:   • OneTouch Ultra test strip, TEST TWICE DAILY OR AS DIRECTED BY MD, Disp: , Rfl:   •  pantoprazole (PROTONIX) 40 mg tablet, TAKE 1 TABLET (40 MG TOTAL) BY MOUTH DAILY, Disp: 90 tablet, Rfl: 3  •  potassium chloride (K-DUR,KLOR-CON) 20 mEq tablet, Take 1 tablet (20 mEq total) by mouth daily, Disp: 30 tablet, Rfl: 1  •  potassium chloride (MICRO-K) 10 MEQ CR capsule, TAKE 2 CAPSULES (20 MEQ TOTAL) BY MOUTH 2 (TWO) TIMES A DAY, Disp: 90 capsule, Rfl: 3  •  Prasterone (Intrarosa) 6 5 MG INST, Insert 6 5 mg into the vagina daily, Disp: 28 each, Rfl: 6  •  prazosin (MINIPRESS) 5 mg capsule, TAKE 1 CAPSULE (5 MG TOTAL) BY MOUTH DAILY AT BEDTIME, Disp: 30 capsule, Rfl: 3  •  predniSONE 10 mg tablet, 1 tablet daily, start this medication after prednisone taper is completed (Patient not taking: Reported on 3/1/2023), Disp: 30 tablet, Rfl: 0  •  risperiDONE (RisperDAL) 3 mg tablet, Take 3 mg by mouth 2 (two) times a day, Disp: , Rfl:   •  rivaroxaban (XARELTO) 20 mg tablet, Take 20 mg by mouth, Disp: , Rfl:   •  senna-docusate sodium (SENOKOT S) 8 6-50 mg per tablet, Take 1 tablet by mouth daily (Patient not taking: Reported on 1/10/2023), Disp: , Rfl:   •  sertraline (ZOLOFT) 100 mg tablet, TAKE 1 TABLET (100 MG TOTAL) BY MOUTH DAILY, Disp: 90 tablet, Rfl: 3  •  Spacer/Aero-Holding Chambers (AeroChamber Plus Geoff-Vu w/Mask) MISC, 1 EACH (1 APPLICATION TOTAL) BY MISCELLANEOUS ROUTE DAILY AS NEEDED (ASTHMA EXACERBATION)  , Disp: , Rfl:   •  SUMAtriptan (IMITREX) 100 mg tablet, TAKE 1 TABLET (100 MG TOTAL) BY MOUTH ONE TIME AS NEEDED FOR MIGRAINE  MAY REPEAT IN 2 HOURS IF UNRESOLVED  DO NOT EXCEED 200 MG IN 24 HOURS (Patient not taking: Reported on 3/1/2023), Disp: , Rfl:   •  Syringe, Disposable, (2-3CC SYRINGE) 3 ML MISC, USE AS DIRECTED TO ADMINISTER NALOXONE INJECTION   (Patient not taking: Reported on 3/1/2023), Disp: , Rfl:   •  triamcinolone (KENALOG) 0 1 % cream, Apply 1 application topically 2 (two) times a day (Patient not taking: Reported on 3/1/2023), Disp: 30 g, Rfl: 1  •  triamcinolone (KENALOG) 0 5 % cream, , Disp: , Rfl:   •  trospium chloride (SANCTURA) 20 mg tablet, Take 1 tablet (20 mg total) by mouth 2 (two) times a day, Disp: 60 tablet, Rfl: 2  •  Trulicity 1 5 TP/7 2BN injection, INJECT 1 5 MG UNDER THE SKIN EVERY 7 DAYS , Disp: 2 mL, Rfl: 3  •  zolpidem (AMBIEN) 10 mg tablet, TAKE 1 TABLET (10 MG TOTAL) BY MOUTH DAILY, Disp: 90 tablet, Rfl: 1     Past History:     Past Medical History:   Diagnosis Date   • Abnormal ECG    • Abnormal Pap smear of cervix    • Asthma    • Atrial fibrillation (HCC)    • Chlamydia    • Chronic kidney disease    • Coronary artery disease    • Depression    • Diabetes mellitus ([facility] )    • Fibromyalgia    • Heart disease    • History of transfusion    • Hypertension    • Migraines    • Myocardial infarction ([facility] )    • Neuromuscular disorder ([facility] )    • Opioid abuse, in remission ([facility] )    • Sick sinus syndrome ([facility] )    • Stroke ([facility] )    • Varicella         Past Surgical History:   Procedure Laterality Date   • APPENDECTOMY     •  SECTION          Family History   Problem Relation Age of Onset   • Asthma Mother    • Cancer Mother    • Diabetes Mother    • Heart disease Mother    • Hypertension Mother    • Stroke Mother    • Asthma Father    • Diabetes Father    • Heart disease Father    • Hypertension Father    • Cancer Maternal Grandfather    • Heart disease Maternal Grandfather    • Cancer Paternal Grandmother    • Diabetes Paternal Grandmother    • Asthma Brother    • Diabetes Brother    • Heart disease Brother    • Hypertension Brother    • Hypertension Brother         Social History     Socioeconomic History   • Marital status: Single     Spouse name: Not on file   • Number of children: Not on file   • Years of education: Not on file   • Highest education level: Not on file   Occupational History   • Not on file   Tobacco Use   • Smoking status: Every Day     Packs/day: 1 00     Years: "25 00     Total pack years: 25 00     Types: Cigarettes     Start date: 1/1/1992   • Smokeless tobacco: Never   • Tobacco comments:     Patient stated that she is not ready to quit smoking    Vaping Use   • Vaping Use: Never used   Substance and Sexual Activity   • Alcohol use: Not Currently   • Drug use: Never   • Sexual activity: Not Currently     Partners: Male     Birth control/protection: Abstinence, Surgical, Female Sterilization   Other Topics Concern   • Not on file   Social History Narrative   • Not on file     Social Determinants of Health     Financial Resource Strain: Not on file   Food Insecurity: Not on file   Transportation Needs: Not on file   Physical Activity: Not on file   Stress: Not on file   Social Connections: Not on file   Intimate Partner Violence: Not on file   Housing Stability: Not on file        Physical Exam:      /90 (BP Location: Left arm)   Pulse 92   Ht 5' 5\" (1 651 m)   Wt 73 kg (161 lb)   SpO2 98%   BMI 26 79 kg/m²     Physical Exam  Vitals reviewed  Constitutional:       General: She is not in acute distress  Appearance: She is well-developed  She is not diaphoretic  HENT:      Head: Normocephalic and atraumatic  Eyes:      General: No scleral icterus  Right eye: No discharge  Left eye: No discharge  Conjunctiva/sclera: Conjunctivae normal       Pupils: Pupils are equal, round, and reactive to light  Neck:      Thyroid: No thyromegaly  Vascular: No JVD  Trachea: No tracheal deviation  Cardiovascular:      Rate and Rhythm: Normal rate and regular rhythm  Heart sounds: Normal heart sounds  No murmur heard  No friction rub  No gallop  Pulmonary:      Effort: Pulmonary effort is normal  No respiratory distress  Breath sounds: Normal breath sounds  No stridor  No wheezing or rales  Chest:      Chest wall: No tenderness  Abdominal:      General: Bowel sounds are normal  There is no distension        Palpations: " Abdomen is soft  There is no mass  Tenderness: There is no abdominal tenderness  There is no guarding or rebound  Musculoskeletal:         General: No tenderness or deformity  Normal range of motion  Cervical back: Normal range of motion and neck supple  Lymphadenopathy:      Cervical: No cervical adenopathy  Skin:     General: Skin is warm and dry  Coloration: Skin is not pale  Findings: No erythema or rash  Neurological:      Mental Status: She is alert and oriented to person, place, and time  Cranial Nerves: No cranial nerve deficit  Motor: No abnormal muscle tone  Coordination: Coordination normal    Psychiatric:         Behavior: Behavior normal            Data:     Pre-operative work-up    Laboratory Results: I have personally reviewed the pertinent laboratory results/reports        Assessment:     No diagnosis found  Plan:     52 y o  female with planned surgery: BL cataract extraction    Cardiac Risk Estimation: per the Revised Cardiac Risk Index (Circ  [id number], 1999), the patient's risk factors for cardiac complications include history of cerebrovascular disease, putting her in: RCI RISK CLASS II (1 risk factor, risk of major cardiac compl  appr  1 3%)  1  Further preoperative workup as follows:   - None; no further preoperative work-up is required    2  Medication Management/Recommendations:   - Patient has been instructed to avoid herbs or non-directed vitamins the week prior to surgery to ensure no drug interactions with perioperative surgical and anesthetic medications  - Patient has been instructed to avoid aspirin containing medications or non-steroidal anti-inflammatory drugs for the week preceding surgery   - Hold Xarelto as instructed by cardiology    3  Prophylaxis for cardiac events with perioperative beta-blockers: not indicated      4  Patient requires further consultation with: None  Previously cleared by cardiology    Clearance  Patient is CLEARED for surgery without any additional cardiac testing       CRISTINO Li  [facility] [place]  [address] [address]  Phone#  [phone]  Fax#  [phone]

## 2023-06-13 DIAGNOSIS — R32 URINARY INCONTINENCE, UNSPECIFIED TYPE: ICD-10-CM

## 2023-06-15 ENCOUNTER — TELEPHONE (OUTPATIENT)
Age: 53
End: 2023-06-15

## 2023-06-15 RX ORDER — TROSPIUM CHLORIDE 20 MG/1
20 TABLET, FILM COATED ORAL 2 TIMES DAILY
Qty: 60 TABLET | Refills: 2 | Status: SHIPPED | OUTPATIENT
Start: 2023-06-15 | End: 2023-09-13

## 2023-06-15 NOTE — TELEPHONE ENCOUNTER
Received request for medical records from [facility] on 6/15/2023 to [facility] phone [phone]

## 2023-06-19 DIAGNOSIS — Z79.4 TYPE 2 DIABETES MELLITUS WITH HYPOGLYCEMIA WITHOUT COMA, WITH LONG-TERM CURRENT USE OF INSULIN (HCC): ICD-10-CM

## 2023-06-19 DIAGNOSIS — E11.649 TYPE 2 DIABETES MELLITUS WITH HYPOGLYCEMIA WITHOUT COMA, WITH LONG-TERM CURRENT USE OF INSULIN (HCC): ICD-10-CM

## 2023-06-19 RX ORDER — INSULIN LISPRO 100 [IU]/ML
INJECTION, SOLUTION INTRAVENOUS; SUBCUTANEOUS
Qty: 15 ML | Refills: 3 | Status: SHIPPED | OUTPATIENT
Start: 2023-06-19

## 2023-06-20 ENCOUNTER — HOSPITAL ENCOUNTER (OUTPATIENT)
Dept: NEUROLOGY | Facility: CLINIC | Age: 53
Discharge: HOME | End: 2023-06-20
Payer: COMMERCIAL

## 2023-06-20 DIAGNOSIS — R20.2 NUMBNESS AND TINGLING OF BOTH LOWER EXTREMITIES: ICD-10-CM

## 2023-06-20 DIAGNOSIS — R20.0 NUMBNESS AND TINGLING OF BOTH LOWER EXTREMITIES: ICD-10-CM

## 2023-06-20 PROCEDURE — 95886 MUSC TEST DONE W/N TEST COMP: CPT | Performed by: PSYCHIATRY & NEUROLOGY

## 2023-06-20 PROCEDURE — 95911 NRV CNDJ TEST 9-10 STUDIES: CPT | Performed by: PSYCHIATRY & NEUROLOGY

## 2023-06-27 ENCOUNTER — TELEPHONE (OUTPATIENT)
Dept: UROLOGY | Facility: MEDICAL CENTER | Age: 53
End: 2023-06-27

## 2023-06-27 ENCOUNTER — CONSULT (OUTPATIENT)
Dept: SURGERY | Facility: CLINIC | Age: 53
End: 2023-06-27
Payer: COMMERCIAL

## 2023-06-27 VITALS
WEIGHT: 161 LBS | DIASTOLIC BLOOD PRESSURE: 90 MMHG | BODY MASS INDEX: 26.82 KG/M2 | SYSTOLIC BLOOD PRESSURE: 130 MMHG | HEART RATE: 92 BPM | HEIGHT: 65 IN

## 2023-06-27 DIAGNOSIS — R22.41 MASS OF RIGHT HIP REGION: ICD-10-CM

## 2023-06-27 PROCEDURE — 99242 OFF/OP CONSLTJ NEW/EST SF 20: CPT | Performed by: SURGERY

## 2023-06-27 RX ORDER — LEFLUNOMIDE 20 MG/1
20 TABLET ORAL DAILY
COMMUNITY
Start: 2023-06-26

## 2023-06-27 NOTE — PROGRESS NOTES
"Assessment/Plan:     Diagnoses and all orders for this visit:    Mass of right hip region  -     Ambulatory Referral to General Surgery    Other orders  -     leflunomide (ARAVA) 20 MG tablet; Take 20 mg by mouth daily      Patient Is being scheduled for excision of 2 subcutaneous masses on the right hip region  This will be done under local anesthesia  Patient will hold her Xarelto for 1 day prior to surgery  Subjective:      Patient ID: Valentin Palacios is a 52 y o  female  HPI   Patient presents to the office complaining of painful masses in the right hip region  She also complains of a mass in the xiphoid area however this is the bony prominence of the inferior end of the xiphoid and not a real mass  The following portions of the patient's history were reviewed and updated as appropriate: allergies, current medications, past family history, past medical history, past social history, past surgical history, and problem list     Review of Systems    Essential hypertension  Atrial fibrillation, on Xarelto  Insulin-dependent diabetes mellitus  Sleep apnea    Patient is undergoing treatment for cataracts  She underwent surgery on her left eye and will be having surgery on the right eye next week    Objective:    /90   Pulse 92   Ht 5' 5\" (1 651 m)   Wt 73 kg (161 lb)   BMI 26 79 kg/m²      Physical Exam    2 subcutaneous masses in the right hip region each measuring 2 cm  Clinically these feel like lipomas    "

## 2023-06-27 NOTE — TELEPHONE ENCOUNTER
[employer] - [facility]OME[facility] (verified) - NO prior auth required   [facility] 6/27/23

## 2023-06-29 DIAGNOSIS — Z79.4 TYPE 2 DIABETES MELLITUS WITH HYPOGLYCEMIA WITHOUT COMA, WITH LONG-TERM CURRENT USE OF INSULIN (HCC): ICD-10-CM

## 2023-06-29 DIAGNOSIS — E11.649 TYPE 2 DIABETES MELLITUS WITH HYPOGLYCEMIA WITHOUT COMA, WITH LONG-TERM CURRENT USE OF INSULIN (HCC): ICD-10-CM

## 2023-06-29 RX ORDER — DULAGLUTIDE 1.5 MG/.5ML
INJECTION, SOLUTION SUBCUTANEOUS
Qty: 2 ML | Refills: 3 | Status: SHIPPED | OUTPATIENT
Start: 2023-06-29

## 2023-06-30 ENCOUNTER — TELEPHONE (OUTPATIENT)
Age: 53
End: 2023-06-30

## 2023-07-07 DIAGNOSIS — B37.31 CANDIDA VAGINITIS: Primary | ICD-10-CM

## 2023-07-07 RX ORDER — FLUCONAZOLE 150 MG/1
150 TABLET ORAL ONCE
Qty: 1 TABLET | Refills: 0 | Status: SHIPPED | OUTPATIENT
Start: 2023-07-07 | End: 2023-07-07

## 2023-07-11 ENCOUNTER — TELEPHONE (OUTPATIENT)
Dept: NEUROLOGY | Facility: CLINIC | Age: 53
End: 2023-07-11

## 2023-07-11 ENCOUNTER — TELEPHONE (OUTPATIENT)
Dept: OBGYN CLINIC | Facility: HOSPITAL | Age: 53
End: 2023-07-11

## 2023-07-11 NOTE — TELEPHONE ENCOUNTER
Patient calling to schedule new patient appointment for neuropathy of legs and feet. Some testing done. Triage intake sent.

## 2023-07-13 ENCOUNTER — PROCEDURE VISIT (OUTPATIENT)
Dept: SURGERY | Facility: CLINIC | Age: 53
End: 2023-07-13

## 2023-07-13 ENCOUNTER — TELEPHONE (OUTPATIENT)
Dept: OBGYN CLINIC | Facility: CLINIC | Age: 53
End: 2023-07-13

## 2023-07-13 VITALS
DIASTOLIC BLOOD PRESSURE: 100 MMHG | SYSTOLIC BLOOD PRESSURE: 140 MMHG | HEIGHT: 65 IN | WEIGHT: 161 LBS | BODY MASS INDEX: 26.82 KG/M2 | HEART RATE: 120 BPM

## 2023-07-13 DIAGNOSIS — E11.44 DIABETIC AMYOTROPHY ASSOCIATED WITH TYPE 2 DIABETES MELLITUS (HCC): ICD-10-CM

## 2023-07-13 DIAGNOSIS — R22.41 MASS OF HIP REGION, RIGHT: Primary | ICD-10-CM

## 2023-07-13 DIAGNOSIS — R22.41 MASS OF HIP REGION, RIGHT: ICD-10-CM

## 2023-07-13 PROBLEM — M79.89 LEG SWELLING: Status: RESOLVED | Noted: 2022-09-20 | Resolved: 2023-07-13

## 2023-07-13 PROCEDURE — 88305 TISSUE EXAM BY PATHOLOGIST: CPT | Performed by: PATHOLOGY

## 2023-07-13 RX ORDER — OFLOXACIN 3 MG/ML
SOLUTION/ DROPS OPHTHALMIC
COMMUNITY
Start: 2023-07-06

## 2023-07-13 RX ORDER — TIZANIDINE HYDROCHLORIDE 2 MG/1
CAPSULE, GELATIN COATED ORAL
COMMUNITY
Start: 2023-06-20

## 2023-07-13 RX ORDER — GABAPENTIN 100 MG/1
100 CAPSULE ORAL
Qty: 30 CAPSULE | Refills: 3 | Status: SHIPPED | OUTPATIENT
Start: 2023-07-13

## 2023-07-13 RX ORDER — OXYCODONE HYDROCHLORIDE 5 MG/1
TABLET ORAL
COMMUNITY
Start: 2023-07-07

## 2023-07-13 NOTE — PROGRESS NOTES
Procedures     Preoperative diagnosis:  Masses right hip region, size 6 cm    Postop diagnosis:  Deep masses right hip region, size 8 cm in aggregate    Procedure:  Excision of deep subfascial masses from right hip region, 2 separate incisions    Written consent obtained from the patient    Local anesthesia used is 30 mils of 1% lidocaine with sodium bicarbonate    Skin prepped with Betadine    These masses were deep subfascial in location lying on the hip joint. There appeared to be cystic. Specimens were sent to pathology. Hemostasis was obtained with interrupted 4-0 Vicryl sutures. Skin incisions were closed with interrupted vertical mattress 3-0 nylon sutures.     Dressings were applied    Patient tolerated procedure well

## 2023-07-13 NOTE — TELEPHONE ENCOUNTER
I called and left the patient a message regarding the request for transportation to her appointment tomorrow with Dr. Ernestina Harada. I asked her to call me back with a physical address so that I can arrange her ride. I called the patient's daughter, José Masters, to get an address and she advised me to call her mother's aide Marilee Mckeon at [phone]. I left a message for the aide asking her to call me back at [phone].

## 2023-07-14 ENCOUNTER — OFFICE VISIT (OUTPATIENT)
Dept: OBGYN CLINIC | Facility: CLINIC | Age: 53
End: 2023-07-14
Payer: COMMERCIAL

## 2023-07-14 VITALS
HEIGHT: 65 IN | HEART RATE: 105 BPM | SYSTOLIC BLOOD PRESSURE: 119 MMHG | DIASTOLIC BLOOD PRESSURE: 84 MMHG | BODY MASS INDEX: 26.82 KG/M2 | WEIGHT: 161 LBS

## 2023-07-14 DIAGNOSIS — G57.93 NEUROPATHY INVOLVING BOTH LOWER EXTREMITIES: Primary | ICD-10-CM

## 2023-07-14 PROCEDURE — 99213 OFFICE O/P EST LOW 20 MIN: CPT | Performed by: FAMILY MEDICINE

## 2023-07-14 RX ORDER — LIDOCAINE 50 MG/G
1 PATCH TOPICAL DAILY
Qty: 60 PATCH | Refills: 0 | Status: SHIPPED | OUTPATIENT
Start: 2023-07-14

## 2023-07-14 NOTE — PROGRESS NOTES
Assessment/Plan:  Assessment/Plan   Diagnoses and all orders for this visit:    Neuropathy involving both lower extremities  -     Ambulatory Referral to Neurology; Future  -     lidocaine (Lidoderm) 5 %; Apply 1 patch topically over 12 hours daily Remove & Discard patch within 12 hours or as directed by MD        49-year-old female with numbness and tingling both lower extremities many years duration. Discussed with patient EMG findings, impression, and plan. EMG noted for evidence of sensorimotor polyneuropathy with axonal features and a secondary demyelinating response, without evidence of lumbar radiculopathy or plexopathy. Clinical impression is that she has symptoms combination of peripheral neuropathy, however radiculopathy is not excluded as she has known history of cervical and lumbar spine pathology. She is currently on gabapentin and muscle relaxer. Advised patient that neuropathy may be due to diabetes however other causes are still possible. I will refer to neurology for further evaluation and recommended treatment. She is to follow-up with her pain and provider regarding MRIs that were ordered. In the interim she may apply topical lidocaine patches. She will follow-up with me as needed. Subjective:   Patient ID: Tomas Mohr is a 52 y.o. female. Chief Complaint   Patient presents with   • Left Lower Leg - Pain   • Right Lower Leg - Pain   • Neck - Pain       49-year-old female following for numbness and tingling both lower extremities many years duration. She was last seen by me in this regard 10 months ago at which point she was referred for EMG of both lower extremities. She denies change in symptoms since her last visit. She has pain described generalized to the feet, constant, burning, associated numbness and tingling and sensation of heaviness, worse with bearing weight and ambulating, and improved with resting.   Since last visit she was seen by her pain management specialist and referred for cervical and lumbar spine MRIs. She has been on regimen of gabapentin 800 mg twice today and 900 mg at night. Leg Pain  This is a chronic problem. The current episode started more than 1 year ago. The problem occurs daily. The problem has been unchanged. Associated symptoms include arthralgias, numbness and weakness. Pertinent negatives include no joint swelling. The symptoms are aggravated by standing and walking. She has tried rest, position changes and oral narcotics (Gabapentin, muscle relaxer) for the symptoms. The treatment provided mild relief. Review of Systems   Musculoskeletal: Positive for arthralgias. Negative for joint swelling. Neurological: Positive for weakness and numbness. Objective:  Vitals:    07/14/23 0936   BP: 119/84   Pulse: 105   Weight: 73 kg (161 lb)   Height: 5' 5" (1.651 m)     Ortho Exam    Physical Exam  Vitals and nursing note reviewed. Constitutional:       General: She is not in acute distress. Appearance: She is well-developed. She is not ill-appearing or diaphoretic. HENT:      Head: Normocephalic and atraumatic. Right Ear: External ear normal.      Left Ear: External ear normal.   Eyes:      Conjunctiva/sclera: Conjunctivae normal.   Neck:      Trachea: No tracheal deviation. Cardiovascular:      Rate and Rhythm: Normal rate. Pulmonary:      Effort: Pulmonary effort is normal. No respiratory distress. Abdominal:      General: There is no distension. Skin:     General: Skin is warm and dry. Coloration: Skin is not jaundiced or pale. Neurological:      Mental Status: She is alert and oriented to person, place, and time. Psychiatric:         Mood and Affect: Mood normal.         Behavior: Behavior normal.         Thought Content:  Thought content normal.         Judgment: Judgment normal.

## 2023-07-14 NOTE — TELEPHONE ENCOUNTER
Caller: Dahlia    Doctor: Aravind Gonzalez    Reason for call: Requesting to have form filled out so patient can have transportation to appts. Per patient, form has been sent to the office.     Call back#: [phone]
Caller: Patient    Doctor: Wan Pickens    Reason for call: Patient asked for status of transportation form faxed to Str office. Called spoke w/Frankie advised form was received today. Dr Wan Pickens will complete when back in office. Patient advised she has no transportation for appt 7/13.  Asked if appt could be virtual?    Call back#: [phone]
Lmom for pt relaying msg
Patient is requesting status on the transportation form that was faxed to us. She is requesting a call back.
None

## 2023-07-18 DIAGNOSIS — K21.9 GASTROESOPHAGEAL REFLUX DISEASE WITHOUT ESOPHAGITIS: ICD-10-CM

## 2023-07-18 DIAGNOSIS — E11.649 TYPE 2 DIABETES MELLITUS WITH HYPOGLYCEMIA WITHOUT COMA, WITH LONG-TERM CURRENT USE OF INSULIN (HCC): ICD-10-CM

## 2023-07-18 DIAGNOSIS — Z79.4 TYPE 2 DIABETES MELLITUS WITH HYPOGLYCEMIA WITHOUT COMA, WITH LONG-TERM CURRENT USE OF INSULIN (HCC): ICD-10-CM

## 2023-07-18 PROCEDURE — 88305 TISSUE EXAM BY PATHOLOGIST: CPT | Performed by: PATHOLOGY

## 2023-07-18 RX ORDER — FAMOTIDINE 20 MG/1
20 TABLET, FILM COATED ORAL 2 TIMES DAILY PRN
Qty: 90 TABLET | Refills: 3 | Status: SHIPPED | OUTPATIENT
Start: 2023-07-18

## 2023-07-18 RX ORDER — GABAPENTIN 400 MG/1
CAPSULE ORAL
Qty: 90 CAPSULE | Refills: 3 | Status: SHIPPED | OUTPATIENT
Start: 2023-07-18

## 2023-07-27 ENCOUNTER — OFFICE VISIT (OUTPATIENT)
Dept: SURGERY | Facility: CLINIC | Age: 53
End: 2023-07-27

## 2023-07-27 VITALS
WEIGHT: 161 LBS | HEIGHT: 65 IN | SYSTOLIC BLOOD PRESSURE: 122 MMHG | DIASTOLIC BLOOD PRESSURE: 86 MMHG | HEART RATE: 88 BPM | BODY MASS INDEX: 26.82 KG/M2

## 2023-07-27 DIAGNOSIS — R22.41 MASS OF HIP REGION, RIGHT: Primary | ICD-10-CM

## 2023-07-27 PROCEDURE — 99024 POSTOP FOLLOW-UP VISIT: CPT | Performed by: SURGERY

## 2023-07-27 NOTE — PROGRESS NOTES
Assessment/Plan:     Diagnoses and all orders for this visit:    Mass of hip region, right      Satisfactory postoperative recovery    Pathology report reviewed    Discharge and see as needed    Subjective:      Patient ID: Roland Espinoza is a 52 y.o. female. HPI   Patient is 2 weeks status post excision of deep lipomatous masses from the right hip region. Patient has recovered well from the surgery and has no complaints. The following portions of the patient's history were reviewed and updated as appropriate: allergies, current medications, past family history, past medical history, past social history, past surgical history, and problem list.    Review of Systems    Noncontributory    Objective:    /86   Pulse 88   Ht 5' 5" (1.651 m)   Wt 73 kg (161 lb)   BMI 26.79 kg/m²      Physical Exam    The incisions on the right hip region are well-healed. There is a residual swelling under the upper incision which may be due to a small hematoma. There is no evidence of infection. All sutures removed.

## 2023-08-01 DIAGNOSIS — E87.6 LOW BLOOD POTASSIUM: ICD-10-CM

## 2023-08-01 RX ORDER — POTASSIUM CHLORIDE 750 MG/1
20 CAPSULE, EXTENDED RELEASE ORAL 2 TIMES DAILY
Qty: 90 CAPSULE | Refills: 3 | Status: SHIPPED | OUTPATIENT
Start: 2023-08-01

## 2023-08-08 ENCOUNTER — OFFICE VISIT (OUTPATIENT)
Age: 53
End: 2023-08-08
Payer: COMMERCIAL

## 2023-08-08 ENCOUNTER — TELEPHONE (OUTPATIENT)
Age: 53
End: 2023-08-08

## 2023-08-08 VITALS
HEART RATE: 100 BPM | TEMPERATURE: 95.4 F | BODY MASS INDEX: 26.79 KG/M2 | RESPIRATION RATE: 18 BRPM | SYSTOLIC BLOOD PRESSURE: 130 MMHG | DIASTOLIC BLOOD PRESSURE: 90 MMHG | OXYGEN SATURATION: 96 % | HEIGHT: 65 IN | WEIGHT: 160.8 LBS

## 2023-08-08 DIAGNOSIS — Z01.818 PREOP EXAMINATION: ICD-10-CM

## 2023-08-08 DIAGNOSIS — R26.9 ABNORMALITY OF GAIT AND MOBILITY: Primary | ICD-10-CM

## 2023-08-08 PROCEDURE — 99242 OFF/OP CONSLTJ NEW/EST SF 20: CPT

## 2023-08-08 NOTE — PROGRESS NOTES
INTERNAL MEDICINE PRE-OPERATIVE EVALUATION  [facility] - [facility] [place]    NAME: Denisha Faith  AGE: 52 y.o. SEX: female  : 1970     DATE: 2023     Internal Medicine Pre-Operative Evaluation:     Chief Complaint: Pre-operative Evaluation     Surgery: cataract removal R eye. Anticipated Date of Surgery: 2023  Referring Provider: CRISTINO Levine        History of Present Illness:     Denisha Faith is a 52 y.o. female who presents to the office today for a preoperative consultation at the request of surgeon, Karyn Smart, who plans on performing cataract surgery on the right eye on 2023. Planned anesthesia is local and IV sedation. Patient has a bleeding risk of: no recent abnormal bleeding and no use of Ca-channel blockers. Patient does not have objections to receiving blood products if needed. Current anti-platelet/anti-coagulation medications that the patient is prescribed includes: Rivaroxaban (Xarelto). She will stop it the day before surgery.      Assessment of Chronic Conditions:   - Diabetes Mellitus: Has referral to endocrine.   - Cerebrovascular Disease: Past CVA   - COPD: stable  - XU with CPAP use     Assessment of Cardiac Risk:  Denies unstable or severe angina or MI in the last 6 weeks or history of stent placement in the last year   Denies decompensated heart failure (e.g. New onset heart failure, NYHA functional class IV heart failure, or worsening existing heart failure)  Denies significant arrhythmias such as high grade AV block, symptomatic ventricular arrhythmia, newly recognized ventricular tachycardia, supraventricular tachycardia with resting heart rate >100, or symptomatic bradycardia  Denies severe heart valve disease including aortic stenosis or symptomatic mitral stenosis     Exercise Capacity:  Able to walk 4 blocks without symptoms?: Yes,    Able to walk 2 flights without symptoms?: Yes    Prior Anesthesia Reactions: No     Personal history of venous thromboembolic disease? No    History of steroid use for >2 weeks within last year? No    STOP-BANG Sleep Apnea Screening Questionnaire:      Do you SNORE loudly (louder than talking or loud enough to be heard through closed doors)? Yes = 1 point   Do you often feel TIRED, fatigued, or sleepy during daytime? No = 0 point   Has anyone OBSERVED you stop breathing during your sleep? Yes = 1 point   Do you have or are you being treated for high blood pressure? Yes = 1 point   BMI more than 35 kg/m2? No = 0 point   AGE over 48years old? Yes = 1 point   NECK circumference > 16 inches (40 cm)? No = 0 point   Male GENDER? No = 0 point   TOTAL SCORE 4= INTERMEDIATE risk of XU       Review of Systems:     Review of Systems   Constitutional: Negative. HENT: Negative. Eyes: Positive for visual disturbance (R eye cataract). Negative for pain, redness and itching. Respiratory: Negative for cough, chest tightness, shortness of breath and wheezing. Cardiovascular: Negative. Gastrointestinal: Negative. Genitourinary: Negative. Musculoskeletal: Positive for back pain (chronic) and neck pain (chronic). Negative for gait problem. Skin: Negative for rash and wound. Neurological: Negative. Hematological: Negative for adenopathy. Does not bruise/bleed easily. Psychiatric/Behavioral: Negative.          Problem List:     Patient Active Problem List   Diagnosis   • Intermittent explosive disorder   • DM (diabetes mellitus), type 2 ([address])   • PTSD (post-traumatic stress disorder)   • Anxiety   • Atrial flutter (HCC)   • Bipolar affective ([address])   • Diabetic neuropathy associated with type 2 diabetes mellitus (HCC)   • IBS (irritable bowel syndrome)   • Mixed hyperlipidemia   • Migraine   • Paroxysmal A-fib (HCC)   • RA (rheumatoid arthritis) ([address])   • Sick sinus syndrome (HCC)   • XU (obstructive sleep apnea)   • Benign essential hypertension   • Asthma   • Bilateral fibrocystic breast disease (FCBD)   • Bursitis of left hip   • Cardiac conduction disorder   • Cerebrovascular accident (CVA) ([address])   • Cervical spondylosis   • Chronic bacterial endocarditis   • Chronic bronchitis (HCC)   • Chronic fatigue   • Chronic idiopathic urticaria   • Chronic pain disorder   • Coronary artery disease involving native coronary artery of native heart without angina pectoris   • COVID-19   • Current every day smoker   • Discogenic low back pain   • Dry eye syndrome, bilateral   • Facet syndrome, lumbar   • Financial difficulties   • Osteoarthritis of spine with radiculopathy, lumbar region   • GERD (gastroesophageal reflux disease)   • Heart disease   • Hemochromatosis   • Lactose intolerance   • Left carpal tunnel syndrome   • Lumbar degenerative disc disease   • Lumbar disc disease with radiculopathy   • Mixed simple and mucopurulent chronic bronchitis (HCC)   • Mixed stress and urge urinary incontinence   • Myofascial pain syndrome   • Chronic, continuous use of opioids   • Overactive bladder   • Numbness and tingling of both lower extremities   • Primary osteoarthritis involving multiple joints   • Psychophysiological insomnia   • Pure hypercholesterolemia   • Seasonal allergic rhinitis due to pollen   • Seborrhea   • Seizures ([address])   • Treatment-emergent central sleep apnea   • Vaginal atrophy   • Mass of hip region, right        Allergies:      Allergies   Allergen Reactions   • Other Anaphylaxis     Capzasin HP -- severe burning and swelling of the skin    • Clarithromycin GI Intolerance   • Acetaminophen GI Intolerance   • Bactrim [Sulfamethoxazole-Trimethoprim] Itching, GI Intolerance, Dizziness, Confusion and Lightheadedness     Patient passes out when on this medication for several days    • Cephalosporins Hives     Tolerated Cefdinir 2/2023   • Latex Hives   • Oxycodone-Acetaminophen GI Intolerance   • Penicillins Diarrhea   • Trazodone Diarrhea   • Capsaicin Rash   • Naproxen Palpitations        Current Medications:       Current Outpatient Medications:   •  albuterol (PROVENTIL HFA,VENTOLIN HFA) 90 mcg/act inhaler, Inhale 2 puffs every 4 (four) hours as needed, Disp: , Rfl:   •  Alcohol Swabs (Pharmacist Choice Alcohol) PADS, 5 (five) times a day, Disp: , Rfl:   •  ALPRAZolam (XANAX) 1 mg tablet, Take 1 tablet (1 mg total) by mouth 4 (four) times a day as needed for anxiety, Disp: 60 tablet, Rfl: 0  •  ARIPiprazole (ABILIFY) 10 mg tablet, Take 1 tablet (10 mg total) by mouth daily, Disp: 90 tablet, Rfl: 3  •  ascorbic acid (VITAMIN C) 250 MG tablet, Take 250 mg by mouth daily, Disp: , Rfl:   •  BD Pen Needle Nkechi 2nd Gen 32G X 4 MM MISC, USE AS DIRECTED FOR BEFORE A MEAL AND AT BEDTIME GLUCOSE INJECTION, Disp: , Rfl:   •  BD Pen Needle Nkechi 2nd Gen 32G X 4 MM MISC, USE WITH INSULIN 5 TIMES A DAY, Disp: 200 each, Rfl: 3  •  BD PrecisionGlide Needle 23G X 1-1/2" MISC, USE AS DIRECTED TO ADMINISTER NALOXONE INJECTION.   MAY DISPENSE 23-25 GAUGE 1-1.5" NEEDLE., Disp: , Rfl:   •  Blood Glucose Monitoring Suppl (ONE TOUCH ULTRA 2) w/Device KIT, Use as directed, Disp: , Rfl:   •  busPIRone (BUSPAR) 30 MG tablet, Take 30 mg by mouth 2 (two) times a day, Disp: , Rfl:   •  Continuous Blood Gluc  (Dexcom G6 ) ALEKS, Use 1 Device 4 (four) times a day (with meals and at bedtime), Disp: 1 each, Rfl: 6  •  Continuous Blood Gluc Sensor (Dexcom G6 Sensor) MISC, CHANGE SENSOR EVERY 10 DAYS, Disp: 3 each, Rfl: 6  •  Continuous Blood Gluc Transmit (Dexcom G6 Transmitter) MISC, USE 4 TIMES A DAY (WITH MEALS AND AT BEDTIME), Disp: , Rfl:   •  famotidine (PEPCID) 20 mg tablet, TAKE 1 TABLET (20 MG TOTAL) BY MOUTH 2 (TWO) TIMES A DAY AS NEEDED FOR INDIGESTION, Disp: 90 tablet, Rfl: 3  •  fexofenadine (ALLEGRA) 180 MG tablet, TAKE 1 TABLET (180 MG TOTAL) BY MOUTH DAILY, Disp: 90 tablet, Rfl: 3  •  Flovent Diskus 250 MCG/ACT diskus inhaler, INHALE 1 PUFF 2 (TWO) TIMES A DAY, Disp: 60 each, Rfl: 1  •  gabapentin (NEURONTIN) 100 mg capsule, TAKE 1 CAPSULE (100 MG TOTAL) BY MOUTH DAILY AT BEDTIME TAKE ADDITIONAL 100 MG CAPSULE TO TOTAL 900 MG AT BEDTIME, Disp: 30 capsule, Rfl: 3  •  gabapentin (NEURONTIN) 400 mg capsule, TAKE 2 CAPSULES BY MOUTH 3 TIMES A DAY, Disp: 90 capsule, Rfl: 3  •  hydrALAZINE (APRESOLINE) 50 mg tablet, Take 50 mg by mouth 3 (three) times a day, Disp: , Rfl:   •  Incontinence Supply Disposable (Depend Undergarment Ex Absorb) MISC, Use 4 (four) times a day as needed (incontinence), Disp: 120 each, Rfl: 10  •  insulin lispro (HumaLOG) 100 units/mL injection pen, INJECT 20 UNITS UNDER THE SKIN 3 (THREE) TIMES A DAY BEFORE MEALS INDICATIONS: TYPE 2 DIABETES MELLITUS., Disp: 15 mL, Rfl: 3  •  Lantus SoloStar 100 units/mL SOPN, INJECT 55 UNITS UNDER THE SKIN 2 (TWO) TIMES A DAY AT LUNCH AND AT BEDTIME., Disp: 45 mL, Rfl: 3  •  leflunomide (ARAVA) 20 MG tablet, Take 20 mg by mouth daily, Disp: , Rfl:   •  lidocaine (Lidoderm) 5 %, Apply 1 patch topically over 12 hours daily Remove & Discard patch within 12 hours or as directed by MD, Disp: 60 patch, Rfl: 0  •  lisinopril (ZESTRIL) 5 mg tablet, Take 5 mg by mouth daily, Disp: , Rfl:   •  lovastatin (MEVACOR) 10 MG tablet, Take 10 mg by mouth, Disp: , Rfl:   •  meclizine (ANTIVERT) 25 mg tablet, Take 25 mg by mouth Three times daily as needed, Disp: , Rfl:   •  metoprolol succinate (TOPROL-XL) 100 mg 24 hr tablet, Take 100 mg by mouth daily, Disp: , Rfl:   •  metroNIDAZOLE (METROCREAM) 0.75 % cream, Apply topically 2 (two) times a day, Disp: 45 g, Rfl: 0  •  montelukast (SINGULAIR) 10 mg tablet, TAKE 1 TABLET (10 MG TOTAL) BY MOUTH DAILY AT BEDTIME, Disp: 90 tablet, Rfl: 3  •  Multiple Vitamins-Minerals (Centrum Silver 50+Women) TABS, Take 1 tablet by mouth daily, Disp: , Rfl:   •  naloxone (NARCAN) 0.4 mg/mL injection, INJECT 1 ML IN SHOULDER OR THIGH.  REPEAT AFTER 2-3 MINUTES IF NO OR MINIMAL RESPONSE., Disp: , Rfl:   •  nitrofurantoin (MACROBID) 100 mg capsule, Take 1 capsule (100 mg total) by mouth 2 (two) times a day, Disp: 10 capsule, Rfl: 0  •  Nutritional Supplements (Glucerna Hunger Smart Shake) LIQD, Take 1 Can by mouth 3 (three) times a day, Disp: 296 mL, Rfl: 5  •  nystatin (MYCOSTATIN) cream, Apply topically 2 (two) times a day, Disp: , Rfl:   •  nystatin (MYCOSTATIN) ointment, Apply topically 2 (two) times a day, Disp: 30 g, Rfl: 6  •  ofloxacin (OCUFLOX) 0.3 % ophthalmic solution, INSTILL ONE DROP INTO LEFT EYE 4 TIMES A DAY, Disp: , Rfl:   •  Olopatadine HCl (Pataday) 0.7 % SOLN, 1 drop each eye twice a day, Disp: 2.5 mL, Rfl: 3  •  OneTouch Delica Lancets 68O MISC, 2 (two) times a day, Disp: , Rfl:   •  OneTouch Ultra test strip, TEST TWICE DAILY OR AS DIRECTED BY MD, Disp: , Rfl:   •  oxyCODONE (ROXICODONE) 5 immediate release tablet, TAKE 1 TABLET UP TO THREE TIMES A DAY AS NEEDED. ONGOING FOR CHRONIC PAIN. FILL JULY 7, Disp: , Rfl:   •  pantoprazole (PROTONIX) 40 mg tablet, TAKE 1 TABLET (40 MG TOTAL) BY MOUTH DAILY, Disp: 90 tablet, Rfl: 3  •  potassium chloride (K-DUR,KLOR-CON) 20 mEq tablet, Take 1 tablet (20 mEq total) by mouth daily, Disp: 30 tablet, Rfl: 1  •  potassium chloride (MICRO-K) 10 MEQ CR capsule, TAKE 2 CAPSULES (20 MEQ TOTAL) BY MOUTH 2 (TWO) TIMES A DAY, Disp: 90 capsule, Rfl: 3  •  Prasterone (Intrarosa) 6.5 MG INST, Insert 6.5 mg into the vagina daily, Disp: 28 each, Rfl: 6  •  prazosin (MINIPRESS) 5 mg capsule, TAKE 1 CAPSULE (5 MG TOTAL) BY MOUTH DAILY AT BEDTIME, Disp: 30 capsule, Rfl: 3  •  risperiDONE (RisperDAL) 3 mg tablet, Take 3 mg by mouth 2 (two) times a day, Disp: , Rfl:   •  sertraline (ZOLOFT) 100 mg tablet, TAKE 1 TABLET (100 MG TOTAL) BY MOUTH DAILY, Disp: 90 tablet, Rfl: 3  •  Spacer/Aero-Holding Chambers (AeroChamber Plus Geoff-Vu w/Mask) MISC, 1 EACH (1 APPLICATION TOTAL) BY MISCELLANEOUS ROUTE DAILY AS NEEDED (ASTHMA EXACERBATION). , Disp: , Rfl:   •  TiZANidine (ZANAFLEX) 2 MG capsule, TAKE 1 TABLET UP TO THREE TIMES A DAY AS NEEDED FOR MUSCLE SPASM, Disp: , Rfl:   •  triamcinolone (KENALOG) 0.5 % cream, Apply topically 2 (two) times a day To rash, Disp: 30 g, Rfl: 3  •  trospium chloride (SANCTURA) 20 mg tablet, TAKE 1 TABLET (20 MG TOTAL) BY MOUTH 2 (TWO) TIMES A DAY, Disp: 60 tablet, Rfl: 2  •  Trulicity 1.5 FI/9.7QN injection, INJECT 1.5 MG UNDER THE SKIN EVERY 7 DAYS., Disp: 2 mL, Rfl: 3  •  atorvastatin (LIPITOR) 10 mg tablet, Take 10 mg by mouth daily, Disp: , Rfl:   •  rivaroxaban (XARELTO) 20 mg tablet, Take 20 mg by mouth (Patient not taking: Reported on 2023), Disp: , Rfl:   •  zolpidem (AMBIEN) 10 mg tablet, TAKE 1 TABLET (10 MG TOTAL) BY MOUTH DAILY, Disp: 90 tablet, Rfl: 1     Past History:     Past Medical History:   Diagnosis Date   • Abnormal ECG    • Abnormal Pap smear of cervix    • Asthma    • Atrial fibrillation (HCC)    • Chlamydia    • Chronic kidney disease    • Coronary artery disease    • Depression    • Diabetes mellitus (HCC)    • Fibromyalgia    • Heart disease    • History of transfusion    • Hypertension    • Migraines    • Myocardial infarction ([address])    • Neuromuscular disorder ([address])    • Opioid abuse, in remission ([address])    • Sick sinus syndrome (HCC)    • Stroke ([address])    • Varicella         Past Surgical History:   Procedure Laterality Date   • APPENDECTOMY     •  SECTION     • EYE SURGERY Left 2023   • HIP SURGERY Right         Family History   Problem Relation Age of Onset   • Asthma Mother    • Cancer Mother    • Diabetes Mother    • Heart disease Mother    • Hypertension Mother    • Stroke Mother    • Asthma Father    • Diabetes Father    • Heart disease Father    • Hypertension Father    • Cancer Maternal Grandfather    • Heart disease Maternal Grandfather    • Cancer Paternal Grandmother    • Diabetes Paternal Grandmother    • Asthma Brother    • Diabetes Brother    • Heart disease Brother    • Hypertension Brother    • Hypertension Brother Social History     Socioeconomic History   • Marital status: Single     Spouse name: Not on file   • Number of children: Not on file   • Years of education: Not on file   • Highest education level: Not on file   Occupational History   • Not on file   Tobacco Use   • Smoking status: Every Day     Packs/day: 0.50     Years: 25.00     Total pack years: 12.50     Types: Cigarettes     Start date: 1/1/1992   • Smokeless tobacco: Never   • Tobacco comments:     Patient stated that she is not ready to quit smoking    Vaping Use   • Vaping Use: Never used   Substance and Sexual Activity   • Alcohol use: Not Currently   • Drug use: Never   • Sexual activity: Not Currently     Partners: Male     Birth control/protection: Abstinence, Surgical, Female Sterilization   Other Topics Concern   • Not on file   Social History Narrative   • Not on file     Social Determinants of Health     Financial Resource Strain: Not on file   Food Insecurity: Not on file   Transportation Needs: Not on file   Physical Activity: Not on file   Stress: Not on file   Social Connections: Not on file   Intimate Partner Violence: Not on file   Housing Stability: Not on file        Physical Exam:      /90 (BP Location: Right arm, Patient Position: Sitting, Cuff Size: Standard)   Pulse 100   Temp (!) 95.4 °F (35.2 °C) (Tympanic)   Resp 18   Ht 5' 5" (1.651 m)   Wt 72.9 kg (160 lb 12.8 oz)   SpO2 96%   BMI 26.76 kg/m²     Physical Exam  Vitals reviewed. Constitutional:       General: She is not in acute distress. Appearance: Normal appearance. She is not toxic-appearing or diaphoretic. HENT:      Head: Normocephalic and atraumatic. Nose: Nose normal.      Mouth/Throat:      Lips: Pink. Mouth: Mucous membranes are moist.   Eyes:      General: Lids are normal. No scleral icterus. Cardiovascular:      Rate and Rhythm: Normal rate.    Pulmonary:      Effort: Pulmonary effort is normal.   Skin:     General: Skin is warm and dry.   Neurological:      General: No focal deficit present. Mental Status: She is alert and oriented to person, place, and time. Assessment:     1. Abnormality of gait and mobility  Ambulatory Referral to Social Work Care Management Program    CANCELED: Ambulatory Referral to Social Work Care Management Program      2. Preop examination             Plan:     52 y.o. female with planned surgery: R eye cataract removal.      Cardiac Risk Estimation: per the Revised Cardiac Risk Index (Circ. 100:1043, 1999), the patient's risk factors for cardiac complications include on insulin, putting her in: RCI RISK CLASS II (1 risk factor, risk of major cardiac compl. appr. 1.3%). 1. Further preoperative workup as follows:   - None; no further preoperative work-up is required    2. Medication Management/Recommendations:   - Patient has been instructed to avoid herbs or non-directed vitamins the week prior to surgery to ensure no drug interactions with perioperative surgical and anesthetic medications. - Patient has been instructed to avoid aspirin containing medications or non-steroidal anti-inflammatory drugs for the week preceding surgery. - hold Xarelto as instructed by cardiology. 3. Prophylaxis for cardiac events with perioperative beta-blockers: not indicated. 4. Patient requires further consultation with: None    Clearance  Patient is CLEARED for surgery without any additional cardiac testing.      April Alona Morales, [address] [facility] [address]  Phone#  [phone]  Fax#  [phone]

## 2023-08-08 NOTE — TELEPHONE ENCOUNTER
Physician certification form dropped off for Neil Rivas to sign, also states that Luciana needs to sign it too    Dropped off and put in reception black bin in reception

## 2023-08-11 ENCOUNTER — PATIENT OUTREACH (OUTPATIENT)
Age: 53
End: 2023-08-11

## 2023-08-11 NOTE — PROGRESS NOTES
BRANDAN HARRIS received referral for assistance with an electric scooter. BRANDAN HARRIS reviewed patient's chart and reached out to her over the phone. Patient confirmed she has already spoken to her insurance company and knows it will be covered, as she has had one in the past, but needs an order from her PCP. BRANDAN HARRIS sent an IB message to provider.

## 2023-08-14 ENCOUNTER — TELEPHONE (OUTPATIENT)
Age: 53
End: 2023-08-14

## 2023-08-14 NOTE — TELEPHONE ENCOUNTER
Hi, good morning. My name is Moose Clark and I'm calling from [facility]. We are looking for a surgical clearance for Angelo for surgery this Thursday. You could reach us at [phone] ext [phone] and you can ask for Courtney or Krys Olivarez. Again. This is for a surgical clearance for a Neo otero's date of birth 1970. Thank you. Have a good day.

## 2023-08-15 ENCOUNTER — TELEPHONE (OUTPATIENT)
Age: 53
End: 2023-08-15

## 2023-08-15 DIAGNOSIS — E11.649 TYPE 2 DIABETES MELLITUS WITH HYPOGLYCEMIA WITHOUT COMA, WITH LONG-TERM CURRENT USE OF INSULIN (HCC): ICD-10-CM

## 2023-08-15 DIAGNOSIS — Z79.4 TYPE 2 DIABETES MELLITUS WITH HYPOGLYCEMIA WITHOUT COMA, WITH LONG-TERM CURRENT USE OF INSULIN (HCC): ICD-10-CM

## 2023-08-15 DIAGNOSIS — F17.200 SMOKING: ICD-10-CM

## 2023-08-15 RX ORDER — FLUTICASONE PROPIONATE 250 UG/1
POWDER, METERED RESPIRATORY (INHALATION)
Qty: 60 EACH | Refills: 1 | Status: SHIPPED | OUTPATIENT
Start: 2023-08-15

## 2023-08-15 RX ORDER — INSULIN LISPRO 100 [IU]/ML
INJECTION, SOLUTION INTRAVENOUS; SUBCUTANEOUS
Qty: 15 ML | Refills: 3 | Status: SHIPPED | OUTPATIENT
Start: 2023-08-15

## 2023-08-15 RX ORDER — DULAGLUTIDE 1.5 MG/.5ML
INJECTION, SOLUTION SUBCUTANEOUS
Qty: 2 ML | Refills: 3 | Status: SHIPPED | OUTPATIENT
Start: 2023-08-15

## 2023-08-15 NOTE — TELEPHONE ENCOUNTER
Called patient regarding the appointment scheduled via [facility] [place] with Dr. Cheyenne Velez on 8/24. The patient was last seen in 2022 by Dr. Cristina Sosa. Please check if the patient wants to transfer their care to Dr. Cheyenne Velez, if not please reschedule with Dr. Cristina Sosa or PA/NP.

## 2023-08-17 ENCOUNTER — TELEPHONE (OUTPATIENT)
Age: 53
End: 2023-08-17

## 2023-08-17 NOTE — TELEPHONE ENCOUNTER
----- Message from Sadaf Forman, [address] [place] [address] sent at 8/17/2023  8:36 AM EDT -----  Regarding: TARSHA: Zachary  I have a hand written script on my desk that can be faxed to a [employer] company for her    ----- Message -----  From: AMY Diaz  Sent: 8/11/2023  11:55 AM EDT  To: CRISTINO Dill  Subject: Zachary Leonardo Tani! I received a referral for assistance with an electric scooter. Unfortunately I cannot order medical equipment in the OP capacity with [employer], but I did speak with the patient and she said her insurance will cover it.   She spoke with them and just needs and order placed from the doctor's office

## 2023-08-19 DIAGNOSIS — F43.10 PTSD (POST-TRAUMATIC STRESS DISORDER): ICD-10-CM

## 2023-08-21 RX ORDER — ZOLPIDEM TARTRATE 10 MG/1
10 TABLET ORAL DAILY
Qty: 90 TABLET | Refills: 0 | Status: SHIPPED | OUTPATIENT
Start: 2023-08-21 | End: 2023-11-29

## 2023-08-23 DIAGNOSIS — Z79.4 TYPE 2 DIABETES MELLITUS WITH HYPOGLYCEMIA WITHOUT COMA, WITH LONG-TERM CURRENT USE OF INSULIN (HCC): ICD-10-CM

## 2023-08-23 DIAGNOSIS — E11.649 TYPE 2 DIABETES MELLITUS WITH HYPOGLYCEMIA WITHOUT COMA, WITH LONG-TERM CURRENT USE OF INSULIN (HCC): ICD-10-CM

## 2023-08-23 RX ORDER — GABAPENTIN 400 MG/1
CAPSULE ORAL
Qty: 90 CAPSULE | Refills: 3 | Status: SHIPPED | OUTPATIENT
Start: 2023-08-23

## 2023-08-24 ENCOUNTER — TELEPHONE (OUTPATIENT)
Dept: GASTROENTEROLOGY | Facility: CLINIC | Age: 53
End: 2023-08-24

## 2023-08-24 ENCOUNTER — OFFICE VISIT (OUTPATIENT)
Dept: GASTROENTEROLOGY | Facility: CLINIC | Age: 53
End: 2023-08-24
Payer: COMMERCIAL

## 2023-08-24 VITALS
SYSTOLIC BLOOD PRESSURE: 146 MMHG | WEIGHT: 159 LBS | HEIGHT: 65 IN | HEART RATE: 87 BPM | DIASTOLIC BLOOD PRESSURE: 96 MMHG | BODY MASS INDEX: 26.49 KG/M2

## 2023-08-24 DIAGNOSIS — R19.7 DIARRHEA, UNSPECIFIED TYPE: Primary | ICD-10-CM

## 2023-08-24 DIAGNOSIS — Z12.11 COLON CANCER SCREENING: ICD-10-CM

## 2023-08-24 DIAGNOSIS — R11.2 NAUSEA AND VOMITING, UNSPECIFIED VOMITING TYPE: ICD-10-CM

## 2023-08-24 PROCEDURE — 99214 OFFICE O/P EST MOD 30 MIN: CPT | Performed by: INTERNAL MEDICINE

## 2023-08-24 RX ORDER — KETOROLAC TROMETHAMINE 5 MG/ML
SOLUTION OPHTHALMIC
COMMUNITY
Start: 2023-08-19

## 2023-08-24 RX ORDER — POLYETHYLENE GLYCOL 3350, SODIUM CHLORIDE, SODIUM BICARBONATE, POTASSIUM CHLORIDE 420; 11.2; 5.72; 1.48 G/4L; G/4L; G/4L; G/4L
4000 POWDER, FOR SOLUTION ORAL ONCE
Qty: 4000 ML | Refills: 0 | Status: SHIPPED | OUTPATIENT
Start: 2023-08-24 | End: 2023-08-24

## 2023-08-24 RX ORDER — PREDNISOLONE ACETATE 10 MG/ML
SUSPENSION/ DROPS OPHTHALMIC
COMMUNITY
Start: 2023-08-16

## 2023-08-24 NOTE — TELEPHONE ENCOUNTER
Cardiac clearance sent to Dr Joseline Tyler office for procedure and xarelto clearance.  sb         Scheduled date of EGD/colonoscopy (as of today):10/3/23  Physician performing EGD/colonoscopy:Dr Delilah Bryant   Location of EGD/colonoscopy:   Desired bowel prep reviewed with patient:jose   Instructions reviewed with patient by:sb  Clearances:  Dr John Arias office

## 2023-08-24 NOTE — PROGRESS NOTES
[facility] - Outpatient Follow-up Note  Miya yBrd 52 y.o. female MRN: [id number]  Encounter: [id number]          ASSESSMENT AND PLAN:      1. Diarrhea, unspecified type  Differential diagnosis is broad. This certainly could represent diarrhea predominant IBS though celiac disease or other malabsorptive process, IBD, pancreatic insufficiency, overflow diarrhea, bile salt malabsorption, dysmotility, etc. are all possible. We will plan to evaluate as below and follow-up here. If evaluation is otherwise negative, might consider a trial of Xifaxan    - C-reactive protein; Future  - Calprotectin,Fecal; Future  - CBC; Future  - Comprehensive metabolic panel; Future  - Lipase; Future  - Pancreatic elastase, fecal; Future  - Celiac Disease Antibody Profile; Future  - Colonoscopy; Future  - polyethylene glycol-electrolytes (TriLyte) 4000 mL solution; Take 4,000 mL by mouth once for 1 dose Take 4000 mL by mouth once for 1 dose. Use as directed  Dispense: 4000 mL; Refill: 0    2. Nausea and vomiting, unspecified vomiting type  Differential diagnosis would include diabetic gastroparesis, less likely pancreaticobiliary disease, etc.  Patient currently taking metoclopramide for migraine headache but reports she has not noted any improvement of abdominal symptoms when she takes this. Will evaluate with EGD and laboratory testing. Contingency might include gastric emptying scan but will need to hold metoclopramide prior to this. Continue pantoprazole and famotidine    - EGD; Future    3.  Colon cancer screening  Colonoscopy    ______________________________________________________________________    SUBJECTIVE: Patient with multiple comorbidities including diabetes with diabetic neuropathy, coronary artery disease, paroxysmal A-fib on Xarelto, hereditary hemochromatosis as well as prior reported diagnosis of IBS reports history of prior chronic constipation and now more than a year of loose stool, worsening in recent weeks with postprandial vomiting. Reports some abdominal pain primarily in the left lower quadrant. No blood in stool or vomit. No fever chills, jaundice or rash. Seen approximately 1 year ago by Dr. Noreen Lyn with recommendation for colonoscopy and EGD but did not follow through with this. Does report some degree of anorexia and possible early satiety. Takes metoclopramide as needed, typically several times per week for migraine headache. Patient with a history of autoimmune disease.   Reports a family history of Crohn's disease in her brother      REVIEW OF SYSTEMS:    ROS       Historical Information   Past Medical History:   Diagnosis Date   • Abnormal ECG    • Abnormal Pap smear of cervix    • Asthma    • Atrial fibrillation (HCC)    • Chlamydia    • Chronic kidney disease    • Coronary artery disease    • Depression    • Diabetes mellitus ([address])    • Fibromyalgia    • Heart disease    • History of transfusion    • Hypertension    • Migraines    • Myocardial infarction ([address])    • Neuromuscular disorder ([address])    • Opioid abuse, in remission ([address])    • Sick sinus syndrome (HCC)    • Stroke [facility])    • Varicella      Past Surgical History:   Procedure Laterality Date   • APPENDECTOMY     • CATARACT EXTRACTION Right 2023   •  SECTION     • EYE SURGERY Left 2023   • HIP SURGERY Right      Social History   Social History     Substance and Sexual Activity   Alcohol Use Not Currently     Social History     Substance and Sexual Activity   Drug Use Never     Social History     Tobacco Use   Smoking Status Every Day   • Packs/day: 0.50   • Years: 25.00   • Total pack years: 12.50   • Types: Cigarettes   • Start date: 1992   Smokeless Tobacco Never   Tobacco Comments    Patient stated that she is not ready to quit smoking      Family History   Problem Relation Age of Onset   • Asthma Mother    • Cancer Mother    • Diabetes Mother    • Heart disease Mother    • Hypertension Mother    • Stroke Mother    • Asthma Father    • Diabetes Father    • Heart disease Father    • Hypertension Father    • Cancer Maternal Grandfather    • Heart disease Maternal Grandfather    • Cancer Paternal Grandmother    • Diabetes Paternal Grandmother    • Asthma Brother    • Diabetes Brother    • Heart disease Brother    • Hypertension Brother    • Hypertension Brother        Meds/Allergies       Current Outpatient Medications:   •  albuterol (PROVENTIL HFA,VENTOLIN HFA) 90 mcg/act inhaler  •  Alcohol Swabs (Pharmacist Choice Alcohol) PADS  •  ALPRAZolam (XANAX) 1 mg tablet  •  ARIPiprazole (ABILIFY) 10 mg tablet  •  ascorbic acid (VITAMIN C) 250 MG tablet  •  atorvastatin (LIPITOR) 10 mg tablet  •  BD Pen Needle Nkechi 2nd Gen 32G X 4 MM MISC  •  BD Pen Needle Nkechi 2nd Gen 32G X 4 MM MISC  •  BD PrecisionGlide Needle 23G X 1-1/2" MISC  •  Blood Glucose Monitoring Suppl (ONE TOUCH ULTRA 2) w/Device KIT  •  busPIRone (BUSPAR) 30 MG tablet  •  Continuous Blood Gluc  (Dexcom G6 ) ALEKS  •  Continuous Blood Gluc Sensor (Dexcom G6 Sensor) MISC  •  Continuous Blood Gluc Transmit (Dexcom G6 Transmitter) MISC  •  famotidine (PEPCID) 20 mg tablet  •  fexofenadine (ALLEGRA) 180 MG tablet  •  Flovent Diskus 250 MCG/ACT diskus inhaler  •  gabapentin (NEURONTIN) 100 mg capsule  •  gabapentin (NEURONTIN) 400 mg capsule  •  hydrALAZINE (APRESOLINE) 50 mg tablet  •  Incontinence Supply Disposable (Depend Undergarment Ex Absorb) MISC  •  insulin lispro (HumaLOG) 100 units/mL injection pen  •  ketorolac (ACULAR) 0.5 % ophthalmic solution  •  Lantus SoloStar 100 units/mL SOPN  •  leflunomide (ARAVA) 20 MG tablet  •  lidocaine (Lidoderm) 5 %  •  lisinopril (ZESTRIL) 5 mg tablet  •  lovastatin (MEVACOR) 10 MG tablet  •  meclizine (ANTIVERT) 25 mg tablet  •  metoprolol succinate (TOPROL-XL) 100 mg 24 hr tablet  •  metroNIDAZOLE (METROCREAM) 0.75 % cream  •  montelukast (SINGULAIR) 10 mg tablet  •  Multiple Vitamins-Minerals (Centrum Silver 50+Women) TABS  •  naloxone (NARCAN) 0.4 mg/mL injection  •  nitrofurantoin (MACROBID) 100 mg capsule  •  Nutritional Supplements (Glucerna Hunger Smart Shake) LIQD  •  nystatin (MYCOSTATIN) cream  •  nystatin (MYCOSTATIN) ointment  •  ofloxacin (OCUFLOX) 0.3 % ophthalmic solution  •  Olopatadine HCl (Pataday) 0.7 % SOLN  •  OneTouch Delica Lancets 12U MISC  •  OneTouch Ultra test strip  •  oxyCODONE (ROXICODONE) 5 immediate release tablet  •  pantoprazole (PROTONIX) 40 mg tablet  •  polyethylene glycol-electrolytes (TriLyte) 4000 mL solution  •  potassium chloride (K-DUR,KLOR-CON) 20 mEq tablet  •  potassium chloride (MICRO-K) 10 MEQ CR capsule  •  Prasterone (Intrarosa) 6.5 MG INST  •  prazosin (MINIPRESS) 5 mg capsule  •  prednisoLONE acetate (PRED FORTE) 1 % ophthalmic suspension  •  risperiDONE (RisperDAL) 3 mg tablet  •  rivaroxaban (XARELTO) 20 mg tablet  •  sertraline (ZOLOFT) 100 mg tablet  •  Spacer/Aero-Holding Chambers (AeroChamber Plus Geoff-Vu w/Mask) MISC  •  TiZANidine (ZANAFLEX) 2 MG capsule  •  triamcinolone (KENALOG) 0.5 % cream  •  trospium chloride (SANCTURA) 20 mg tablet  •  Trulicity 1.5 EZ/9.2DU injection  •  zolpidem (AMBIEN) 10 mg tablet    Allergies   Allergen Reactions   • Other Anaphylaxis     Capzasin HP -- severe burning and swelling of the skin    • Clarithromycin GI Intolerance   • Acetaminophen GI Intolerance   • Bactrim [Sulfamethoxazole-Trimethoprim] Itching, GI Intolerance, Dizziness, Confusion and Lightheadedness     Patient passes out when on this medication for several days    • Cephalosporins Hives     Tolerated Cefdinir 2/2023   • Latex Hives   • Oxycodone-Acetaminophen GI Intolerance   • Penicillins Diarrhea   • Trazodone Diarrhea   • Capsaicin Rash   • Naproxen Palpitations           Objective     Blood pressure 146/96, pulse 87, height 5' 5" (1.651 m), weight 72.1 kg (159 lb). Body mass index is 26.46 kg/m².       PHYSICAL EXAM: Physical Exam  Vitals and nursing note reviewed. Constitutional:       General: She is not in acute distress. Appearance: She is not ill-appearing. HENT:      Head: Normocephalic and atraumatic. Eyes:      General: No scleral icterus. Extraocular Movements: Extraocular movements intact. Cardiovascular:      Rate and Rhythm: Normal rate and regular rhythm. Pulmonary:      Effort: Pulmonary effort is normal. No respiratory distress. Abdominal:      General: There is no distension. Palpations: Abdomen is soft. Tenderness: There is no abdominal tenderness. There is no guarding or rebound. Musculoskeletal:         General: No deformity. Right lower leg: No edema. Left lower leg: No edema. Skin:     General: Skin is warm and dry. Coloration: Skin is not cyanotic. Findings: No erythema. Neurological:      General: No focal deficit present. Mental Status: She is alert and oriented to person, place, and time. Psychiatric:         Mood and Affect: Mood normal.         Behavior: Behavior normal.          Lab Results:   No visits with results within 1 Day(s) from this visit.    Latest known visit with results is:   Orders Only on 07/13/2023   Component Date Value   • Case Report 07/13/2023                      Value:Surgical Pathology Report                         Case: [id number]                                   Authorizing Provider:  Cortney Quesada MD Collected:           07/13/2023 1456              Ordering Location:     [facility]  Received:            07/14/2023 [facility]:           Mary Kate Boudreaux MD                                                         Specimen:    Mass, Hip                                                                                 • Final Diagnosis 07/13/2023 Value:This result contains rich text formatting which cannot be displayed here. • Additional Information 07/13/2023                      Value: This result contains rich text formatting which cannot be displayed here. • Gross Description 07/13/2023                      Value: This result contains rich text formatting which cannot be displayed here. • Clinical Information 07/13/2023                      Value:Deep subfascial masses of the right hip region overlying hip joint  Deep masses right hip region, size 8 cm in aggregate         Radiology Results:   No results found.

## 2023-08-25 NOTE — TELEPHONE ENCOUNTER
Cardiac clearance received from Dr Mary Beth Borjas office(cardiologist). Pt notified to hold xarelto 2 days prior to procedure.  Sb

## 2023-08-28 DIAGNOSIS — R32 URINARY INCONTINENCE, UNSPECIFIED TYPE: ICD-10-CM

## 2023-08-28 DIAGNOSIS — E87.6 LOW BLOOD POTASSIUM: Primary | ICD-10-CM

## 2023-08-28 RX ORDER — TROSPIUM CHLORIDE 20 MG/1
20 TABLET, FILM COATED ORAL 2 TIMES DAILY
Qty: 60 TABLET | Refills: 2 | Status: SHIPPED | OUTPATIENT
Start: 2023-08-28 | End: 2023-11-26

## 2023-09-09 DIAGNOSIS — F17.200 SMOKING: ICD-10-CM

## 2023-09-11 RX ORDER — FLUTICASONE PROPIONATE 250 UG/1
POWDER, METERED RESPIRATORY (INHALATION)
Qty: 60 EACH | Refills: 1 | Status: SHIPPED | OUTPATIENT
Start: 2023-09-11

## 2023-09-12 ENCOUNTER — TELEPHONE (OUTPATIENT)
Age: 53
End: 2023-09-12

## 2023-09-12 ENCOUNTER — PROCEDURE VISIT (OUTPATIENT)
Dept: UROLOGY | Facility: MEDICAL CENTER | Age: 53
End: 2023-09-12
Payer: COMMERCIAL

## 2023-09-12 VITALS
SYSTOLIC BLOOD PRESSURE: 144 MMHG | DIASTOLIC BLOOD PRESSURE: 88 MMHG | BODY MASS INDEX: 25.55 KG/M2 | HEART RATE: 95 BPM | OXYGEN SATURATION: 94 % | HEIGHT: 66 IN | WEIGHT: 159 LBS

## 2023-09-12 DIAGNOSIS — R39.89 SUSPECTED UTI: Primary | ICD-10-CM

## 2023-09-12 DIAGNOSIS — N32.89 ERYTHEMATOUS BLADDER MUCOSA: ICD-10-CM

## 2023-09-12 DIAGNOSIS — N39.41 URGENCY INCONTINENCE: ICD-10-CM

## 2023-09-12 LAB
SL AMB  POCT GLUCOSE, UA: 1000
SL AMB LEUKOCYTE ESTERASE,UA: ABNORMAL
SL AMB POCT BILIRUBIN,UA: ABNORMAL
SL AMB POCT BLOOD,UA: ABNORMAL
SL AMB POCT CLARITY,UA: CLEAR
SL AMB POCT COLOR,UA: YELLOW
SL AMB POCT KETONES,UA: ABNORMAL
SL AMB POCT NITRITE,UA: ABNORMAL
SL AMB POCT PH,UA: 6
SL AMB POCT SPECIFIC GRAVITY,UA: 1
SL AMB POCT URINE PROTEIN: ABNORMAL
SL AMB POCT UROBILINOGEN: 0.2

## 2023-09-12 PROCEDURE — 87086 URINE CULTURE/COLONY COUNT: CPT | Performed by: STUDENT IN AN ORGANIZED HEALTH CARE EDUCATION/TRAINING PROGRAM

## 2023-09-12 PROCEDURE — 99214 OFFICE O/P EST MOD 30 MIN: CPT | Performed by: STUDENT IN AN ORGANIZED HEALTH CARE EDUCATION/TRAINING PROGRAM

## 2023-09-12 PROCEDURE — 81003 URINALYSIS AUTO W/O SCOPE: CPT | Performed by: STUDENT IN AN ORGANIZED HEALTH CARE EDUCATION/TRAINING PROGRAM

## 2023-09-12 PROCEDURE — 52000 CYSTOURETHROSCOPY: CPT | Performed by: STUDENT IN AN ORGANIZED HEALTH CARE EDUCATION/TRAINING PROGRAM

## 2023-09-12 PROCEDURE — 88112 CYTOPATH CELL ENHANCE TECH: CPT | Performed by: STUDENT IN AN ORGANIZED HEALTH CARE EDUCATION/TRAINING PROGRAM

## 2023-09-12 RX ORDER — MIRABEGRON 25 MG/1
25 TABLET, FILM COATED, EXTENDED RELEASE ORAL DAILY
Qty: 30 TABLET | Refills: 2 | Status: SHIPPED | OUTPATIENT
Start: 2023-09-12 | End: 2023-12-11

## 2023-09-12 RX ORDER — CEFDINIR 300 MG/1
300 CAPSULE ORAL EVERY 12 HOURS SCHEDULED
Qty: 28 CAPSULE | Refills: 0 | Status: SHIPPED | OUTPATIENT
Start: 2023-09-12 | End: 2023-09-26

## 2023-09-12 NOTE — Clinical Note
Please place and order to [address] for alcohol free adult wipes, desitin cream and adult incontinence briefs.

## 2023-09-12 NOTE — PROGRESS NOTES
Urology Ambulatory Progress Note  9/12/2023    Leonidas Marshall  1970  [id number]      Assessment:  1. Overactive Bladder/urgency incontinence-patient persistent urgency, refractory to multiple medications. She has tried oxybutynin and trospium without any relief. We will try Myrbetriq but I think we will need to consider third line therapies given the extent of her symptoms. 2. Bladder erythema- persistent despite abx with negative cytology. I now recommend biopsy. I discussed the procedure bladder biopsy in detail. And the potential to perform TURBT if a bladder tumor is noted. The patient expressed understanding    Plan:  • Schedule for urodynamics with return visit to discuss results. • Urine sent for repeat culture and cytology  • Empiric 2-week course of cefdinir sent to pharmacy given recurrent episodes of Salmonella  • Informed consent obtained and case request placed for cystoscopy, bladder biopsy, fulguration, possible TURBT  • Patient requests prescription for adult wipes, barrier cream, and incontinence briefs. Request has been sent to clinical pool to place order. Chief Complaint: Follow-up for microhematuria and urgency incontinence    History of Present Illness  Leonidas Marshall is a 52 y.o. female presenting for re-evaluation of bladder erythema and urgency incontinence. We performed cystoscopy demonstrating diffuse bladder erythema most prominent on the right anterior to the right ureteral orifice. I have concern for infection so we sent her urine for culture and return for for Salmonella pleated a course of antibiotics and returns today for reevaluation cystoscopically. 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 She has tried the trospium but has had no relief whatsoever. She is wearing briefs around-the-clock due to ongoing incontinence and is having a lot of irritation as a result of wearing his briefs. She has tried multiple brand of diaper several times and they do not help. She is family history significant for bladder cancer in her mother. Her mother has dementia and is unable to give details surrounding the nature of her bladder cancer. Past Medical History  Past Medical History:   Diagnosis Date   • Abnormal ECG    • Abnormal Pap smear of cervix    • Asthma    • Atrial fibrillation (HCC)    • Chlamydia    • Chronic kidney disease    • Coronary artery disease    • Depression    • Diabetes mellitus (HCC)    • Fibromyalgia    • Heart disease    • History of transfusion    • Hypertension    • Migraines    • Myocardial infarction ([address])    • Neuromuscular disorder ([address])    • Opioid abuse, in remission ([address])    • Sick sinus syndrome (HCC)    • Stroke [facility])    • Varicella        Past Surgical History  Past Surgical History:   Procedure Laterality Date   • APPENDECTOMY     • CATARACT EXTRACTION Right 2023   •  SECTION     • EYE SURGERY Left 2023   • HIP SURGERY Right        Physical Exam  /88 (BP Location: Left arm, Patient Position: Sitting, Cuff Size: Standard)   Pulse 95   Ht 5' 5.5" (1.664 m)   Wt 72.1 kg (159 lb)   SpO2 94%   BMI 26.06 kg/m²     General:  no acute distress. Head:  Normocephalic, atraumatic. Cardiovascular:  Regular rate  Respiratory:  Patient has unlabored respirations. Results  Final Diagnosis   A. Urine, Clean Catch:  Negative for high grade urothelial carcinoma ([address]) - see comment. - Benign squamous and urothelial cells. - Rare neutrophils and rare bacterial organisms. Sloan Cameron MD  [facility]    Portions of the above record have been created with voice recognition software.  Occasional wrong word or "sound alike" substitution may have occurred due to the inherent limitations of voice recognition software. Please read the chart carefully and recognize, using context, where substitution may have occurred. For further clarification, please contact me directly.

## 2023-09-12 NOTE — PROGRESS NOTES
Cystoscopy     Date/Time 9/12/2023 10:00 AM     Performed by  Kathi Melton MD   Authorized by Kathi Melton MD     Universal Protocol:  Consent: Verbal consent obtained. Written consent obtained. Consent given by: patient  Patient understanding: patient states understanding of the procedure being performed  Patient consent: the patient's understanding of the procedure matches consent given  Procedure consent: procedure consent matches procedure scheduled  Patient identity confirmed: verbally with patient        Procedure Details:  Procedure type: cystoscopy      Office Cystoscopy Procedure Note    Indication:    Bladder erythema    Informed consent   The risks, benefits, complications, treatment options, and expected outcomes were discussed with the patient. The patient agreed with the proposed plan and provided informed consent. Anesthesia  Lidocaine jelly 2%    Antibiotic prophylaxis   None    Procedure  The patient was placed in the recumbent position and the genitalia exposed. The patient was then prepped and draped in the usual manner using sterile technique. A well lubricated 17 F flexible cystoscope was then passed per urethra into the bladder for careful examination. The following findings were noted:    Findings:  Urethra:  Normal in appearance without stricture or other abnormalities. Bladder: The bladder is moderately trabeculated, there is a 1 to 2 cm erythematous patch on the right lateral wall closer to the base, there is a small erythematous spot on the posterior wall as well as cystitis cystica at the trigone close to the bladder neck.   Ureteral orifices:  Orthotopic  Other findings: Diffuse erythema/irritation of the labia minora as described by the patient    Specimens: Bladder wash for cytology and cystoscopic urine for culture                 Complications:    None; patient tolerated the procedure well           Condition: Stable    Assessment:   Bladder erythema    Plan:  See office note    Boris Larsen MD  [facility]

## 2023-09-13 DIAGNOSIS — B37.31 VAGINAL YEAST INFECTION: Primary | ICD-10-CM

## 2023-09-13 RX ORDER — FLUCONAZOLE 150 MG/1
TABLET ORAL
Qty: 2 TABLET | Refills: 0 | Status: SHIPPED | OUTPATIENT
Start: 2023-09-13 | End: 2023-09-16

## 2023-09-14 LAB — BACTERIA UR CULT: ABNORMAL

## 2023-09-14 PROCEDURE — 88112 CYTOPATH CELL ENHANCE TECH: CPT | Performed by: STUDENT IN AN ORGANIZED HEALTH CARE EDUCATION/TRAINING PROGRAM

## 2023-09-21 ENCOUNTER — TELEPHONE (OUTPATIENT)
Dept: UROLOGY | Facility: CLINIC | Age: 53
End: 2023-09-21

## 2023-09-21 DIAGNOSIS — N30.00 ACUTE CYSTITIS WITHOUT HEMATURIA: ICD-10-CM

## 2023-09-21 DIAGNOSIS — N30.01 ACUTE CYSTITIS WITH HEMATURIA: Primary | ICD-10-CM

## 2023-09-21 RX ORDER — AMPICILLIN 500 MG/1
500 CAPSULE ORAL 4 TIMES DAILY
Qty: 20 CAPSULE | Refills: 0 | Status: SHIPPED | OUTPATIENT
Start: 2023-09-21 | End: 2023-09-26

## 2023-09-21 NOTE — TELEPHONE ENCOUNTER
Please call patient and tell her to start Ampicillin. She has a reaction to PCN listed as GI upset. This is the best abx for her to start. Thank you for calling her.     FT

## 2023-09-21 NOTE — TELEPHONE ENCOUNTER
Called patient - LMOM that her urine culture was positive and antibiotic was sent to the pharmacy for her. She is asked to call back if she has any further questions or concerns.

## 2023-09-21 NOTE — TELEPHONE ENCOUNTER
Patient called stating she was returning the call from the office. Message give as per encounter note . Patient aware and will get the medication from the pharmacy. No further action needed.

## 2023-09-25 LAB
LEFT EYE DIABETIC RETINOPATHY: NORMAL
RIGHT EYE DIABETIC RETINOPATHY: NORMAL

## 2023-09-26 RX ORDER — METHOCARBAMOL 750 MG/1
TABLET, FILM COATED ORAL
COMMUNITY
Start: 2023-09-19

## 2023-09-26 RX ORDER — AZITHROMYCIN 500 MG/1
TABLET, FILM COATED ORAL
COMMUNITY
Start: 2023-09-21 | End: 2023-09-29

## 2023-09-27 ENCOUNTER — TELEPHONE (OUTPATIENT)
Dept: UROLOGY | Facility: MEDICAL CENTER | Age: 53
End: 2023-09-27

## 2023-09-27 LAB — BACTERIA UR CULT: ABNORMAL

## 2023-09-28 ENCOUNTER — TELEPHONE (OUTPATIENT)
Dept: UROLOGY | Facility: MEDICAL CENTER | Age: 53
End: 2023-09-28

## 2023-09-28 DIAGNOSIS — E11.649 TYPE 2 DIABETES MELLITUS WITH HYPOGLYCEMIA WITHOUT COMA, WITH LONG-TERM CURRENT USE OF INSULIN (HCC): ICD-10-CM

## 2023-09-28 DIAGNOSIS — N39.0 RECURRENT UTI: ICD-10-CM

## 2023-09-28 DIAGNOSIS — Z79.4 TYPE 2 DIABETES MELLITUS WITH HYPOGLYCEMIA WITHOUT COMA, WITH LONG-TERM CURRENT USE OF INSULIN (HCC): ICD-10-CM

## 2023-09-28 DIAGNOSIS — N39.0 URINARY TRACT INFECTION WITHOUT HEMATURIA, SITE UNSPECIFIED: Primary | ICD-10-CM

## 2023-09-28 RX ORDER — CIPROFLOXACIN 500 MG/1
500 TABLET, FILM COATED ORAL EVERY 12 HOURS SCHEDULED
Qty: 20 TABLET | Refills: 0 | Status: SHIPPED | OUTPATIENT
Start: 2023-09-28 | End: 2023-09-29

## 2023-09-28 NOTE — TELEPHONE ENCOUNTER
Salmonella UTI is usually secondary to intake of contaminated water/food. Prescription for Cipro sent to pharmacy.  I would like for her to see Infectious Disease

## 2023-09-28 NOTE — TELEPHONE ENCOUNTER
----- Message from Hillary Cottrell, [address] sent at 9/27/2023  4:20 PM EDT -----  Regarding: FW: Test results   Contact: [phone]    ----- Message -----  From: Brain Chandler  Sent: 9/27/2023   4:14 PM EDT  To: [facility]  Subject: Test results                                     Am I reading the results of my urine test. I tested positive for Salmonella again? WTH? Those that mean I have to get on another antibiotics against? Why do I keep testing positive?  Please help me understand

## 2023-09-28 NOTE — TELEPHONE ENCOUNTER
Spoke to pt and advised of UTI and RX for Cipro called to pharmacy. Pt also advised a referral to infectious disease was placed and she will be hearing from their office directly to set up an appt.

## 2023-09-29 ENCOUNTER — APPOINTMENT (OUTPATIENT)
Dept: LAB | Facility: CLINIC | Age: 53
End: 2023-09-29
Payer: COMMERCIAL

## 2023-09-29 ENCOUNTER — OFFICE VISIT (OUTPATIENT)
Age: 53
End: 2023-09-29
Payer: COMMERCIAL

## 2023-09-29 VITALS
DIASTOLIC BLOOD PRESSURE: 98 MMHG | WEIGHT: 160 LBS | HEART RATE: 91 BPM | OXYGEN SATURATION: 98 % | HEIGHT: 66 IN | SYSTOLIC BLOOD PRESSURE: 138 MMHG | BODY MASS INDEX: 25.71 KG/M2

## 2023-09-29 DIAGNOSIS — Z79.4 TYPE 2 DIABETES MELLITUS WITH HYPOGLYCEMIA WITHOUT COMA, WITH LONG-TERM CURRENT USE OF INSULIN (HCC): ICD-10-CM

## 2023-09-29 DIAGNOSIS — R31.29 MICROHEMATURIA: ICD-10-CM

## 2023-09-29 DIAGNOSIS — R19.7 DIARRHEA, UNSPECIFIED TYPE: ICD-10-CM

## 2023-09-29 DIAGNOSIS — I48.0 PAROXYSMAL A-FIB (HCC): ICD-10-CM

## 2023-09-29 DIAGNOSIS — G47.33 OSA (OBSTRUCTIVE SLEEP APNEA): ICD-10-CM

## 2023-09-29 DIAGNOSIS — Z79.4 TYPE 2 DIABETES MELLITUS WITH HYPOGLYCEMIA WITHOUT COMA, WITH LONG-TERM CURRENT USE OF INSULIN (HCC): Primary | ICD-10-CM

## 2023-09-29 DIAGNOSIS — E11.649 TYPE 2 DIABETES MELLITUS WITH HYPOGLYCEMIA WITHOUT COMA, WITH LONG-TERM CURRENT USE OF INSULIN (HCC): ICD-10-CM

## 2023-09-29 DIAGNOSIS — B37.31 VAGINAL YEAST INFECTION: ICD-10-CM

## 2023-09-29 DIAGNOSIS — F43.10 PTSD (POST-TRAUMATIC STRESS DISORDER): ICD-10-CM

## 2023-09-29 DIAGNOSIS — K58.9 IRRITABLE BOWEL SYNDROME, UNSPECIFIED TYPE: ICD-10-CM

## 2023-09-29 DIAGNOSIS — R32 URINARY INCONTINENCE, UNSPECIFIED TYPE: ICD-10-CM

## 2023-09-29 DIAGNOSIS — Z01.419 ENCOUNTER FOR CERVICAL PAP SMEAR WITH PELVIC EXAM: ICD-10-CM

## 2023-09-29 DIAGNOSIS — I10 BENIGN ESSENTIAL HYPERTENSION: ICD-10-CM

## 2023-09-29 DIAGNOSIS — E11.649 TYPE 2 DIABETES MELLITUS WITH HYPOGLYCEMIA WITHOUT COMA, WITH LONG-TERM CURRENT USE OF INSULIN (HCC): Primary | ICD-10-CM

## 2023-09-29 DIAGNOSIS — N32.81 OVERACTIVE BLADDER: ICD-10-CM

## 2023-09-29 DIAGNOSIS — L03.90 WOUND CELLULITIS: ICD-10-CM

## 2023-09-29 LAB
ALBUMIN SERPL BCP-MCNC: 3.9 G/DL (ref 3.5–5)
ALP SERPL-CCNC: 107 U/L (ref 34–104)
ALT SERPL W P-5'-P-CCNC: 14 U/L (ref 7–52)
ANION GAP SERPL CALCULATED.3IONS-SCNC: 8 MMOL/L
AST SERPL W P-5'-P-CCNC: 18 U/L (ref 13–39)
BILIRUB SERPL-MCNC: 0.37 MG/DL (ref 0.2–1)
BUN SERPL-MCNC: 7 MG/DL (ref 5–25)
CALCIUM SERPL-MCNC: 8.6 MG/DL (ref 8.4–10.2)
CHLORIDE SERPL-SCNC: 100 MMOL/L (ref 96–108)
CHOLEST SERPL-MCNC: 157 MG/DL
CO2 SERPL-SCNC: 25 MMOL/L (ref 21–32)
CREAT SERPL-MCNC: 0.99 MG/DL (ref 0.6–1.3)
CREAT UR-MCNC: 59.6 MG/DL
CRP SERPL QL: 15.3 MG/L
ERYTHROCYTE [DISTWIDTH] IN BLOOD BY AUTOMATED COUNT: 13.5 % (ref 11.6–15.1)
EST. AVERAGE GLUCOSE BLD GHB EST-MCNC: 269 MG/DL
GFR SERPL CREATININE-BSD FRML MDRD: 64 ML/MIN/1.73SQ M
GLUCOSE P FAST SERPL-MCNC: 430 MG/DL (ref 65–99)
HBA1C MFR BLD: 11 %
HCT VFR BLD AUTO: 46.4 % (ref 34.8–46.1)
HDLC SERPL-MCNC: 34 MG/DL
HGB BLD-MCNC: 15.4 G/DL (ref 11.5–15.4)
LDLC SERPL CALC-MCNC: 52 MG/DL (ref 0–100)
LIPASE SERPL-CCNC: 91 U/L (ref 11–82)
MCH RBC QN AUTO: 30.4 PG (ref 26.8–34.3)
MCHC RBC AUTO-ENTMCNC: 33.2 G/DL (ref 31.4–37.4)
MCV RBC AUTO: 92 FL (ref 82–98)
MICROALBUMIN UR-MCNC: 17.4 MG/L
MICROALBUMIN/CREAT 24H UR: 29 MG/G CREATININE (ref 0–30)
PLATELET # BLD AUTO: 149 THOUSANDS/UL (ref 149–390)
PMV BLD AUTO: 12.5 FL (ref 8.9–12.7)
POTASSIUM SERPL-SCNC: 3.8 MMOL/L (ref 3.5–5.3)
PROT SERPL-MCNC: 6.9 G/DL (ref 6.4–8.4)
RBC # BLD AUTO: 5.07 MILLION/UL (ref 3.81–5.12)
SODIUM SERPL-SCNC: 133 MMOL/L (ref 135–147)
TRIGL SERPL-MCNC: 354 MG/DL
TSH SERPL DL<=0.05 MIU/L-ACNC: 0.92 UIU/ML (ref 0.45–4.5)
WBC # BLD AUTO: 11.41 THOUSAND/UL (ref 4.31–10.16)

## 2023-09-29 PROCEDURE — 82043 UR ALBUMIN QUANTITATIVE: CPT

## 2023-09-29 PROCEDURE — 80061 LIPID PANEL: CPT

## 2023-09-29 PROCEDURE — 87086 URINE CULTURE/COLONY COUNT: CPT

## 2023-09-29 PROCEDURE — 84443 ASSAY THYROID STIM HORMONE: CPT

## 2023-09-29 PROCEDURE — 83036 HEMOGLOBIN GLYCOSYLATED A1C: CPT

## 2023-09-29 PROCEDURE — 86231 EMA EACH IG CLASS: CPT

## 2023-09-29 PROCEDURE — 86258 DGP ANTIBODY EACH IG CLASS: CPT

## 2023-09-29 PROCEDURE — 86140 C-REACTIVE PROTEIN: CPT

## 2023-09-29 PROCEDURE — 3046F HEMOGLOBIN A1C LEVEL >9.0%: CPT | Performed by: STUDENT IN AN ORGANIZED HEALTH CARE EDUCATION/TRAINING PROGRAM

## 2023-09-29 PROCEDURE — 83690 ASSAY OF LIPASE: CPT

## 2023-09-29 PROCEDURE — 80053 COMPREHEN METABOLIC PANEL: CPT

## 2023-09-29 PROCEDURE — 85027 COMPLETE CBC AUTOMATED: CPT

## 2023-09-29 PROCEDURE — 99214 OFFICE O/P EST MOD 30 MIN: CPT

## 2023-09-29 PROCEDURE — 82784 ASSAY IGA/IGD/IGG/IGM EACH: CPT

## 2023-09-29 PROCEDURE — 86364 TISS TRNSGLTMNASE EA IG CLAS: CPT

## 2023-09-29 PROCEDURE — 82570 ASSAY OF URINE CREATININE: CPT

## 2023-09-29 PROCEDURE — 36415 COLL VENOUS BLD VENIPUNCTURE: CPT

## 2023-09-29 RX ORDER — FLUCONAZOLE 150 MG/1
TABLET ORAL
Qty: 2 TABLET | Refills: 1 | Status: SHIPPED | OUTPATIENT
Start: 2023-09-29 | End: 2024-09-29

## 2023-09-29 RX ORDER — GABAPENTIN 400 MG/1
CAPSULE ORAL
Qty: 90 CAPSULE | Refills: 3 | Status: SHIPPED | OUTPATIENT
Start: 2023-09-29

## 2023-09-29 RX ORDER — AMMONIUM LACTATE 12 G/100G
LOTION TOPICAL 2 TIMES DAILY PRN
Qty: 222 ML | Refills: 3 | Status: SHIPPED | OUTPATIENT
Start: 2023-09-29

## 2023-09-29 NOTE — PATIENT INSTRUCTIONS
Type 2 Diabetes Management for Adults   AMBULATORY CARE:   Type 2 diabetes  is a disease that affects how your body uses glucose (sugar). Either your body cannot make enough insulin, or it cannot use the insulin correctly. It is important to keep diabetes controlled to prevent damage to your heart, blood vessels, and other organs. Management will help you feel well and enjoy your daily activities. Your diabetes care team providers can help you make a plan to fit diabetes care into your schedule. Your plan can change over time to fit your needs and your family's needs. Have someone call your local emergency number (911 in the [address]) if:   • You cannot be woken. • You have signs of diabetic ketoacidosis:     ? confusion, fatigue    ? vomiting    ? rapid heartbeat    ? fruity smelling breath    ? extreme thirst    ? dry mouth and skin    • You have any of the following signs of a heart attack:      ? Squeezing, pressure, or pain in your chest    ? You may  also have any of the following:     - Discomfort or pain in your back, neck, jaw, stomach, or arm    - Shortness of breath    - Nausea or vomiting    - Lightheadedness or a sudden cold sweat    • You have any of the following signs of a stroke:      ? Numbness or drooping on one side of your face     ? Weakness in an arm or leg    ? Confusion or difficulty speaking    ? Dizziness, a severe headache, or vision loss    Call your doctor or diabetes care team provider if:   • You have a sore or wound that will not heal.    • You have a change in the amount you urinate. • Your blood sugar levels are higher than your target goals. • You often have lower blood sugar levels than your target goals. • Your skin is red, dry, warm, or swollen. • You have trouble coping with diabetes, or you feel anxious or depressed. • You have trouble following any part of your care plan, such as your meal plan.     • You have questions or concerns about your condition or care.    What you need to know about high blood sugar levels:  High blood sugar levels may not cause any symptoms. You may feel more thirsty or urinate more often than usual. Over time, high blood sugar levels can damage your nerves, blood vessels, tissues, and organs. The following can increase your blood sugar levels:  • Large meals or large amounts of carbohydrates at one time    • Less physical activity    • Stress    • Illness    • A lower dose of diabetes medicine or insulin, or a late dose    What you need to know about low blood sugar levels:  Symptoms include feeling shaky, dizzy, irritable, or confused. You can prevent symptoms by keeping your blood sugar levels from going too low. • Treat a low blood sugar level right away:      ? Drink 4 ounces of juice or have 1 tube of glucose gel. ? Check your blood sugar level again 10 to 15 minutes later. ? When the level goes back to normal, eat a meal or snack to prevent another decrease. • Keep glucose gel, raisins, or hard candy with you at all times to treat a low blood sugar level. • Your blood sugar level can get too low if you take diabetes medicine or insulin and do not eat enough food. • If you use insulin, check your blood sugar level before you exercise. ? If your blood sugar level is below 100 mg/dL, eat 4 crackers or 2 ounces of raisins, or drink 4 ounces of juice. ? Check your level every 30 minutes if you exercise longer than 1 hour. ? You may need a snack during or after exercise. What you can do to manage your blood sugar levels:   • Check your blood sugar levels as directed and as needed. Several items are available to use to check your levels. You may need to check by testing a drop of blood in a glucose monitor. You may instead be given a continuous glucose monitoring (CGM) device. The device is worn at all times. The CGM checks your blood sugar level every 5 minutes.  It sends results to an electronic device such as a smart phone. A CGM can be used with or without an insulin pump. You and your diabetes care team providers will decide on the best method for you. The goal for blood sugar levels before meals  is between 80 and 130 mg/dL and 2 hours after eating  is lower than 180 mg/dL. • Make healthy food choices. Work with a dietitian to create a meal plan that works for you and your schedule. A dietitian can help you learn how to eat the right amount of carbohydrates (sugar and starchy foods) during your meals and snacks. Examples of carbohydrates are breads, cereals, rice, pasta, fruit, low-fat dairy, and sweets. Carbohydrates can raise your blood sugar level if you eat too many at one time. • Eat high-fiber foods as directed. Fiber helps improve blood sugar levels. Fiber also lowers your risk for heart disease and other problems diabetes can cause. Examples of high-fiber foods include vegetables, whole-grain bread, and beans such as tenorio beans. Your dietitian can tell you how much fiber to have each day. • Get regular physical activity. Physical activity can help you get to your target blood sugar level goal and manage your weight. Get at least 150 minutes of moderate to vigorous aerobic physical activity each week. Resistance training, such as lifting weights, should be done 3 times each week. Do not miss more than 2 days of physical activity in a row. Do not sit longer than 30 minutes at a time. Your healthcare provider can help you create an activity plan. The plan can include the best activities for you and can help you build your strength and endurance. • Maintain a healthy weight. Ask your team what a healthy weight is for you. A healthy weight can help you control diabetes and prevent heart disease. Ask your team to help you create a weight-loss plan, if needed. Even a loss of 3% to 7% of your excess body weight can help make a difference in managing diabetes.  Your team will help you set a weight-loss goal, such as 10 to 15 pounds, or 5% of your extra weight. Together you and your team can set manageable weight-loss goals. • Take your diabetes medicine or insulin as directed. You may need diabetes medicine, insulin, or both to help control your blood sugar levels. Your healthcare provider will teach you how and when to take your diabetes medicine or insulin. You will also be taught about side effects oral diabetes medicine can cause. Insulin may be injected or given through a pump or pen. You and your providers will decide on the best method for you:    ? An insulin pump  is an implanted device that gives your insulin 24 hours a day. An insulin pump prevents the need for multiple insulin injections in a day. ? An insulin pen  is a device prefilled with the right amount of insulin. ? You and your family members will be taught how to draw up and give insulin  if this is the best method for you. Your providers will also teach you how to dispose of needles and syringes. ? You will learn how much insulin you need  and when to give it. You will be taught when not to give insulin. You will also be taught what to do if your blood sugar level drops too low. This may happen if you take insulin and do not eat the right amount of carbohydrates. More ways to manage type 2 diabetes:   • Wear medical alert identification. Wear medical alert jewelry or carry a card that says you have diabetes. Ask your provider where to get these items. • Do not smoke. Nicotine and other chemicals in cigarettes and cigars can cause lung and blood vessel damage. It also makes it more difficult to manage your diabetes. Ask your provider for information if you currently smoke and need help to quit. Do not use e-cigarettes or smokeless tobacco in place of cigarettes or to help you quit. They still contain nicotine.     • Check your feet each day for cuts, scratches, calluses, or other wounds. Look for redness and swelling, and feel for warmth. Wear shoes that fit well. Check your shoes for rocks or other objects that can hurt your feet. Do not walk barefoot or wear shoes without socks. Wear cotton socks to help keep your feet dry. • Ask about vaccines you may need. You have a higher risk for serious illness if you get the flu, pneumonia, COVID-19, or hepatitis. Ask your provider if you should get vaccines to prevent these or other diseases, and when to get the vaccines. • Talk to your provider if you become stressed about diabetes care. Sometimes being able to fit diabetes care into your life can cause increased stress. The stress can cause you not to take care of yourself properly. Your provider can help by offering tips about self-care. A mental health provider can listen and offer help with self-care issues. Other types of counseling can help you make nutrition or physical activity changes. • Have your A1c checked as directed. Your provider may check your A1c every 3 months, or 2 times each year if your diabetes is controlled. An A1c test shows the average amount of sugar in your blood over the past 2 to 3 months. Your provider will tell you what your A1c level should be. • Have screening tests as directed. Your provider may recommend screening for complications of diabetes and other conditions that may develop. Some screenings may begin right away and some may happen within the first 5 years of diagnosis:    ? Examples of diabetes complications  include kidney problems, high cholesterol, high blood pressure, blood vessel problems, eye problems, and sleep apnea. ? You may be screened for a low vitamin B level  if you take oral diabetes medicine for a long time. ? You may be screened for polycystic ovarian syndrome (PCOS)  if you are of childbearing age. Follow up with your doctor or diabetes care team providers as directed:   You may need to have blood tests done before your follow-up visit. The test results will show if changes need to be made in your treatment or self-care. Talk to your provider if you cannot afford your medicine. Write down your questions so you remember to ask them during your visits. © Copyright [employer] 2023 Information is for End User's use only and may not be sold, redistributed or otherwise used for commercial purposes. The above information is an  only. It is not intended as medical advice for individual conditions or treatments. Talk to your doctor, nurse or pharmacist before following any medical regimen to see if it is safe and effective for you. Foot Care for People with Diabetes   AMBULATORY CARE:   What you need to know about foot care:  Long-term high blood sugar levels can damage the blood vessels and nerves in your legs and feet. This damage makes it hard to feel pressure, pain, temperature, and touch. You may not be able to feel a cut or sore, or shoes that are too tight. Foot care is needed to prevent serious problems, such as an infection or amputation. Diabetes may cause your toes to become crooked or curved under. These changes may affect the way you walk and can lead to increased pressure on your foot. The pressure can decrease blood flow to your feet. Lack of blood flow increases your risk for a foot ulcer. Call your care team provider if:   • Your feet become numb, weak, or hard to move. • You have pus draining from a sore on your foot. • You have a wound on your foot that gets bigger, deeper, or does not heal.    • You see blisters, cuts, scratches, calluses, or sores on your foot. • You have a fever, and your feet become red, warm, and swollen. • Your toenails become thick, curled, or yellow. • You find it hard to check your feet because your vision is poor. • You have questions or concerns about your condition or care. How to care for your feet:   • Check your feet each day.   Look at your whole foot, including the bottom, and between and under your toes. Check for wounds, corns, and calluses. Use a mirror to see the bottom of your feet. The skin on your feet may be shiny, tight, or darker than normal. Your feet may also be cold and pale. Feel your feet by running your hands along the tops, bottoms, sides, and between your toes. Redness, swelling, and warmth are signs of blood flow problems that can lead to a foot ulcer. Do not try to remove corns or calluses yourself. Do not ignore small problems, such as dry skin or small wounds. These can become life-threatening over time without proper care. • Wash your feet each day with soap and warm water. Do not use hot water, because this can injure your foot. Dry your feet gently with a towel after you wash them. Dry between and under your toes. • Apply lotion or a moisturizer on your dry feet. Ask your care team provider what lotions are best to use. Do not put lotion or moisturizer between your toes. Moisture between your toes could lead to skin breakdown. • Cut your toenails correctly. File or cut your toenails straight across. Use a soft brush to clean around your toenails. If your toenails are very thick, you may need to have a care team provider or specialist cut them. • Protect your feet. Do not walk barefoot or wear your shoes without socks. Check your shoes for rocks or other objects that can hurt your feet. Wear cotton socks to help keep your feet dry. Wear socks without toe seams, or wear them with the seams inside out. Change your socks each day. Do not wear socks that are dirty or damp. • Wear shoes that fit well. Wear shoes that do not rub against any area of your feet. Your shoes should be ½ to ¾ inch (1 to 2 centimeters) longer than your feet. Your shoes should also have extra space around the widest part of your feet.  Walking or athletic shoes with laces or straps that adjust are best. Ask your care team provider for help to choose shoes that fit you best. Ask your provider if you need to wear an insert, orthotic, or bandage on your feet. • Go to your follow-up visits. Your care team provider will do a foot exam at least 1 time each year. You may need a foot exam more often if you have nerve damage, foot deformities, or ulcers. Your provider will check for nerve damage and how well you can feel your feet. Your provider will check your shoes to see if they fit well. • Do not smoke. Smoking can damage your blood vessels and put you at increased risk for foot ulcers. Ask your care team provider for information if you currently smoke and need help to quit. E-cigarettes or smokeless tobacco still contain nicotine. Talk to your care team provider before you use these products. Follow up with your diabetes care team provider or foot specialist as directed: You will need to have your feet checked at least 1 time each year. You may need a foot exam more often if you have nerve damage, foot deformities, or ulcers. Write down your questions so you remember to ask them during your visits. © Copyright [facility] 2023 Information is for End User's use only and may not be sold, redistributed or otherwise used for commercial purposes. The above information is an  only. It is not intended as medical advice for individual conditions or treatments. Talk to your doctor, nurse or pharmacist before following any medical regimen to see if it is safe and effective for you.

## 2023-09-29 NOTE — PROGRESS NOTES
INTERNAL MEDICINE FOLLOW-UP VISIT  [facility] - [facility] INTERNAL MEDICINE [address]    NAME: Madison Mauricio  AGE: 52 y.o. SEX: female  : 1970     DATE: 2023     Assessment and Plan:   Type 2 diabetes mellitus with hypoglycemia without coma, with long-term current use of insulin ([address])  Averaging 200's at home. Diagnosed with salmenella through urine and started on antibitoics. Following with endocrinology in the past however stopped going and has been followed by PCP for management of diabetes. She is due for hemoglobin A1c which we will get today. Follows with podiatry and has an appointment in 2 weeks. Diabetic foot exam completed today. No open wounds she does have some dry skin to her feet. Did educate use of Lac-Hydrin lotion. Educated to not put lotion between her toes to prevent fungal infection  - Hemoglobin A1C; Future  - Comprehensive metabolic panel; Future  - CBC and differential; Future  - Albumin / creatinine urine ratio; Future  - Lipid Panel with Direct LDL reflex; Future  - TSH, 3rd generation with Free T4 reflex; Future  - Hemoglobin A1C; Future  - Comprehensive metabolic panel; Future  - CBC and differential; Future  - Lipid Panel with Direct LDL reflex; Future  - TSH, 3rd generation with Free T4 reflex; Future  - Hemoglobin A1c (w/out EAG) ([facility] ONLY); Future    Rheumatoid arthritis  Diagnosed with FAITH at the age of 13. Continues following with rheumatologist for multiple joint pain. Needing intermittent use of rolling walker due to pain with ambulation. Would benefit from the use of a scooter for leaving the home and long distance ambulation due to safety, unsteady gait and most definitely for pain    Irritable bowel syndrome, unspecified type  Scheduled EGD and colonoscopy  Following with GI    XU (obstructive sleep apnea)  Stable    Paroxysmal A-fib ([address])  Follows with cardiology    Benign essential hypertension  Stable at this time. Continue to limit salt in your diet no more than 2000 mg/d. Intermittent BP checks at home    Overactive bladder  Urology follows  Cystoscopy with noted erythematous patch to right lateral wall which will be biopsied to rule out carcinoma     PTSD (post-traumatic stress disorder)  Stable. Will fill medications until she finds another provider  PATRICIA BASHIR or [place]  reviewed: No red flags were identified    UTI with salmonella  Following with urology, referred her to ID due to chronic reinfection with same microorganism    Wounds  To right hand 3rd digit, left pinna of ear, MRSA in the past, culture obtained form ear and finger. Not a lot of drainage. Will wait for cultures to return    [facility] maintenance  - Ambulatory Referral to Gynecology; Future        Return in 6 months (on 3/29/2024) for Diabetes follow-up. Chief Complaint:     Chief Complaint   Patient presents with   • Follow-up      History of Present Illness:     Patient is here today for a follow-up as well as to discuss several issues. Continues to smoke 1 pack a day is not interested in quitting at this time. She has been trying to get medical equipment such as a scooter however for some reason this has not been delivered or carried out. We will try to refer her again. The following portions of the patient's history were reviewed and updated as appropriate: allergies, current medications, past family history, past medical history, past social history, past surgical history and problem list.     Review of Systems:     Review of Systems   Constitutional: Negative for appetite change, chills, diaphoresis, fatigue, fever and unexpected weight change. HENT: Negative for postnasal drip and sneezing. Eyes: Negative for visual disturbance. Respiratory: Negative for chest tightness and shortness of breath. Cardiovascular: Negative for chest pain, palpitations and leg swelling. Gastrointestinal: Negative for abdominal pain and blood in stool. Endocrine: Negative for cold intolerance, heat intolerance, polydipsia, polyphagia and polyuria. Genitourinary: Negative for difficulty urinating, dysuria, frequency and urgency. Musculoskeletal: Positive for arthralgias. Negative for myalgias. Skin: Negative for rash and wound. Neurological: Negative for dizziness, weakness, light-headedness and headaches. Hematological: Negative for adenopathy. Psychiatric/Behavioral: Negative for confusion, dysphoric mood and sleep disturbance. The patient is not nervous/anxious.          Past Medical History:     Past Medical History:   Diagnosis Date   • Abnormal ECG    • Abnormal Pap smear of cervix    • Asthma    • Atrial fibrillation (HCC)    • Chlamydia    • Chronic kidney disease    • Coronary artery disease    • Depression    • Diabetes mellitus ([address] [facility])    • Fibromyalgia    • Heart disease    • History of transfusion    • Hypertension    • Migraines    • Myocardial infarction [facility])    • Neuromuscular disorder ([address] [facility])    • Opioid abuse, in remission ([facility])    • Sick sinus syndrome (HCC)    • Stroke ([facility])    • Varicella         Current Medications:     Current Outpatient Medications:   •  albuterol (PROVENTIL HFA,VENTOLIN HFA) 90 mcg/act inhaler, Inhale 2 puffs every 4 (four) hours as needed, Disp: , Rfl:   •  Alcohol Swabs (Pharmacist Choice Alcohol) PADS, 5 (five) times a day, Disp: , Rfl:   •  ALPRAZolam (XANAX) 1 mg tablet, Take 1 tablet (1 mg total) by mouth 4 (four) times a day as needed for anxiety, Disp: 60 tablet, Rfl: 0  •  ammonium lactate (LAC-HYDRIN) 12 % lotion, Apply topically 2 (two) times a day as needed for dry skin, Disp: 222 mL, Rfl: 3  •  ARIPiprazole (ABILIFY) 10 mg tablet, Take 1 tablet (10 mg total) by mouth daily, Disp: 90 tablet, Rfl: 3  •  atorvastatin (LIPITOR) 10 mg tablet, Take 10 mg by mouth daily, Disp: , Rfl:   •  azithromycin (ZITHROMAX) 500 MG tablet, , Disp: , Rfl:   •  BD Pen Needle Nkechi 2nd Gen 32G X 4 MM MISC, USE AS DIRECTED FOR BEFORE A MEAL AND AT BEDTIME GLUCOSE INJECTION, Disp: , Rfl:   •  BD Pen Needle Nkechi 2nd Gen 32G X 4 MM MISC, USE WITH INSULIN 5 TIMES A DAY, Disp: 200 each, Rfl: 3  •  BD PrecisionGlide Needle 23G X 1-1/2" MISC, USE AS DIRECTED TO ADMINISTER NALOXONE INJECTION.   MAY DISPENSE 23-25 GAUGE 1-1.5" NEEDLE., Disp: , Rfl:   •  Blood Glucose Monitoring Suppl (ONE TOUCH ULTRA 2) w/Device KIT, Use as directed, Disp: , Rfl:   •  busPIRone (BUSPAR) 30 MG tablet, Take 30 mg by mouth 2 (two) times a day, Disp: , Rfl:   •  Continuous Blood Gluc  (Dexcom G6 ) ALEKS, Use 1 Device 4 (four) times a day (with meals and at bedtime), Disp: 1 each, Rfl: 6  •  Continuous Blood Gluc Sensor (Dexcom G6 Sensor) MISC, CHANGE SENSOR EVERY 10 DAYS, Disp: 3 each, Rfl: 6  •  Continuous Blood Gluc Transmit (Dexcom G6 Transmitter) MISC, USE 4 TIMES A DAY (WITH MEALS AND AT BEDTIME), Disp: , Rfl:   •  famotidine (PEPCID) 20 mg tablet, TAKE 1 TABLET (20 MG TOTAL) BY MOUTH 2 (TWO) TIMES A DAY AS NEEDED FOR INDIGESTION, Disp: 90 tablet, Rfl: 3  •  fexofenadine (ALLEGRA) 180 MG tablet, TAKE 1 TABLET (180 MG TOTAL) BY MOUTH DAILY, Disp: 90 tablet, Rfl: 3  •  Flovent Diskus 250 MCG/ACT diskus inhaler, INHALE 1 PUFF 2 (TWO) TIMES A DAY, Disp: 60 each, Rfl: 1  •  fluconazole (DIFLUCAN) 150 mg tablet, Take 1 tablet now then repeat in 48 hours, Disp: 2 tablet, Rfl: 1  •  gabapentin (NEURONTIN) 100 mg capsule, TAKE 1 CAPSULE (100 MG TOTAL) BY MOUTH DAILY AT BEDTIME TAKE ADDITIONAL 100 MG CAPSULE TO TOTAL 900 MG AT BEDTIME, Disp: 30 capsule, Rfl: 3  •  gabapentin (NEURONTIN) 400 mg capsule, TAKE 2 CAPSULES BY MOUTH 3 TIMES A DAY, Disp: 90 capsule, Rfl: 3  •  hydrALAZINE (APRESOLINE) 50 mg tablet, Take 50 mg by mouth 3 (three) times a day, Disp: , Rfl:   •  Incontinence Supply Disposable (Depend Undergarment Ex Absorb) MISC, Use 4 (four) times a day as needed (incontinence), Disp: 120 each, Rfl: 10  •  insulin lispro (HumaLOG) 100 units/mL injection pen, INJECT 20 UNITS UNDER THE SKIN 3 (THREE) TIMES A DAY BEFORE MEALS INDICATIONS: TYPE 2 DIABETES MELLITUS., Disp: 15 mL, Rfl: 3  •  Lantus SoloStar 100 units/mL SOPN, INJECT 55 UNITS UNDER THE SKIN 2 (TWO) TIMES A DAY AT LUNCH AND AT BEDTIME., Disp: 45 mL, Rfl: 3  •  leflunomide (ARAVA) 20 MG tablet, Take 20 mg by mouth daily, Disp: , Rfl:   •  lidocaine (Lidoderm) 5 %, Apply 1 patch topically over 12 hours daily Remove & Discard patch within 12 hours or as directed by MD, Disp: 60 patch, Rfl: 0  •  lisinopril (ZESTRIL) 5 mg tablet, Take 5 mg by mouth daily, Disp: , Rfl:   •  lovastatin (MEVACOR) 10 MG tablet, Take 10 mg by mouth, Disp: , Rfl:   •  meclizine (ANTIVERT) 25 mg tablet, Take 25 mg by mouth Three times daily as needed, Disp: , Rfl:   •  methocarbamol (ROBAXIN) 750 mg tablet, TAKE ONE TABLET BY MOUTH DAILY AS NEEDED SPASMS, Disp: , Rfl:   •  metoprolol succinate (TOPROL-XL) 100 mg 24 hr tablet, Take 100 mg by mouth daily, Disp: , Rfl:   •  Mirabegron ER (Myrbetriq) 25 MG TB24, Take 25 mg by mouth in the morning, Disp: 30 tablet, Rfl: 2  •  montelukast (SINGULAIR) 10 mg tablet, TAKE 1 TABLET (10 MG TOTAL) BY MOUTH DAILY AT BEDTIME, Disp: 90 tablet, Rfl: 3  •  Multiple Vitamins-Minerals (Centrum Silver 50+Women) TABS, TAKE ONE TABLET BY MOUTH DAILY, Disp: 30 tablet, Rfl: 3  •  nitrofurantoin (MACROBID) 100 mg capsule, Take 1 capsule (100 mg total) by mouth 2 (two) times a day, Disp: 10 capsule, Rfl: 0  •  Nutritional Supplements (Glucerna Hunger Smart Shake) LIQD, Take 1 Can by mouth 3 (three) times a day, Disp: 296 mL, Rfl: 5  •  nystatin (MYCOSTATIN) ointment, Apply topically 2 (two) times a day, Disp: 30 g, Rfl: 6  •  pantoprazole (PROTONIX) 40 mg tablet, TAKE 1 TABLET (40 MG TOTAL) BY MOUTH DAILY, Disp: 90 tablet, Rfl: 3  •  potassium chloride (K-DUR,KLOR-CON) 20 mEq tablet, Take 1 tablet (20 mEq total) by mouth daily, Disp: 30 tablet, Rfl: 1  •  potassium chloride (MICRO-K) 10 MEQ CR capsule, TAKE 2 CAPSULES (20 MEQ TOTAL) BY MOUTH 2 (TWO) TIMES A DAY, Disp: 90 capsule, Rfl: 3  •  prazosin (MINIPRESS) 5 mg capsule, TAKE 1 CAPSULE (5 MG TOTAL) BY MOUTH DAILY AT BEDTIME, Disp: 30 capsule, Rfl: 3  •  risperiDONE (RisperDAL) 3 mg tablet, Take 3 mg by mouth 2 (two) times a day, Disp: , Rfl:   •  rivaroxaban (XARELTO) 20 mg tablet, Take 20 mg by mouth, Disp: , Rfl:   •  sertraline (ZOLOFT) 100 mg tablet, TAKE 1 TABLET (100 MG TOTAL) BY MOUTH DAILY, Disp: 90 tablet, Rfl: 3  •  Spacer/Aero-Holding Chambers (AeroChamber Plus Geoff-Vu w/Mask) MISC, 1 EACH (1 APPLICATION TOTAL) BY MISCELLANEOUS ROUTE DAILY AS NEEDED (ASTHMA EXACERBATION). , Disp: , Rfl:   •  TiZANidine (ZANAFLEX) 2 MG capsule, TAKE 1 TABLET UP TO THREE TIMES A DAY AS NEEDED FOR MUSCLE SPASM, Disp: , Rfl:   •  triamcinolone (KENALOG) 0.5 % cream, Apply topically 2 (two) times a day To rash, Disp: 30 g, Rfl: 3  •  Trulicity 1.5 CA/0.4HO injection, INJECT 1.5 MG UNDER THE SKIN EVERY 7 DAYS., Disp: 2 mL, Rfl: 3  •  zolpidem (AMBIEN) 10 mg tablet, TAKE 1 TABLET (10 MG TOTAL) BY MOUTH DAILY, Disp: 90 tablet, Rfl: 0  •  ciprofloxacin (CIPRO) 500 mg tablet, Take 1 tablet (500 mg total) by mouth every 12 (twelve) hours for 10 days (Patient not taking: Reported on 9/29/2023), Disp: 20 tablet, Rfl: 0  •  ketorolac (ACULAR) 0.5 % ophthalmic solution, INSTILL ONE DROP INTO THE LEFT EYE 4 TIMES A DAY (Patient not taking: Reported on 9/29/2023), Disp: , Rfl:   •  metroNIDAZOLE (METROCREAM) 0.75 % cream, Apply topically 2 (two) times a day, Disp: 45 g, Rfl: 0  •  naloxone (NARCAN) 0.4 mg/mL injection, INJECT 1 ML IN SHOULDER OR THIGH. REPEAT AFTER 2-3 MINUTES IF NO OR MINIMAL RESPONSE.  (Patient not taking: Reported on 9/29/2023), Disp: , Rfl:   •  nystatin (MYCOSTATIN) cream, Apply topically 2 (two) times a day (Patient not taking: Reported on 9/29/2023), Disp: , Rfl:   •  ofloxacin (OCUFLOX) 0.3 % ophthalmic solution, INSTILL ONE DROP INTO LEFT EYE 4 TIMES A DAY, Disp: , Rfl:   •  Olopatadine HCl (Pataday) 0.7 % SOLN, 1 drop each eye twice a day, Disp: 2.5 mL, Rfl: 3  •  OneTouch Delica Lancets 16Z MISC, 2 (two) times a day, Disp: , Rfl:   •  OneTouch Ultra test strip, TEST TWICE DAILY OR AS DIRECTED BY MD, Disp: , Rfl:   •  oxyCODONE (ROXICODONE) 5 immediate release tablet, 10 mg, Disp: , Rfl:   •  polyethylene glycol-electrolytes (TriLyte) 4000 mL solution, Take 4,000 mL by mouth once for 1 dose Take 4000 mL by mouth once for 1 dose. Use as directed, Disp: 4000 mL, Rfl: 0  •  Prasterone (Intrarosa) 6.5 MG INST, Insert 6.5 mg into the vagina daily, Disp: 28 each, Rfl: 6  •  prednisoLONE acetate (PRED FORTE) 1 % ophthalmic suspension, INSTILL ONE DROP INTO LEFT EYE 4 TIMES A DAY (Patient not taking: Reported on 9/29/2023), Disp: , Rfl:      Allergies: Allergies   Allergen Reactions   • Other Anaphylaxis     Capzasin HP -- severe burning and swelling of the skin    • Clarithromycin GI Intolerance   • Acetaminophen GI Intolerance   • Bactrim [Sulfamethoxazole-Trimethoprim] Itching, GI Intolerance, Dizziness, Confusion and Lightheadedness     Patient passes out when on this medication for several days    • Cephalosporins Hives     Tolerated Cefdinir 2/2023   • Latex Hives   • Oxycodone-Acetaminophen GI Intolerance   • Penicillins Diarrhea   • Trazodone Diarrhea   • Capsaicin Rash   • Naproxen Palpitations        Physical Exam:     /98 (BP Location: Left arm, Patient Position: Sitting, Cuff Size: Standard)   Pulse 91   Ht 5' 5.5" (1.664 m)   Wt 72.6 kg (160 lb)   SpO2 98%   BMI 26.22 kg/m²     Physical Exam  Cardiovascular:      Pulses: no weak pulses          Dorsalis pedis pulses are 2+ on the right side and 2+ on the left side. Posterior tibial pulses are 2+ on the right side and 2+ on the left side. Feet:      Right foot:      Skin integrity: No ulcer, skin breakdown, erythema, warmth, callus or dry skin.       Left foot:      Skin integrity: No ulcer, skin breakdown, erythema, warmth, callus or dry skin. Diabetic Foot Exam    Patient's shoes and socks removed. Right Foot/Ankle   Right Foot Inspection  Skin Exam: skin normal and skin intact. No dry skin, no warmth, no callus, no erythema, no maceration, no abnormal color, no pre-ulcer, no ulcer and no callus. Toe Exam: ROM and strength within normal limits. Sensory   Vibration: intact  Monofilament testing: intact    Vascular  Capillary refills: < 3 seconds  The right DP pulse is 2+. The right PT pulse is 2+. Left Foot/Ankle  Left Foot Inspection  Skin Exam: skin normal and skin intact. No dry skin, no warmth, no erythema, no maceration, normal color, no pre-ulcer, no ulcer and no callus. Toe Exam: ROM and strength within normal limits. Sensory   Vibration: intact  Monofilament testing: intact    Vascular  Capillary refills: < 3 seconds  The left DP pulse is 2+. The left PT pulse is 2+. Assign Risk Category  No deformity present  No loss of protective sensation  No weak pulses  Risk: 0     Data:     Laboratory Results: I have personally reviewed the pertinent laboratory results/reports   Radiology/Other Diagnostic Testing Results: I have personally reviewed pertinent reports.       Enedina Schroeder, [address] [facility] INTERNAL MEDICINE [address]

## 2023-09-30 DIAGNOSIS — E11.649 TYPE 2 DIABETES MELLITUS WITH HYPOGLYCEMIA WITHOUT COMA, WITH LONG-TERM CURRENT USE OF INSULIN (HCC): ICD-10-CM

## 2023-09-30 DIAGNOSIS — B37.31 VAGINAL YEAST INFECTION: ICD-10-CM

## 2023-09-30 DIAGNOSIS — Z79.4 TYPE 2 DIABETES MELLITUS WITH HYPOGLYCEMIA WITHOUT COMA, WITH LONG-TERM CURRENT USE OF INSULIN (HCC): ICD-10-CM

## 2023-09-30 LAB
BACTERIA UR CULT: NORMAL
ENDOMYSIUM IGA SER QL: NEGATIVE
GLIADIN PEPTIDE IGA SER-ACNC: 7 UNITS (ref 0–19)
GLIADIN PEPTIDE IGG SER-ACNC: 2 UNITS (ref 0–19)
IGA SERPL-MCNC: 282 MG/DL (ref 87–352)
TTG IGA SER-ACNC: <2 U/ML (ref 0–3)
TTG IGG SER-ACNC: <2 U/ML (ref 0–5)

## 2023-10-02 ENCOUNTER — TELEPHONE (OUTPATIENT)
Dept: UROLOGY | Facility: MEDICAL CENTER | Age: 53
End: 2023-10-02

## 2023-10-02 ENCOUNTER — TELEPHONE (OUTPATIENT)
Age: 53
End: 2023-10-02

## 2023-10-02 RX ORDER — FLUCONAZOLE 150 MG/1
TABLET ORAL
Qty: 2 TABLET | Refills: 1 | OUTPATIENT
Start: 2023-10-02

## 2023-10-02 RX ORDER — INSULIN GLARGINE 100 [IU]/ML
INJECTION, SOLUTION SUBCUTANEOUS
Qty: 45 ML | Refills: 3 | OUTPATIENT
Start: 2023-10-02

## 2023-10-02 RX ORDER — SODIUM CHLORIDE, SODIUM LACTATE, POTASSIUM CHLORIDE, CALCIUM CHLORIDE 600; 310; 30; 20 MG/100ML; MG/100ML; MG/100ML; MG/100ML
125 INJECTION, SOLUTION INTRAVENOUS CONTINUOUS
OUTPATIENT
Start: 2023-10-02

## 2023-10-02 NOTE — TELEPHONE ENCOUNTER
Patients GI provider:  Dr. Chaim Fleischer    Number to return call: [phone]    Reason for call: Pt calling to find out her procedure arrival time before 4pm in order to arrange transportation to the facility. If she does hear before 4pm she will have to reschedule. Pt also asking to review blood thinner medication instructions and is asking for a call back as soon as possible.      Scheduled procedure/appointment date if applicable: 71/13/7726 - Colonoscopy & EGD - Chaim Fleischer

## 2023-10-12 NOTE — RESULT ENCOUNTER NOTE
Please call the patient regarding her result. Recent blood work shows evidence of inflammation which is likely related to her recent UTI. No suggestion of celiac disease.

## 2023-10-13 DIAGNOSIS — F41.9 ANXIETY: ICD-10-CM

## 2023-10-13 RX ORDER — ALPRAZOLAM 1 MG/1
1 TABLET ORAL 4 TIMES DAILY PRN
Qty: 60 TABLET | Refills: 0 | Status: SHIPPED | OUTPATIENT
Start: 2023-10-13

## 2023-10-16 ENCOUNTER — TELEPHONE (OUTPATIENT)
Dept: INFECTIOUS DISEASES | Facility: CLINIC | Age: 53
End: 2023-10-16

## 2023-10-16 DIAGNOSIS — E11.649 TYPE 2 DIABETES MELLITUS WITH HYPOGLYCEMIA WITHOUT COMA, WITH LONG-TERM CURRENT USE OF INSULIN (HCC): ICD-10-CM

## 2023-10-16 DIAGNOSIS — Z79.4 TYPE 2 DIABETES MELLITUS WITH HYPOGLYCEMIA WITHOUT COMA, WITH LONG-TERM CURRENT USE OF INSULIN (HCC): ICD-10-CM

## 2023-10-16 RX ORDER — INSULIN LISPRO 100 [IU]/ML
INJECTION, SOLUTION INTRAVENOUS; SUBCUTANEOUS
Qty: 15 ML | Refills: 3 | Status: SHIPPED | OUTPATIENT
Start: 2023-10-16

## 2023-10-16 NOTE — TELEPHONE ENCOUNTER
Using  [id number], Diamond Sanchez - contacted patient to schedule consult. Non urgent referral for asymptomatic bacteriuria      left message for pt to CB should patient wish to schedule.

## 2023-10-24 DIAGNOSIS — E11.44 DIABETIC AMYOTROPHY ASSOCIATED WITH TYPE 2 DIABETES MELLITUS (HCC): ICD-10-CM

## 2023-10-24 DIAGNOSIS — E11.649 TYPE 2 DIABETES MELLITUS WITH HYPOGLYCEMIA WITHOUT COMA, WITH LONG-TERM CURRENT USE OF INSULIN (HCC): ICD-10-CM

## 2023-10-24 DIAGNOSIS — Z79.4 TYPE 2 DIABETES MELLITUS WITH HYPOGLYCEMIA WITHOUT COMA, WITH LONG-TERM CURRENT USE OF INSULIN (HCC): ICD-10-CM

## 2023-10-24 RX ORDER — GABAPENTIN 100 MG/1
100 CAPSULE ORAL
Qty: 30 CAPSULE | Refills: 3 | Status: SHIPPED | OUTPATIENT
Start: 2023-10-24

## 2023-10-24 RX ORDER — DULAGLUTIDE 1.5 MG/.5ML
INJECTION, SOLUTION SUBCUTANEOUS
Qty: 2 ML | Refills: 3 | Status: SHIPPED | OUTPATIENT
Start: 2023-10-24

## 2023-10-25 ENCOUNTER — TELEPHONE (OUTPATIENT)
Age: 53
End: 2023-10-25

## 2023-10-25 DIAGNOSIS — F63.81 INTERMITTENT EXPLOSIVE DISORDER: ICD-10-CM

## 2023-10-25 DIAGNOSIS — F43.10 PTSD (POST-TRAUMATIC STRESS DISORDER): ICD-10-CM

## 2023-10-25 DIAGNOSIS — B37.31 VAGINAL YEAST INFECTION: ICD-10-CM

## 2023-10-25 RX ORDER — FLUCONAZOLE 150 MG/1
TABLET ORAL
Qty: 2 TABLET | Refills: 0 | Status: CANCELLED | OUTPATIENT
Start: 2023-10-25 | End: 2024-10-25

## 2023-10-25 RX ORDER — SERTRALINE HYDROCHLORIDE 100 MG/1
100 TABLET, FILM COATED ORAL DAILY
Qty: 90 TABLET | Refills: 0 | Status: SHIPPED | OUTPATIENT
Start: 2023-10-25 | End: 2023-10-25 | Stop reason: SDUPTHER

## 2023-10-25 RX ORDER — PRAZOSIN HYDROCHLORIDE 5 MG/1
5 CAPSULE ORAL
Qty: 30 CAPSULE | Refills: 0 | Status: SHIPPED | OUTPATIENT
Start: 2023-10-25 | End: 2023-10-25 | Stop reason: SDUPTHER

## 2023-10-25 NOTE — TELEPHONE ENCOUNTER
Patients GI provider:  Dr. Enciso Stage    Number to return call: [phone]    Reason for call: Pt is on xarelto. Please contact patient regarding xarelto hold.  Thank you    Scheduled procedure/appointment date if applicable: Apt/procedure 11/11/2023

## 2023-10-25 NOTE — TELEPHONE ENCOUNTER
Scheduled date of colonoscopy (as of today):11/11/2023  Physician performing colonoscopy:Dr. Luis Heard  Location of colonoscopy:[facility]  Bowel prep reviewed with patient:Anatoly  Instructions reviewed with patient by:Alex/Given to pt at appt  Clearances: Dr. Alysia Jackson

## 2023-10-28 DIAGNOSIS — F17.200 SMOKING: ICD-10-CM

## 2023-10-30 ENCOUNTER — TELEPHONE (OUTPATIENT)
Dept: GASTROENTEROLOGY | Facility: CLINIC | Age: 53
End: 2023-10-30

## 2023-10-30 RX ORDER — FLUTICASONE PROPIONATE 250 UG/1
POWDER, METERED RESPIRATORY (INHALATION)
Qty: 60 EACH | Refills: 1 | Status: SHIPPED | OUTPATIENT
Start: 2023-10-30

## 2023-10-31 DIAGNOSIS — N39.41 URGENCY INCONTINENCE: ICD-10-CM

## 2023-10-31 RX ORDER — MIRABEGRON 25 MG/1
TABLET, FILM COATED, EXTENDED RELEASE ORAL
Qty: 30 TABLET | Refills: 0 | Status: SHIPPED | OUTPATIENT
Start: 2023-10-31

## 2023-11-12 DIAGNOSIS — F41.9 ANXIETY: ICD-10-CM

## 2023-11-13 ENCOUNTER — TELEPHONE (OUTPATIENT)
Dept: GASTROENTEROLOGY | Facility: CLINIC | Age: 53
End: 2023-11-13

## 2023-11-13 RX ORDER — ALPRAZOLAM 1 MG/1
1 TABLET ORAL 4 TIMES DAILY PRN
Qty: 60 TABLET | Refills: 0 | Status: SHIPPED | OUTPATIENT
Start: 2023-11-13

## 2023-11-13 NOTE — TELEPHONE ENCOUNTER
Received below message from [address] [place] [address] [birth year] cancelling for 11/11- she took her trulicity yesterday. there is a note in chart that we called her 1 week ago & LM not to take it. . please reschedule her and remind her not to take for 7days prior to appt. '    Called and left message for patient to call back to reschedule her procedure.

## 2023-11-16 ENCOUNTER — TELEPHONE (OUTPATIENT)
Age: 53
End: 2023-11-16

## 2023-11-17 ENCOUNTER — TELEPHONE (OUTPATIENT)
Dept: INFECTIOUS DISEASES | Facility: CLINIC | Age: 53
End: 2023-11-17

## 2023-11-17 DIAGNOSIS — Z86.19 HISTORY OF SALMONELLA INFECTION: ICD-10-CM

## 2023-11-17 DIAGNOSIS — R82.71 ASYMPTOMATIC BACTERIURIA: Primary | ICD-10-CM

## 2023-11-18 DIAGNOSIS — K21.9 GASTROESOPHAGEAL REFLUX DISEASE WITHOUT ESOPHAGITIS: ICD-10-CM

## 2023-11-20 ENCOUNTER — TELEPHONE (OUTPATIENT)
Age: 53
End: 2023-11-20

## 2023-11-20 RX ORDER — FAMOTIDINE 20 MG/1
20 TABLET, FILM COATED ORAL 2 TIMES DAILY PRN
Qty: 90 TABLET | Refills: 3 | Status: SHIPPED | OUTPATIENT
Start: 2023-11-20

## 2023-11-29 DIAGNOSIS — Z79.4 TYPE 2 DIABETES MELLITUS WITH HYPOGLYCEMIA WITHOUT COMA, WITH LONG-TERM CURRENT USE OF INSULIN (HCC): ICD-10-CM

## 2023-11-29 DIAGNOSIS — E11.649 TYPE 2 DIABETES MELLITUS WITH HYPOGLYCEMIA WITHOUT COMA, WITH LONG-TERM CURRENT USE OF INSULIN (HCC): ICD-10-CM

## 2023-11-29 RX ORDER — GABAPENTIN 400 MG/1
CAPSULE ORAL
Qty: 90 CAPSULE | Refills: 3 | Status: SHIPPED | OUTPATIENT
Start: 2023-11-29

## 2023-12-04 ENCOUNTER — VBI (OUTPATIENT)
Dept: ADMINISTRATIVE | Facility: OTHER | Age: 53
End: 2023-12-04

## 2023-12-04 DIAGNOSIS — F41.9 ANXIETY: ICD-10-CM

## 2023-12-04 DIAGNOSIS — N39.41 URGENCY INCONTINENCE: ICD-10-CM

## 2023-12-04 DIAGNOSIS — F43.10 PTSD (POST-TRAUMATIC STRESS DISORDER): ICD-10-CM

## 2023-12-04 RX ORDER — ZOLPIDEM TARTRATE 10 MG/1
10 TABLET ORAL DAILY
Qty: 30 TABLET | Refills: 0 | Status: SHIPPED | OUTPATIENT
Start: 2023-12-04 | End: 2024-03-13

## 2023-12-04 RX ORDER — MIRABEGRON 25 MG/1
25 TABLET, FILM COATED, EXTENDED RELEASE ORAL DAILY
Qty: 30 TABLET | Refills: 0 | Status: SHIPPED | OUTPATIENT
Start: 2023-12-04

## 2023-12-04 RX ORDER — ALPRAZOLAM 1 MG/1
1 TABLET ORAL 4 TIMES DAILY PRN
Qty: 60 TABLET | Refills: 0 | Status: SHIPPED | OUTPATIENT
Start: 2023-12-04

## 2023-12-05 DIAGNOSIS — E11.649 TYPE 2 DIABETES MELLITUS WITH HYPOGLYCEMIA WITHOUT COMA, WITH LONG-TERM CURRENT USE OF INSULIN (HCC): Primary | ICD-10-CM

## 2023-12-05 DIAGNOSIS — Z79.4 TYPE 2 DIABETES MELLITUS WITH HYPOGLYCEMIA WITHOUT COMA, WITH LONG-TERM CURRENT USE OF INSULIN (HCC): Primary | ICD-10-CM

## 2023-12-07 RX ORDER — PROCHLORPERAZINE 25 MG/1
SUPPOSITORY RECTAL
Qty: 1 EACH | Refills: 3 | Status: SHIPPED | OUTPATIENT
Start: 2023-12-07

## 2023-12-09 DIAGNOSIS — F43.10 PTSD (POST-TRAUMATIC STRESS DISORDER): ICD-10-CM

## 2023-12-10 RX ORDER — BUSPIRONE HYDROCHLORIDE 30 MG/1
30 TABLET ORAL 2 TIMES DAILY
Qty: 180 TABLET | Refills: 0 | Status: SHIPPED | OUTPATIENT
Start: 2023-12-10

## 2023-12-10 RX ORDER — RISPERIDONE 3 MG/1
3 TABLET ORAL 2 TIMES DAILY
Qty: 180 TABLET | Refills: 0 | Status: SHIPPED | OUTPATIENT
Start: 2023-12-10

## 2023-12-12 DIAGNOSIS — E11.649 TYPE 2 DIABETES MELLITUS WITH HYPOGLYCEMIA WITHOUT COMA, WITH LONG-TERM CURRENT USE OF INSULIN (HCC): ICD-10-CM

## 2023-12-12 DIAGNOSIS — Z79.4 TYPE 2 DIABETES MELLITUS WITH HYPOGLYCEMIA WITHOUT COMA, WITH LONG-TERM CURRENT USE OF INSULIN (HCC): ICD-10-CM

## 2023-12-12 RX ORDER — AMMONIUM LACTATE 12 G/100G
LOTION TOPICAL 2 TIMES DAILY PRN
Qty: 226 G | Refills: 3 | Status: SHIPPED | OUTPATIENT
Start: 2023-12-12 | End: 2023-12-13 | Stop reason: SDUPTHER

## 2023-12-13 ENCOUNTER — OFFICE VISIT (OUTPATIENT)
Age: 53
End: 2023-12-13
Payer: COMMERCIAL

## 2023-12-13 VITALS
WEIGHT: 142 LBS | DIASTOLIC BLOOD PRESSURE: 80 MMHG | OXYGEN SATURATION: 96 % | HEART RATE: 109 BPM | SYSTOLIC BLOOD PRESSURE: 132 MMHG | HEIGHT: 66 IN | RESPIRATION RATE: 18 BRPM | BODY MASS INDEX: 22.82 KG/M2

## 2023-12-13 DIAGNOSIS — S01.302A: ICD-10-CM

## 2023-12-13 DIAGNOSIS — M47.26 OSTEOARTHRITIS OF SPINE WITH RADICULOPATHY, LUMBAR REGION: ICD-10-CM

## 2023-12-13 DIAGNOSIS — Z13.0 SCREENING FOR IRON DEFICIENCY ANEMIA: ICD-10-CM

## 2023-12-13 DIAGNOSIS — B37.31 VAGINAL YEAST INFECTION: ICD-10-CM

## 2023-12-13 DIAGNOSIS — I63.9 CEREBROVASCULAR ACCIDENT (CVA), UNSPECIFIED MECHANISM (HCC): ICD-10-CM

## 2023-12-13 DIAGNOSIS — E83.119 HEMOCHROMATOSIS, UNSPECIFIED HEMOCHROMATOSIS TYPE: ICD-10-CM

## 2023-12-13 DIAGNOSIS — R11.0 NAUSEA: ICD-10-CM

## 2023-12-13 DIAGNOSIS — Z79.4 TYPE 2 DIABETES MELLITUS WITH HYPOGLYCEMIA WITHOUT COMA, WITH LONG-TERM CURRENT USE OF INSULIN (HCC): ICD-10-CM

## 2023-12-13 DIAGNOSIS — E11.649 TYPE 2 DIABETES MELLITUS WITH HYPOGLYCEMIA WITHOUT COMA, WITH LONG-TERM CURRENT USE OF INSULIN (HCC): ICD-10-CM

## 2023-12-13 DIAGNOSIS — Z23 ENCOUNTER FOR IMMUNIZATION: ICD-10-CM

## 2023-12-13 DIAGNOSIS — H10.32 ACUTE BACTERIAL CONJUNCTIVITIS OF LEFT EYE: Primary | ICD-10-CM

## 2023-12-13 PROCEDURE — 99214 OFFICE O/P EST MOD 30 MIN: CPT

## 2023-12-13 PROCEDURE — 90471 IMMUNIZATION ADMIN: CPT

## 2023-12-13 PROCEDURE — 90686 IIV4 VACC NO PRSV 0.5 ML IM: CPT

## 2023-12-13 RX ORDER — ONDANSETRON 4 MG/1
4 TABLET, ORALLY DISINTEGRATING ORAL EVERY 6 HOURS PRN
Qty: 10 TABLET | Refills: 0 | Status: SHIPPED | OUTPATIENT
Start: 2023-12-13 | End: 2023-12-14 | Stop reason: SDUPTHER

## 2023-12-13 RX ORDER — ERYTHROMYCIN 5 MG/G
0.5 OINTMENT OPHTHALMIC EVERY 8 HOURS SCHEDULED
Qty: 3.5 G | Refills: 0 | Status: SHIPPED | OUTPATIENT
Start: 2023-12-13 | End: 2023-12-14 | Stop reason: SDUPTHER

## 2023-12-13 RX ORDER — FLUCONAZOLE 150 MG/1
TABLET ORAL
Qty: 2 TABLET | Refills: 1 | Status: SHIPPED | OUTPATIENT
Start: 2023-12-13 | End: 2023-12-14 | Stop reason: SDUPTHER

## 2023-12-13 RX ORDER — AMMONIUM LACTATE 12 G/100G
LOTION TOPICAL 2 TIMES DAILY PRN
Qty: 226 G | Refills: 3 | Status: SHIPPED | OUTPATIENT
Start: 2023-12-13 | End: 2023-12-14 | Stop reason: SDUPTHER

## 2023-12-13 NOTE — PROGRESS NOTES
INTERNAL MEDICINE FOLLOW-UP VISIT  [facility] - [facility] INTERNAL MEDICINE [address]    NAME: Irais Or  AGE: 52 y.o. SEX: female  : 1970     DATE: 2023     Assessment and Plan:   1. Acute bacterial conjunctivitis of left eye  Left eye with exudate and pain. Grandson positive for conjunctivitis. - erythromycin (ILOTYCIN) ophthalmic ointment; Administer 0.5 inches into the left eye every 8 (eight) hours for 5 days  Dispense: 3.5 g; Refill: 0    2. Hemochromatosis, unspecified hemochromatosis type  Chronic. Currently goes to oncology but would like to have an appointment closer. Referral provided  - Ambulatory Referral to Hematology / Oncology; Future    3. Cerebrovascular accident (CVA), unspecified mechanism ([address])  Weakness in bilateral legs. Difficult and taxing to go outpatient will refer to home health  - Referral to [address] [facility]; Future    4. Osteoarthritis of spine with radiculopathy, lumbar region  Pain relief for in home home health  - Referral to [address] [facility]; Future    5. Nausea  Intermittent over the last 6 months. She did have a 18 pound weight loss. She denies any abdominal pain, blood in her stool. She is overdue for colonoscopy. She has a poor diet and blood sugars are averaging over 200. She is scheduled to see endocrinology  - ondansetron (ZOFRAN-ODT) 4 mg disintegrating tablet; Take 1 tablet (4 mg total) by mouth every 6 (six) hours as needed for nausea  Dispense: 10 tablet; Refill: 0    6. Type 2 diabetes mellitus with hypoglycemia without coma, with long-term current use of insulin ([address])  Follows with endocrinology but missed her most recent appointment. Blood sugars are averaging over 200s. She has a very poor diet which includes regular soda several times a day as well as indulging in processed foods. Long discussion with patient involving avoiding sugars and carbohydrates in her diet.   We will get her into physical therapy for some activity  - Comprehensive metabolic panel; Future  - ammonium lactate (LAC-HYDRIN) 12 % lotion; Apply topically 2 (two) times a day as needed for dry skin  Dispense: 226 g; Refill: 3    7. Screening for iron deficiency anemia  - CBC and differential; Future    8. Open wound of left pinna of ear with complication, initial encounter  Chronic wound to left outer ear, nonhealing will refer to Derm  - Ambulatory Referral to Dermatology; Future    9. Vaginal yeast infection  - fluconazole (DIFLUCAN) 150 mg tablet; Take 1 tablet now then repeat in 48 hours  Dispense: 2 tablet; Refill: 1  Schedule colonoscopy- asap    Quit smoking  Limit sugars in your diet  Schedule your colonoscopy  Follow-up with endocrinology  Flu vaccine provided today        Return in about 3 months (around 3/25/2024) for Annual physical.       Chief Complaint:     Chief Complaint   Patient presents with   • Follow-up      History of Present Illness:     Rosa Ricci is a 57-year-old female patient with a past medical history significant for diabetes mellitus type 2, PTSD, anxiety, bipolar, migraine, A-fib, RA, sick sinus syndrome, XU, diabetic neuropathy, asthma, hypertension, past CVA, CAD, chronic smoker, XU, GERD, DDD, urinary incontinence, OB, hyperlipidemia. Throat, left ear and left eye pain about 2 weeks. She has exudate in the morning and tearing. Her grandson has conjunctivitis. Nasal congestion is present. Weakness to BL legs causing her to lose her balance. She was ordered home PT but has not started this yet. I will forms out for her today  She is still with [facility]     Weight loss 18 lbs since September. She has been limiting intake of food due to nausea. This is improving now. Her nausea is all day long. No issues with bowel. No abdominal pain  Blood glucose is averaging 200's.  She continues to drink regular sodas not diet      The following portions of the patient's history were reviewed and updated as appropriate: allergies, current medications, past family history, past medical history, past social history, past surgical history and problem list.     Review of Systems:     Review of Systems   Constitutional:  Negative for appetite change, chills, diaphoresis, fatigue, fever and unexpected weight change. HENT:  Negative for postnasal drip and sneezing. Eyes:  Negative for visual disturbance. Respiratory:  Negative for chest tightness and shortness of breath. Cardiovascular:  Negative for chest pain, palpitations and leg swelling. Gastrointestinal:  Negative for abdominal pain and blood in stool. Endocrine: Negative for cold intolerance, heat intolerance, polydipsia, polyphagia and polyuria. Genitourinary:  Negative for difficulty urinating, dysuria, frequency and urgency. Musculoskeletal:  Negative for arthralgias and myalgias. Skin:  Negative for rash and wound. Neurological:  Negative for dizziness, weakness, light-headedness and headaches. Hematological:  Negative for adenopathy. Psychiatric/Behavioral:  Negative for confusion, dysphoric mood and sleep disturbance. The patient is not nervous/anxious.          Past Medical History:     Past Medical History:   Diagnosis Date   • Abnormal ECG    • Abnormal Pap smear of cervix    • Asthma    • Atrial fibrillation (HCC)    • Chlamydia    • Chronic kidney disease    • Coronary artery disease    • Depression    • Diabetes mellitus (HCC)    • Fibromyalgia    • Heart disease    • History of transfusion    • Hypertension    • Migraines    • Myocardial infarction [facility])    • Neuromuscular disorder ([address] [facility])    • Opioid abuse, in remission ([address] [facility])    • Sick sinus syndrome (HCC)    • Stroke ([facility])    • Varicella         Current Medications:     Current Outpatient Medications:   •  albuterol (PROVENTIL HFA,VENTOLIN HFA) 90 mcg/act inhaler, Inhale 2 puffs every 4 (four) hours as needed, Disp: , Rfl:   •  Alcohol Swabs (Pharmacist Choice Alcohol) PADS, 5 (five) times a day, Disp: , Rfl:   •  ALPRAZolam (XANAX) 1 mg tablet, Take 1 tablet (1 mg total) by mouth 4 (four) times a day as needed for anxiety, Disp: 60 tablet, Rfl: 0  •  ammonium lactate (LAC-HYDRIN) 12 % lotion, Apply topically 2 (two) times a day as needed for dry skin, Disp: 226 g, Rfl: 3  •  ARIPiprazole (ABILIFY) 10 mg tablet, Take 1 tablet (10 mg total) by mouth daily, Disp: 90 tablet, Rfl: 3  •  atorvastatin (LIPITOR) 10 mg tablet, Take 10 mg by mouth daily, Disp: , Rfl:   •  BD Pen Needle Nkechi 2nd Gen 32G X 4 MM MISC, USE AS DIRECTED FOR BEFORE A MEAL AND AT BEDTIME GLUCOSE INJECTION, Disp: , Rfl:   •  BD Pen Needle Nkechi 2nd Gen 32G X 4 MM MISC, USE WITH INSULIN 5 TIMES A DAY, Disp: 200 each, Rfl: 3  •  BD PrecisionGlide Needle 23G X 1-1/2" MISC, USE AS DIRECTED TO ADMINISTER NALOXONE INJECTION.   MAY DISPENSE 23-25 GAUGE 1-1.5" NEEDLE., Disp: , Rfl:   •  Blood Glucose Monitoring Suppl (ONE TOUCH ULTRA 2) w/Device KIT, Use as directed, Disp: , Rfl:   •  busPIRone (BUSPAR) 30 MG tablet, TAKE 1 TABLET (30 MG TOTAL) BY MOUTH 2 (TWO) TIMES A DAY, Disp: 180 tablet, Rfl: 0  •  Continuous Blood Gluc  (Dexcom G6 ) ALEKS, Use 1 Device 4 (four) times a day (with meals and at bedtime), Disp: 1 each, Rfl: 6  •  Continuous Blood Gluc Sensor (Dexcom G6 Sensor) MISC, CHANGE SENSOR EVERY 10 DAYS, Disp: 3 each, Rfl: 6  •  Continuous Blood Gluc Transmit (Dexcom G6 Transmitter) MISC, USE 4 TIMES A DAY (WITH MEALS AND AT BEDTIME), Disp: 1 each, Rfl: 3  •  erythromycin (ILOTYCIN) ophthalmic ointment, Administer 0.5 inches into the left eye every 8 (eight) hours for 5 days, Disp: 3.5 g, Rfl: 0  •  famotidine (PEPCID) 20 mg tablet, TAKE 1 TABLET (20 MG TOTAL) BY MOUTH 2 (TWO) TIMES A DAY AS NEEDED FOR INDIGESTION, Disp: 90 tablet, Rfl: 3  •  fexofenadine (ALLEGRA) 180 MG tablet, TAKE 1 TABLET (180 MG TOTAL) BY MOUTH DAILY, Disp: 90 tablet, Rfl: 3  •  Flovent Diskus 250 MCG/ACT diskus inhaler, INHALE 1 PUFF 2 (TWO) TIMES A DAY, Disp: 60 each, Rfl: 1  •  fluconazole (DIFLUCAN) 150 mg tablet, Take 1 tablet now then repeat in 48 hours, Disp: 2 tablet, Rfl: 1  •  gabapentin (NEURONTIN) 100 mg capsule, TAKE 1 CAPSULE (100 MG TOTAL) BY MOUTH DAILY AT BEDTIME TAKE ADDITIONAL 100 MG CAPSULE TO TOTAL 900 MG AT BEDTIME, Disp: 30 capsule, Rfl: 3  •  gabapentin (NEURONTIN) 400 mg capsule, TAKE 2 CAPSULES BY MOUTH 3 TIMES A DAY, Disp: 90 capsule, Rfl: 3  •  hydrALAZINE (APRESOLINE) 50 mg tablet, Take 50 mg by mouth 3 (three) times a day, Disp: , Rfl:   •  Incontinence Supply Disposable (Depend Undergarment Ex Absorb) MISC, Use 4 (four) times a day as needed (incontinence), Disp: 120 each, Rfl: 10  •  insulin lispro (HumaLOG) 100 units/mL injection pen, INJECT 20 UNITS UNDER THE SKIN 3 (THREE) TIMES A DAY BEFORE MEALS INDICATIONS: TYPE 2 DIABETES MELLITUS., Disp: 15 mL, Rfl: 3  •  Lantus SoloStar 100 units/mL SOPN, INJECT 55 UNITS UNDER THE SKIN 2 (TWO) TIMES A DAY AT LUNCH AND AT BEDTIME., Disp: 45 mL, Rfl: 3  •  leflunomide (ARAVA) 20 MG tablet, Take 20 mg by mouth daily, Disp: , Rfl:   •  lidocaine (Lidoderm) 5 %, Apply 1 patch topically over 12 hours daily Remove & Discard patch within 12 hours or as directed by MD, Disp: 60 patch, Rfl: 0  •  lisinopril (ZESTRIL) 5 mg tablet, Take 5 mg by mouth daily, Disp: , Rfl:   •  lovastatin (MEVACOR) 10 MG tablet, Take 10 mg by mouth, Disp: , Rfl:   •  meclizine (ANTIVERT) 25 mg tablet, Take 25 mg by mouth Three times daily as needed, Disp: , Rfl:   •  methocarbamol (ROBAXIN) 750 mg tablet, TAKE ONE TABLET BY MOUTH DAILY AS NEEDED SPASMS, Disp: , Rfl:   •  metoprolol succinate (TOPROL-XL) 100 mg 24 hr tablet, Take 100 mg by mouth daily, Disp: , Rfl:   •  Mirabegron ER (Myrbetriq) 25 MG TB24, Take 25 mg by mouth in the morning, Disp: 30 tablet, Rfl: 0  •  montelukast (SINGULAIR) 10 mg tablet, TAKE 1 TABLET (10 MG TOTAL) BY MOUTH DAILY AT BEDTIME, Disp: 90 tablet, Rfl: 3  •  naloxone (NARCAN) 0.4 mg/mL injection, , Disp: , Rfl:   •  nitrofurantoin (MACROBID) 100 mg capsule, Take 1 capsule (100 mg total) by mouth 2 (two) times a day, Disp: 10 capsule, Rfl: 0  •  Nutritional Supplements (Glucerna Hunger Smart Shake) LIQD, Take 1 Can by mouth 3 (three) times a day, Disp: 296 mL, Rfl: 5  •  Olopatadine HCl (Pataday) 0.7 % SOLN, 1 drop each eye twice a day, Disp: 2.5 mL, Rfl: 3  •  ondansetron (ZOFRAN-ODT) 4 mg disintegrating tablet, Take 1 tablet (4 mg total) by mouth every 6 (six) hours as needed for nausea, Disp: 10 tablet, Rfl: 0  •  OneTouch Delica Lancets 35U MISC, 2 (two) times a day, Disp: , Rfl:   •  OneTouch Ultra test strip, TEST TWICE DAILY OR AS DIRECTED BY MD, Disp: , Rfl:   •  oxyCODONE (ROXICODONE) 5 immediate release tablet, 10 mg, Disp: , Rfl:   •  pantoprazole (PROTONIX) 40 mg tablet, TAKE 1 TABLET (40 MG TOTAL) BY MOUTH DAILY, Disp: 90 tablet, Rfl: 3  •  polyethylene glycol-electrolytes (TriLyte) 4000 mL solution, Take 4,000 mL by mouth once for 1 dose Take 4000 mL by mouth once for 1 dose.  Use as directed, Disp: 4000 mL, Rfl: 0  •  potassium chloride (K-DUR,KLOR-CON) 20 mEq tablet, Take 1 tablet (20 mEq total) by mouth daily, Disp: 30 tablet, Rfl: 1  •  potassium chloride (MICRO-K) 10 MEQ CR capsule, TAKE 2 CAPSULES (20 MEQ TOTAL) BY MOUTH 2 (TWO) TIMES A DAY, Disp: 90 capsule, Rfl: 3  •  prazosin (MINIPRESS) 5 mg capsule, Take 1 capsule (5 mg total) by mouth daily at bedtime, Disp: 30 capsule, Rfl: 0  •  prednisoLONE acetate (PRED FORTE) 1 % ophthalmic suspension, INSTILL ONE DROP INTO LEFT EYE 4 TIMES A DAY, Disp: , Rfl:   •  risperiDONE (RisperDAL) 3 mg tablet, TAKE 1 TABLET (3 MG TOTAL) BY MOUTH 2 (TWO) TIMES A DAY, Disp: 180 tablet, Rfl: 0  •  rivaroxaban (XARELTO) 20 mg tablet, Take 20 mg by mouth, Disp: , Rfl:   •  sertraline (ZOLOFT) 100 mg tablet, Take 1 tablet (100 mg total) by mouth daily, Disp: 90 tablet, Rfl: 0  •  Spacer/Aero-Holding Chambers ([employer] Geoff-Vu w/Mask) MISC, 1 EACH (1 APPLICATION TOTAL) BY MISCELLANEOUS ROUTE DAILY AS NEEDED (ASTHMA EXACERBATION). , Disp: , Rfl:   •  TiZANidine (ZANAFLEX) 2 MG capsule, TAKE 1 TABLET UP TO THREE TIMES A DAY AS NEEDED FOR MUSCLE SPASM, Disp: , Rfl:   •  Trulicity 1.5 TN/0.9FL injection, INJECT 1.5 MG UNDER THE SKIN EVERY 7 DAYS., Disp: 2 mL, Rfl: 3  •  zolpidem (AMBIEN) 10 mg tablet, Take 1 tablet (10 mg total) by mouth daily, Disp: 30 tablet, Rfl: 0     Allergies: Allergies   Allergen Reactions   • Other Anaphylaxis     Capzasin HP -- severe burning and swelling of the skin    • Clarithromycin GI Intolerance   • Acetaminophen GI Intolerance   • Bactrim [Sulfamethoxazole-Trimethoprim] Itching, GI Intolerance, Dizziness, Confusion and Lightheadedness     Patient passes out when on this medication for several days    • Cephalosporins Hives     Tolerated Cefdinir 2/2023   • Latex Hives   • Oxycodone-Acetaminophen GI Intolerance   • Penicillins Diarrhea   • Trazodone Diarrhea   • Capsaicin Rash   • Naproxen Palpitations        Physical Exam:     /80 (BP Location: Left arm, Patient Position: Sitting, Cuff Size: Large)   Pulse (!) 109   Resp 18   Ht 5' 5.5" (1.664 m)   Wt 64.4 kg (142 lb)   SpO2 96%   BMI 23.27 kg/m²     Physical Exam  Constitutional:       Appearance: She is well-developed. HENT:      Head: Normocephalic and atraumatic. Eyes:      Pupils: Pupils are equal, round, and reactive to light. Neck:      Thyroid: No thyromegaly. Cardiovascular:      Rate and Rhythm: Normal rate and regular rhythm. Heart sounds: No murmur heard. Pulmonary:      Effort: Pulmonary effort is normal.      Breath sounds: Normal breath sounds. Abdominal:      General: Bowel sounds are normal.      Palpations: Abdomen is soft. Musculoskeletal:         General: Normal range of motion. Cervical back: Normal range of motion and neck supple. Lymphadenopathy:      Cervical: No cervical adenopathy. Skin:     General: Skin is warm and dry. Neurological:      Mental Status: She is alert and oriented to person, place, and time. Data:     Laboratory Results: I have personally reviewed the pertinent laboratory results/reports   Radiology/Other Diagnostic Testing Results: I have personally reviewed pertinent reports.       Che Zaidi, [address] [facility] INTERNAL MEDICINE [address]

## 2023-12-14 DIAGNOSIS — E11.649 TYPE 2 DIABETES MELLITUS WITH HYPOGLYCEMIA WITHOUT COMA, WITH LONG-TERM CURRENT USE OF INSULIN (HCC): ICD-10-CM

## 2023-12-14 DIAGNOSIS — R11.0 NAUSEA: ICD-10-CM

## 2023-12-14 DIAGNOSIS — Z79.4 TYPE 2 DIABETES MELLITUS WITH HYPOGLYCEMIA WITHOUT COMA, WITH LONG-TERM CURRENT USE OF INSULIN (HCC): ICD-10-CM

## 2023-12-14 DIAGNOSIS — B37.31 VAGINAL YEAST INFECTION: ICD-10-CM

## 2023-12-14 DIAGNOSIS — H10.32 ACUTE BACTERIAL CONJUNCTIVITIS OF LEFT EYE: ICD-10-CM

## 2023-12-14 RX ORDER — ERYTHROMYCIN 5 MG/G
0.5 OINTMENT OPHTHALMIC EVERY 8 HOURS SCHEDULED
Qty: 3.5 G | Refills: 0 | Status: SHIPPED | OUTPATIENT
Start: 2023-12-14 | End: 2023-12-19

## 2023-12-14 RX ORDER — AMMONIUM LACTATE 12 G/100G
LOTION TOPICAL 2 TIMES DAILY PRN
Qty: 226 G | Refills: 3 | Status: SHIPPED | OUTPATIENT
Start: 2023-12-14

## 2023-12-14 RX ORDER — FLUCONAZOLE 150 MG/1
TABLET ORAL
Qty: 2 TABLET | Refills: 1 | Status: SHIPPED | OUTPATIENT
Start: 2023-12-14 | End: 2024-12-14

## 2023-12-14 RX ORDER — ONDANSETRON 4 MG/1
4 TABLET, ORALLY DISINTEGRATING ORAL EVERY 6 HOURS PRN
Qty: 10 TABLET | Refills: 0 | Status: SHIPPED | OUTPATIENT
Start: 2023-12-14 | End: 2023-12-19

## 2023-12-15 ENCOUNTER — TELEPHONE (OUTPATIENT)
Dept: HEMATOLOGY ONCOLOGY | Facility: CLINIC | Age: 53
End: 2023-12-15

## 2023-12-15 DIAGNOSIS — F17.200 SMOKING: ICD-10-CM

## 2023-12-15 RX ORDER — FLUTICASONE PROPIONATE 250 UG/1
POWDER, METERED RESPIRATORY (INHALATION)
Qty: 60 EACH | Refills: 1 | Status: SHIPPED | OUTPATIENT
Start: 2023-12-15

## 2023-12-15 NOTE — TELEPHONE ENCOUNTER
I called Javed Mays in response to a referral that was received for patient to establish care with Hematology. Outreach was made to schedule a consultation. I left a voicemail explaining the reason for my call and advised patient to call [facility] at [phone]. Another attempt will be made to contact patient.

## 2023-12-18 ENCOUNTER — TELEPHONE (OUTPATIENT)
Age: 53
End: 2023-12-18

## 2023-12-18 ENCOUNTER — TELEPHONE (OUTPATIENT)
Dept: HEMATOLOGY ONCOLOGY | Facility: CLINIC | Age: 53
End: 2023-12-18

## 2023-12-18 NOTE — TELEPHONE ENCOUNTER
Rec'd STAT referral for patient.    SOC opening tomorrow afternoon in [facility] [place].    Called and left message for patient to return call regarding scheduling. (Informed patient that appt may/may not be available if/when she returns call.)

## 2023-12-18 NOTE — TELEPHONE ENCOUNTER
I called Dahlia in response to a referral that was received for patient to establish care with Hematology.     Outreach was made to schedule a consultation.    Spoke with patient, she asked for a call back later one due to being busy at the moment  Another attempt will be made to contact patient.

## 2023-12-18 NOTE — TELEPHONE ENCOUNTER
Called the patient and informed her that her form from the [place] [employer] [facility] has been filled out and is ready to be picked up. Patient asked to have the form mailed to her [address].

## 2023-12-19 ENCOUNTER — TELEPHONE (OUTPATIENT)
Dept: HEMATOLOGY ONCOLOGY | Facility: CLINIC | Age: 53
End: 2023-12-19

## 2023-12-19 DIAGNOSIS — E11.9 INSULIN DEPENDENT TYPE 2 DIABETES MELLITUS (HCC): ICD-10-CM

## 2023-12-19 DIAGNOSIS — Z79.4 INSULIN DEPENDENT TYPE 2 DIABETES MELLITUS (HCC): ICD-10-CM

## 2023-12-19 DIAGNOSIS — R11.0 NAUSEA: ICD-10-CM

## 2023-12-19 RX ORDER — PROCHLORPERAZINE 25 MG/1
SUPPOSITORY RECTAL
Qty: 3 EACH | Refills: 6 | Status: SHIPPED | OUTPATIENT
Start: 2023-12-19

## 2023-12-19 RX ORDER — ONDANSETRON 4 MG/1
4 TABLET, ORALLY DISINTEGRATING ORAL EVERY 6 HOURS PRN
Qty: 10 TABLET | Refills: 0 | Status: SHIPPED | OUTPATIENT
Start: 2023-12-19

## 2023-12-19 NOTE — TELEPHONE ENCOUNTER
I called Dahlia in response to a referral that was received for patient to establish care with Hematology.     Outreach was made to schedule a consultation.    I left a voicemail explaining the reason for my call and advised patient to call [facility] at [phone].  This is the third attempt to schedule patient unsuccessfully. The referral has been closed, a Voice Assist message has been sent to patient (if applicable) and a letter has been sent to the address on file.

## 2023-12-23 DIAGNOSIS — E87.6 LOW BLOOD POTASSIUM: ICD-10-CM

## 2023-12-27 RX ORDER — POTASSIUM CHLORIDE 750 MG/1
20 CAPSULE, EXTENDED RELEASE ORAL 2 TIMES DAILY
Qty: 90 CAPSULE | Refills: 3 | Status: SHIPPED | OUTPATIENT
Start: 2023-12-27

## 2023-12-29 DIAGNOSIS — F43.10 PTSD (POST-TRAUMATIC STRESS DISORDER): ICD-10-CM

## 2023-12-29 RX ORDER — ZOLPIDEM TARTRATE 10 MG/1
10 TABLET ORAL DAILY
Qty: 30 TABLET | Refills: 0 | Status: SHIPPED | OUTPATIENT
Start: 2023-12-29 | End: 2024-04-07

## 2023-12-30 DIAGNOSIS — Z79.4 TYPE 2 DIABETES MELLITUS WITH HYPOGLYCEMIA WITHOUT COMA, WITH LONG-TERM CURRENT USE OF INSULIN (HCC): ICD-10-CM

## 2023-12-30 DIAGNOSIS — E11.649 TYPE 2 DIABETES MELLITUS WITH HYPOGLYCEMIA WITHOUT COMA, WITH LONG-TERM CURRENT USE OF INSULIN (HCC): ICD-10-CM

## 2023-12-30 RX ORDER — GABAPENTIN 400 MG/1
CAPSULE ORAL
Qty: 90 CAPSULE | Refills: 3 | Status: SHIPPED | OUTPATIENT
Start: 2023-12-30

## 2024-01-02 DIAGNOSIS — R11.0 NAUSEA: ICD-10-CM

## 2024-01-02 RX ORDER — ONDANSETRON 4 MG/1
4 TABLET, ORALLY DISINTEGRATING ORAL EVERY 6 HOURS PRN
Qty: 10 TABLET | Refills: 0 | Status: SHIPPED | OUTPATIENT
Start: 2024-01-02

## 2024-01-04 DIAGNOSIS — B37.31 VAGINAL YEAST INFECTION: ICD-10-CM

## 2024-01-04 DIAGNOSIS — F41.9 ANXIETY: ICD-10-CM

## 2024-01-04 DIAGNOSIS — N39.41 URGENCY INCONTINENCE: ICD-10-CM

## 2024-01-05 RX ORDER — ALPRAZOLAM 1 MG/1
1 TABLET ORAL 4 TIMES DAILY PRN
Qty: 60 TABLET | Refills: 0 | Status: SHIPPED | OUTPATIENT
Start: 2024-01-05

## 2024-01-05 RX ORDER — MIRABEGRON 25 MG/1
25 TABLET, FILM COATED, EXTENDED RELEASE ORAL DAILY
Qty: 30 TABLET | Refills: 0 | Status: SHIPPED | OUTPATIENT
Start: 2024-01-05

## 2024-01-05 RX ORDER — FLUCONAZOLE 150 MG/1
TABLET ORAL
Qty: 2 TABLET | Refills: 0 | Status: SHIPPED | OUTPATIENT
Start: 2024-01-05 | End: 2025-01-04

## 2024-01-08 ENCOUNTER — TELEPHONE (OUTPATIENT)
Dept: PSYCHIATRY | Facility: CLINIC | Age: 54
End: 2024-01-08

## 2024-01-08 NOTE — TELEPHONE ENCOUNTER
Patient has been added to the Medication Management wait list without a referral.    Insurance: [employer]  Insurance Type:    Commercial []   Medicaid [x]   [facility] (if applicable)   Medicare []  Location Preference: [place]   Provider Preference: none  Virtual: Yes [x] No []  Were outside resources sent: Yes [] No []  Advised the patient to contact her PCP for a referral.

## 2024-01-08 NOTE — TELEPHONE ENCOUNTER
The writer attempted to contact the patient to verify the need of services. Writer LVM for the patient to contact the intake department for assistance.

## 2024-01-09 ENCOUNTER — TELEPHONE (OUTPATIENT)
Age: 54
End: 2024-01-09

## 2024-01-09 DIAGNOSIS — B37.31 VAGINAL YEAST INFECTION: ICD-10-CM

## 2024-01-09 DIAGNOSIS — F41.9 ANXIETY: ICD-10-CM

## 2024-01-09 DIAGNOSIS — F43.10 PTSD (POST-TRAUMATIC STRESS DISORDER): Primary | ICD-10-CM

## 2024-01-09 RX ORDER — FLUCONAZOLE 150 MG/1
TABLET ORAL
Qty: 2 TABLET | Refills: 0 | Status: SHIPPED | OUTPATIENT
Start: 2024-01-09 | End: 2024-01-11

## 2024-01-09 NOTE — TELEPHONE ENCOUNTER
Patient states that she is having a burning pain under her left breast since yesterday and pain in her back. Pt denies any injury to her back Per Infectious Disease protocol,  recurrent UTI patients are not seen in the office. Referring providers can request an E-Consult if they would like to discuss the case further.

## 2024-01-09 NOTE — TELEPHONE ENCOUNTER
Patient calling to schedule NP appt. She states she keeps getting salmonella. She said her PCP Dana Alexandra placed a referral. Advised her that I did not see one placed and to contact her PCP to place order so we can schedule her appt.

## 2024-01-15 ENCOUNTER — TELEPHONE (OUTPATIENT)
Age: 54
End: 2024-01-15

## 2024-01-15 NOTE — TELEPHONE ENCOUNTER
Patient of Dr. Patel at [facility]    Patient called stating she needs to reschedule her UDS appointment. Please review and call patient with appointment.     Patient can be reached at [phone]

## 2024-01-16 DIAGNOSIS — F43.10 PTSD (POST-TRAUMATIC STRESS DISORDER): ICD-10-CM

## 2024-01-16 RX ORDER — PRAZOSIN HYDROCHLORIDE 5 MG/1
5 CAPSULE ORAL
Qty: 30 CAPSULE | Refills: 0 | Status: SHIPPED | OUTPATIENT
Start: 2024-01-16

## 2024-01-22 ENCOUNTER — PATIENT OUTREACH (OUTPATIENT)
Dept: OTHER | Facility: CLINIC | Age: 54
End: 2024-01-22

## 2024-01-22 ENCOUNTER — OFFICE VISIT (OUTPATIENT)
Age: 54
End: 2024-01-22
Payer: COMMERCIAL

## 2024-01-22 VITALS
HEART RATE: 111 BPM | RESPIRATION RATE: 17 BRPM | OXYGEN SATURATION: 98 % | HEIGHT: 66 IN | BODY MASS INDEX: 22.73 KG/M2 | WEIGHT: 141.4 LBS | SYSTOLIC BLOOD PRESSURE: 115 MMHG | DIASTOLIC BLOOD PRESSURE: 76 MMHG

## 2024-01-22 DIAGNOSIS — E11.49 OTHER DIABETIC NEUROLOGICAL COMPLICATION ASSOCIATED WITH TYPE 2 DIABETES MELLITUS (HCC): ICD-10-CM

## 2024-01-22 DIAGNOSIS — Z13.220 SCREENING FOR LIPID DISORDERS: ICD-10-CM

## 2024-01-22 DIAGNOSIS — R56.9 SEIZURES (HCC): ICD-10-CM

## 2024-01-22 DIAGNOSIS — E11.649 TYPE 2 DIABETES MELLITUS WITH HYPOGLYCEMIA WITHOUT COMA, WITH LONG-TERM CURRENT USE OF INSULIN (HCC): Primary | ICD-10-CM

## 2024-01-22 DIAGNOSIS — I48.0 PAROXYSMAL A-FIB (HCC): ICD-10-CM

## 2024-01-22 DIAGNOSIS — R73.01 IMPAIRED FASTING GLUCOSE: ICD-10-CM

## 2024-01-22 DIAGNOSIS — Z72.0 TOBACCO USE: ICD-10-CM

## 2024-01-22 DIAGNOSIS — Z79.4 TYPE 2 DIABETES MELLITUS WITH HYPOGLYCEMIA WITHOUT COMA, WITH LONG-TERM CURRENT USE OF INSULIN (HCC): Primary | ICD-10-CM

## 2024-01-22 DIAGNOSIS — L98.9 SKIN LESION: ICD-10-CM

## 2024-01-22 DIAGNOSIS — R53.83 OTHER FATIGUE: ICD-10-CM

## 2024-01-22 DIAGNOSIS — Z13.0 SCREENING FOR DEFICIENCY ANEMIA: ICD-10-CM

## 2024-01-22 DIAGNOSIS — F17.210 CIGARETTE NICOTINE DEPENDENCE WITHOUT COMPLICATION: ICD-10-CM

## 2024-01-22 DIAGNOSIS — Z71.6 ENCOUNTER FOR SMOKING CESSATION COUNSELING: ICD-10-CM

## 2024-01-22 DIAGNOSIS — Z00.00 ANNUAL PHYSICAL EXAM: ICD-10-CM

## 2024-01-22 DIAGNOSIS — Z12.31 ENCOUNTER FOR SCREENING MAMMOGRAM FOR MALIGNANT NEOPLASM OF BREAST: ICD-10-CM

## 2024-01-22 DIAGNOSIS — M06.9 RHEUMATOID ARTHRITIS INVOLVING BOTH HANDS, UNSPECIFIED WHETHER RHEUMATOID FACTOR PRESENT (HCC): ICD-10-CM

## 2024-01-22 DIAGNOSIS — Z12.11 ENCOUNTER FOR SCREENING FOR MALIGNANT NEOPLASM OF COLON: ICD-10-CM

## 2024-01-22 DIAGNOSIS — F31.9 BIPOLAR AFFECTIVE DISORDER, REMISSION STATUS UNSPECIFIED (HCC): ICD-10-CM

## 2024-01-22 PROCEDURE — 99396 PREV VISIT EST AGE 40-64: CPT

## 2024-01-22 RX ORDER — VARENICLINE TARTRATE 0.5 (11)-1
KIT ORAL
Qty: 53 EACH | Refills: 0 | Status: SHIPPED | OUTPATIENT
Start: 2024-01-22

## 2024-01-22 RX ORDER — METFORMIN HYDROCHLORIDE 750 MG/1
TABLET, EXTENDED RELEASE ORAL
COMMUNITY

## 2024-01-22 NOTE — PATIENT INSTRUCTIONS
Type 2 Diabetes Management for Adults   AMBULATORY CARE:   Type 2 diabetes  is a disease that affects how your body uses glucose (sugar). Either your body cannot make enough insulin, or it cannot use the insulin correctly. It is important to keep diabetes controlled to prevent damage to your heart, blood vessels, and other organs. Management will help you feel well and enjoy your daily activities. Your diabetes care team providers can help you make a plan to fit diabetes care into your schedule. Your plan can change over time to fit your needs and your family's needs.       Have someone call your local emergency number (911 in the [place]) if:   You cannot be woken.    You have signs of diabetic ketoacidosis:     confusion, fatigue    vomiting    rapid heartbeat    fruity smelling breath    extreme thirst    dry mouth and skin    You have any of the following signs of a heart attack:      Squeezing, pressure, or pain in your chest    You may  also have any of the following:     Discomfort or pain in your back, neck, jaw, stomach, or arm    Shortness of breath    Nausea or vomiting    Lightheadedness or a sudden cold sweat    You have any of the following signs of a stroke:      Numbness or drooping on one side of your face     Weakness in an arm or leg    Confusion or difficulty speaking    Dizziness, a severe headache, or vision loss    Call your doctor or diabetes care team provider if:   You have a sore or wound that will not heal.    You have a change in the amount you urinate.    Your blood sugar levels are higher than your target goals.    You often have lower blood sugar levels than your target goals.    Your skin is red, dry, warm, or swollen.    You have trouble coping with diabetes, or you feel anxious or depressed.    You have trouble following any part of your care plan, such as your meal plan.    You have questions or concerns about your condition or care.    What you need to know about high blood sugar  levels:  High blood sugar levels may not cause any symptoms. You may feel more thirsty or urinate more often than usual. Over time, high blood sugar levels can damage your nerves, blood vessels, tissues, and organs. The following can increase your blood sugar levels:  Large meals or large amounts of carbohydrates at one time    Less physical activity    Stress    Illness    A lower dose of diabetes medicine or insulin, or a late dose    What you need to know about low blood sugar levels:  Symptoms include feeling shaky, dizzy, irritable, or confused. You can prevent symptoms by keeping your blood sugar levels from going too low.  Treat a low blood sugar level right away:      Drink 4 ounces of juice or have 1 tube of glucose gel.    Check your blood sugar level again 10 to 15 minutes later.    When the level goes back to normal, eat a meal or snack to prevent another decrease.       Keep glucose gel, raisins, or hard candy with you at all times to treat a low blood sugar level.     Your blood sugar level can get too low if you take diabetes medicine or insulin and do not eat enough food.     If you use insulin, check your blood sugar level before you exercise.      If your blood sugar level is below 100 mg/dL, eat 4 crackers or 2 ounces of raisins, or drink 4 ounces of juice.    Check your level every 30 minutes if you exercise longer than 1 hour.    You may need a snack during or after exercise.    What you can do to manage your blood sugar levels:   Check your blood sugar levels as directed and as needed.  Several items are available to use to check your levels. You may need to check by testing a drop of blood in a glucose monitor. You may instead be given a continuous glucose monitoring (CGM) device. The device is worn at all times. The CGM checks your blood sugar level every 5 minutes. It sends results to an electronic device such as a smart phone. A CGM can be used with or without an insulin pump. You and your  diabetes care team providers will decide on the best method for you. The goal for blood sugar levels before meals  is between 80 and 130 mg/dL and 2 hours after eating  is lower than 180 mg/dL.            Make healthy food choices.  Work with a dietitian to create a meal plan that works for you and your schedule. A dietitian can help you learn how to eat the right amount of carbohydrates (sugar and starchy foods) during your meals and snacks. Examples of carbohydrates are breads, cereals, rice, pasta, fruit, low-fat dairy, and sweets. Carbohydrates can raise your blood sugar level if you eat too many at one time.         Eat high-fiber foods as directed.  Fiber helps improve blood sugar levels. Fiber also lowers your risk for heart disease and other problems diabetes can cause. Examples of high-fiber foods include vegetables, whole-grain bread, and beans such as tenorio beans. Your dietitian can tell you how much fiber to have each day.         Get regular physical activity.  Physical activity can help you get to your target blood sugar level goal and manage your weight. Get at least 150 minutes of moderate to vigorous aerobic physical activity each week. Resistance training, such as lifting weights, should be done 3 times each week. Do not miss more than 2 days of physical activity in a row. Do not sit longer than 30 minutes at a time. Your healthcare provider can help you create an activity plan. The plan can include the best activities for you and can help you build your strength and endurance.            Maintain a healthy weight.  Ask your team what a healthy weight is for you. A healthy weight can help you control diabetes and prevent heart disease. Ask your team to help you create a weight-loss plan, if needed. Even a loss of 3% to 7% of your excess body weight can help make a difference in managing diabetes. Your team will help you set a weight-loss goal, such as 10 to 15 pounds, or 5% of your extra weight.  Together you and your team can set manageable weight-loss goals.    Take your diabetes medicine or insulin as directed.  You may need diabetes medicine, insulin, or both to help control your blood sugar levels. Your healthcare provider will teach you how and when to take your diabetes medicine or insulin. You will also be taught about side effects oral diabetes medicine can cause. Insulin may be injected or given through a pump or pen. You and your providers will decide on the best method for you:    An insulin pump  is an implanted device that gives your insulin 24 hours a day. An insulin pump prevents the need for multiple insulin injections in a day.         An insulin pen  is a device prefilled with the right amount of insulin.         You and your family members will be taught how to draw up and give insulin  if this is the best method for you. Your providers will also teach you how to dispose of needles and syringes.    You will learn how much insulin you need  and when to give it. You will be taught when not to give insulin. You will also be taught what to do if your blood sugar level drops too low. This may happen if you take insulin and do not eat the right amount of carbohydrates.    More ways to manage type 2 diabetes:   Wear medical alert identification.  Wear medical alert jewelry or carry a card that says you have diabetes. Ask your provider where to get these items.         Do not smoke.  Nicotine and other chemicals in cigarettes and cigars can cause lung and blood vessel damage. It also makes it more difficult to manage your diabetes. Ask your provider for information if you currently smoke and need help to quit. Do not use e-cigarettes or smokeless tobacco in place of cigarettes or to help you quit. They still contain nicotine.    Check your feet each day for cuts, scratches, calluses, or other wounds.  Look for redness and swelling, and feel for warmth. Wear shoes that fit well. Check your shoes  for rocks or other objects that can hurt your feet. Do not walk barefoot or wear shoes without socks. Wear cotton socks to help keep your feet dry.         Ask about vaccines you may need.  You have a higher risk for serious illness if you get the flu, pneumonia, COVID-19, or hepatitis. Ask your provider if you should get vaccines to prevent these or other diseases, and when to get the vaccines.    Talk to your provider if you become stressed about diabetes care.  Sometimes being able to fit diabetes care into your life can cause increased stress. The stress can cause you not to take care of yourself properly. Your provider can help by offering tips about self-care. A mental health provider can listen and offer help with self-care issues. Other types of counseling can help you make nutrition or physical activity changes.    Have your A1c checked as directed.  Your provider may check your A1c every 3 months, or 2 times each year if your diabetes is controlled. An A1c test shows the average amount of sugar in your blood over the past 2 to 3 months. Your provider will tell you what your A1c level should be.    Have screening tests as directed.  Your provider may recommend screening for complications of diabetes and other conditions that may develop. Some screenings may begin right away and some may happen within the first 5 years of diagnosis:    Examples of diabetes complications  include kidney problems, high cholesterol, high blood pressure, blood vessel problems, eye problems, and sleep apnea.    You may be screened for a low vitamin B level  if you take oral diabetes medicine for a long time.    You may be screened for polycystic ovarian syndrome (PCOS)  if you are of childbearing age.    Follow up with your doctor or diabetes care team providers as directed:  You may need to have blood tests done before your follow-up visit. The test results will show if changes need to be made in your treatment or self-care.  Talk to your provider if you cannot afford your medicine. Write down your questions so you remember to ask them during your visits.  © Copyright [employer] 2023 Information is for End User's use only and may not be sold, redistributed or otherwise used for commercial purposes.  The above information is an  only. It is not intended as medical advice for individual conditions or treatments. Talk to your doctor, nurse or pharmacist before following any medical regimen to see if it is safe and effective for you.    Foot Care for People with Diabetes   AMBULATORY CARE:   What you need to know about foot care:  Long-term high blood sugar levels can damage the blood vessels and nerves in your legs and feet. This damage makes it hard to feel pressure, pain, temperature, and touch. You may not be able to feel a cut or sore, or shoes that are too tight. Foot care is needed to prevent serious problems, such as an infection or amputation. Diabetes may cause your toes to become crooked or curved under. These changes may affect the way you walk and can lead to increased pressure on your foot. The pressure can decrease blood flow to your feet. Lack of blood flow increases your risk for a foot ulcer.  Call your care team provider if:   Your feet become numb, weak, or hard to move.    You have pus draining from a sore on your foot.    You have a wound on your foot that gets bigger, deeper, or does not heal.    You see blisters, cuts, scratches, calluses, or sores on your foot.    You have a fever, and your feet become red, warm, and swollen.    Your toenails become thick, curled, or yellow.    You find it hard to check your feet because your vision is poor.    You have questions or concerns about your condition or care.    How to care for your feet:   Check your feet each day.  Look at your whole foot, including the bottom, and between and under your toes. Check for wounds, corns, and calluses. Use a mirror to see the  bottom of your feet. The skin on your feet may be shiny, tight, or darker than normal. Your feet may also be cold and pale. Feel your feet by running your hands along the tops, bottoms, sides, and between your toes. Redness, swelling, and warmth are signs of blood flow problems that can lead to a foot ulcer. Do not try to remove corns or calluses yourself. Do not ignore small problems, such as dry skin or small wounds. These can become life-threatening over time without proper care.         Wash your feet each day with soap and warm water.  Do not use hot water, because this can injure your foot. Dry your feet gently with a towel after you wash them. Dry between and under your toes.    Apply lotion or a moisturizer on your dry feet.  Ask your care team provider what lotions are best to use. Do not put lotion or moisturizer between your toes. Moisture between your toes could lead to skin breakdown.    Cut your toenails correctly.  File or cut your toenails straight across. Use a soft brush to clean around your toenails. If your toenails are very thick, you may need to have a care team provider or specialist cut them.     Protect your feet.  Do not walk barefoot or wear your shoes without socks. Check your shoes for rocks or other objects that can hurt your feet. Wear cotton socks to help keep your feet dry. Wear socks without toe seams, or wear them with the seams inside out. Change your socks each day. Do not wear socks that are dirty or damp.    Wear shoes that fit well.  Wear shoes that do not rub against any area of your feet. Your shoes should be ½ to ¾ inch (1 to 2 centimeters) longer than your feet. Your shoes should also have extra space around the widest part of your feet. Walking or athletic shoes with laces or straps that adjust are best. Ask your care team provider for help to choose shoes that fit you best. Ask your provider if you need to wear an insert, orthotic, or bandage on your feet.         Go to  your follow-up visits.  Your care team provider will do a foot exam at least 1 time each year. You may need a foot exam more often if you have nerve damage, foot deformities, or ulcers. Your provider will check for nerve damage and how well you can feel your feet. Your provider will check your shoes to see if they fit well.    Do not smoke.  Smoking can damage your blood vessels and put you at increased risk for foot ulcers. Ask your care team provider for information if you currently smoke and need help to quit. E-cigarettes or smokeless tobacco still contain nicotine. Talk to your care team provider before you use these products.    Follow up with your diabetes care team provider or foot specialist as directed:  You will need to have your feet checked at least 1 time each year. You may need a foot exam more often if you have nerve damage, foot deformities, or ulcers. Write down your questions so you remember to ask them during your visits.  © Copyright [employer] 2023 Information is for End User's use only and may not be sold, redistributed or otherwise used for commercial purposes.  The above information is an  only. It is not intended as medical advice for individual conditions or treatments. Talk to your doctor, nurse or pharmacist before following any medical regimen to see if it is safe and effective for you.

## 2024-01-22 NOTE — PROGRESS NOTES
INTERNAL MEDICINE HEALTH MAINTENANCE OFFICE VISIT  [facility] INTERNAL MEDICINE [address]    NAME: Dahlia Kraus  AGE: 53 y.o. SEX: female  : 1970     DATE: 2024     Assessment and Plan:     1. Type 2 diabetes mellitus with hypoglycemia without coma, with long-term current use of insulin (HCC)  Endo manages - seeing new one, recently established  Averaging blood sugars have improved but are still quite elevated last hemoglobin A1c was 15.7 which was recent she will be following with endocrinology for further management    2. Other diabetic neurological complication associated with type 2 diabetes mellitus (HCC)  Stable with gabapentin    3. Paroxysmal A-fib (HCC)  Stable with Xarelto  Pulmonary embolism found approximately a year ago    4. Rheumatoid arthritis involving both hands, unspecified whether rheumatoid factor present (HCC)  Pending appointment, feels she is stable with pain  Will send referral for PT    5. Bipolar affective disorder, remission status unspecified (HCC)  Stable - following with psychiatry   [facility]     6. Seizures (HCC)  ? Unknown last one. Will refer to neurology     7.  Continuous tobacco use  1 ppd currently   Shared decision making to start chantix  Referral sent to smoking cessation  CT lung screening for baseline      Tobacco Cessation Counseling: Tobacco cessation counseling was provided. The patient is sincerely urged to quit consumption of tobacco. She is not ready to quit tobacco. Medication options and side effects of medication discussed. Patient agreed to medication. Varenicline (chantix) was prescribed.     Lung Cancer Screening Shared Decision Making: I discussed with her that she is a candidate for lung cancer CT screening.     The following Shared Decision-Making points were covered:  Benefits of screening were discussed, including the rates of reduction in death from lung cancer and other causes.  Harms of  screening were reviewed, including false positive tests, radiation exposure levels, risks of invasive procedures, risks of complications of screening, and risk of overdiagnosis.  I counseled on the importance of adherence to annual lung cancer LDCT screening, impact of co-morbidities, and ability or willingness to undergo diagnosis and treatment.  I counseled on the importance of maintaining abstinence as a former smoker or was counseled on the importance of smoking cessation if a current smoker    Review of Eligibility Criteria: She meets all of the criteria for Lung Cancer Screening.   - She is 53 y.o.   - She has 20 pack year tobacco history and is a current smoker or has quit within the past 15 years  - She presents no signs or symptoms of lung cancer    After discussion, the patient decided to elect lung cancer screening.       Patient Counseling:  Nutrition: Stressed importance of moderation in sodium/caffeine intake, saturated fat, trans fat and cholesterol. Discussed portion control as well as increasing intake of fruits, vegetables, lean protein, and fish.   Exercise: Stressed the importance of regular exercise at least 150 mins/week  Sexuality: Discussed sexually transmitted diseases, partner selection, use of condoms, avoidance of unintended pregnancy  and contraceptive alternatives.  Injury prevention: Discussed safety belts, safety helmets, smoke detector, smoking near bedding or upholstery.   Dental health: Discussed importance of regular tooth brushing, flossing, and dental visits.  Immunizations reviewed. UTD flu vaccine, defers COVID,   Had zostervax- due for shingrix- will get at pharmacy, UTD with pneumo vaccines  Discussed benefits of screening  Education was printed for patient    Discussed the patient's BMI with her.  The BMI is in the acceptable range    No follow-ups on file.     Chief Complaint:     Chief Complaint   Patient presents with   • Physical Exam        History of Present Illness:      Well Adult Physical   Patient here for a comprehensive physical exam.The patient reports no problems    Diet and Physical Activity  Diet: well balanced diet  Weight concerns: None, patient's BMI is between 18.5-24.9  Exercise: never      Depression Screen  Negative for depression with PHQ2 score of 0.     General Health  Hearing: Normal:  bilateral  Vision: goes for regular eye exams and wears glasses- [facility]   Dental: Uppers and lower dentures     Reproductive Health  Post menopausal  MORGAN , she is unsure if she still has ovaries  Breast exam monthly- due for mammogram, referral placed      The following portions of the patient's history were reviewed and updated as appropriate: allergies, current medications, past family history, past medical history, past social history, past surgical history and problem list.     Review of Systems:     Review of Systems   Constitutional:  Negative for appetite change, chills, diaphoresis, fatigue, fever and unexpected weight change.   HENT:  Negative for postnasal drip and sneezing.    Eyes:  Negative for visual disturbance.   Respiratory:  Negative for chest tightness and shortness of breath.    Cardiovascular:  Negative for chest pain, palpitations and leg swelling.   Gastrointestinal:  Negative for abdominal pain and blood in stool.   Endocrine: Negative for cold intolerance, heat intolerance, polydipsia, polyphagia and polyuria.   Genitourinary:  Negative for difficulty urinating, dysuria, frequency and urgency.   Musculoskeletal:  Negative for arthralgias and myalgias.   Skin:  Negative for rash and wound.   Neurological:  Negative for dizziness, weakness, light-headedness and headaches.   Hematological:  Negative for adenopathy.   Psychiatric/Behavioral:  Negative for confusion, dysphoric mood and sleep disturbance. The patient is not nervous/anxious.         Past Medical History:     Past Medical History:   Diagnosis Date   • Abnormal ECG     • Abnormal Pap smear of cervix    • Allergic    • Anemia    • Anxiety    • Asthma    • Atrial fibrillation (HCC)    • Chlamydia    • Chronic kidney disease    • Coronary artery disease    • Depression    • Diabetes mellitus ([facility])    • Fibromyalgia    • GERD (gastroesophageal reflux disease)    • Headache(784.0)    • Heart disease    • Heart murmur    • History of transfusion    • Hypertension    • Inflammatory bowel disease    • Migraines    • Myocardial infarction ([facility])    • Neuromuscular disorder ([facility])    • Opioid abuse, in remission ([facility])    • Otitis media    • Pneumonia    • Scoliosis    • Seizures ([facility])    • Shingles    • Sick sinus syndrome ([facility])    • Stroke ([facility])    • Urinary tract infection    • Varicella    • Visual impairment         Past Surgical History:     Past Surgical History:   Procedure Laterality Date   • APPENDECTOMY     • CATARACT EXTRACTION Right 2023   •  SECTION     • EYE SURGERY Left 2023   • FRACTURE SURGERY     • HIP SURGERY Right    • HYSTERECTOMY     • TUBAL LIGATION          Social History:     Social History     Socioeconomic History   • Marital status: Single     Spouse name: None   • Number of children: None   • Years of education: None   • Highest education level: None   Occupational History   • None   Tobacco Use   • Smoking status: Every Day     Current packs/day: 0.50     Average packs/day: 0.6 packs/day for 41.1 years (25.0 ttl pk-yrs)     Types: Cigarettes     Start date: 1992   • Smokeless tobacco: Never   • Tobacco comments:     Patient stated that she is not ready to quit smoking    Vaping Use   • Vaping status: Never Used   Substance and Sexual Activity   • Alcohol use: Not Currently   • Drug use: Never   • Sexual activity: Not Currently     Partners: Male     Birth control/protection: Abstinence, Surgical, Female Sterilization   Other Topics Concern   • None   Social History Narrative   • None     Social Determinants of Health     Financial Resource  Strain: Not on file   Food Insecurity: Not on file   Transportation Needs: Not on file   Physical Activity: Not on file   Stress: Not on file   Social Connections: Unknown (12/24/2023)    Received from [employer]    Social [employer]    • Social Connections: Not on file    • Social Connections: Not on file   Intimate Partner Violence: Not on file   Housing Stability: Not on file        Family History:     Family History   Problem Relation Age of Onset   • Asthma Mother    • Cancer Mother         Bladder Cancer   • Diabetes Mother    • Heart disease Mother    • Hypertension Mother    • Stroke Mother    • Mental illness Mother    • Depression Mother    • COPD Mother    • Arthritis Mother    • Hearing loss Mother    • Vision loss Mother    • Glaucoma Mother    • Anxiety disorder Mother    • Psychiatric Illness Mother    • Asthma Father    • Diabetes Father    • Heart disease Father    • Hypertension Father    • Arthritis Father    • Cancer Maternal Grandfather    • Heart disease Maternal Grandfather    • Arthritis Maternal Grandfather    • Cancer Paternal Grandmother    • Diabetes Paternal Grandmother    • Asthma Brother    • Diabetes Brother    • Heart disease Brother    • Hypertension Brother    • Mental illness Brother    • Arthritis Brother    • Hypertension Brother    • Arthritis Paternal Grandfather    • Mental illness Maternal Aunt    • Mental illness Maternal Aunt    • Mental illness Daughter    • Depression Daughter    • Asthma Paternal Aunt    • ADD / ADHD Cousin    • ADD / ADHD Cousin    • Psychiatric Illness Brother         Current Medications:     Outpatient Medications Prior to Visit   Medication Sig Dispense Refill   • albuterol (PROVENTIL HFA,VENTOLIN HFA) 90 mcg/act inhaler Inhale 2 puffs every 4 (four) hours as needed     • Alcohol Swabs (Pharmacist Choice Alcohol) PADS 5 (five) times a day     • ALPRAZolam (XANAX) 1 mg tablet Take 1 tablet (1 mg total) by mouth 4 (four) times a day as needed for  "anxiety 60 tablet 0   • ammonium lactate (LAC-HYDRIN) 12 % lotion Apply topically 2 (two) times a day as needed for dry skin 226 g 3   • ARIPiprazole (ABILIFY) 10 mg tablet Take 1 tablet (10 mg total) by mouth daily 90 tablet 3   • atorvastatin (LIPITOR) 10 mg tablet Take 10 mg by mouth daily     • BD Pen Needle Nkechi 2nd Gen 32G X 4 MM MISC USE AS DIRECTED FOR BEFORE A MEAL AND AT BEDTIME GLUCOSE INJECTION     • BD Pen Needle Nkechi 2nd Gen 32G X 4 MM MISC USE WITH INSULIN 5 TIMES A  each 3   • BD PrecisionGlide Needle 23G X 1-1/2\" MISC USE AS DIRECTED TO ADMINISTER NALOXONE INJECTION.  MAY DISPENSE 23-25 GAUGE 1-1.5\" NEEDLE.     • Blood Glucose Monitoring Suppl (ONE TOUCH ULTRA 2) w/Device KIT Use as directed     • busPIRone (BUSPAR) 30 MG tablet TAKE 1 TABLET (30 MG TOTAL) BY MOUTH 2 (TWO) TIMES A  tablet 0   • Continuous Blood Gluc  (Dexcom G6 ) ALEKS Use 1 Device 4 (four) times a day (with meals and at bedtime) 1 each 6   • Continuous Blood Gluc Sensor (Dexcom G6 Sensor) MISC CHANGE SENSOR EVERY 10 DAYS 3 each 6   • Continuous Blood Gluc Transmit (Dexcom G6 Transmitter) MISC USE 4 TIMES A DAY (WITH MEALS AND AT BEDTIME) 1 each 3   • famotidine (PEPCID) 20 mg tablet TAKE 1 TABLET (20 MG TOTAL) BY MOUTH 2 (TWO) TIMES A DAY AS NEEDED FOR INDIGESTION 90 tablet 3   • fexofenadine (ALLEGRA) 180 MG tablet TAKE 1 TABLET (180 MG TOTAL) BY MOUTH DAILY 90 tablet 3   • Flovent Diskus 250 MCG/ACT diskus inhaler INHALE 1 PUFF 2 (TWO) TIMES A DAY 60 each 1   • gabapentin (NEURONTIN) 100 mg capsule TAKE 1 CAPSULE (100 MG TOTAL) BY MOUTH DAILY AT BEDTIME TAKE ADDITIONAL 100 MG CAPSULE TO TOTAL 900 MG AT BEDTIME 30 capsule 3   • gabapentin (NEURONTIN) 400 mg capsule TAKE 2 CAPSULES BY MOUTH 3 TIMES A DAY 90 capsule 3   • hydrALAZINE (APRESOLINE) 50 mg tablet Take 50 mg by mouth 3 (three) times a day     • Incontinence Supply Disposable (Depend Undergarment Ex Absorb) MISC Use 4 (four) times a day as " needed (incontinence) 120 each 10   • insulin lispro (HumaLOG) 100 units/mL injection pen INJECT 20 UNITS UNDER THE SKIN 3 (THREE) TIMES A DAY BEFORE MEALS INDICATIONS: TYPE 2 DIABETES MELLITUS. 15 mL 3   • Lantus SoloStar 100 units/mL SOPN INJECT 55 UNITS UNDER THE SKIN 2 (TWO) TIMES A DAY AT LUNCH AND AT BEDTIME. 45 mL 3   • leflunomide (ARAVA) 20 MG tablet Take 20 mg by mouth daily     • lidocaine (Lidoderm) 5 % Apply 1 patch topically over 12 hours daily Remove & Discard patch within 12 hours or as directed by MD 60 patch 0   • lisinopril (ZESTRIL) 5 mg tablet Take 5 mg by mouth daily     • lovastatin (MEVACOR) 10 MG tablet Take 10 mg by mouth     • meclizine (ANTIVERT) 25 mg tablet Take 25 mg by mouth Three times daily as needed     • methocarbamol (ROBAXIN) 750 mg tablet TAKE ONE TABLET BY MOUTH DAILY AS NEEDED SPASMS     • metoprolol succinate (TOPROL-XL) 100 mg 24 hr tablet Take 100 mg by mouth daily     • Mirabegron ER (Myrbetriq) 25 MG TB24 Take 25 mg by mouth in the morning 30 tablet 0   • montelukast (SINGULAIR) 10 mg tablet TAKE 1 TABLET (10 MG TOTAL) BY MOUTH DAILY AT BEDTIME 90 tablet 3   • naloxone (NARCAN) 0.4 mg/mL injection      • nitrofurantoin (MACROBID) 100 mg capsule Take 1 capsule (100 mg total) by mouth 2 (two) times a day 10 capsule 0   • Nutritional Supplements (Glucerna Hunger Smart Shake) LIQD Take 1 Can by mouth 3 (three) times a day 296 mL 5   • Olopatadine HCl (Pataday) 0.7 % SOLN 1 drop each eye twice a day 2.5 mL 3   • ondansetron (ZOFRAN-ODT) 4 mg disintegrating tablet TAKE 1 TABLET (4 MG TOTAL) BY MOUTH EVERY 6 (SIX) HOURS AS NEEDED FOR NAUSEA 10 tablet 0   • OneTouch Delica Lancets 33G MISC 2 (two) times a day     • OneTouch Ultra test strip TEST TWICE DAILY OR AS DIRECTED BY MD     • oxyCODONE (ROXICODONE) 5 immediate release tablet 10 mg     • pantoprazole (PROTONIX) 40 mg tablet TAKE 1 TABLET (40 MG TOTAL) BY MOUTH DAILY 90 tablet 3   • potassium chloride (K-DUR,KLOR-CON) 20  mEq tablet Take 1 tablet (20 mEq total) by mouth daily 30 tablet 1   • potassium chloride (MICRO-K) 10 MEQ CR capsule TAKE 2 CAPSULES (20 MEQ TOTAL) BY MOUTH 2 (TWO) TIMES A DAY 90 capsule 3   • prazosin (MINIPRESS) 5 mg capsule TAKE 1 CAPSULE (5 MG TOTAL) BY MOUTH DAILY AT BEDTIME 30 capsule 0   • prednisoLONE acetate (PRED FORTE) 1 % ophthalmic suspension INSTILL ONE DROP INTO LEFT EYE 4 TIMES A DAY     • risperiDONE (RisperDAL) 3 mg tablet TAKE 1 TABLET (3 MG TOTAL) BY MOUTH 2 (TWO) TIMES A  tablet 0   • rivaroxaban (XARELTO) 20 mg tablet Take 20 mg by mouth     • sertraline (ZOLOFT) 100 mg tablet Take 1 tablet (100 mg total) by mouth daily 90 tablet 0   • Spacer/Aero-Holding Chambers (AeroChamber Plus Geoff-Vu w/Mask) MISC 1 EACH (1 APPLICATION TOTAL) BY MISCELLANEOUS ROUTE DAILY AS NEEDED (ASTHMA EXACERBATION).     • TiZANidine (ZANAFLEX) 2 MG capsule TAKE 1 TABLET UP TO THREE TIMES A DAY AS NEEDED FOR MUSCLE SPASM     • Trulicity 1.5 MG/0.5ML injection INJECT 1.5 MG UNDER THE SKIN EVERY 7 DAYS. 2 mL 3   • zolpidem (AMBIEN) 10 mg tablet TAKE 1 TABLET (10 MG TOTAL) BY MOUTH DAILY 30 tablet 0   • metFORMIN (GLUCOPHAGE-XR) 750 mg 24 hr tablet TAKE 1 TABLET (750 MG TOTAL) BY MOUTH 2 (TWO) TIMES A DAY WITH MEALS.     • polyethylene glycol-electrolytes (TriLyte) 4000 mL solution Take 4,000 mL by mouth once for 1 dose Take 4000 mL by mouth once for 1 dose. Use as directed 4000 mL 0     No facility-administered medications prior to visit.         Allergies:     Allergies   Allergen Reactions   • Other Anaphylaxis     Capzasin HP -- severe burning and swelling of the skin    • Clarithromycin GI Intolerance   • Acetaminophen GI Intolerance   • Bactrim [Sulfamethoxazole-Trimethoprim] Itching, GI Intolerance, Dizziness, Confusion and Lightheadedness     Patient passes out when on this medication for several days    • Cephalosporins Hives     Tolerated Cefdinir 2/2023   • Latex Hives   • Oxycodone-Acetaminophen GI  "Intolerance   • Penicillins Diarrhea   • Trazodone Diarrhea   • Capsaicin Rash   • Naproxen Palpitations        Objective:     Physical Exam:  /76 (BP Location: Left arm, Patient Position: Sitting, Cuff Size: Large)   Pulse (!) 111   Resp 17   Ht 5' 5.5\" (1.664 m)   Wt 64.1 kg (141 lb 6.4 oz)   SpO2 98%   BMI 23.17 kg/m²     General Appearance:    Alert, cooperative, no distress, appears stated age   Head:    Normocephalic, without obvious abnormality, atraumatic   Eyes:    PERRL, conjunctiva/corneas clear, EOM's intact, fundi     benign, both eyes   Ears:    Normal TM's and external ear canals, both ears   Nose:   Nares normal, septum midline, mucosa normal, no drainage    or sinus tenderness   Throat:   Lips, mucosa, and tongue normal; teeth and gums normal   Neck:   Supple, symmetrical, trachea midline, no adenopathy;     thyroid:  no enlargement/tenderness/nodules; no carotid    bruit or JVD   Back:     Symmetric, no curvature, ROM normal, no CVA tenderness   Lungs:     Clear to auscultation bilaterally, respirations unlabored   Chest Wall:    No tenderness or deformity    Heart:    Regular rate and rhythm, S1 and S2 normal, no murmur, rub   or gallop   Breast Exam:    No tenderness, masses, or nipple abnormality   Abdomen:     Soft, non-tender, bowel sounds active all four quadrants,     no masses, no organomegaly   Genitalia:    Normal female without lesion, discharge or tenderness   Rectal:    Normal tone, no masses or tenderness; guaiac negative stool   Extremities:   Extremities normal, atraumatic, no cyanosis or edema   Pulses:   2+ and symmetric all extremities   Skin:   Skin color, texture, turgor normal, no rashes or lesions   Lymph nodes:   Cervical, supraclavicular, and axillary nodes normal   Neurologic:   CNII-XII intact, normal strength, sensation and reflexes     throughout       Data:    Laboratory Results: I have personally reviewed the pertinent laboratory results/reports "   Radiology/Other Diagnostic Testing Results: I have personally reviewed pertinent reports.       Health Maintenance:     Health Maintenance   Topic Date Due   • PT PLAN OF CARE  Never done   • COVID-19 Vaccine (1) Never done   • Breast Cancer Screening: Mammogram  Never done   • Colorectal Cancer Screening  Never done   • Lung Cancer Screening  02/13/2023   • DM Eye Exam  04/17/2024   • HEMOGLOBIN A1C  07/17/2024   • Kidney Health Evaluation: Albumin/Creatinine Ratio  09/29/2024   • Diabetic Foot Exam  09/29/2024   • Kidney Health Evaluation: GFR  01/17/2025   • Annual Physical  01/22/2025   • DTaP,Tdap,and Td Vaccines (2 - Td or Tdap) 10/06/2025   • HIV Screening  Completed   • Hepatitis C Screening  Completed   • Zoster Vaccine  Completed   • Pneumococcal Vaccine: Pediatrics (0 to 5 Years) and At-Risk Patients (6 to 64 Years)  Completed   • Influenza Vaccine  Completed   • HIB Vaccine  Aged Out   • IPV Vaccine  Aged Out   • Hepatitis A Vaccine  Aged Out   • Meningococcal ACWY Vaccine  Aged Out   • HPV Vaccine  Aged Out     Immunization History   Administered Date(s) Administered   • Hep A, adult 11/11/2021   • Hep B, adult 05/04/2021, 11/11/2021   • INFLUENZA 10/01/2019, 11/29/2022   • Influenza Quadrivalent 3 years and older 10/20/2020, 11/11/2021   • Influenza Quadrivalent Recombinant Preservative Free IM 09/25/2018, 10/18/2019   • Influenza, injectable, quadrivalent, preservative free 0.5 mL 12/13/2023   • Influenza, recombinant, quadrivalent,injectable, preservative free 11/29/2022   • Influenza, seasonal, injectable 09/11/2014, 10/31/2016, 08/14/2018, 10/30/2019   • Pneumococcal Conjugate 13-Valent 10/06/2015   • Pneumococcal Conjugate Vaccine 20-valent (Pcv20), Polysace 11/29/2022   • Pneumococcal Polysaccharide PPV23 10/14/2014, 12/04/2017   • Tdap 10/06/2015   • Zoster Vaccine Recombinant 11/22/2019, 11/11/2021       CRISTINO Carreno  [facility] INTERNAL MEDICINE [address] [facility] [address]

## 2024-01-25 ENCOUNTER — HOME HEALTH ADMISSION (OUTPATIENT)
Dept: HOME HEALTH SERVICES | Facility: HOME HEALTHCARE | Age: 54
End: 2024-01-25
Payer: COMMERCIAL

## 2024-01-25 ENCOUNTER — HOME CARE VISIT (OUTPATIENT)
Dept: HOME HEALTH SERVICES | Facility: HOME HEALTHCARE | Age: 54
End: 2024-01-25

## 2024-01-25 DIAGNOSIS — E11.49 OTHER DIABETIC NEUROLOGICAL COMPLICATION ASSOCIATED WITH TYPE 2 DIABETES MELLITUS (HCC): Primary | ICD-10-CM

## 2024-01-25 DIAGNOSIS — M06.9 RHEUMATOID ARTHRITIS INVOLVING BOTH HANDS, UNSPECIFIED WHETHER RHEUMATOID FACTOR PRESENT (HCC): ICD-10-CM

## 2024-01-26 ENCOUNTER — HOME CARE VISIT (OUTPATIENT)
Dept: HOME HEALTH SERVICES | Facility: HOME HEALTHCARE | Age: 54
End: 2024-01-26

## 2024-01-26 NOTE — CASE COMMUNICATION
Agreeable to  services and no other SN agencies in home: x    Communication to the physician regarding delay: New SOC date 1/29/24    Insurance, copays, HB status reviewed: x    Verified address and phone number: x    Requested that patient secure pets: x    Medication bottles and DC instructions in home: x    Wound care/IV supplies in home: x    CG present to learn: x

## 2024-01-29 ENCOUNTER — HOME CARE VISIT (OUTPATIENT)
Dept: HOME HEALTH SERVICES | Facility: HOME HEALTHCARE | Age: 54
End: 2024-01-29
Payer: COMMERCIAL

## 2024-01-29 ENCOUNTER — TELEPHONE (OUTPATIENT)
Age: 54
End: 2024-01-29

## 2024-01-29 PROCEDURE — G0151 HHCP-SERV OF PT,EA 15 MIN: HCPCS

## 2024-01-29 NOTE — TELEPHONE ENCOUNTER
Hi, this is Anitha Magaña. I'm a home Health physical therapist with [facility]. I was calling about patient Dahlia Meeks. Date of birth is [birth year]. We got a referral for home physical therapy for her and I went out to do for initial visit today and I have quite a few questions on the medication she's on. She's just has a lot on her chart that she doesn't have in the home. So I wanted to go through them with whomever pretty thoroughly. If you could please call me back.

## 2024-01-30 ENCOUNTER — TELEPHONE (OUTPATIENT)
Age: 54
End: 2024-01-30

## 2024-01-30 ENCOUNTER — HOME CARE VISIT (OUTPATIENT)
Dept: HOME HEALTH SERVICES | Facility: HOME HEALTHCARE | Age: 54
End: 2024-01-30
Payer: COMMERCIAL

## 2024-01-30 DIAGNOSIS — Z79.4 TYPE 2 DIABETES MELLITUS WITH HYPOGLYCEMIA WITHOUT COMA, WITH LONG-TERM CURRENT USE OF INSULIN (HCC): ICD-10-CM

## 2024-01-30 DIAGNOSIS — E11.649 TYPE 2 DIABETES MELLITUS WITH HYPOGLYCEMIA WITHOUT COMA, WITH LONG-TERM CURRENT USE OF INSULIN (HCC): ICD-10-CM

## 2024-01-30 RX ORDER — INSULIN LISPRO 100 [IU]/ML
INJECTION, SOLUTION INTRAVENOUS; SUBCUTANEOUS
Qty: 15 ML | Refills: 3 | OUTPATIENT
Start: 2024-01-30

## 2024-01-30 NOTE — TELEPHONE ENCOUNTER
Anitha Magaña with the [facility] is calling and requesting to get information on the medications that the patient is taking. And ones that she is no longer taking

## 2024-01-31 ENCOUNTER — HOME CARE VISIT (OUTPATIENT)
Dept: HOME HEALTH SERVICES | Facility: HOME HEALTHCARE | Age: 54
End: 2024-01-31
Payer: COMMERCIAL

## 2024-01-31 PROCEDURE — G0155 HHCP-SVS OF CSW,EA 15 MIN: HCPCS

## 2024-01-31 PROCEDURE — G0321 AUDIO-ONLY HHS: HCPCS

## 2024-02-01 ENCOUNTER — OFFICE VISIT (OUTPATIENT)
Dept: PODIATRY | Facility: CLINIC | Age: 54
End: 2024-02-01
Payer: COMMERCIAL

## 2024-02-01 VITALS
HEIGHT: 66 IN | BODY MASS INDEX: 23.17 KG/M2 | OXYGEN SATURATION: 92 % | HEART RATE: 87 BPM | DIASTOLIC BLOOD PRESSURE: 92 MMHG | SYSTOLIC BLOOD PRESSURE: 139 MMHG

## 2024-02-01 DIAGNOSIS — E11.649 TYPE 2 DIABETES MELLITUS WITH HYPOGLYCEMIA WITHOUT COMA, WITH LONG-TERM CURRENT USE OF INSULIN (HCC): ICD-10-CM

## 2024-02-01 DIAGNOSIS — Z79.4 TYPE 2 DIABETES MELLITUS WITH HYPOGLYCEMIA WITHOUT COMA, WITH LONG-TERM CURRENT USE OF INSULIN (HCC): ICD-10-CM

## 2024-02-01 DIAGNOSIS — B35.1 ONYCHOMYCOSIS: Primary | ICD-10-CM

## 2024-02-01 DIAGNOSIS — E11.42 DIABETIC POLYNEUROPATHY ASSOCIATED WITH TYPE 2 DIABETES MELLITUS (HCC): ICD-10-CM

## 2024-02-01 DIAGNOSIS — Z79.4 TYPE 2 DIABETES MELLITUS WITH HYPOGLYCEMIA WITHOUT COMA, WITH LONG-TERM CURRENT USE OF INSULIN (HCC): Primary | ICD-10-CM

## 2024-02-01 DIAGNOSIS — E11.649 TYPE 2 DIABETES MELLITUS WITH HYPOGLYCEMIA WITHOUT COMA, WITH LONG-TERM CURRENT USE OF INSULIN (HCC): Primary | ICD-10-CM

## 2024-02-01 PROCEDURE — 99212 OFFICE O/P EST SF 10 MIN: CPT | Performed by: PODIATRIST

## 2024-02-01 PROCEDURE — 11721 DEBRIDE NAIL 6 OR MORE: CPT | Performed by: PODIATRIST

## 2024-02-01 NOTE — PROGRESS NOTES
Assessment/Plan:     The patient's clinical examination today significant for thickened and dystrophic, brittle and discolored pedal nail plates with subungual debris consistent with onychomycosis.  Pedal pulses are palpable bilaterally.  The interdigital spaces are clear without maceration.  There is a healed partial-thickness eschar to the lateral aspect of the left fourth digit with the fifth digital nail plate was impinging upon the skin.  It has healed without any signs of infection.  Vibratory and epicritic sensation is diminished bilaterally consistent with diabetic peripheral neuropathy.    The patient's pedal nail plates are sharply debrided with a sterile nail clipper x 10 without complication.  Nails were then mechanically reduced in thickness and girth utilizing a rotary bur.  A diabetic foot examination was performed and the patient is deemed to be in the moderate risk category secondary to her history of peripheral neuropathy.  Her most recent hemoglobin A1c from January 17, 2024 was 15.7, prior to that it was 11.0 in September 2023.      Recommend follow-up in 2 to 3 months for continued at risk diabetic footcare.     Diagnoses and all orders for this visit:    Onychomycosis    Type 2 diabetes mellitus with hypoglycemia without coma, with long-term current use of insulin (HCC)    Diabetic polyneuropathy associated with type 2 diabetes mellitus (HCC)          Subjective:     Patient ID: Dahlia Kraus is a 53 y.o. female.    The patient presents today with all for a diabetic foot examination and at risk diabetic footcare.  The patient notes difficulty trimming her own nails due to the thickness and girth.  She states that the last couple of times that she is trimmed her nails because she is not cutting herself.  She states her primary care physician referred her back to podiatry for at risk footcare.  She states that adjustments have been made to her diabetes medications and her numbers have been  looking better lately.  Her last hemoglobin A1c was 15.7.      PAST MEDICAL HISTORY:  Past Medical History:   Diagnosis Date    Abnormal ECG     Abnormal Pap smear of cervix     Allergic     Anemia     Anxiety     Asthma     Atrial fibrillation (HCC)     Chlamydia     Chronic kidney disease     Coronary artery disease     Depression     Diabetes mellitus (HCC)     Fibromyalgia     GERD (gastroesophageal reflux disease)     Headache(784.0)     Heart disease     Heart murmur     History of transfusion     Hypertension     Inflammatory bowel disease     Migraines     Myocardial infarction (HCC)     Neuromuscular disorder (HCC)     Opioid abuse, in remission (HCC)     Otitis media     Pneumonia     Scoliosis     Seizures (HCC)     Shingles     Sick sinus syndrome (HCC)     Stroke (HCC)     Urinary tract infection     Varicella     Visual impairment        PAST SURGICAL HISTORY:  Past Surgical History:   Procedure Laterality Date    APPENDECTOMY      CATARACT EXTRACTION Right 2023     SECTION      EYE SURGERY Left 2023    FRACTURE SURGERY      HIP SURGERY Right     HYSTERECTOMY      TUBAL LIGATION          ALLERGIES:  Other, Clarithromycin, Acetaminophen, Bactrim [sulfamethoxazole-trimethoprim], Cephalosporins, Latex, Oxycodone-acetaminophen, Penicillins, Trazodone, Capsaicin, and Naproxen    MEDICATIONS:  Current Outpatient Medications   Medication Sig Dispense Refill    albuterol (PROVENTIL HFA,VENTOLIN HFA) 90 mcg/act inhaler Inhale 2 puffs every 4 (four) hours as needed      Alcohol Swabs (Pharmacist Choice Alcohol) PADS 5 (five) times a day      ALPRAZolam (XANAX) 1 mg tablet Take 1 tablet (1 mg total) by mouth 4 (four) times a day as needed for anxiety 60 tablet 0    ammonium lactate (LAC-HYDRIN) 12 % lotion Apply topically 2 (two) times a day as needed for dry skin 226 g 3    ARIPiprazole (ABILIFY) 10 mg tablet Take 1 tablet (10 mg total) by mouth daily 90 tablet 3    BD Pen Needle Nkechi 2nd Gen  "32G X 4 MM MISC USE AS DIRECTED FOR BEFORE A MEAL AND AT BEDTIME GLUCOSE INJECTION      BD Pen Needle Nkechi 2nd Gen 32G X 4 MM MISC USE WITH INSULIN 5 TIMES A  each 3    BD PrecisionGlide Needle 23G X 1-1/2\" MISC USE AS DIRECTED TO ADMINISTER NALOXONE INJECTION.  MAY DISPENSE 23-25 GAUGE 1-1.5\" NEEDLE.      Blood Glucose Monitoring Suppl (ONE TOUCH ULTRA 2) w/Device KIT Use as directed      Continuous Blood Gluc  (Dexcom G6 ) ALEKS Use 1 Device 4 (four) times a day (with meals and at bedtime) 1 each 6    Continuous Blood Gluc Sensor (Dexcom G6 Sensor) MISC CHANGE SENSOR EVERY 10 DAYS 3 each 6    Continuous Blood Gluc Transmit (Dexcom G6 Transmitter) MISC USE 4 TIMES A DAY (WITH MEALS AND AT BEDTIME) 1 each 3    famotidine (PEPCID) 20 mg tablet TAKE 1 TABLET (20 MG TOTAL) BY MOUTH 2 (TWO) TIMES A DAY AS NEEDED FOR INDIGESTION 90 tablet 3    fexofenadine (ALLEGRA) 180 MG tablet TAKE 1 TABLET (180 MG TOTAL) BY MOUTH DAILY 90 tablet 3    Flovent Diskus 250 MCG/ACT diskus inhaler INHALE 1 PUFF 2 (TWO) TIMES A DAY 60 each 1    gabapentin (NEURONTIN) 100 mg capsule TAKE 1 CAPSULE (100 MG TOTAL) BY MOUTH DAILY AT BEDTIME TAKE ADDITIONAL 100 MG CAPSULE TO TOTAL 900 MG AT BEDTIME 30 capsule 3    gabapentin (NEURONTIN) 400 mg capsule TAKE 2 CAPSULES BY MOUTH 3 TIMES A DAY 90 capsule 3    hydrALAZINE (APRESOLINE) 50 mg tablet Take 50 mg by mouth 3 (three) times a day      Incontinence Supply Disposable (Depend Undergarment Ex Absorb) MISC Use 4 (four) times a day as needed (incontinence) 120 each 10    Lantus SoloStar 100 units/mL SOPN INJECT 55 UNITS UNDER THE SKIN 2 (TWO) TIMES A DAY AT LUNCH AND AT BEDTIME. 45 mL 3    leflunomide (ARAVA) 20 MG tablet Take 20 mg by mouth daily      lisinopril (ZESTRIL) 5 mg tablet Take 5 mg by mouth daily      lovastatin (MEVACOR) 10 MG tablet Take 10 mg by mouth      meclizine (ANTIVERT) 25 mg tablet Take 25 mg by mouth Three times daily as needed      metFORMIN " (GLUCOPHAGE-XR) 750 mg 24 hr tablet TAKE 1 TABLET (750 MG TOTAL) BY MOUTH 2 (TWO) TIMES A DAY WITH MEALS.      methocarbamol (ROBAXIN) 750 mg tablet TAKE ONE TABLET BY MOUTH DAILY AS NEEDED SPASMS      metoprolol succinate (TOPROL-XL) 100 mg 24 hr tablet Take 100 mg by mouth daily      Mirabegron ER (Myrbetriq) 25 MG TB24 Take 25 mg by mouth in the morning 30 tablet 0    montelukast (SINGULAIR) 10 mg tablet TAKE 1 TABLET (10 MG TOTAL) BY MOUTH DAILY AT BEDTIME 90 tablet 3    naloxone (NARCAN) 0.4 mg/mL injection       nitrofurantoin (MACROBID) 100 mg capsule Take 1 capsule (100 mg total) by mouth 2 (two) times a day 10 capsule 0    Nutritional Supplements (Glucerna Hunger Smart Shake) LIQD Take 1 Can by mouth 3 (three) times a day 296 mL 5    Olopatadine HCl (Pataday) 0.7 % SOLN 1 drop each eye twice a day 2.5 mL 3    ondansetron (ZOFRAN-ODT) 4 mg disintegrating tablet TAKE 1 TABLET (4 MG TOTAL) BY MOUTH EVERY 6 (SIX) HOURS AS NEEDED FOR NAUSEA 10 tablet 0    OneTouch Delica Lancets 33G MISC 2 (two) times a day      OneTouch Ultra test strip TEST TWICE DAILY OR AS DIRECTED BY MD      oxyCODONE (ROXICODONE) 5 immediate release tablet 10 mg pt reports she takes this 4 times per day but is out of the medication currently      pantoprazole (PROTONIX) 40 mg tablet TAKE 1 TABLET (40 MG TOTAL) BY MOUTH DAILY 90 tablet 3    potassium chloride (K-DUR,KLOR-CON) 20 mEq tablet Take 1 tablet (20 mEq total) by mouth daily 30 tablet 1    potassium chloride (MICRO-K) 10 MEQ CR capsule TAKE 2 CAPSULES (20 MEQ TOTAL) BY MOUTH 2 (TWO) TIMES A DAY 90 capsule 3    prazosin (MINIPRESS) 5 mg capsule TAKE 1 CAPSULE (5 MG TOTAL) BY MOUTH DAILY AT BEDTIME 30 capsule 0    prednisoLONE acetate (PRED FORTE) 1 % ophthalmic suspension INSTILL ONE DROP INTO LEFT EYE 4 TIMES A DAY      risperiDONE (RisperDAL) 3 mg tablet TAKE 1 TABLET (3 MG TOTAL) BY MOUTH 2 (TWO) TIMES A  tablet 0    rivaroxaban (XARELTO) 20 mg tablet Take 20 mg by mouth       sertraline (ZOLOFT) 100 mg tablet Take 1 tablet (100 mg total) by mouth daily 90 tablet 0    Spacer/Aero-Holding Chambers (AeroChamber Plus Geoff-Vu w/Mask) MISC 1 EACH (1 APPLICATION TOTAL) BY MISCELLANEOUS ROUTE DAILY AS NEEDED (ASTHMA EXACERBATION).      TiZANidine (ZANAFLEX) 2 MG capsule TAKE 1 TABLET UP TO THREE TIMES A DAY AS NEEDED FOR MUSCLE SPASM      Trulicity 1.5 MG/0.5ML injection INJECT 1.5 MG UNDER THE SKIN EVERY 7 DAYS. 2 mL 3    Varenicline Tartrate, Starter, (Chantix Starting Month Pak) 0.5 MG X 11 & 1 MG X 42 TBPK 0.5 mg once daily for 3 days then 0.5mg twice daily for days 4-7 then 1 mg twice daily 53 each 0    zolpidem (AMBIEN) 10 mg tablet TAKE 1 TABLET (10 MG TOTAL) BY MOUTH DAILY 30 tablet 0    atorvastatin (LIPITOR) 10 mg tablet Take 10 mg by mouth daily      busPIRone (BUSPAR) 30 MG tablet TAKE 1 TABLET (30 MG TOTAL) BY MOUTH 2 (TWO) TIMES A DAY (Patient not taking: Reported on 1/31/2024) 180 tablet 0    insulin lispro (HumaLOG) 100 units/mL injection pen INJECT 20 UNITS UNDER THE SKIN 3 (THREE) TIMES A DAY BEFORE MEALS INDICATIONS: TYPE 2 DIABETES MELLITUS. (Patient not taking: No sig reported) 15 mL 3    lidocaine (Lidoderm) 5 % Apply 1 patch topically over 12 hours daily Remove & Discard patch within 12 hours or as directed by MD (Patient not taking: Reported on 1/29/2024) 60 patch 0    polyethylene glycol-electrolytes (TriLyte) 4000 mL solution Take 4,000 mL by mouth once for 1 dose Take 4000 mL by mouth once for 1 dose. Use as directed 4000 mL 0     No current facility-administered medications for this visit.       SOCIAL HISTORY:  Social History     Socioeconomic History    Marital status: Single     Spouse name: None    Number of children: None    Years of education: None    Highest education level: None   Occupational History    None   Tobacco Use    Smoking status: Every Day     Current packs/day: 0.50     Average packs/day: 0.6 packs/day for 41.1 years (25.0 ttl pk-yrs)     Types:  Cigarettes     Start date: 1/1/1992    Smokeless tobacco: Never    Tobacco comments:     Patient stated that she is not ready to quit smoking    Vaping Use    Vaping status: Never Used   Substance and Sexual Activity    Alcohol use: Not Currently    Drug use: Never    Sexual activity: Not Currently     Partners: Male     Birth control/protection: Abstinence, Surgical, Female Sterilization   Other Topics Concern    None   Social History Narrative    None     Social Determinants of Health     Financial Resource Strain: Not on file   Food Insecurity: Not on file   Transportation Needs: Not on file   Physical Activity: Not on file   Stress: Not on file   Social Connections: Unknown (12/24/2023)    Received from [facility]     Social Connections: Not on file     Social Connections: Not on file   Intimate Partner Violence: Not on file   Housing Stability: Not on file        Review of Systems   Constitutional: Negative.    HENT: Negative.     Eyes: Negative.    Respiratory: Negative.     Cardiovascular: Negative.    Endocrine: Negative.    Musculoskeletal: Negative.    Skin: Negative.    Neurological: Negative.    Hematological: Negative.    Psychiatric/Behavioral: Negative.           Objective:     Physical Exam  Constitutional:       Appearance: Normal appearance.   HENT:      Head: Normocephalic and atraumatic.      Nose: Nose normal.   Cardiovascular:      Pulses: no weak pulses          Dorsalis pedis pulses are 2+ on the right side and 2+ on the left side.        Posterior tibial pulses are 1+ on the right side and 1+ on the left side.   Pulmonary:      Effort: Pulmonary effort is normal.   Feet:      Right foot:      Skin integrity: No ulcer, skin breakdown, erythema, warmth, callus or dry skin.      Left foot:      Skin integrity: No ulcer, skin breakdown, erythema, warmth, callus or dry skin.      Comments: The patient's clinical examination today significant for thickened and dystrophic,  brittle and discolored pedal nail plates with subungual debris consistent with onychomycosis.  Pedal pulses are palpable bilaterally.  The interdigital spaces are clear without maceration.  There is a healed partial-thickness eschar to the lateral aspect of the left fourth digit with the fifth digital nail plate was impinging upon the skin.  It has healed without any signs of infection.  Vibratory and epicritic sensation is diminished bilaterally consistent with diabetic peripheral neuropathy.    Skin:     General: Skin is warm.      Capillary Refill: Capillary refill takes less than 2 seconds.   Neurological:      General: No focal deficit present.      Mental Status: She is alert and oriented to person, place, and time.   Psychiatric:         Mood and Affect: Mood normal.         Behavior: Behavior normal.         Thought Content: Thought content normal.           Diabetic Foot Exam    Patient's shoes and socks removed.    Right Foot/Ankle   Right Foot Inspection  Skin Exam: skin normal and skin intact. No dry skin, no warmth, no callus, no erythema, no maceration, no abnormal color, no pre-ulcer, no ulcer and no callus.     Toe Exam: ROM and strength within normal limits.     Sensory   Vibration: diminished  Monofilament testing: diminished    Vascular  Capillary refills: < 3 seconds  The right DP pulse is 2+. The right PT pulse is 1+.     Left Foot/Ankle  Left Foot Inspection  Skin Exam: skin normal and skin intact. No dry skin, no warmth, no erythema, no maceration, normal color, no pre-ulcer, no ulcer and no callus.     Toe Exam: ROM and strength within normal limits.     Sensory   Vibration: diminished  Monofilament testing: diminished    Vascular  Capillary refills: < 3 seconds  The left DP pulse is 2+. The left PT pulse is 1+.     Assign Risk Category  No deformity present  Loss of protective sensation  No weak pulses  Risk: 1

## 2024-02-02 ENCOUNTER — HOME CARE VISIT (OUTPATIENT)
Dept: HOME HEALTH SERVICES | Facility: HOME HEALTHCARE | Age: 54
End: 2024-02-02
Payer: COMMERCIAL

## 2024-02-02 DIAGNOSIS — E11.649 TYPE 2 DIABETES MELLITUS WITH HYPOGLYCEMIA WITHOUT COMA, WITH LONG-TERM CURRENT USE OF INSULIN (HCC): ICD-10-CM

## 2024-02-02 DIAGNOSIS — E11.44 DIABETIC AMYOTROPHY ASSOCIATED WITH TYPE 2 DIABETES MELLITUS (HCC): ICD-10-CM

## 2024-02-02 DIAGNOSIS — Z79.4 TYPE 2 DIABETES MELLITUS WITH HYPOGLYCEMIA WITHOUT COMA, WITH LONG-TERM CURRENT USE OF INSULIN (HCC): ICD-10-CM

## 2024-02-02 RX ORDER — DULAGLUTIDE 1.5 MG/.5ML
INJECTION, SOLUTION SUBCUTANEOUS
Qty: 2 ML | Refills: 3 | Status: SHIPPED | OUTPATIENT
Start: 2024-02-02

## 2024-02-02 RX ORDER — GABAPENTIN 100 MG/1
100 CAPSULE ORAL
Qty: 30 CAPSULE | Refills: 3 | Status: SHIPPED | OUTPATIENT
Start: 2024-02-02

## 2024-02-02 NOTE — TELEPHONE ENCOUNTER
Forms on desk. K 2.9 on admission, now resolved. Likely iso home albuterol use  -trend K, replete to 4.0

## 2024-02-07 ENCOUNTER — HOME CARE VISIT (OUTPATIENT)
Dept: HOME HEALTH SERVICES | Facility: HOME HEALTHCARE | Age: 54
End: 2024-02-07
Payer: COMMERCIAL

## 2024-02-07 VITALS
SYSTOLIC BLOOD PRESSURE: 180 MMHG | HEART RATE: 88 BPM | OXYGEN SATURATION: 96 % | DIASTOLIC BLOOD PRESSURE: 100 MMHG | TEMPERATURE: 98.6 F

## 2024-02-07 DIAGNOSIS — F41.9 ANXIETY: ICD-10-CM

## 2024-02-07 PROCEDURE — G0151 HHCP-SERV OF PT,EA 15 MIN: HCPCS

## 2024-02-07 RX ORDER — ALPRAZOLAM 1 MG/1
1 TABLET ORAL 4 TIMES DAILY PRN
Qty: 60 TABLET | Refills: 0 | Status: SHIPPED | OUTPATIENT
Start: 2024-02-07

## 2024-02-08 ENCOUNTER — HOME CARE VISIT (OUTPATIENT)
Dept: HOME HEALTH SERVICES | Facility: HOME HEALTHCARE | Age: 54
End: 2024-02-08
Payer: COMMERCIAL

## 2024-02-09 DIAGNOSIS — N39.41 URGENCY INCONTINENCE: ICD-10-CM

## 2024-02-09 DIAGNOSIS — E11.649 TYPE 2 DIABETES MELLITUS WITH HYPOGLYCEMIA WITHOUT COMA, WITH LONG-TERM CURRENT USE OF INSULIN (HCC): ICD-10-CM

## 2024-02-09 DIAGNOSIS — F43.10 PTSD (POST-TRAUMATIC STRESS DISORDER): ICD-10-CM

## 2024-02-09 DIAGNOSIS — Z79.4 TYPE 2 DIABETES MELLITUS WITH HYPOGLYCEMIA WITHOUT COMA, WITH LONG-TERM CURRENT USE OF INSULIN (HCC): ICD-10-CM

## 2024-02-09 LAB — COLOGUARD RESULT REPORTABLE: NEGATIVE

## 2024-02-09 RX ORDER — PRAZOSIN HYDROCHLORIDE 5 MG/1
5 CAPSULE ORAL
Qty: 30 CAPSULE | Refills: 0 | Status: SHIPPED | OUTPATIENT
Start: 2024-02-09

## 2024-02-09 RX ORDER — MIRABEGRON 25 MG/1
25 TABLET, FILM COATED, EXTENDED RELEASE ORAL DAILY
Qty: 30 TABLET | Refills: 0 | Status: SHIPPED | OUTPATIENT
Start: 2024-02-09

## 2024-02-09 RX ORDER — INSULIN LISPRO 100 [IU]/ML
INJECTION, SOLUTION INTRAVENOUS; SUBCUTANEOUS
Qty: 15 ML | Refills: 3 | Status: SHIPPED | OUTPATIENT
Start: 2024-02-09

## 2024-02-12 ENCOUNTER — HOME CARE VISIT (OUTPATIENT)
Dept: HOME HEALTH SERVICES | Facility: HOME HEALTHCARE | Age: 54
End: 2024-02-12
Payer: COMMERCIAL

## 2024-02-12 ENCOUNTER — TELEPHONE (OUTPATIENT)
Age: 54
End: 2024-02-12

## 2024-02-12 VITALS
SYSTOLIC BLOOD PRESSURE: 160 MMHG | DIASTOLIC BLOOD PRESSURE: 94 MMHG | OXYGEN SATURATION: 94 % | HEART RATE: 96 BPM | TEMPERATURE: 98.1 F

## 2024-02-12 PROCEDURE — G0151 HHCP-SERV OF PT,EA 15 MIN: HCPCS

## 2024-02-12 NOTE — TELEPHONE ENCOUNTER
----- Message from Rekha Bryan PA-C sent at 2/12/2024  8:38 AM EST -----  Gisell was negative for any blood.

## 2024-02-14 ENCOUNTER — HOME CARE VISIT (OUTPATIENT)
Dept: HOME HEALTH SERVICES | Facility: HOME HEALTHCARE | Age: 54
End: 2024-02-14
Payer: COMMERCIAL

## 2024-02-14 VITALS
DIASTOLIC BLOOD PRESSURE: 84 MMHG | SYSTOLIC BLOOD PRESSURE: 140 MMHG | HEART RATE: 96 BPM | TEMPERATURE: 97.9 F | OXYGEN SATURATION: 92 %

## 2024-02-14 PROCEDURE — G0151 HHCP-SERV OF PT,EA 15 MIN: HCPCS

## 2024-02-15 DIAGNOSIS — F17.210 CIGARETTE NICOTINE DEPENDENCE WITHOUT COMPLICATION: ICD-10-CM

## 2024-02-15 RX ORDER — VARENICLINE TARTRATE 0.5 (11)-1
KIT ORAL
Qty: 53 EACH | Refills: 0 | Status: SHIPPED | OUTPATIENT
Start: 2024-02-15

## 2024-02-16 ENCOUNTER — TELEPHONE (OUTPATIENT)
Age: 54
End: 2024-02-16

## 2024-02-16 NOTE — TELEPHONE ENCOUNTER
Received call from patients  Marti asking to [facility] new patient appt.    Patient has a STAT referral for Open wound of left pinna of ear with complication, initial encounter     Call was transferred to Estefany Anaya to help [facility] this appt

## 2024-02-19 ENCOUNTER — HOME CARE VISIT (OUTPATIENT)
Dept: HOME HEALTH SERVICES | Facility: HOME HEALTHCARE | Age: 54
End: 2024-02-19
Payer: COMMERCIAL

## 2024-02-19 VITALS
SYSTOLIC BLOOD PRESSURE: 152 MMHG | TEMPERATURE: 98.1 F | OXYGEN SATURATION: 96 % | DIASTOLIC BLOOD PRESSURE: 96 MMHG | HEART RATE: 98 BPM

## 2024-02-19 PROCEDURE — G0151 HHCP-SERV OF PT,EA 15 MIN: HCPCS

## 2024-02-21 ENCOUNTER — CONSULT (OUTPATIENT)
Dept: INFECTIOUS DISEASES | Facility: CLINIC | Age: 54
End: 2024-02-21
Payer: COMMERCIAL

## 2024-02-21 VITALS
DIASTOLIC BLOOD PRESSURE: 80 MMHG | WEIGHT: 135 LBS | OXYGEN SATURATION: 97 % | SYSTOLIC BLOOD PRESSURE: 132 MMHG | HEART RATE: 89 BPM | HEIGHT: 66 IN | TEMPERATURE: 95.6 F | BODY MASS INDEX: 21.69 KG/M2 | RESPIRATION RATE: 18 BRPM

## 2024-02-21 DIAGNOSIS — M06.9 RHEUMATOID ARTHRITIS INVOLVING BOTH HANDS, UNSPECIFIED WHETHER RHEUMATOID FACTOR PRESENT (HCC): ICD-10-CM

## 2024-02-21 DIAGNOSIS — R82.71 ASYMPTOMATIC BACTERIURIA: ICD-10-CM

## 2024-02-21 DIAGNOSIS — A02.9 SALMONELLA INFECTION: Primary | ICD-10-CM

## 2024-02-21 DIAGNOSIS — F43.10 PTSD (POST-TRAUMATIC STRESS DISORDER): ICD-10-CM

## 2024-02-21 PROCEDURE — 99245 OFF/OP CONSLTJ NEW/EST HI 55: CPT | Performed by: INTERNAL MEDICINE

## 2024-02-21 RX ORDER — NUTRITIONAL SUPPLEMENT
237 BAR ORAL 2 TIMES DAILY
COMMUNITY
Start: 2024-01-26 | End: 2025-01-20

## 2024-02-21 NOTE — PROGRESS NOTES
Consultation - Infectious Disease   Dahlia Kraus 53 y.o. female MRN: [id number]  Unit/Bed#:  Encounter: [id number]      IMPRESSION & RECOMMENDATIONS:   Impression/Recommendations:  This is a 53 y.o. female, multiple medical problems, referred by her neurologist for recurrent Salmonella bacteremia, with chronic diarrhea.    Chronic diarrhea for more than a year, with prolonged/persistent Salmonella shedding.  Although patient has never had stool cultures done, given recurrent bacteriuria and chronic diarrhea, she likely has chronic Salmonella shedding from a GI tract.  Chronic Salmonella shedding for more than a year is commonly associated with infected gallstones, which patient has.  She will likely need cholecystectomy.  Patient should get colonoscopy and abdomen/pelvis CT done first, to look for any other potential nidus of Salmonella infection.  If CT and colonoscopy are benign, she should get cholecystectomy.  At this point, given likely chronic gallstone Salmonella infection, any antibiotic regimen will likely be ineffective, as we have seen in this patient.  Therefore, will not treat her with an additional antibiotic, which would expose her to antibiotic toxicity without any effectiveness for her diarrhea.  Now, patient has remained clinically and systemically well throughout this past year.  No additional antibiotic for now.  Check abdomen/pelvis CT.  Patient needs to reschedule colonoscopy with her gastroenterologist.  Check stool culture.  If CT and colonoscopy are benign, will need for patient to surgeon for cholecystectomy.    Chronic asymptomatic Salmonella bacteriuria.  This is likely contamination from chronic and sometimes uncontrolled diarrhea.  Workup plan as in above.  No antibiotic needed for this indication.    RA, previously on Humira but currently not on it.  Until patient Salmonella infection is resolved, she should stay off Biologics.  Hematology follow-up.  Recommend hold off on any  biologic until Salmonella infection is resolved.    All previous outpatient records reviewed in detail.  Discussed with patient in detail regarding the above plan.  Follow-up with us after CT and colonoscopy.    Thank you for this consultation.      HISTORY OF PRESENT ILLNESS:  Reason for Consult: Recurrent Salmonella bacteriuria.    HPI: Dahlia Kraus is a 53 y.o. female, with multiple medical problems outlined below, referred by urologist for evaluation of recurrent Salmonella bacteriuria.    Patient's first urine culture with growth of Salmonella was back in January 2023.  She was treated with a course of Levaquin.  She subsequently had repeat urine culture in February, April and September, all with growth of Salmonella.  She was treated with Bactrim and cefdinir subsequently.  Patient had 2 cystoscopies, with mild nonspecific erythematous mucosa, with biopsy negative for malignancy.  Patient states that she does not recall having urinary symptoms.  However, she has set persistent diarrhea for more than a year, constantly soiling her underpants throughout the night.    With regards to her diarrhea, patient saw her gastroenterologist last year and was scheduled for colonoscopy and EGD.  This could not be done due to patient not be able to get a ride.  She also had abdomen/pelvis CT scheduled but had to be canceled due to inability to get a ride.  Patient had ultrasound done previously, with evidence of cholelithiasis.  She denies any abdominal pain or fever/chills.    Patient is being followed by rheumatology at [facility].  Per rheumatology's note, patient was previously on Humira when she was living in [place] a few years ago.  She is currently not on it any further.    REVIEW OF SYSTEMS:  A complete system-based review of systems was done.  Except for what is noted in HPI above, ROS is otherwise negative.    PAST MEDICAL HISTORY:  Past Medical History:   Diagnosis Date    Abnormal ECG     Abnormal Pap smear of  cervix     Allergic     Anemia     Anxiety     Asthma     Atrial fibrillation (HCC)     Chlamydia     Chronic kidney disease     Coronary artery disease     Depression     Diabetes mellitus (HCC)     Fibromyalgia     GERD (gastroesophageal reflux disease)     Headache(784.0)     Heart disease     Heart murmur     History of transfusion     Hypertension     Inflammatory bowel disease     Migraines     Myocardial infarction (HCC)     Neuromuscular disorder (HCC)     Opioid abuse, in remission (HCC)     Otitis media     Pneumonia     Scoliosis     Seizures (HCC)     Shingles     Sick sinus syndrome (HCC)     Stroke (HCC)     Urinary tract infection     Varicella     Visual impairment      Past Surgical History:   Procedure Laterality Date    APPENDECTOMY      CATARACT EXTRACTION Right 2023     SECTION      EYE SURGERY Left 2023    FRACTURE SURGERY      HIP SURGERY Right     HYSTERECTOMY      TUBAL LIGATION       Problem list reviewed.    FAMILY HISTORY:  Non-contributory    SOCIAL HISTORY:  Social History     Substance and Sexual Activity   Alcohol Use Not Currently     Social History     Substance and Sexual Activity   Drug Use Never     Social History     Tobacco Use   Smoking Status Every Day    Current packs/day: 0.50    Average packs/day: 0.6 packs/day for 41.1 years (25.1 ttl pk-yrs)    Types: Cigarettes    Start date: 1992   Smokeless Tobacco Never   Tobacco Comments    Patient stated that she is not ready to quit smoking        ALLERGIES:  Allergies   Allergen Reactions    Other Anaphylaxis     Capzasin HP -- severe burning and swelling of the skin     Clarithromycin GI Intolerance    Acetaminophen GI Intolerance    Bactrim [Sulfamethoxazole-Trimethoprim] Itching, GI Intolerance, Dizziness, Confusion and Lightheadedness     Patient passes out when on this medication for several days     Cephalosporins Hives     Tolerated Cefdinir 2023    Latex Hives    Oxycodone-Acetaminophen GI  Intolerance    Penicillins Diarrhea    Trazodone Diarrhea    Capsaicin Rash    Naproxen Palpitations       MEDICATIONS:  All current active medications have been reviewed.  Patient is currently not on any antibiotic.    PHYSICAL EXAM:  Vitals:  Temperature: (!) 95.6 °F (35.3 °C)     Physical Exam:  General:  Well-nourished, well-developed, in no acute distress. Awake, alert and oriented x 3.  Eyes:  Conjunctive clear with no hemorrhages or effusions  Oropharynx:  No ulcers, no lesions, pharynx benign, no tonsillitis  Neck:  Supple, no lymphadenopathy, no mass, nontender  Lungs:  Expansion symmetric, no rales, no wheezing, no accessory muscle use  Cardiac:  Regular rate and rhythm, normal S1, normal S2, no murmurs  Abdomen:  Soft, nondistended, non-tender, no HSM  Extremities:  No edema, no erythema, nontender. No ulcers  Skin:  No rashes, no ulcers  Neurological:  Moves all four extremities spontaneously, sensation grossly intact    LABS, IMAGING, & OTHER STUDIES:  Lab Results:  I have personally reviewed pertinent labs.    Imaging Studies:   I have personally reviewed pertinent imaging study reports and images in PACS.    EKG, Pathology, and Other Studies:   I have personally reviewed pertinent reports.

## 2024-02-22 ENCOUNTER — TELEPHONE (OUTPATIENT)
Age: 54
End: 2024-02-22

## 2024-02-22 ENCOUNTER — OFFICE VISIT (OUTPATIENT)
Age: 54
End: 2024-02-22
Payer: COMMERCIAL

## 2024-02-22 VITALS
HEIGHT: 66 IN | HEART RATE: 95 BPM | RESPIRATION RATE: 16 BRPM | DIASTOLIC BLOOD PRESSURE: 102 MMHG | OXYGEN SATURATION: 96 % | BODY MASS INDEX: 23.3 KG/M2 | SYSTOLIC BLOOD PRESSURE: 148 MMHG | WEIGHT: 145 LBS

## 2024-02-22 DIAGNOSIS — N32.81 OVERACTIVE BLADDER: Primary | ICD-10-CM

## 2024-02-22 DIAGNOSIS — T80.89XA ALLERGY INJECTION REACTION, INITIAL ENCOUNTER: ICD-10-CM

## 2024-02-22 DIAGNOSIS — I10 PRIMARY HYPERTENSION: ICD-10-CM

## 2024-02-22 DIAGNOSIS — M06.9 RHEUMATOID ARTHRITIS INVOLVING BOTH HANDS, UNSPECIFIED WHETHER RHEUMATOID FACTOR PRESENT (HCC): ICD-10-CM

## 2024-02-22 DIAGNOSIS — T45.0X5A ALLERGY INJECTION REACTION, INITIAL ENCOUNTER: ICD-10-CM

## 2024-02-22 DIAGNOSIS — F17.200 SMOKING: ICD-10-CM

## 2024-02-22 DIAGNOSIS — J45.909 UNCOMPLICATED ASTHMA, UNSPECIFIED ASTHMA SEVERITY, UNSPECIFIED WHETHER PERSISTENT: ICD-10-CM

## 2024-02-22 DIAGNOSIS — B37.9 YEAST INFECTION: ICD-10-CM

## 2024-02-22 PROCEDURE — 99214 OFFICE O/P EST MOD 30 MIN: CPT

## 2024-02-22 RX ORDER — LISINOPRIL 10 MG/1
10 TABLET ORAL DAILY
Qty: 90 TABLET | Refills: 2 | Status: SHIPPED | OUTPATIENT
Start: 2024-02-22

## 2024-02-22 RX ORDER — TROSPIUM CHLORIDE 20 MG/1
20 TABLET, FILM COATED ORAL 2 TIMES DAILY
Qty: 180 TABLET | Refills: 3 | Status: SHIPPED | OUTPATIENT
Start: 2024-02-22

## 2024-02-22 RX ORDER — FLUTICASONE PROPIONATE 250 UG/1
1-2 POWDER, METERED RESPIRATORY (INHALATION) 2 TIMES DAILY
Qty: 60 EACH | Refills: 1 | Status: SHIPPED | OUTPATIENT
Start: 2024-02-22

## 2024-02-22 RX ORDER — ZOLPIDEM TARTRATE 10 MG/1
10 TABLET ORAL DAILY
Qty: 30 TABLET | Refills: 0 | Status: SHIPPED | OUTPATIENT
Start: 2024-02-22 | End: 2024-06-01

## 2024-02-22 RX ORDER — FLUCONAZOLE 150 MG/1
150 TABLET ORAL ONCE
Qty: 1 TABLET | Refills: 0 | Status: SHIPPED | OUTPATIENT
Start: 2024-02-22 | End: 2024-02-22

## 2024-02-22 RX ORDER — EPINEPHRINE 0.15 MG/.3ML
0.15 INJECTION INTRAMUSCULAR ONCE
Qty: 0.3 ML | Refills: 0 | Status: SHIPPED | OUTPATIENT
Start: 2024-02-22 | End: 2024-02-26

## 2024-02-22 RX ORDER — LEFLUNOMIDE 20 MG/1
20 TABLET ORAL DAILY
Qty: 90 TABLET | Refills: 2 | Status: SHIPPED | OUTPATIENT
Start: 2024-02-22

## 2024-02-22 RX ORDER — INHALER,ASSIST DEVICE,MED MASK
SPACER (EA) MISCELLANEOUS
Qty: 1 EACH | Refills: 0 | Status: SHIPPED | OUTPATIENT
Start: 2024-02-22

## 2024-02-22 NOTE — TELEPHONE ENCOUNTER
Hi, my name is Anthony Pharmacist [facility] calling in reference to patient Dahlia Meeks with a date of birth of 1970. I have a script electronic script sent in by CRISTINO nicely first name Vy for an EPI pen. The script was sent in for EPI Pen [place]. Just want to make sure if that is correct. Actual return number here is [phone]. My name is Anthony. Have a good evening now. john Sarmiento.

## 2024-02-22 NOTE — PROGRESS NOTES
INTERNAL MEDICINE FOLLOW-UP VISIT  [facility] - [facility] INTERNAL MEDICINE [address]    NAME: Dahlia Kraus  AGE: 53 y.o. SEX: female  : 1970     DATE: 2024     Assessment and Plan:   1. Overactive bladder  - trospium chloride (SANCTURA) 20 mg tablet; Take 1 tablet (20 mg total) by mouth 2 (two) times a day  Dispense: 180 tablet; Refill: 3    2. Allergy injection reaction, initial encounter  - EPINEPHrine (EPIPEN JR) 0.15 mg/0.3 mL SOAJ; Inject 0.3 mL (0.15 mg total) into a muscle once for 1 dose  Dispense: 0.3 mL; Refill: 0    3. Uncomplicated asthma, unspecified asthma severity, unspecified whether persistent  - Spacer/Aero-Holding Chambers (AeroChamber Plus Geoff-Vu w/Mask) MISC; Inhale daily at bedtime as needed (wheezing)  Dispense: 1 each; Refill: 0    4. Rheumatoid arthritis involving both hands, unspecified whether rheumatoid factor present (HCC)  - leflunomide (ARAVA) 20 MG tablet; Take 1 tablet (20 mg total) by mouth daily  Dispense: 90 tablet; Refill: 2    5. Smoking  We started Chantix 7 days ago.  Stressed smoking cessation  - fluticasone (Flovent Diskus) 250 mcg/actuation diskus inhaler; Inhale 1-2 puffs 2 (two) times a day Rinse mouth after use.  Dispense: 60 each; Refill: 1    6. Yeast infection  - fluconazole (DIFLUCAN) 150 mg tablet; Take 1 tablet (150 mg total) by mouth once for 1 dose  Dispense: 1 tablet; Refill: 0    7. Primary hypertension  Increase lisinopril to 10 mg daily.  Limit the salt to no more than 2 g/day  - lisinopril (ZESTRIL) 10 mg tablet; Take 1 tablet (10 mg total) by mouth daily  Dispense: 90 tablet; Refill: 2        No follow-ups on file.       Chief Complaint:     Chief Complaint   Patient presents with   • Follow-up     1 month      History of Present Illness:     Patient is here for a follow-up.  Overall she is feeling well and denies any complaints.  She has had some elevations in blood pressure at home averaging above 140/98.  She  denies any headaches, vision disturbances or dizziness.    The following portions of the patient's history were reviewed and updated as appropriate: allergies, current medications, past family history, past medical history, past social history, past surgical history and problem list.     Review of Systems:     Review of Systems   Constitutional:  Negative for appetite change, chills, diaphoresis, fatigue, fever and unexpected weight change.   HENT:  Negative for postnasal drip and sneezing.    Eyes:  Negative for visual disturbance.   Respiratory:  Negative for chest tightness and shortness of breath.    Cardiovascular:  Negative for chest pain, palpitations and leg swelling.   Gastrointestinal:  Negative for abdominal pain and blood in stool.   Endocrine: Negative for cold intolerance, heat intolerance, polydipsia, polyphagia and polyuria.   Genitourinary:  Negative for difficulty urinating, dysuria, frequency and urgency.   Musculoskeletal:  Negative for arthralgias and myalgias.   Skin:  Negative for rash and wound.   Neurological:  Negative for dizziness, weakness, light-headedness and headaches.   Hematological:  Negative for adenopathy.   Psychiatric/Behavioral:  Negative for confusion, dysphoric mood and sleep disturbance. The patient is not nervous/anxious.         Past Medical History:     Past Medical History:   Diagnosis Date   • Abnormal ECG    • Abnormal Pap smear of cervix    • Allergic    • Anemia    • Anxiety    • Asthma    • Atrial fibrillation (HCC)    • Chlamydia    • Chronic kidney disease    • Coronary artery disease    • Depression    • Diabetes mellitus (HCC)    • Fibromyalgia    • GERD (gastroesophageal reflux disease)    • Headache(784.0)    • Heart disease    • Heart murmur    • History of transfusion    • Hypertension    • Inflammatory bowel disease    • Migraines    • Myocardial infarction (HCC)    • Neuromuscular disorder (HCC)    • Opioid abuse, in remission ([facility])    • Otitis media    •  "Pneumonia    • Scoliosis    • Seizures (HCC)    • Shingles    • Sick sinus syndrome (HCC)    • Stroke (HCC)    • Urinary tract infection    • Varicella    • Visual impairment         Current Medications:     Current Outpatient Medications:   •  albuterol (PROVENTIL HFA,VENTOLIN HFA) 90 mcg/act inhaler, Inhale 2 puffs every 4 (four) hours as needed, Disp: , Rfl:   •  Alcohol Swabs (Pharmacist Choice Alcohol) PADS, 5 (five) times a day, Disp: , Rfl:   •  ALPRAZolam (XANAX) 1 mg tablet, Take 1 tablet (1 mg total) by mouth 4 (four) times a day as needed for anxiety, Disp: 60 tablet, Rfl: 0  •  ammonium lactate (LAC-HYDRIN) 12 % lotion, Apply topically 2 (two) times a day as needed for dry skin, Disp: 226 g, Rfl: 3  •  atorvastatin (LIPITOR) 10 mg tablet, Take 10 mg by mouth daily, Disp: , Rfl:   •  BD Pen Needle Nkechi 2nd Gen 32G X 4 MM MISC, USE AS DIRECTED FOR BEFORE A MEAL AND AT BEDTIME GLUCOSE INJECTION, Disp: , Rfl:   •  BD Pen Needle Nkechi 2nd Gen 32G X 4 MM MISC, USE WITH INSULIN 5 TIMES A DAY, Disp: 200 each, Rfl: 3  •  BD PrecisionGlide Needle 23G X 1-1/2\" MISC, USE AS DIRECTED TO ADMINISTER NALOXONE INJECTION.  MAY DISPENSE 23-25 GAUGE 1-1.5\" NEEDLE., Disp: , Rfl:   •  Blood Glucose Monitoring Suppl (ONE TOUCH ULTRA 2) w/Device KIT, Use as directed, Disp: , Rfl:   •  busPIRone (BUSPAR) 30 MG tablet, TAKE 1 TABLET (30 MG TOTAL) BY MOUTH 2 (TWO) TIMES A DAY, Disp: 180 tablet, Rfl: 0  •  Continuous Blood Gluc  (Dexcom G6 ) ALEKS, Use 1 Device 4 (four) times a day (with meals and at bedtime), Disp: 1 each, Rfl: 6  •  Continuous Blood Gluc Sensor (Dexcom G6 Sensor) MISC, CHANGE SENSOR EVERY 10 DAYS, Disp: 3 each, Rfl: 6  •  Continuous Blood Gluc Transmit (Dexcom G6 Transmitter) MISC, USE 4 TIMES A DAY (WITH MEALS AND AT BEDTIME), Disp: 1 each, Rfl: 3  •  EPINEPHrine (EPIPEN JR) 0.15 mg/0.3 mL SOAJ, Inject 0.3 mL (0.15 mg total) into a muscle once for 1 dose, Disp: 0.3 mL, Rfl: 0  •  famotidine " (PEPCID) 20 mg tablet, TAKE 1 TABLET (20 MG TOTAL) BY MOUTH 2 (TWO) TIMES A DAY AS NEEDED FOR INDIGESTION, Disp: 90 tablet, Rfl: 3  •  fexofenadine (ALLEGRA) 180 MG tablet, TAKE 1 TABLET (180 MG TOTAL) BY MOUTH DAILY, Disp: 90 tablet, Rfl: 3  •  fluconazole (DIFLUCAN) 150 mg tablet, Take 1 tablet (150 mg total) by mouth once for 1 dose, Disp: 1 tablet, Rfl: 0  •  fluticasone (Flovent Diskus) 250 mcg/actuation diskus inhaler, Inhale 1-2 puffs 2 (two) times a day Rinse mouth after use., Disp: 60 each, Rfl: 1  •  gabapentin (NEURONTIN) 100 mg capsule, TAKE 1 CAPSULE (100 MG TOTAL) BY MOUTH DAILY AT BEDTIME TAKE ADDITIONAL 100 MG CAPSULE TO TOTAL 900 MG AT BEDTIME, Disp: 30 capsule, Rfl: 3  •  gabapentin (NEURONTIN) 400 mg capsule, TAKE 2 CAPSULES BY MOUTH 3 TIMES A DAY, Disp: 90 capsule, Rfl: 3  •  hydrALAZINE (APRESOLINE) 50 mg tablet, Take 50 mg by mouth 3 (three) times a day, Disp: , Rfl:   •  Incontinence Supply Disposable (Depend Undergarment Ex Absorb) MISC, Use 4 (four) times a day as needed (incontinence), Disp: 120 each, Rfl: 10  •  insulin lispro (HumaLOG) 100 units/mL injection pen, INJECT 20 UNITS UNDER THE SKIN 3 (THREE) TIMES A DAY BEFORE MEALS INDICATIONS: TYPE 2 DIABETES MELLITUS., Disp: 15 mL, Rfl: 3  •  Lantus SoloStar 100 units/mL SOPN, INJECT 55 UNITS UNDER THE SKIN 2 (TWO) TIMES A DAY AT LUNCH AND AT BEDTIME., Disp: 45 mL, Rfl: 3  •  leflunomide (ARAVA) 20 MG tablet, Take 1 tablet (20 mg total) by mouth daily, Disp: 90 tablet, Rfl: 2  •  lisinopril (ZESTRIL) 10 mg tablet, Take 1 tablet (10 mg total) by mouth daily, Disp: 90 tablet, Rfl: 2  •  lovastatin (MEVACOR) 10 MG tablet, Take 10 mg by mouth, Disp: , Rfl:   •  metFORMIN (GLUCOPHAGE-XR) 750 mg 24 hr tablet, TAKE 1 TABLET (750 MG TOTAL) BY MOUTH 2 (TWO) TIMES A DAY WITH MEALS., Disp: , Rfl:   •  metoprolol succinate (TOPROL-XL) 100 mg 24 hr tablet, Take 100 mg by mouth daily, Disp: , Rfl:   •  montelukast (SINGULAIR) 10 mg tablet, TAKE 1 TABLET  (10 MG TOTAL) BY MOUTH DAILY AT BEDTIME, Disp: 90 tablet, Rfl: 3  •  Myrbetriq 25 MG TB24, TAKE 25 MG BY MOUTH IN THE MORNING, Disp: 30 tablet, Rfl: 0  •  Nutritional Supplements (Glucerna Hunger Smart Shake) LIQD, Take 1 Can by mouth 3 (three) times a day, Disp: 296 mL, Rfl: 5  •  ondansetron (ZOFRAN-ODT) 4 mg disintegrating tablet, TAKE 1 TABLET (4 MG TOTAL) BY MOUTH EVERY 6 (SIX) HOURS AS NEEDED FOR NAUSEA, Disp: 10 tablet, Rfl: 0  •  OneTouch Delica Lancets 33G MISC, 2 (two) times a day, Disp: , Rfl:   •  OneTouch Ultra test strip, TEST TWICE DAILY OR AS DIRECTED BY MD, Disp: , Rfl:   •  Oral Electrolytes (Pedialyte) PACK, Take 237 mL by mouth 2 (two) times a day, Disp: , Rfl:   •  oxyCODONE (ROXICODONE) 5 immediate release tablet, 10 mg pt reports she takes this 4 times per day but is out of the medication currently, Disp: , Rfl:   •  pantoprazole (PROTONIX) 40 mg tablet, TAKE 1 TABLET (40 MG TOTAL) BY MOUTH DAILY, Disp: 90 tablet, Rfl: 3  •  potassium chloride (K-DUR,KLOR-CON) 20 mEq tablet, Take 1 tablet (20 mEq total) by mouth daily, Disp: 30 tablet, Rfl: 1  •  prazosin (MINIPRESS) 5 mg capsule, TAKE 1 CAPSULE (5 MG TOTAL) BY MOUTH DAILY AT BEDTIME, Disp: 30 capsule, Rfl: 0  •  risperiDONE (RisperDAL) 3 mg tablet, TAKE 1 TABLET (3 MG TOTAL) BY MOUTH 2 (TWO) TIMES A DAY, Disp: 180 tablet, Rfl: 0  •  rivaroxaban (XARELTO) 20 mg tablet, Take 20 mg by mouth, Disp: , Rfl:   •  sertraline (ZOLOFT) 100 mg tablet, Take 1 tablet (100 mg total) by mouth daily, Disp: 90 tablet, Rfl: 0  •  Spacer/Aero-Holding Chambers (AeroChamber Plus Geoff-Vu w/Mask) MISC, Inhale daily at bedtime as needed (wheezing), Disp: 1 each, Rfl: 0  •  trospium chloride (SANCTURA) 20 mg tablet, Take 1 tablet (20 mg total) by mouth 2 (two) times a day, Disp: 180 tablet, Rfl: 3  •  Trulicity 1.5 MG/0.5ML injection, INJECT 1.5 MG UNDER THE SKIN EVERY 7 DAYS., Disp: 2 mL, Rfl: 3  •  Varenicline Tartrate, Starter, 0.5 MG X 11 & 1 MG X 42 TBPK, 0.5 MG  "ONCE DAILY FOR 3 DAYS THEN 0.5MG TWICE DAILY FOR DAYS 4-7 THEN 1 MG TWICE DAILY, Disp: 53 each, Rfl: 0  •  zolpidem (AMBIEN) 10 mg tablet, TAKE 1 TABLET (10 MG TOTAL) BY MOUTH DAILY, Disp: 30 tablet, Rfl: 0  •  naloxone (NARCAN) 0.4 mg/mL injection, , Disp: , Rfl:   •  potassium chloride (MICRO-K) 10 MEQ CR capsule, TAKE 2 CAPSULES (20 MEQ TOTAL) BY MOUTH 2 (TWO) TIMES A DAY (Patient not taking: Reported on 2/22/2024), Disp: 90 capsule, Rfl: 3     Allergies:     Allergies   Allergen Reactions   • Other Anaphylaxis     Capzasin HP -- severe burning and swelling of the skin    • Clarithromycin GI Intolerance   • Acetaminophen GI Intolerance   • Bactrim [Sulfamethoxazole-Trimethoprim] Itching, GI Intolerance, Dizziness, Confusion and Lightheadedness     Patient passes out when on this medication for several days    • Cephalosporins Hives     Tolerated Cefdinir 2/2023   • Latex Hives   • Oxycodone-Acetaminophen GI Intolerance   • Penicillins Diarrhea   • Trazodone Diarrhea   • Capsaicin Rash   • Naproxen Palpitations        Physical Exam:     BP (!) 148/102 (BP Location: Left arm, Patient Position: Sitting, Cuff Size: Standard)   Pulse 95   Resp 16   Ht 5' 5.5\" (1.664 m)   Wt 65.8 kg (145 lb)   SpO2 96%   BMI 23.76 kg/m²     Physical Exam  Constitutional:       Appearance: She is well-developed.   HENT:      Head: Normocephalic and atraumatic.   Eyes:      Pupils: Pupils are equal, round, and reactive to light.   Neck:      Thyroid: No thyromegaly.   Cardiovascular:      Rate and Rhythm: Normal rate and regular rhythm.      Heart sounds: No murmur heard.  Pulmonary:      Effort: Pulmonary effort is normal.      Breath sounds: Normal breath sounds.   Abdominal:      General: Bowel sounds are normal.      Palpations: Abdomen is soft.   Musculoskeletal:         General: Normal range of motion.      Cervical back: Normal range of motion and neck supple.   Lymphadenopathy:      Cervical: No cervical adenopathy. "   Skin:     General: Skin is warm and dry.   Neurological:      Mental Status: She is alert and oriented to person, place, and time.           Data:     Laboratory Results: I have personally reviewed the pertinent laboratory results/reports   Radiology/Other Diagnostic Testing Results: I have personally reviewed pertinent reports.      CRISTINO Carreno  [facility] INTERNAL MEDICINE [address]

## 2024-02-23 ENCOUNTER — TELEPHONE (OUTPATIENT)
Dept: ADMINISTRATIVE | Facility: OTHER | Age: 54
End: 2024-02-23

## 2024-02-23 DIAGNOSIS — F41.9 ANXIETY: ICD-10-CM

## 2024-02-23 DIAGNOSIS — Z79.4 INSULIN DEPENDENT TYPE 2 DIABETES MELLITUS (HCC): ICD-10-CM

## 2024-02-23 DIAGNOSIS — E11.649 TYPE 2 DIABETES MELLITUS WITH HYPOGLYCEMIA WITHOUT COMA, WITH LONG-TERM CURRENT USE OF INSULIN (HCC): ICD-10-CM

## 2024-02-23 DIAGNOSIS — E11.9 INSULIN DEPENDENT TYPE 2 DIABETES MELLITUS (HCC): ICD-10-CM

## 2024-02-23 DIAGNOSIS — Z79.4 TYPE 2 DIABETES MELLITUS WITH HYPOGLYCEMIA WITHOUT COMA, WITH LONG-TERM CURRENT USE OF INSULIN (HCC): ICD-10-CM

## 2024-02-23 RX ORDER — INSULIN GLARGINE 100 [IU]/ML
INJECTION, SOLUTION SUBCUTANEOUS
Qty: 45 ML | Refills: 3 | Status: SHIPPED | OUTPATIENT
Start: 2024-02-23

## 2024-02-23 RX ORDER — PROCHLORPERAZINE 25 MG/1
1 SUPPOSITORY RECTAL
Qty: 1 EACH | Refills: 6 | Status: SHIPPED | OUTPATIENT
Start: 2024-02-23

## 2024-02-23 RX ORDER — ALPRAZOLAM 1 MG/1
1 TABLET ORAL 4 TIMES DAILY PRN
Qty: 60 TABLET | Refills: 0 | Status: SHIPPED | OUTPATIENT
Start: 2024-02-23

## 2024-02-23 NOTE — TELEPHONE ENCOUNTER
----- Message from Rebecca Abel sent at 2/22/2024 11:30 AM EST -----  Regarding: care gap request  02/22/24 11:30 AM    Hello, our patient attached above has had Diabetic Eye Exam completed/performed. Please assist in updating the patient chart by making an External outreach to [facility] located in [place]. The date of service is 2023.    Thank you,  Rebecca Abel  [facility] INTERNAL MED [address]

## 2024-02-26 ENCOUNTER — HOME CARE VISIT (OUTPATIENT)
Dept: HOME HEALTH SERVICES | Facility: HOME HEALTHCARE | Age: 54
End: 2024-02-26
Payer: COMMERCIAL

## 2024-02-26 ENCOUNTER — TELEPHONE (OUTPATIENT)
Age: 54
End: 2024-02-26

## 2024-02-26 VITALS
OXYGEN SATURATION: 95 % | DIASTOLIC BLOOD PRESSURE: 88 MMHG | SYSTOLIC BLOOD PRESSURE: 158 MMHG | HEART RATE: 105 BPM | TEMPERATURE: 98.2 F

## 2024-02-26 DIAGNOSIS — T78.2XXA ANAPHYLAXIS, INITIAL ENCOUNTER: ICD-10-CM

## 2024-02-26 DIAGNOSIS — F41.9 ANXIETY: Primary | ICD-10-CM

## 2024-02-26 PROCEDURE — G0151 HHCP-SERV OF PT,EA 15 MIN: HCPCS

## 2024-02-26 RX ORDER — EPINEPHRINE 0.3 MG/.3ML
0.3 INJECTION SUBCUTANEOUS ONCE
Qty: 0.6 ML | Refills: 0 | Status: SHIPPED | OUTPATIENT
Start: 2024-02-26 | End: 2024-02-26

## 2024-02-26 NOTE — TELEPHONE ENCOUNTER
----- Message from CRISTION Carreno sent at 2/26/2024  7:56 AM EST -----  Regarding: FW: Medical [employer]   Contact: [phone]  I do not have these being ordered in parachute from her.  Can you send an order through parachute for what ever DME she needs      ----- Message -----  From: Claudette Dwyer  Sent: 2/23/2024   2:53 PM EST  To: CRISTINO Carreno  Subject: FW: Medical [employer]                               ----- Message -----  From: Dahlia Kraus  Sent: 2/23/2024   2:03 PM EST  To: [facility] Clinical  Subject: Medical Julio Cesar                                 Hi Dr Cortez I wanted to ask you the following     Glucerna hunger smart Can you send in for prior authorization?     What happend with the supplies you ordered for me   Scooter, elevated camode with handles to lift up, bath handled, bed rails     Can you put an order in for a bath chair?     Thank you

## 2024-02-27 NOTE — TELEPHONE ENCOUNTER
Upon review of the In Basket request we were able to note that no further action is required. The patient chart is up to date as a result of a previous request.      Any additional questions or concerns should be emailed to the Practice Liaisons via the appropriate education email address, please do not reply via In Basket.    Thank you  Dana Garrison

## 2024-02-28 DIAGNOSIS — F43.10 PTSD (POST-TRAUMATIC STRESS DISORDER): ICD-10-CM

## 2024-02-28 LAB
DME PARACHUTE DELIVERY DATE ACTUAL: NORMAL
DME PARACHUTE DELIVERY DATE REQUESTED: NORMAL
DME PARACHUTE ITEM DESCRIPTION: NORMAL
DME PARACHUTE ITEM DESCRIPTION: NORMAL
DME PARACHUTE ORDER STATUS: NORMAL
DME PARACHUTE SUPPLIER NAME: NORMAL
DME PARACHUTE SUPPLIER PHONE: NORMAL

## 2024-02-29 RX ORDER — BUSPIRONE HYDROCHLORIDE 30 MG/1
30 TABLET ORAL 2 TIMES DAILY
Qty: 180 TABLET | Refills: 0 | Status: SHIPPED | OUTPATIENT
Start: 2024-02-29

## 2024-02-29 RX ORDER — RISPERIDONE 3 MG/1
3 TABLET ORAL 2 TIMES DAILY
Qty: 180 TABLET | Refills: 0 | Status: SHIPPED | OUTPATIENT
Start: 2024-02-29

## 2024-03-04 ENCOUNTER — HOME CARE VISIT (OUTPATIENT)
Dept: HOME HEALTH SERVICES | Facility: HOME HEALTHCARE | Age: 54
End: 2024-03-04
Payer: COMMERCIAL

## 2024-03-04 VITALS
TEMPERATURE: 98.1 F | SYSTOLIC BLOOD PRESSURE: 124 MMHG | HEART RATE: 74 BPM | OXYGEN SATURATION: 97 % | DIASTOLIC BLOOD PRESSURE: 74 MMHG

## 2024-03-04 DIAGNOSIS — F41.9 ANXIETY: ICD-10-CM

## 2024-03-04 PROCEDURE — G0151 HHCP-SERV OF PT,EA 15 MIN: HCPCS

## 2024-03-04 RX ORDER — ALPRAZOLAM 1 MG/1
1 TABLET ORAL 4 TIMES DAILY PRN
Qty: 60 TABLET | Refills: 0 | Status: SHIPPED | OUTPATIENT
Start: 2024-03-04

## 2024-03-05 DIAGNOSIS — F43.10 PTSD (POST-TRAUMATIC STRESS DISORDER): ICD-10-CM

## 2024-03-05 DIAGNOSIS — N39.41 URGENCY INCONTINENCE: ICD-10-CM

## 2024-03-06 RX ORDER — PRAZOSIN HYDROCHLORIDE 5 MG/1
5 CAPSULE ORAL
Qty: 30 CAPSULE | Refills: 0 | Status: SHIPPED | OUTPATIENT
Start: 2024-03-06

## 2024-03-06 RX ORDER — MIRABEGRON 25 MG/1
25 TABLET, FILM COATED, EXTENDED RELEASE ORAL DAILY
Qty: 30 TABLET | Refills: 0 | Status: SHIPPED | OUTPATIENT
Start: 2024-03-06

## 2024-03-07 DIAGNOSIS — Z79.4 TYPE 2 DIABETES MELLITUS WITH HYPOGLYCEMIA WITHOUT COMA, WITH LONG-TERM CURRENT USE OF INSULIN (HCC): ICD-10-CM

## 2024-03-07 DIAGNOSIS — E11.649 TYPE 2 DIABETES MELLITUS WITH HYPOGLYCEMIA WITHOUT COMA, WITH LONG-TERM CURRENT USE OF INSULIN (HCC): ICD-10-CM

## 2024-03-07 RX ORDER — GABAPENTIN 400 MG/1
CAPSULE ORAL
Qty: 90 CAPSULE | Refills: 3 | Status: SHIPPED | OUTPATIENT
Start: 2024-03-07

## 2024-03-11 DIAGNOSIS — F17.210 CIGARETTE NICOTINE DEPENDENCE WITHOUT COMPLICATION: ICD-10-CM

## 2024-03-11 RX ORDER — VARENICLINE TARTRATE 0.5 (11)-1
KIT ORAL
Qty: 53 EACH | Refills: 0 | Status: SHIPPED | OUTPATIENT
Start: 2024-03-11

## 2024-03-13 ENCOUNTER — PROCEDURE VISIT (OUTPATIENT)
Dept: UROLOGY | Facility: MEDICAL CENTER | Age: 54
End: 2024-03-13
Payer: COMMERCIAL

## 2024-03-13 VITALS
DIASTOLIC BLOOD PRESSURE: 80 MMHG | HEIGHT: 66 IN | WEIGHT: 145 LBS | HEART RATE: 102 BPM | BODY MASS INDEX: 23.3 KG/M2 | SYSTOLIC BLOOD PRESSURE: 102 MMHG | OXYGEN SATURATION: 98 %

## 2024-03-13 DIAGNOSIS — N36.44 DETRUSOR SPHINCTER DYSSYNERGIA: ICD-10-CM

## 2024-03-13 DIAGNOSIS — R39.15 URGENCY OF URINATION: Primary | ICD-10-CM

## 2024-03-13 DIAGNOSIS — N39.41 URGE INCONTINENCE OF URINE: ICD-10-CM

## 2024-03-13 DIAGNOSIS — N32.81 DETRUSOR INSTABILITY: ICD-10-CM

## 2024-03-13 LAB
SL AMB  POCT GLUCOSE, UA: NEGATIVE
SL AMB LEUKOCYTE ESTERASE,UA: NEGATIVE
SL AMB POCT BILIRUBIN,UA: NEGATIVE
SL AMB POCT BLOOD,UA: NEGATIVE
SL AMB POCT CLARITY,UA: CLEAR
SL AMB POCT COLOR,UA: YELLOW
SL AMB POCT KETONES,UA: NEGATIVE
SL AMB POCT NITRITE,UA: NEGATIVE
SL AMB POCT PH,UA: 5
SL AMB POCT SPECIFIC GRAVITY,UA: 1.01
SL AMB POCT URINE PROTEIN: NEGATIVE
SL AMB POCT UROBILINOGEN: NEGATIVE

## 2024-03-13 PROCEDURE — 51784 ANAL/URINARY MUSCLE STUDY: CPT

## 2024-03-13 PROCEDURE — 51797 INTRAABDOMINAL PRESSURE TEST: CPT

## 2024-03-13 PROCEDURE — 51728 CYSTOMETROGRAM W/VP: CPT

## 2024-03-13 PROCEDURE — 81002 URINALYSIS NONAUTO W/O SCOPE: CPT

## 2024-03-13 NOTE — PROGRESS NOTES
"CC: \"I pee on myself 24/7.  I have urgency and it doesn't always come out, and sometimes leak when I cough or sneeze.\"    Denies pain /burning with voiding  Has sense of incomplete emptying    Unable to void prior to test, straight catheterized for 25 ml    CMG:       Position:  sitting     First fill:           Fill sensation:      7 ml,   pdet - 3 cmH2O          First urge:          18 ml,   pdet  -3 cmH2O             Normal urge:      60 ml,   pdet  -3 cmH2O           Must urge:        106 ml,   pdet  -3 cmH2O       Permission to void:            195 ml,  pdet  40 cmH2O          Max pdet during void      40 cm H2O       Voided volume:              131 ml    Second fill:          First urge:          88 ml,  pdet 0 cmH2O          Normal urge:    101 ml,  pdet 0 cmH2O          Must urge:        129 ml,  pdet 4 cmH2O    Permission to void:     229 ml,  pdet 8 cmH2O       Max pdet during void:   45 cmH2O       Voided volume:     156.1 ml    Bladder stability:       unstable at  188 /223 ml with leakage    Compliance:  normal         Sphincter function  Unable to provoke RUFINA at 100 ml first fill  Unable to provoke RUFINA at 100 and 200 ml with 2nd fill    EMG activity:       Normal during filling,        abnormal during voiding    Comments:   Sensory urgency   After initial DI with leak and void, fill restarted and again had DI with leak and void.   + DI with urge incontinence   + EMG activity during voiding    Urodynamics    Date/Time: 3/13/2024 1:00 PM    Performed by: Tori Wilkins RN  Authorized by: Markos Phelps MD  Universal Protocol:  Consent: Verbal consent obtained. Written consent obtained.  Risks and benefits: risks, benefits and alternatives were discussed  Consent given by: patient  Patient understanding: patient states understanding of the procedure being performed  Patient consent: the patient's understanding of the procedure matches consent given  Procedure consent: procedure consent matches " procedure scheduled  Patient identity confirmed: verbally with patient  Procedure - Urodynamics:  Procedure details: CMG and EMG      Voiding Pressure Study: Yes    Intra-abdominal Voiding Pressure Study: Yes    Post-procedure:     Patient tolerance: Patient tolerated procedure well with no immediate complications

## 2024-03-14 ENCOUNTER — OFFICE VISIT (OUTPATIENT)
Dept: DERMATOLOGY | Facility: CLINIC | Age: 54
End: 2024-03-14

## 2024-03-14 VITALS — WEIGHT: 145 LBS | BODY MASS INDEX: 24.16 KG/M2 | TEMPERATURE: 97 F | HEIGHT: 65 IN

## 2024-03-14 DIAGNOSIS — L98.9 SKIN LESION: ICD-10-CM

## 2024-03-14 DIAGNOSIS — D48.5 NEOPLASM OF UNCERTAIN BEHAVIOR OF SKIN: Primary | ICD-10-CM

## 2024-03-14 PROCEDURE — 88312 SPECIAL STAINS GROUP 1: CPT | Performed by: STUDENT IN AN ORGANIZED HEALTH CARE EDUCATION/TRAINING PROGRAM

## 2024-03-14 PROCEDURE — 88342 IMHCHEM/IMCYTCHM 1ST ANTB: CPT | Performed by: STUDENT IN AN ORGANIZED HEALTH CARE EDUCATION/TRAINING PROGRAM

## 2024-03-14 PROCEDURE — 88305 TISSUE EXAM BY PATHOLOGIST: CPT | Performed by: STUDENT IN AN ORGANIZED HEALTH CARE EDUCATION/TRAINING PROGRAM

## 2024-03-14 PROCEDURE — 88313 SPECIAL STAINS GROUP 2: CPT | Performed by: STUDENT IN AN ORGANIZED HEALTH CARE EDUCATION/TRAINING PROGRAM

## 2024-03-14 NOTE — PROGRESS NOTES
"[facility] Dermatology Clinic Note     Patient Name: Dahlia Kraus  Encounter Date: 03/14/24     Have you been cared for by a [facility] Dermatologist in the last 3 years and, if so, which description applies to you?    NO.   I am considered a \"new\" patient and must complete all patient intake questions. I am FEMALE/of child-bearing potential.    REVIEW OF SYSTEMS:  Have you recently had or currently have any of the following? Recent fever or chills? No  Any non-healing wound? No  Are you pregnant or planning to become pregnant? No  Are you currently or planning to be nursing or breast feeding? No   PAST MEDICAL HISTORY:  Have you personally ever had or currently have any of the following?  If \"YES,\" then please provide more detail. Skin cancer (such as Melanoma, Basal Cell Carcinoma, Squamous Cell Carcinoma?  No  Tuberculosis, HIV/AIDS, Hepatitis B or C: No  Radiation Treatment No   HISTORY OF IMMUNOSUPPRESSION:   Do you have a history of any of the following:  Systemic Immunosuppression such as Diabetes, Biologic or Immunotherapy, Chemotherapy, Organ Transplantation, Bone Marrow Transplantation?  Yes diabetic    Answering \"YES\" requires the addition of the dotphrase \"IMMUNOSUPPRESSED\" as the first diagnosis of the patient's visit.   FAMILY HISTORY:  Any \"first degree relatives\" (parent, brother, sister, or child) with the following?    Skin Cancer, Pancreatic or Other Cancer?    PATIENT EXPERIENCE:    Do you want the Dermatologist to perform a COMPLETE skin exam today including a clinical examination under the \"bra and underwear\" areas?  NO  If necessary, do we have your permission to call and leave a detailed message on your Preferred Phone number that includes your specific medical information?  Yes      Allergies   Allergen Reactions    Other Anaphylaxis     Capzasin HP -- severe burning and swelling of the skin     Clarithromycin GI Intolerance    Acetaminophen GI Intolerance    Bactrim " "[Sulfamethoxazole-Trimethoprim] Itching, GI Intolerance, Dizziness, Confusion and Lightheadedness     Patient passes out when on this medication for several days     Cephalosporins Hives     Tolerated Cefdinir 2/2023    Latex Hives    Oxycodone-Acetaminophen GI Intolerance    Penicillins Diarrhea    Trazodone Diarrhea    Capsaicin Rash    Naproxen Palpitations      Current Outpatient Medications:     albuterol (PROVENTIL HFA,VENTOLIN HFA) 90 mcg/act inhaler, Inhale 2 puffs every 4 (four) hours as needed, Disp: , Rfl:     Alcohol Swabs (Pharmacist Choice Alcohol) PADS, 5 (five) times a day, Disp: , Rfl:     ALPRAZolam (XANAX) 1 mg tablet, TAKE 1 TABLET (1 MG TOTAL) BY MOUTH 4 (FOUR) TIMES A DAY AS NEEDED FOR ANXIETY, Disp: 60 tablet, Rfl: 0    ammonium lactate (LAC-HYDRIN) 12 % lotion, Apply topically 2 (two) times a day as needed for dry skin, Disp: 226 g, Rfl: 3    atorvastatin (LIPITOR) 10 mg tablet, Take 10 mg by mouth daily, Disp: , Rfl:     BD Pen Needle Nkechi 2nd Gen 32G X 4 MM MISC, USE AS DIRECTED FOR BEFORE A MEAL AND AT BEDTIME GLUCOSE INJECTION, Disp: , Rfl:     BD Pen Needle Nkechi 2nd Gen 32G X 4 MM MISC, USE WITH INSULIN 5 TIMES A DAY, Disp: 200 each, Rfl: 3    BD PrecisionGlide Needle 23G X 1-1/2\" MISC, USE AS DIRECTED TO ADMINISTER NALOXONE INJECTION.  MAY DISPENSE 23-25 GAUGE 1-1.5\" NEEDLE., Disp: , Rfl:     Blood Glucose Monitoring Suppl (ONE TOUCH ULTRA 2) w/Device KIT, Use as directed, Disp: , Rfl:     busPIRone (BUSPAR) 30 MG tablet, TAKE 1 TABLET (30 MG TOTAL) BY MOUTH 2 (TWO) TIMES A DAY, Disp: 180 tablet, Rfl: 0    Continuous Blood Gluc  (Dexcom G6 ) ALEKS, USE 1 DEVICE 4 (FOUR) TIMES A DAY (WITH MEALS AND AT BEDTIME), Disp: 1 each, Rfl: 6    Continuous Blood Gluc Sensor (Dexcom G6 Sensor) MISC, CHANGE SENSOR EVERY 10 DAYS, Disp: 3 each, Rfl: 6    Continuous Blood Gluc Transmit (Dexcom G6 Transmitter) MISC, USE 4 TIMES A DAY (WITH MEALS AND AT BEDTIME), Disp: 1 each, Rfl: 3    " EPINEPHrine (EPIPEN) 0.3 mg/0.3 mL SOAJ, Inject 0.3 mL (0.3 mg total) into a muscle once for 1 dose, Disp: 0.6 mL, Rfl: 0    famotidine (PEPCID) 20 mg tablet, TAKE 1 TABLET (20 MG TOTAL) BY MOUTH 2 (TWO) TIMES A DAY AS NEEDED FOR INDIGESTION, Disp: 90 tablet, Rfl: 3    fexofenadine (ALLEGRA) 180 MG tablet, TAKE 1 TABLET (180 MG TOTAL) BY MOUTH DAILY, Disp: 90 tablet, Rfl: 3    fluticasone (Flovent Diskus) 250 mcg/actuation diskus inhaler, Inhale 1-2 puffs 2 (two) times a day Rinse mouth after use., Disp: 60 each, Rfl: 1    gabapentin (NEURONTIN) 100 mg capsule, TAKE 1 CAPSULE (100 MG TOTAL) BY MOUTH DAILY AT BEDTIME TAKE ADDITIONAL 100 MG CAPSULE TO TOTAL 900 MG AT BEDTIME, Disp: 30 capsule, Rfl: 3    gabapentin (NEURONTIN) 400 mg capsule, TAKE 2 CAPSULES BY MOUTH 3 TIMES A DAY, Disp: 90 capsule, Rfl: 3    hydrALAZINE (APRESOLINE) 50 mg tablet, Take 50 mg by mouth 3 (three) times a day, Disp: , Rfl:     Incontinence Supply Disposable (Depend Undergarment Ex Absorb) MISC, Use 4 (four) times a day as needed (incontinence), Disp: 120 each, Rfl: 10    insulin lispro (HumaLOG) 100 units/mL injection pen, INJECT 20 UNITS UNDER THE SKIN 3 (THREE) TIMES A DAY BEFORE MEALS INDICATIONS: TYPE 2 DIABETES MELLITUS., Disp: 15 mL, Rfl: 3    Lantus SoloStar 100 units/mL SOPN, INJECT 55 UNITS UNDER THE SKIN 2 (TWO) TIMES A DAY AT LUNCH AND AT BEDTIME., Disp: 45 mL, Rfl: 3    leflunomide (ARAVA) 20 MG tablet, Take 1 tablet (20 mg total) by mouth daily, Disp: 90 tablet, Rfl: 2    lisinopril (ZESTRIL) 10 mg tablet, Take 1 tablet (10 mg total) by mouth daily, Disp: 90 tablet, Rfl: 2    lovastatin (MEVACOR) 10 MG tablet, Take 10 mg by mouth, Disp: , Rfl:     metFORMIN (GLUCOPHAGE-XR) 750 mg 24 hr tablet, TAKE 1 TABLET (750 MG TOTAL) BY MOUTH 2 (TWO) TIMES A DAY WITH MEALS., Disp: , Rfl:     metoprolol succinate (TOPROL-XL) 100 mg 24 hr tablet, Take 100 mg by mouth daily, Disp: , Rfl:     montelukast (SINGULAIR) 10 mg tablet, TAKE 1  TABLET (10 MG TOTAL) BY MOUTH DAILY AT BEDTIME, Disp: 90 tablet, Rfl: 3    Myrbetriq 25 MG TB24, TAKE 25 MG BY MOUTH IN THE MORNING, Disp: 30 tablet, Rfl: 0    naloxone (NARCAN) 0.4 mg/mL injection, , Disp: , Rfl:     Nutritional Supplements (Glucerna Hunger Smart Shake) LIQD, Take 1 Can by mouth 3 (three) times a day, Disp: 296 mL, Rfl: 5    ondansetron (ZOFRAN-ODT) 4 mg disintegrating tablet, TAKE 1 TABLET (4 MG TOTAL) BY MOUTH EVERY 6 (SIX) HOURS AS NEEDED FOR NAUSEA, Disp: 10 tablet, Rfl: 0    OneTouch Delica Lancets 33G MISC, 2 (two) times a day, Disp: , Rfl:     OneTouch Ultra test strip, TEST TWICE DAILY OR AS DIRECTED BY MD, Disp: , Rfl:     Oral Electrolytes (Pedialyte) PACK, Take 237 mL by mouth 2 (two) times a day, Disp: , Rfl:     oxyCODONE (ROXICODONE) 5 immediate release tablet, 10 mg pt reports she takes this 4 times per day but is out of the medication currently, Disp: , Rfl:     pantoprazole (PROTONIX) 40 mg tablet, TAKE 1 TABLET (40 MG TOTAL) BY MOUTH DAILY, Disp: 90 tablet, Rfl: 3    potassium chloride (K-DUR,KLOR-CON) 20 mEq tablet, Take 1 tablet (20 mEq total) by mouth daily, Disp: 30 tablet, Rfl: 1    potassium chloride (MICRO-K) 10 MEQ CR capsule, TAKE 2 CAPSULES (20 MEQ TOTAL) BY MOUTH 2 (TWO) TIMES A DAY, Disp: 90 capsule, Rfl: 3    prazosin (MINIPRESS) 5 mg capsule, TAKE 1 CAPSULE (5 MG TOTAL) BY MOUTH DAILY AT BEDTIME, Disp: 30 capsule, Rfl: 0    risperiDONE (RisperDAL) 3 mg tablet, TAKE 1 TABLET (3 MG TOTAL) BY MOUTH 2 (TWO) TIMES A DAY, Disp: 180 tablet, Rfl: 0    rivaroxaban (XARELTO) 20 mg tablet, Take 20 mg by mouth, Disp: , Rfl:     sertraline (ZOLOFT) 100 mg tablet, Take 1 tablet (100 mg total) by mouth daily, Disp: 90 tablet, Rfl: 0    Spacer/Aero-Holding Chambers (AeroChamber Plus Geoff-Vu w/Mask) MISC, Inhale daily at bedtime as needed (wheezing) (Patient not taking: Reported on 3/13/2024), Disp: 1 each, Rfl: 0    trospium chloride (SANCTURA) 20 mg tablet, Take 1 tablet (20 mg  "total) by mouth 2 (two) times a day, Disp: 180 tablet, Rfl: 3    Trulicity 1.5 MG/0.5ML injection, INJECT 1.5 MG UNDER THE SKIN EVERY 7 DAYS., Disp: 2 mL, Rfl: 3    Varenicline Tartrate, Starter, 0.5 MG X 11 & 1 MG X 42 TBPK, 0.5 MG ONCE DAILY FOR 3 DAYS THEN 0.5MG TWICE DAILY FOR DAYS 4-7 THEN 1 MG TWICE DAILY (Patient not taking: Reported on 3/13/2024), Disp: 53 each, Rfl: 0    zolpidem (AMBIEN) 10 mg tablet, TAKE 1 TABLET (10 MG TOTAL) BY MOUTH DAILY, Disp: 30 tablet, Rfl: 0          Whom besides the patient is providing clinical information about today's encounter?   NO ADDITIONAL HISTORIAN (patient alone provided history)    Physical Exam and Assessment/Plan by Diagnosis:    CHERRY ANGIOMAS     Physical Exam:  Anatomic Location Affected:  Trunk and extremities  Morphological Description:  Scattered cherry red papules  Denies pain, itch, bleeding. No treatments tried. Present for years. Present constantly; no modifying factors which make it worse or better.     Assessment and Plan:  Based on a thorough discussion of this condition and the management approach to it (including a comprehensive discussion of the known risks, side effects and potential benefits of treatment), the patient (family) agrees to implement the following specific plan:  Reassure benign        SEBORRHEIC KERATOSIS; NON-INFLAMED     Physical Exam:  Anatomic Location Affected:  Trunk and extremities  Morphological Description:  Waxy, smooth to warty textured, yellow to brownish-grey to dark brown to blackish, discrete, \"stuck-on\" appearing papules.  Present for years. Denies pain, itch, bleeding.      Additional History of Present Condition:  Present constantly; no modifying factors which make it worse or better. No prior treatment.       Assessment and Plan:  Based on a thorough discussion of this condition and the management approach to it (including a comprehensive discussion of the known risks, side effects and potential benefits of " "treatment), the patient (family) agrees to implement the following specific plan:  Reassure benign  Use sun protection.  Apply SPF 30 or higher at least three times a day.  Wear sun protecting clothing and hats.        SOLAR LENTIGINES   OTHER SKIN CHANGES DUE TO CHRONIC EXPOSURE TO NONIONIZING RADIATION     Physical Exam:  Anatomic Location Affected:  Sun exposed areas of back, chest, arms, legs  Morphological Description:  Multiple scattered brown to tan evenly pigmented macules   Denies pain, itch, bleeding. No treatments tried. Present for months - years. Reports getting newer lesions with sun exposure.         Assessment and Plan:  Based on a thorough discussion of this condition and the management approach to it (including a comprehensive discussion of the known risks, side effects and potential benefits of treatment), the patient (family) agrees to implement the following specific plan:  Reassure benign  Use sun protection.  Apply SPF 30 or higher at least three times a day.  Wear sun protecting clothing and hats.         MULTIPLE MELANOCYTIC NEVI (\"Moles\")     Physical Exam:  Anatomic Location Affected: Trunk and extremities  Morphological Description:  Scattered, round to ovoid, symmetrical-appearing, even bordered, skin colored to dark brown macules/papules  Denies pain, itch, bleeding. No treatments tried. Present for years. Present constantly; no modifying factors which make it worse or better. Denies actively changing or growing moles.      Assessment and Plan:  Based on a thorough discussion of this condition and the management approach to it (including a comprehensive discussion of the known risks, side effects and potential benefits of treatment), the patient (family) agrees to implement the following specific plan:  Reassure benign  Monitor for changes  Use sun protection.  Apply SPF 30 or higher at least three times a day.  Wear sun protecting clothing and hats.            2 NEOPLASMS OF UNCERTAIN " "BEHAVIOR OF SKIN    Physical Exam:  (Anatomic Location); (Size and Morphological Description); (Differential Diagnosis):  Specimen A;right helix ;2 cm atrophic brown plaque;Ddx: SCC vs discoid lupus  Specimen B;back; .5 cm brown papule; Ddx rule out atypia    Pertinent Positives:  Pertinent Negatives:            Additional History of Present Condition:    Patient here today for sores on bilateral ear lobes that's start off as blisters then ruptures ,and elbows burning,itchy, no bleeding,patient states its been present for a couple of years .apply OTC creams but does not help.    Assessment and Plan:  I have discussed with the patient that a sample of skin via a \"skin biopsy” would be potentially helpful to further make a specific diagnosis under the microscope.  Based on a thorough discussion of this condition and the management approach to it (including a comprehensive discussion of the known risks, side effects and potential benefits of treatment), the patient (family) agrees to implement the following specific plan:    Procedure:  Skin Biopsy.  After a thorough discussion of treatment options and risk/benefits/alternatives (including but not limited to local pain, scarring, dyspigmentation, blistering, possible superinfection, and inability to confirm a diagnosis via histopathology), verbal and written consent were obtained and portion of the rash was biopsied for tissue sample.  See below for consent that was obtained from patient and subsequent Procedure Note.      PROCEDURE TANGENTIAL (SHAVE) BIOPSY NOTE: FOR SPECIMEN A AND B    Performing Physician:   Anatomic Location; Clinical Description with size (cm); Pre-Op Diagnosis:   Specimen A;right helix ;2 cm atrophic brown plaque;Ddx: SCC vs discoid lupus  Specimen B;back; .5 cm brown papule; Ddx rule out atypia  Post-op diagnosis: Same     Local anesthesia: 1% xylocaine with epi      Topical anesthesia: None    Hemostasis: Aluminum chloride       After " "obtaining informed consent  at which time there was a discussion about the purpose of biopsy  and low risks of infection and bleeding.  The area was prepped and draped in the usual fashion. Anesthesia was obtained with 1% lidocaine with epinephrine. A shave biopsy to an appropriate sampling depth was obtained by Shave (Dermablade or 15 blade) The resulting wound was covered with surgical ointment and bandaged appropriately.     The patient tolerated the procedure well without complications and was without signs of functional compromise.      Specimen has been sent for review by Dermatopathology.    Standard post-procedure care has been explained and has been included in written form within the patient's copy of Informed Consent.    INFORMED CONSENT DISCUSSION AND POST-OPERATIVE INSTRUCTIONS FOR PATIENT    I.  RATIONALE FOR PROCEDURE  I understand that a skin biopsy allows the Dermatologist to test a lesion or rash under the microscope to obtain a diagnosis.  It usually involves numbing the area with numbing medication and removing a small piece of skin; sometimes the area will be closed with sutures. In this specific procedure, sutures are not usually needed.  If any sutures are placed, then they are usually need to be removed in 2 weeks or less.    I understand that my Dermatologist recommends that a skin \"shave\" biopsy be performed today.  A local anesthetic, similar to the kind that a dentist uses when filling a cavity, will be injected with a very small needle into the skin area to be sampled.  The injected skin and tissue underneath \"will go to sleep” and become numb so no pain should be felt afterwards.  An instrument shaped like a tiny \"razor blade\" (shave biopsy instrument) will be used to cut a small piece of tissue and skin from the area so that a sample of tissue can be taken and examined more closely under the microscope.  A slight amount of bleeding will occur, but it will be stopped with direct " "pressure and a pressure bandage and any other appropriate methods.  I understands that a scar will form where the wound was created.  Surgical ointment will be applied to help protect the wound.  Sutures are not usually needed.    II.  RISKS AND POTENTIAL COMPLICATIONS   I understand the risks and potential complications of a skin biopsy include but are not limited to the following:  Bleeding  Infection  Pain  Scar/keloid  Skin discoloration  Incomplete Removal  Recurrence  Nerve Damage/Numbness/Loss of Function  Allergic Reaction to Anesthesia  Biopsies are diagnostic procedures and based on findings additional treatment or evaluation may be required  Loss or destruction of specimen resulting in no additional findings    My Dermatologist has explained to me the nature of the condition, the nature of the procedure, and the benefits to be reasonably expected compared with alternative approaches.  My Dermatologist has discussed the likelihood of major risks or complications of this procedure including the specific risks listed above, such as bleeding, infection, and scarring/keloid.  I understand that a scar is expected after this procedure.  I understand that my physician cannot predict if the scar will form a \"keloid,\" which extends beyond the borders of the wound that is created.  A keloid is a thick, painful, and bumpy scar.  A keloid can be difficult to treat, as it does not always respond well to therapy, which includes injecting cortisone directly into the keloid every few weeks.  While this usually reduces the pain and size of the scar, it does not eliminate it.      I understand that photographs may be taken before and after the procedure.  These will be maintained as part of the medical providers confidential records and may not be made available to me.  I further authorize the medical provider to use the photographs for teaching purposes or to illustrate scientific papers, books, or lectures if in his/her " "judgment, medical research, education, or science may benefit from its use.    I have had an opportunity to fully inquire about the risks and benefits of this procedure and its alternatives.   I have been given ample time and opportunity to ask questions and to seek a second opinion if I wished to do so.  I acknowledge that there have specifically been no guarantees as to the cosmetic results from the procedure.  I am aware that with any procedure there is always the possibility of an unexpected complication.    III. POST-PROCEDURAL CARE (WHAT YOU WILL NEED TO DO \"AFTER THE BIOPSY\" TO OPTIMIZE HEALING)    Keep the area clean and dry.  Try NOT to remove the bandage or get it wet for the first 24 hours.    Gently clean the area and apply surgical ointment (such as Vaseline petrolatum ointment, which is available \"over the counter\" and not a prescription) to the biopsy site for up to 2 weeks straight.  This acts to protect the wound from the outside world.      Sutures are not usually placed in this procedure.  If any sutures were placed, return for suture removal as instructed (generally 1 week for the face, 2 weeks for the body).      Take Acetaminophen (Tylenol) for discomfort, if no contraindications.  Ibuprofen or aspirin could make bleeding worse.    Call our office immediately for signs of infection: fever, chills, increased redness, warmth, tenderness, discomfort/pain, or pus or foul smell coming from the wound.    WHAT TO DO IF THERE IS ANY BLEEDING?  If a small amount of bleeding is noticed, place a clean cloth over the area and apply firm pressure for ten minutes.  Check the wound after 10 minutes of direct pressure.  If bleeding persists, try one more time for an additional 10 minutes of direct pressure on the area.  If the bleeding becomes heavier or does not stop after the second attempt, or if you have any other questions about this procedure, then please call your [facility] Dermatologist by calling " [phone] (SKIN).     I hereby acknowledge that I have reviewed and verified the site with my Dermatologist and have requested and authorized my Dermatologist to proceed with the procedure.         Scribe Attestation      I,:  Sarai Nath am acting as a scribe while in the presence of the attending physician.:       I,:  Rebecca Awad MD personally performed the services described in this documentation    as scribed in my presence.:           Resident Dr. Thakkar

## 2024-03-18 DIAGNOSIS — F43.10 PTSD (POST-TRAUMATIC STRESS DISORDER): ICD-10-CM

## 2024-03-18 RX ORDER — ZOLPIDEM TARTRATE 10 MG/1
10 TABLET ORAL DAILY
Qty: 30 TABLET | Refills: 0 | Status: SHIPPED | OUTPATIENT
Start: 2024-03-18 | End: 2024-06-26

## 2024-03-19 DIAGNOSIS — E87.6 LOW BLOOD POTASSIUM: ICD-10-CM

## 2024-03-19 PROCEDURE — 88342 IMHCHEM/IMCYTCHM 1ST ANTB: CPT | Performed by: STUDENT IN AN ORGANIZED HEALTH CARE EDUCATION/TRAINING PROGRAM

## 2024-03-19 PROCEDURE — 88305 TISSUE EXAM BY PATHOLOGIST: CPT | Performed by: STUDENT IN AN ORGANIZED HEALTH CARE EDUCATION/TRAINING PROGRAM

## 2024-03-19 PROCEDURE — 88312 SPECIAL STAINS GROUP 1: CPT | Performed by: STUDENT IN AN ORGANIZED HEALTH CARE EDUCATION/TRAINING PROGRAM

## 2024-03-19 PROCEDURE — 88313 SPECIAL STAINS GROUP 2: CPT | Performed by: STUDENT IN AN ORGANIZED HEALTH CARE EDUCATION/TRAINING PROGRAM

## 2024-03-19 RX ORDER — POTASSIUM CHLORIDE 750 MG/1
20 CAPSULE, EXTENDED RELEASE ORAL 2 TIMES DAILY
Qty: 90 CAPSULE | Refills: 3 | Status: SHIPPED | OUTPATIENT
Start: 2024-03-19

## 2024-03-21 ENCOUNTER — TELEPHONE (OUTPATIENT)
Age: 54
End: 2024-03-21

## 2024-03-21 DIAGNOSIS — Z12.11 COLON CANCER SCREENING: Primary | ICD-10-CM

## 2024-03-22 NOTE — RESULT ENCOUNTER NOTE
DERMATOPATHOLOGY RESULT NOTE    Results reviewed by ordering physician.  Called patient to personally discuss results. No answer, left voicemail with result.      Instructions for Clinical Derm Team:   (remember to route Result Note to appropriate staff):    None    Result & Plan by Specimen:    Specimen A: benign  Plan: monitor      Specimen B: benign  Plan: reassured, benign    Tissue Exam: [id number]  Order: [id number]   Visible to patient: Yes (seen)      Dx: Neoplasm of uncertain behavior of skin    0 Result Notes     Component   Case Report  Surgical Pathology Report                         Case: [id number]                                  Authorizing Provider:  Anitha Thakkar MD            Collected:           03/14/2024 [facility]8              Ordering Location:     [facility]      Received:            03/14/2024 [address] [place] [address]                                                                      Pathologist:           Henny Hanna MD                                                          Specimens:   A) - Skin, Other, Specimen A; right helix                                                           B) - Skin, Other, Speimen B; left upper back                                            Final Diagnosis  A. Skin, right helix, shave biopsy:    Consistent with trauma/irritation (see note).    Note: Further clinical pathologic correlation is advised. Multiple levels examined. SOX10 immunostain was reviewed. Pathogenic microorganisms are not seen with PAS stain. Colloidal iron stain was examined. If the lesion were to progress/persist, additional sampling should be sought.        B. Skin, left upper back, shave biopsy:    Dermal melanocytic nevus; extends to the tissue edges.          Electronically signed by Henny Hanna MD on 3/19/2024 at  1:41 PM  Additional Information   All reported additional testing was performed with appropriately reactive controls.  These tests  "were developed and their performance characteristics determined by [facility] or appropriate performing facility, though some tests may be performed on tissues which have not been validated for performance characteristics (such as staining performed on alcohol exposed cell blocks and decalcified tissues).  Results should be interpreted with caution and in the context of the patients' clinical condition. These tests may not be cleared or approved by the U.S. Food and Drug Administration, though the FDA has determined that such clearance or approval is not necessary. These tests are used for clinical purposes and they should not be regarded as investigational or for research. This laboratory has been approved by [place] 88, designated as a high-complexity laboratory and is qualified to perform these tests.  .  Gross Description   A. The specimen is received in formalin, labeled with the patient's name and hospital number, and is designated \" right helix.\"  It consists of a 1.2 x 0.5 cm fragment of skin, less than 0.1 cm thick.  The skin is white to tan-brown and wrinkled.  The margin is inked green and the skin surface is inked red.  The specimen is bisected and submitted entirely in 1 cassette, between sponges.  B. The specimen is received in formalin, labeled with the patient's name and hospital number, and is designated \" left upper back.\"  It consists of a 0.8 x 0.5 cm fragment of skin, less than 0.1 cm thick.  The skin is tan-white with a 0.5 x 0.4 cm tan-brown macule.  The margin is inked green and the skin surface is inked red.  The specimen is bisected and submitted entirely in 1 cassette, between sponges.    Note: The estimated total formalin fixation time based upon information provided by the submitting clinician and the standard processing schedule is under 72 hours.    MScheib  Clinical Information   Specimen A;skin;shave biopsy;right helix ;54 year old female; 2 cm atrophic brown " plaque;Ddx: BCC vs discord lupus  Specimen B;left upper back;skin;shave biopsy; 54 year old fe[place]ale; 5 ml brown papule;Ddx atypia    ATTENTION DERMPATH  Resulting Agency [facility]          Specimen Collected: 03/14/24  1:58 PM Last Resulted: 03/19/24  1:41 PM     Order Details       View Encounter       Lab and Collection Details       Routing       Result History    View All Conversations on this Encounter        Scans on Order [id number]    Lab Result Document - Document on 3/19/2024  1:41 PM

## 2024-03-23 DIAGNOSIS — E11.649 TYPE 2 DIABETES MELLITUS WITH HYPOGLYCEMIA WITHOUT COMA, WITH LONG-TERM CURRENT USE OF INSULIN (HCC): ICD-10-CM

## 2024-03-23 DIAGNOSIS — Z79.4 TYPE 2 DIABETES MELLITUS WITH HYPOGLYCEMIA WITHOUT COMA, WITH LONG-TERM CURRENT USE OF INSULIN (HCC): ICD-10-CM

## 2024-03-25 DIAGNOSIS — Z12.11 COLON CANCER SCREENING: Primary | ICD-10-CM

## 2024-03-25 RX ORDER — AMMONIUM LACTATE 12 G/100G
LOTION TOPICAL 2 TIMES DAILY PRN
Qty: 227 G | Refills: 3 | Status: SHIPPED | OUTPATIENT
Start: 2024-03-25

## 2024-03-28 DIAGNOSIS — F41.9 ANXIETY: ICD-10-CM

## 2024-03-28 RX ORDER — ALPRAZOLAM 1 MG/1
1 TABLET ORAL 4 TIMES DAILY PRN
Qty: 60 TABLET | Refills: 0 | Status: SHIPPED | OUTPATIENT
Start: 2024-03-28

## 2024-04-01 DIAGNOSIS — F43.10 PTSD (POST-TRAUMATIC STRESS DISORDER): ICD-10-CM

## 2024-04-01 DIAGNOSIS — N39.41 URGENCY INCONTINENCE: ICD-10-CM

## 2024-04-01 RX ORDER — MIRABEGRON 25 MG/1
25 TABLET, FILM COATED, EXTENDED RELEASE ORAL DAILY
Qty: 90 TABLET | Refills: 3 | Status: SHIPPED | OUTPATIENT
Start: 2024-04-01

## 2024-04-01 RX ORDER — PRAZOSIN HYDROCHLORIDE 5 MG/1
5 CAPSULE ORAL
Qty: 90 CAPSULE | Refills: 3 | Status: SHIPPED | OUTPATIENT
Start: 2024-04-01

## 2024-04-08 ENCOUNTER — ANESTHESIA EVENT (OUTPATIENT)
Dept: GASTROENTEROLOGY | Facility: HOSPITAL | Age: 54
End: 2024-04-08

## 2024-04-09 ENCOUNTER — HOSPITAL ENCOUNTER (OUTPATIENT)
Dept: GASTROENTEROLOGY | Facility: HOSPITAL | Age: 54
Setting detail: OUTPATIENT SURGERY
Discharge: HOME | End: 2024-04-09
Attending: INTERNAL MEDICINE
Payer: COMMERCIAL

## 2024-04-09 ENCOUNTER — ANESTHESIA (OUTPATIENT)
Dept: GASTROENTEROLOGY | Facility: HOSPITAL | Age: 54
End: 2024-04-09

## 2024-04-09 VITALS
RESPIRATION RATE: 22 BRPM | DIASTOLIC BLOOD PRESSURE: 90 MMHG | WEIGHT: 134.48 LBS | TEMPERATURE: 97.9 F | SYSTOLIC BLOOD PRESSURE: 175 MMHG | HEART RATE: 79 BPM | OXYGEN SATURATION: 98 % | BODY MASS INDEX: 22.41 KG/M2 | HEIGHT: 65 IN

## 2024-04-09 DIAGNOSIS — R11.2 NAUSEA AND VOMITING, UNSPECIFIED VOMITING TYPE: ICD-10-CM

## 2024-04-09 DIAGNOSIS — K21.9 GASTROESOPHAGEAL REFLUX DISEASE WITHOUT ESOPHAGITIS: ICD-10-CM

## 2024-04-09 DIAGNOSIS — R19.7 DIARRHEA, UNSPECIFIED TYPE: ICD-10-CM

## 2024-04-09 LAB — GLUCOSE SERPL-MCNC: 268 MG/DL (ref 65–140)

## 2024-04-09 PROCEDURE — 82948 REAGENT STRIP/BLOOD GLUCOSE: CPT

## 2024-04-09 RX ORDER — PANTOPRAZOLE SODIUM 40 MG/1
40 TABLET, DELAYED RELEASE ORAL 2 TIMES DAILY
Qty: 90 TABLET | Refills: 3 | Status: SHIPPED | OUTPATIENT
Start: 2024-04-09

## 2024-04-09 RX ORDER — PANTOPRAZOLE SODIUM 40 MG/1
40 TABLET, DELAYED RELEASE ORAL 2 TIMES DAILY
Qty: 180 TABLET | Refills: 3 | Status: SHIPPED | OUTPATIENT
Start: 2024-04-09

## 2024-04-09 RX ORDER — PROPOFOL 10 MG/ML
INJECTION, EMULSION INTRAVENOUS AS NEEDED
Status: DISCONTINUED | OUTPATIENT
Start: 2024-04-09 | End: 2024-04-09

## 2024-04-09 RX ORDER — LIDOCAINE HYDROCHLORIDE 20 MG/ML
INJECTION, SOLUTION EPIDURAL; INFILTRATION; INTRACAUDAL; PERINEURAL AS NEEDED
Status: DISCONTINUED | OUTPATIENT
Start: 2024-04-09 | End: 2024-04-09

## 2024-04-09 RX ORDER — SODIUM CHLORIDE, SODIUM LACTATE, POTASSIUM CHLORIDE, CALCIUM CHLORIDE 600; 310; 30; 20 MG/100ML; MG/100ML; MG/100ML; MG/100ML
INJECTION, SOLUTION INTRAVENOUS CONTINUOUS PRN
Status: DISCONTINUED | OUTPATIENT
Start: 2024-04-09 | End: 2024-04-09

## 2024-04-09 RX ADMIN — PROPOFOL 20 MG: 10 INJECTION, EMULSION INTRAVENOUS at 11:05

## 2024-04-09 RX ADMIN — LIDOCAINE HYDROCHLORIDE 40 MG: 20 INJECTION, SOLUTION EPIDURAL; INFILTRATION; INTRACAUDAL; PERINEURAL at 10:57

## 2024-04-09 RX ADMIN — LIDOCAINE HYDROCHLORIDE 40 MG: 20 INJECTION, SOLUTION EPIDURAL; INFILTRATION; INTRACAUDAL; PERINEURAL at 10:59

## 2024-04-09 RX ADMIN — PROPOFOL 20 MG: 10 INJECTION, EMULSION INTRAVENOUS at 11:07

## 2024-04-09 RX ADMIN — PROPOFOL 20 MG: 10 INJECTION, EMULSION INTRAVENOUS at 11:09

## 2024-04-09 RX ADMIN — PROPOFOL 20 MG: 10 INJECTION, EMULSION INTRAVENOUS at 11:01

## 2024-04-09 RX ADMIN — PROPOFOL 20 MG: 10 INJECTION, EMULSION INTRAVENOUS at 11:00

## 2024-04-09 RX ADMIN — PROPOFOL 20 MG: 10 INJECTION, EMULSION INTRAVENOUS at 11:03

## 2024-04-09 RX ADMIN — PROPOFOL 100 MG: 10 INJECTION, EMULSION INTRAVENOUS at 10:59

## 2024-04-09 RX ADMIN — SODIUM CHLORIDE, SODIUM LACTATE, POTASSIUM CHLORIDE, AND CALCIUM CHLORIDE: .6; .31; .03; .02 INJECTION, SOLUTION INTRAVENOUS at 10:50

## 2024-04-09 NOTE — H&P
History and Physical - [facility]  Dahlia Kraus 54 y.o. female MRN: [id number]                  HPI: Dahlia Kraus is a 54 y.o. year old female who presents for EGD/colonoscopy for nausea vomiting diarrhea.      REVIEW OF SYSTEMS: Per the HPI, and otherwise unremarkable.    Historical Information   Past Medical History:   Diagnosis Date    Abnormal ECG     Abnormal Pap smear of cervix     Allergic     Anemia     Anxiety     Asthma     Atrial fibrillation (HCC)     Chlamydia     Chronic kidney disease     Coronary artery disease     Depression     Diabetes mellitus (HCC)     Fibromyalgia     GERD (gastroesophageal reflux disease)     Headache(784.0)     Heart disease     Heart murmur     History of transfusion     Hypertension     Inflammatory bowel disease     Migraines     Myocardial infarction (HCC)     Neuromuscular disorder (HCC)     Opioid abuse, in remission (HCC)     Otitis media     Pneumonia     Scoliosis     Seizures (HCC)     Shingles     Sick sinus syndrome (HCC)     Stroke (HCC)     Urinary tract infection     Varicella     Visual impairment      Past Surgical History:   Procedure Laterality Date    APPENDECTOMY      CATARACT EXTRACTION Right 2023     SECTION      EYE SURGERY Left 2023    FRACTURE SURGERY      HIP SURGERY Right     HYSTERECTOMY      TUBAL LIGATION       Social History   Social History     Substance and Sexual Activity   Alcohol Use Not Currently     Social History     Substance and Sexual Activity   Drug Use Never     Social History     Tobacco Use   Smoking Status Every Day    Current packs/day: 0.50    Average packs/day: 0.6 packs/day for 41.3 years (25.1 ttl pk-yrs)    Types: Cigarettes    Start date: 1992   Smokeless Tobacco Never   Tobacco Comments    Patient stated that she is not ready to quit smoking      Family History   Problem Relation Age of Onset    Asthma Mother     Cancer Mother         Bladder Cancer    Diabetes  "Mother     Heart disease Mother     Hypertension Mother     Stroke Mother     Mental illness Mother     Depression Mother     COPD Mother     Arthritis Mother     Hearing loss Mother     Vision loss Mother     Glaucoma Mother     Anxiety disorder Mother     Psychiatric Illness Mother     Asthma Father     Diabetes Father     Heart disease Father     Hypertension Father     Arthritis Father     Cancer Maternal Grandfather     Heart disease Maternal Grandfather     Arthritis Maternal Grandfather     Cancer Paternal Grandmother     Diabetes Paternal Grandmother     Asthma Brother     Diabetes Brother     Heart disease Brother     Hypertension Brother     Mental illness Brother     Arthritis Brother     Hypertension Brother     Arthritis Paternal Grandfather     Mental illness Maternal Aunt     Mental illness Maternal Aunt     Mental illness Daughter     Depression Daughter     Asthma Paternal Aunt     ADD / ADHD Cousin     ADD / ADHD Cousin     Psychiatric Illness Brother        Meds/Allergies       Current Outpatient Medications:     albuterol (PROVENTIL HFA,VENTOLIN HFA) 90 mcg/act inhaler    Alcohol Swabs (Pharmacist Choice Alcohol) PADS    ALPRAZolam (XANAX) 1 mg tablet    ammonium lactate (LAC-HYDRIN) 12 % lotion    atorvastatin (LIPITOR) 10 mg tablet    BD Pen Needle Nkechi 2nd Gen 32G X 4 MM MISC    BD Pen Needle Nkechi 2nd Gen 32G X 4 MM MISC    BD PrecisionGlide Needle 23G X 1-1/2\" MISC    Blood Glucose Monitoring Suppl (ONE TOUCH ULTRA 2) w/Device KIT    busPIRone (BUSPAR) 30 MG tablet    Continuous Blood Gluc  (Dexcom G6 ) ALEKS    Continuous Blood Gluc Sensor (Dexcom G6 Sensor) MISC    Continuous Blood Gluc Transmit (Dexcom G6 Transmitter) MISC    EPINEPHrine (EPIPEN) 0.3 mg/0.3 mL SOAJ    famotidine (PEPCID) 20 mg tablet    fexofenadine (ALLEGRA) 180 MG tablet    fluticasone (Flovent Diskus) 250 mcg/actuation diskus inhaler    gabapentin (NEURONTIN) 100 mg capsule    gabapentin (NEURONTIN) 400 " mg capsule    hydrALAZINE (APRESOLINE) 50 mg tablet    Incontinence Supply Disposable (Depend Undergarment Ex Absorb) MISC    insulin lispro (HumaLOG) 100 units/mL injection pen    Lantus SoloStar 100 units/mL SOPN    leflunomide (ARAVA) 20 MG tablet    lisinopril (ZESTRIL) 10 mg tablet    lovastatin (MEVACOR) 10 MG tablet    metFORMIN (GLUCOPHAGE-XR) 750 mg 24 hr tablet    metoprolol succinate (TOPROL-XL) 100 mg 24 hr tablet    Mirabegron ER (Myrbetriq) 25 MG TB24    montelukast (SINGULAIR) 10 mg tablet    naloxone (NARCAN) 0.4 mg/mL injection    Nutritional Supplements (Glucerna Hunger Smart Shake) LIQD    ondansetron (ZOFRAN-ODT) 4 mg disintegrating tablet    OneTouch Delica Lancets 33G MISC    OneTouch Ultra test strip    Oral Electrolytes (PEDIALYTE SINGLES PO)    Oral Electrolytes (Pedialyte) PACK    oxyCODONE (ROXICODONE) 5 immediate release tablet    pantoprazole (PROTONIX) 40 mg tablet    polyethylene glycol (GOLYTELY) 4000 mL solution    polyethylene glycol (GOLYTELY) 4000 mL solution    potassium chloride (K-DUR,KLOR-CON) 20 mEq tablet    potassium chloride (MICRO-K) 10 MEQ CR capsule    prazosin (MINIPRESS) 5 mg capsule    risperiDONE (RisperDAL) 3 mg tablet    rivaroxaban (XARELTO) 20 mg tablet    sertraline (ZOLOFT) 100 mg tablet    Spacer/Aero-Holding Chambers (AeroChamber Plus Geoff-Vu w/Mask) MISC    trospium chloride (SANCTURA) 20 mg tablet    Trulicity 1.5 MG/0.5ML injection    Varenicline Tartrate, Starter, 0.5 MG X 11 & 1 MG X 42 TBPK    zolpidem (AMBIEN) 10 mg tablet    Allergies   Allergen Reactions    Other Anaphylaxis     Capzasin HP -- severe burning and swelling of the skin     Clarithromycin GI Intolerance    Acetaminophen GI Intolerance    Bactrim [Sulfamethoxazole-Trimethoprim] Itching, GI Intolerance, Dizziness, Confusion and Lightheadedness     Patient passes out when on this medication for several days     Cephalosporins Hives     Tolerated Cefdinir 2/2023    Latex Hives     Oxycodone-Acetaminophen GI Intolerance    Penicillins Diarrhea    Trazodone Diarrhea    Capsaicin Rash    Naproxen Palpitations       Objective     There were no vitals taken for this visit.      PHYSICAL EXAM    Gen: NAD  Head: NCAT  CV: RRR  CHEST: Clear  ABD: soft, NT/ND  EXT: no edema      ASSESSMENT/PLAN:  Dahlia Kraus is a 54 y.o. year old female who presents for EGD/colonoscopy for nausea vomiting diarrhea. The patient is stable and optimized for the procedure, we reviewed risk and benefits. Risk include but not limited to infection, bleeding, perforation and missing a lesion.

## 2024-04-09 NOTE — ANESTHESIA PREPROCEDURE EVALUATION
Procedure:  COLONOSCOPY  EGD    Relevant Problems   CARDIO   (+) Atrial flutter (HCC)   (+) Benign essential hypertension   (+) Cardiac conduction disorder   (+) Coronary artery disease involving native coronary artery of native heart without angina pectoris   (+) Migraine   (+) Mixed hyperlipidemia   (+) Paroxysmal A-fib (HCC)   (+) Pure hypercholesterolemia   (+) Sick sinus syndrome (HCC)      ENDO   (+) DM (diabetes mellitus), type 2 ([facility])      GI/HEPATIC   (+) GERD (gastroesophageal reflux disease)      MUSCULOSKELETAL   (+) Cervical spondylosis   (+) Discogenic low back pain   (+) Facet syndrome, lumbar   (+) Lumbar degenerative disc disease   (+) Myofascial pain syndrome   (+) Osteoarthritis of spine with radiculopathy, lumbar region   (+) Primary osteoarthritis involving multiple joints   (+) RA (rheumatoid arthritis) ([facility])      NEURO/PSYCH   (+) Anxiety   (+) Cerebrovascular accident (CVA) ([facility])   (+) Chronic pain disorder   (+) Chronic, continuous use of opioids   (+) Diabetic neuropathy associated with type 2 diabetes mellitus ([facility])   (+) Migraine   (+) Myofascial pain syndrome   (+) Numbness and tingling of both lower extremities   (+) PTSD (post-traumatic stress disorder)   (+) Seizures ([facility])      PULMONARY   (+) Asthma   (+) Chronic bronchitis ([facility])   (+) Mixed simple and mucopurulent chronic bronchitis ([facility])   (+) XU (obstructive sleep apnea)        Physical Exam    Airway    Mallampati score: II  TM Distance: >3 FB  Neck ROM: full     Dental       Cardiovascular  Cardiovascular exam normal    Pulmonary  Pulmonary exam normal     Other Findings  post-pubertal.      Anesthesia Plan  ASA Score- 3     Anesthesia Type- IV sedation with anesthesia with ASA Monitors.         Additional Monitors:     Airway Plan:            Plan Factors-Exercise tolerance (METS): >4 METS.    Chart reviewed. EKG reviewed. Imaging results reviewed. Existing labs reviewed. Patient summary reviewed.                  Induction-  intravenous.    Postoperative Plan-     Informed Consent- Anesthetic plan and risks discussed with patient.  I personally reviewed this patient with the CRNA. Discussed and agreed on the Anesthesia Plan with the CRNA..

## 2024-04-09 NOTE — ANESTHESIA POSTPROCEDURE EVALUATION
Post-Op Assessment Note    CV Status:  Stable  Pain Score: 0    Pain management: adequate       Mental Status:  Awake and sleepy   Hydration Status:  Euvolemic   PONV Controlled:  Controlled   Airway Patency:  Patent     Post Op Vitals Reviewed: Yes    No anethesia notable event occurred.    Staff: CRNA               /70 (04/09/24 1115)    Temp 97.9 °F (36.6 °C) (04/09/24 1115)    Pulse 72   Resp 22 (04/09/24 1115)    SpO2 97 % (04/09/24 1115)

## 2024-04-10 NOTE — PROGRESS NOTES
Daily Note     Today's date: 2022  Patient name: Jan Lima  : 1970  MRN: [id number]  Referring provider: Kourtney Kurtz DO  Dx:   Encounter Diagnosis     ICD-10-CM    1  Lymphedema  I89 0                   Subjective: "my legs look really good "  ( I did have a compression pump years ago but I lost it in my move  ")      Objective: See treatment diary below      Assessment: Tolerated treatment well  Patient would benefit from continued PT  Good tolerance to 4 chamber compression pump today 40 mmHG with elevation of both le's       Plan: Continue per plan of care  Precautions:      Allergies  Reviewed by Merilyn Lanes at 11:28 PM   Severity Reactions Comments   Other High Anaphylaxis Capzasin HP -- severe burning and swelling of the skin    Clarithromycin Medium GI Intolerance    Acetaminophen Not Specified GI Intolerance    Bactrim [sulfamethoxazole-trimethoprim] Not Specified Itching, GI Intolerance, Dizziness, Confusion, Lightheadedness Patient passes out when on this medication for several days    Cephalosporins Not Specified Hives    Latex Not Specified Hives    Oxycodone-acetaminophen Not Specified GI Intolerance    Penicillins Not Specified Diarrhea    Trazodone Not Specified Diarrhea    Capsaicin Low Rash    Naproxen              Manuals 10/3/22 10/24 11/1 11/7/22          I eval             Mld massage and soft tissue mobilization both le's    45 min man          Pt has prescription for bilateral compression socks                           Neuro Re-Ed, ,there exer              Nu step   13 min 15 min r 3 15 min r 3         Le lymphedema there exer  10 reps see packet   Glut sets, adduct, quad, ankle pumps 30 reps                                                                           Ther Ex                                                                                                                     Ther Activity                                       Gait Training Modalities             Trial bilateral le compression pump with le elevation 30-40 mmHG     30 min Initial (On Arrival)

## 2024-05-14 DIAGNOSIS — F41.9 ANXIETY: ICD-10-CM

## 2024-05-14 DIAGNOSIS — E46 PROTEIN-CALORIE MALNUTRITION, UNSPECIFIED SEVERITY (HCC): ICD-10-CM

## 2024-05-14 RX ORDER — ALPRAZOLAM 1 MG/1
1 TABLET ORAL 4 TIMES DAILY PRN
Qty: 60 TABLET | Refills: 0 | Status: SHIPPED | OUTPATIENT
Start: 2024-05-14

## 2024-05-14 RX ORDER — LACTOSE-REDUCED FOOD 0.05 G-1.5
1 LIQUID (ML) ORAL 3 TIMES DAILY
Qty: 296 ML | Refills: 5 | Status: SHIPPED | OUTPATIENT
Start: 2024-05-14

## 2024-05-15 DIAGNOSIS — Z00.6 ENCOUNTER FOR EXAMINATION FOR NORMAL COMPARISON OR CONTROL IN CLINICAL RESEARCH PROGRAM: ICD-10-CM

## 2024-05-17 DIAGNOSIS — F43.10 PTSD (POST-TRAUMATIC STRESS DISORDER): ICD-10-CM

## 2024-05-20 DIAGNOSIS — F43.10 PTSD (POST-TRAUMATIC STRESS DISORDER): ICD-10-CM

## 2024-05-20 RX ORDER — ZOLPIDEM TARTRATE 10 MG/1
10 TABLET ORAL DAILY
Qty: 30 TABLET | Refills: 0 | Status: CANCELLED | OUTPATIENT
Start: 2024-05-20 | End: 2024-08-28

## 2024-05-20 RX ORDER — ZOLPIDEM TARTRATE 10 MG/1
10 TABLET ORAL
Qty: 30 TABLET | Refills: 0 | OUTPATIENT
Start: 2024-05-20 | End: 2024-06-19

## 2024-05-20 NOTE — TELEPHONE ENCOUNTER
Reason for call:   [x] Refill   [] Prior Auth  [] Other:     Office:   [x] PCP/Provider -   [] Specialty/Provider -     Medication: zolpidem (AMBIEN) 10 mg tablet   TAKE 1 TABLET (10 MG TOTAL) BY MOUTH DAILY       Pharmacy: [facility]    Does the patient have enough for 3 days?   [] Yes   [x] No - Send as HP to POD

## 2024-05-21 DIAGNOSIS — F43.10 PTSD (POST-TRAUMATIC STRESS DISORDER): ICD-10-CM

## 2024-05-21 RX ORDER — ZOLPIDEM TARTRATE 10 MG/1
10 TABLET ORAL DAILY
Qty: 30 TABLET | Refills: 0 | OUTPATIENT
Start: 2024-05-21 | End: 2024-08-29

## 2024-05-21 NOTE — TELEPHONE ENCOUNTER
Patient unsure of why zolpidem was denied.  Patient states she has not slept in 2 nights and unsure what is causing the script to not be filled.  Would like call back: [phone]

## 2024-05-23 DIAGNOSIS — F43.10 PTSD (POST-TRAUMATIC STRESS DISORDER): ICD-10-CM

## 2024-05-23 RX ORDER — ZOLPIDEM TARTRATE 10 MG/1
10 TABLET ORAL
Qty: 30 TABLET | Refills: 0 | Status: SHIPPED | OUTPATIENT
Start: 2024-05-23 | End: 2024-08-31

## 2024-05-29 ENCOUNTER — PATIENT MESSAGE (OUTPATIENT)
Age: 54
End: 2024-05-29

## 2024-06-05 DIAGNOSIS — Z79.4 INSULIN DEPENDENT TYPE 2 DIABETES MELLITUS (HCC): ICD-10-CM

## 2024-06-05 DIAGNOSIS — E11.9 INSULIN DEPENDENT TYPE 2 DIABETES MELLITUS (HCC): ICD-10-CM

## 2024-06-05 RX ORDER — PROCHLORPERAZINE 25 MG/1
SUPPOSITORY RECTAL
Qty: 3 EACH | Refills: 6 | Status: SHIPPED | OUTPATIENT
Start: 2024-06-05

## 2024-06-07 DIAGNOSIS — F63.81 INTERMITTENT EXPLOSIVE DISORDER: ICD-10-CM

## 2024-06-07 DIAGNOSIS — E11.44 DIABETIC AMYOTROPHY ASSOCIATED WITH TYPE 2 DIABETES MELLITUS (HCC): ICD-10-CM

## 2024-06-07 RX ORDER — GABAPENTIN 100 MG/1
100 CAPSULE ORAL
Qty: 90 CAPSULE | Refills: 3 | Status: SHIPPED | OUTPATIENT
Start: 2024-06-07

## 2024-06-07 RX ORDER — SERTRALINE HYDROCHLORIDE 100 MG/1
100 TABLET, FILM COATED ORAL DAILY
Qty: 90 TABLET | Refills: 3 | Status: SHIPPED | OUTPATIENT
Start: 2024-06-07

## 2024-06-13 DIAGNOSIS — Z79.4 TYPE 2 DIABETES MELLITUS WITH HYPOGLYCEMIA WITHOUT COMA, WITH LONG-TERM CURRENT USE OF INSULIN (HCC): ICD-10-CM

## 2024-06-13 DIAGNOSIS — E11.649 TYPE 2 DIABETES MELLITUS WITH HYPOGLYCEMIA WITHOUT COMA, WITH LONG-TERM CURRENT USE OF INSULIN (HCC): ICD-10-CM

## 2024-06-13 DIAGNOSIS — F41.9 ANXIETY: ICD-10-CM

## 2024-06-13 RX ORDER — DULAGLUTIDE 1.5 MG/.5ML
INJECTION, SOLUTION SUBCUTANEOUS
Qty: 2 ML | Refills: 3 | Status: SHIPPED | OUTPATIENT
Start: 2024-06-13

## 2024-06-14 RX ORDER — ALPRAZOLAM 1 MG/1
1 TABLET ORAL 4 TIMES DAILY PRN
Qty: 60 TABLET | Refills: 0 | Status: SHIPPED | OUTPATIENT
Start: 2024-06-14 | End: 2024-06-17 | Stop reason: SDUPTHER

## 2024-06-17 DIAGNOSIS — F43.10 PTSD (POST-TRAUMATIC STRESS DISORDER): ICD-10-CM

## 2024-06-17 DIAGNOSIS — F41.9 ANXIETY: ICD-10-CM

## 2024-06-17 DIAGNOSIS — E11.649 TYPE 2 DIABETES MELLITUS WITH HYPOGLYCEMIA WITHOUT COMA, WITH LONG-TERM CURRENT USE OF INSULIN (HCC): ICD-10-CM

## 2024-06-17 DIAGNOSIS — E11.649 TYPE 2 DIABETES MELLITUS WITH HYPOGLYCEMIA WITHOUT COMA, WITH LONG-TERM CURRENT USE OF INSULIN (HCC): Primary | ICD-10-CM

## 2024-06-17 DIAGNOSIS — Z79.4 TYPE 2 DIABETES MELLITUS WITH HYPOGLYCEMIA WITHOUT COMA, WITH LONG-TERM CURRENT USE OF INSULIN (HCC): ICD-10-CM

## 2024-06-17 DIAGNOSIS — Z79.4 TYPE 2 DIABETES MELLITUS WITH HYPOGLYCEMIA WITHOUT COMA, WITH LONG-TERM CURRENT USE OF INSULIN (HCC): Primary | ICD-10-CM

## 2024-06-17 RX ORDER — DULAGLUTIDE 1.5 MG/.5ML
INJECTION, SOLUTION SUBCUTANEOUS
Qty: 2 ML | Refills: 0 | OUTPATIENT
Start: 2024-06-17

## 2024-06-17 RX ORDER — ZOLPIDEM TARTRATE 10 MG/1
10 TABLET ORAL
Qty: 30 TABLET | Refills: 0 | Status: SHIPPED | OUTPATIENT
Start: 2024-06-17 | End: 2024-09-25

## 2024-06-17 RX ORDER — LANCETS 33 GAUGE
EACH MISCELLANEOUS 2 TIMES DAILY
Qty: 100 EACH | Refills: 5 | Status: SHIPPED | OUTPATIENT
Start: 2024-06-17

## 2024-06-17 RX ORDER — ALPRAZOLAM 1 MG/1
1 TABLET ORAL 4 TIMES DAILY PRN
Qty: 60 TABLET | Refills: 0 | Status: SHIPPED | OUTPATIENT
Start: 2024-06-17

## 2024-06-19 ENCOUNTER — OFFICE VISIT (OUTPATIENT)
Age: 54
End: 2024-06-19
Payer: COMMERCIAL

## 2024-06-19 VITALS
OXYGEN SATURATION: 96 % | HEART RATE: 115 BPM | BODY MASS INDEX: 22.33 KG/M2 | DIASTOLIC BLOOD PRESSURE: 100 MMHG | HEIGHT: 65 IN | SYSTOLIC BLOOD PRESSURE: 150 MMHG | WEIGHT: 134 LBS

## 2024-06-19 DIAGNOSIS — R25.1 TREMOR: ICD-10-CM

## 2024-06-19 DIAGNOSIS — H93.90 EAR LESION: Primary | ICD-10-CM

## 2024-06-19 DIAGNOSIS — M79.675 TOE PAIN, LEFT: ICD-10-CM

## 2024-06-19 PROCEDURE — 99214 OFFICE O/P EST MOD 30 MIN: CPT

## 2024-06-19 NOTE — PROGRESS NOTES
INTERNAL MEDICINE FOLLOW-UP VISIT  [facility] INTERNAL MEDICINE [address]    NAME: Dahlia Kraus  AGE: 54 y.o. SEX: female  : 1970     DATE: 2024     Assessment and Plan:   1. Ear lesion  The ear lesion was biopsied by dermatology and it came back benign.  Discussed following up with dermatology for any additional recommendations.    2. Toe pain, left  Continue with warm water Epsom salt soaks 20 minutes twice a day and applying topical mupirocin.  If redness expands or you start to experience any fevers or chills, notify the office.    3. Tremor  Continue to monitor for any tremors.  Could be related to cigarette use, hypoglycemia, elevated blood pressure.  Will place referral to neurology to rule out Parkinson's as she does have family history.        No follow-ups on file.       Chief Complaint:     Chief Complaint   Patient presents with    Follow-up     Hands/legs shaking       History of Present Illness:   Patient is a 54 old female that presents today for multiple complaints.  She notes last week she had some tremors in her arms and legs resting and with movement.  She notes they have since resolved.  She has been struggling with ambulation over the past few months.  She has been falling more frequently.  She would like a motorized scooter, bed rails, and commode.  She is nervous as her brother was just diagnosed with Parkinson's.      She also notes bilateral ear boils that have been going on for years.  A boil appears, then purulent drainage comes, then it resolves.  She notes it is starting to make her ear look deformed.    She also notes about a week ago she had her left fifth digit on something and the nail broke off.  It has been red and tender to touch.  She has been doing Epsom salt baths 20 minutes twice a day and applying Neosporin which has helped.      Brother got diagnosed with parkinsons.  Tremors.    Ear sores for years. MOTORIZED  SCOOTER.    L foot 5th toenail    The following portions of the patient's history were reviewed and updated as appropriate: allergies, current medications, past family history, past medical history, past social history, past surgical history and problem list.     Review of Systems:     Review of Systems   Constitutional:  Negative for chills and fever.   Skin:  Positive for wound.   Neurological:  Positive for tremors.        Past Medical History:     Past Medical History:   Diagnosis Date    Abnormal ECG     Abnormal Pap smear of cervix     Allergic     Anemia     Anxiety     Asthma     Atrial fibrillation (HCC)     Chlamydia     Chronic kidney disease     Coronary artery disease     Depression     Diabetes mellitus (HCC)     Fibromyalgia     GERD (gastroesophageal reflux disease)     Headache(784.0)     Heart disease     Heart murmur     History of transfusion     Hypertension     Inflammatory bowel disease     Migraines     Myocardial infarction (HCC)     Neuromuscular disorder (HCC)     Opioid abuse, in remission (HCC)     Otitis media     Pneumonia     Scoliosis     Seizures (HCC)     Shingles     Sick sinus syndrome (HCC)     Stroke (HCC)     Urinary tract infection     Varicella     Visual impairment         Current Medications:     Current Outpatient Medications:     albuterol (PROVENTIL HFA,VENTOLIN HFA) 90 mcg/act inhaler, Inhale 2 puffs every 4 (four) hours as needed, Disp: , Rfl:     Alcohol Swabs (Pharmacist Choice Alcohol) PADS, 5 (five) times a day, Disp: , Rfl:     ALPRAZolam (XANAX) 1 mg tablet, Take 1 tablet (1 mg total) by mouth 4 (four) times a day as needed for anxiety, Disp: 60 tablet, Rfl: 0    ammonium lactate (LAC-HYDRIN) 12 % lotion, APPLY TOPICALLY 2 (TWO) TIMES A DAY AS NEEDED FOR DRY SKIN, Disp: 227 g, Rfl: 3    BD Pen Needle Nkechi 2nd Gen 32G X 4 MM MISC, USE AS DIRECTED FOR BEFORE A MEAL AND AT BEDTIME GLUCOSE INJECTION, Disp: , Rfl:     BD Pen Needle Nkechi 2nd Gen 32G X 4 MM MISC, USE  "WITH INSULIN 5 TIMES A DAY, Disp: 200 each, Rfl: 3    BD PrecisionGlide Needle 23G X 1-1/2\" MISC, USE AS DIRECTED TO ADMINISTER NALOXONE INJECTION.  MAY DISPENSE 23-25 GAUGE 1-1.5\" NEEDLE., Disp: , Rfl:     Blood Glucose Monitoring Suppl (ONE TOUCH ULTRA 2) w/Device KIT, Use as directed, Disp: , Rfl:     busPIRone (BUSPAR) 30 MG tablet, TAKE 1 TABLET (30 MG TOTAL) BY MOUTH 2 (TWO) TIMES A DAY, Disp: 180 tablet, Rfl: 0    Continuous Blood Gluc  (Dexcom G6 ) ALEKS, USE 1 DEVICE 4 (FOUR) TIMES A DAY (WITH MEALS AND AT BEDTIME), Disp: 1 each, Rfl: 6    Continuous Blood Gluc Transmit (Dexcom G6 Transmitter) MISC, USE 4 TIMES A DAY (WITH MEALS AND AT BEDTIME), Disp: 1 each, Rfl: 3    Continuous Glucose Sensor (Dexcom G6 Sensor) MISC, CHANGE SENSOR EVERY 10 DAYS, Disp: 3 each, Rfl: 6    famotidine (PEPCID) 20 mg tablet, TAKE 1 TABLET (20 MG TOTAL) BY MOUTH 2 (TWO) TIMES A DAY AS NEEDED FOR INDIGESTION, Disp: 90 tablet, Rfl: 3    fexofenadine (ALLEGRA) 180 MG tablet, TAKE 1 TABLET (180 MG TOTAL) BY MOUTH DAILY, Disp: 90 tablet, Rfl: 3    fluticasone (Flovent Diskus) 250 mcg/actuation diskus inhaler, Inhale 1-2 puffs 2 (two) times a day Rinse mouth after use., Disp: 60 each, Rfl: 1    gabapentin (NEURONTIN) 100 mg capsule, TAKE 1 CAPSULE (100 MG TOTAL) BY MOUTH DAILY AT BEDTIME TAKE ADDITIONAL 100 MG CAPSULE TO TOTAL 900 MG AT BEDTIME, Disp: 90 capsule, Rfl: 3    gabapentin (NEURONTIN) 400 mg capsule, TAKE 2 CAPSULES BY MOUTH 3 TIMES A DAY, Disp: 90 capsule, Rfl: 3    hydrALAZINE (APRESOLINE) 50 mg tablet, Take 50 mg by mouth 3 (three) times a day, Disp: , Rfl:     Incontinence Supply Disposable (Depend Undergarment Ex Absorb) MISC, Use 4 (four) times a day as needed (incontinence), Disp: 120 each, Rfl: 10    insulin lispro (HumaLOG) 100 units/mL injection pen, INJECT 20 UNITS UNDER THE SKIN 3 (THREE) TIMES A DAY BEFORE MEALS INDICATIONS: TYPE 2 DIABETES MELLITUS., Disp: 15 mL, Rfl: 3    Lantus SoloStar 100 " units/mL SOPN, INJECT 55 UNITS UNDER THE SKIN 2 (TWO) TIMES A DAY AT LUNCH AND AT BEDTIME., Disp: 45 mL, Rfl: 3    leflunomide (ARAVA) 20 MG tablet, Take 1 tablet (20 mg total) by mouth daily, Disp: 90 tablet, Rfl: 2    lisinopril (ZESTRIL) 10 mg tablet, Take 1 tablet (10 mg total) by mouth daily, Disp: 90 tablet, Rfl: 2    lovastatin (MEVACOR) 10 MG tablet, Take 10 mg by mouth, Disp: , Rfl:     metFORMIN (GLUCOPHAGE-XR) 750 mg 24 hr tablet, TAKE 1 TABLET (750 MG TOTAL) BY MOUTH 2 (TWO) TIMES A DAY WITH MEALS., Disp: , Rfl:     metoprolol succinate (TOPROL-XL) 100 mg 24 hr tablet, Take 100 mg by mouth daily, Disp: , Rfl:     Mirabegron ER (Myrbetriq) 25 MG TB24, TAKE 25 MG BY MOUTH IN THE MORNING, Disp: 90 tablet, Rfl: 3    montelukast (SINGULAIR) 10 mg tablet, TAKE 1 TABLET (10 MG TOTAL) BY MOUTH DAILY AT BEDTIME, Disp: 90 tablet, Rfl: 3    naloxone (NARCAN) 0.4 mg/mL injection, , Disp: , Rfl:     Nutritional Supplements (Glucerna Hunger Smart Shake) LIQD, Take 1 Can by mouth 3 (three) times a day, Disp: 296 mL, Rfl: 5    ondansetron (ZOFRAN-ODT) 4 mg disintegrating tablet, TAKE 1 TABLET (4 MG TOTAL) BY MOUTH EVERY 6 (SIX) HOURS AS NEEDED FOR NAUSEA, Disp: 10 tablet, Rfl: 0    OneTouch Delica Lancets 33G MISC, Inject under the skin 2 (two) times a day, Disp: 100 each, Rfl: 5    OneTouch Ultra test strip, TEST TWICE DAILY OR AS DIRECTED BY MD, Disp: , Rfl:     Oral Electrolytes (PEDIALYTE SINGLES PO), Take 8 oz by mouth 2 (two) times a day, Disp: , Rfl:     Oral Electrolytes (Pedialyte) PACK, Take 237 mL by mouth 2 (two) times a day, Disp: , Rfl:     oxyCODONE (ROXICODONE) 5 immediate release tablet, 10 mg pt reports she takes this 4 times per day but is out of the medication currently, Disp: , Rfl:     pantoprazole (PROTONIX) 40 mg tablet, Take 1 tablet (40 mg total) by mouth 2 (two) times a day, Disp: 90 tablet, Rfl: 3    pantoprazole (PROTONIX) 40 mg tablet, Take 1 tablet (40 mg total) by mouth 2 (two) times a  day, Disp: 180 tablet, Rfl: 3    potassium chloride (K-DUR,KLOR-CON) 20 mEq tablet, Take 1 tablet (20 mEq total) by mouth daily, Disp: 30 tablet, Rfl: 1    potassium chloride (MICRO-K) 10 MEQ CR capsule, TAKE 2 CAPSULES (20 MEQ TOTAL) BY MOUTH 2 (TWO) TIMES A DAY, Disp: 90 capsule, Rfl: 3    prazosin (MINIPRESS) 5 mg capsule, TAKE 1 CAPSULE (5 MG TOTAL) BY MOUTH DAILY AT BEDTIME, Disp: 90 capsule, Rfl: 3    risperiDONE (RisperDAL) 3 mg tablet, TAKE 1 TABLET (3 MG TOTAL) BY MOUTH 2 (TWO) TIMES A DAY, Disp: 180 tablet, Rfl: 0    rivaroxaban (XARELTO) 20 mg tablet, Take 20 mg by mouth, Disp: , Rfl:     sertraline (ZOLOFT) 100 mg tablet, Take 1 tablet (100 mg total) by mouth daily, Disp: 90 tablet, Rfl: 3    Spacer/Aero-Holding Chambers (AeroChamber Plus Geoff-Vu w/Mask) MISC, Inhale daily at bedtime as needed (wheezing), Disp: 1 each, Rfl: 0    trospium chloride (SANCTURA) 20 mg tablet, Take 1 tablet (20 mg total) by mouth 2 (two) times a day, Disp: 180 tablet, Rfl: 3    Trulicity 1.5 MG/0.5ML injection, INJECT 1.5 MG UNDER THE SKIN EVERY 7 DAYS., Disp: 2 mL, Rfl: 3    Varenicline Tartrate, Starter, 0.5 MG X 11 & 1 MG X 42 TBPK, 0.5 MG ONCE DAILY FOR 3 DAYS THEN 0.5MG TWICE DAILY FOR DAYS 4-7 THEN 1 MG TWICE DAILY, Disp: 53 each, Rfl: 0    zolpidem (AMBIEN) 10 mg tablet, Take 1 tablet (10 mg total) by mouth daily at bedtime, Disp: 30 tablet, Rfl: 0    atorvastatin (LIPITOR) 10 mg tablet, Take 10 mg by mouth daily, Disp: , Rfl:     EPINEPHrine (EPIPEN) 0.3 mg/0.3 mL SOAJ, Inject 0.3 mL (0.3 mg total) into a muscle once for 1 dose, Disp: 0.6 mL, Rfl: 0    polyethylene glycol (GOLYTELY) 4000 mL solution, Take 4,000 mL by mouth once for 1 dose, Disp: 4000 mL, Rfl: 0    polyethylene glycol (GOLYTELY) 4000 mL solution, Take 4,000 mL by mouth once for 1 dose, Disp: 4000 mL, Rfl: 0     Allergies:     Allergies   Allergen Reactions    Other Anaphylaxis     Capzasin HP -- severe burning and swelling of the skin     Clarithromycin  "GI Intolerance    Acetaminophen GI Intolerance    Bactrim [Sulfamethoxazole-Trimethoprim] Itching, GI Intolerance, Dizziness, Confusion and Lightheadedness     Patient passes out when on this medication for several days     Cephalosporins Hives     Tolerated Cefdinir 2/2023    Latex Hives    Oxycodone-Acetaminophen GI Intolerance    Penicillins Diarrhea    Trazodone Diarrhea    Capsaicin Rash    Naproxen Palpitations        Physical Exam:     /100   Pulse (!) 115   Ht 5' 5\" (1.651 m)   Wt 60.8 kg (134 lb)   SpO2 96%   BMI 22.30 kg/m²     Physical Exam  Vitals and nursing note reviewed.   Constitutional:       General: She is awake. She is not in acute distress.     Appearance: Normal appearance. She is well-developed, well-groomed and normal weight.   HENT:      Head: Normocephalic and atraumatic.      Right Ear: Hearing and external ear normal.      Left Ear: Hearing and external ear normal.      Ears:      Comments: Scabbing on right ear.     Nose: Nose normal.      Mouth/Throat:      Lips: Pink.      Mouth: Mucous membranes are moist.   Eyes:      General: Lids are normal. Vision grossly intact. Gaze aligned appropriately.      Conjunctiva/sclera: Conjunctivae normal.   Neck:      Vascular: No carotid bruit.      Trachea: Trachea and phonation normal.   Pulmonary:      Effort: Pulmonary effort is normal. No respiratory distress.   Abdominal:      General: Abdomen is flat.   Musculoskeletal:         General: No swelling.      Cervical back: Neck supple.   Feet:      Left foot:      Skin integrity: Skin breakdown and erythema present. No warmth.   Skin:     General: Skin is warm.      Capillary Refill: Capillary refill takes less than 2 seconds.   Neurological:      Mental Status: She is alert.   Psychiatric:         Attention and Perception: Attention and perception normal.         Mood and Affect: Mood and affect normal.         Speech: Speech normal.         Behavior: Behavior normal. Behavior is " cooperative.         Thought Content: Thought content normal.         Cognition and Memory: Cognition and memory normal.         Judgment: Judgment normal.           Data:     Laboratory Results: I have personally reviewed the pertinent laboratory results/reports   Radiology/Other Diagnostic Testing Results: I have personally reviewed pertinent reports.      Rekha Bryan PA-C  [facility] INTERNAL MEDICINE [address]

## 2024-06-20 ENCOUNTER — TELEPHONE (OUTPATIENT)
Age: 54
End: 2024-06-20

## 2024-06-20 LAB
DME PARACHUTE DELIVERY DATE REQUESTED: NORMAL
DME PARACHUTE DELIVERY DATE REQUESTED: NORMAL
DME PARACHUTE ITEM DESCRIPTION: NORMAL
DME PARACHUTE ITEM DESCRIPTION: NORMAL
DME PARACHUTE ORDER STATUS: NORMAL
DME PARACHUTE ORDER STATUS: NORMAL
DME PARACHUTE SUPPLIER NAME: NORMAL
DME PARACHUTE SUPPLIER NAME: NORMAL
DME PARACHUTE SUPPLIER PHONE: NORMAL
DME PARACHUTE SUPPLIER PHONE: NORMAL

## 2024-06-20 NOTE — TELEPHONE ENCOUNTER
Nadeen from [facility] called in to inform Rekha Bryan that they do not carry full length bed rail.     Nadeen suggested trying [employer] [facility].    Please advise.

## 2024-06-24 LAB
DME PARACHUTE DELIVERY DATE REQUESTED: NORMAL
DME PARACHUTE ITEM DESCRIPTION: NORMAL
DME PARACHUTE ORDER STATUS: NORMAL
DME PARACHUTE SUPPLIER NAME: NORMAL
DME PARACHUTE SUPPLIER PHONE: NORMAL

## 2024-06-28 ENCOUNTER — TELEPHONE (OUTPATIENT)
Age: 54
End: 2024-06-28

## 2024-06-28 NOTE — TELEPHONE ENCOUNTER
Patient called in stating the doctor ordered a bed rail and toilet chair with handles for her. Patient has not received anything or got a phone call regarding this. Please advise and call patient with an update when available.

## 2024-07-03 DIAGNOSIS — F43.10 PTSD (POST-TRAUMATIC STRESS DISORDER): ICD-10-CM

## 2024-07-04 RX ORDER — ZOLPIDEM TARTRATE 10 MG/1
10 TABLET ORAL DAILY
Qty: 30 TABLET | Refills: 0 | Status: SHIPPED | OUTPATIENT
Start: 2024-07-04

## 2024-07-09 ENCOUNTER — TELEPHONE (OUTPATIENT)
Age: 54
End: 2024-07-09

## 2024-07-09 NOTE — TELEPHONE ENCOUNTER
Alejandra from  and [facility]. She states that they faxed over a script for incontinence supplies for the patient. I informed her I do not see in her chart. She states she will re-fax.

## 2024-07-23 DIAGNOSIS — F41.9 ANXIETY: ICD-10-CM

## 2024-07-24 RX ORDER — ALPRAZOLAM 1 MG/1
1 TABLET ORAL 4 TIMES DAILY PRN
Qty: 60 TABLET | Refills: 0 | Status: SHIPPED | OUTPATIENT
Start: 2024-07-24

## 2024-08-07 ENCOUNTER — TELEPHONE (OUTPATIENT)
Dept: ADMINISTRATIVE | Facility: OTHER | Age: 54
End: 2024-08-07

## 2024-08-07 NOTE — TELEPHONE ENCOUNTER
----- Message from Claudette WILD sent at 8/6/2024  4:29 PM EDT -----  Regarding: [facility] [place] DIABETIC EYE EXAM  08/06/24 4:29 PM    Hello, our patient Dahlia Kraus has had Diabetic Eye Exam completed/performed. Please assist in updating the patient chart by pulling the document from the Media Tab. The date of service is 9/25/23 scanned in on 8/5/24.     Thank you,  Claudette Dwyer  [facility] INTERNAL MED [address]

## 2024-08-07 NOTE — LETTER
Diabetic Eye Exam Form    Date Requested: 24  Patient: Dahlia Kraus  Patient : 1970   Referring Provider: Ravi Chowdhury MD      DIABETIC Eye Exam Date _______________________________      Type of Exam MUST be documented for Diabetic Eye Exams. Please CHECK ONE.     Retinal Exam       Dilated Retinal Exam       OCT       Optomap-Iris Exam      Fundus Photography       Left Eye - Please check Retinopathy or No Retinopathy        Exam did show retinopathy    Exam did not show retinopathy       Right Eye - Please check Retinopathy or No Retinopathy       Exam did show retinopathy    Exam did not show retinopathy       Comments __________________________________________________________    Practice Providing Exam ______________________________________________    Exam Performed By (print name) _______________________________________      Provider Signature ___________________________________________________      These reports are needed for  compliance.  Please fax this completed form and a copy of the Diabetic Eye Exam report to our office located at [address] [place] [address] as soon as possible via Fax [phone] rachel Brantley: Phone [phone]  We thank you for your assistance in treating our mutual patient.

## 2024-08-08 ENCOUNTER — TELEPHONE (OUTPATIENT)
Dept: ADMINISTRATIVE | Facility: OTHER | Age: 54
End: 2024-08-08

## 2024-08-08 DIAGNOSIS — Z79.4 TYPE 2 DIABETES MELLITUS WITH HYPOGLYCEMIA WITHOUT COMA, WITH LONG-TERM CURRENT USE OF INSULIN (HCC): ICD-10-CM

## 2024-08-08 DIAGNOSIS — F43.10 PTSD (POST-TRAUMATIC STRESS DISORDER): ICD-10-CM

## 2024-08-08 DIAGNOSIS — K21.9 GASTROESOPHAGEAL REFLUX DISEASE WITHOUT ESOPHAGITIS: ICD-10-CM

## 2024-08-08 DIAGNOSIS — E11.649 TYPE 2 DIABETES MELLITUS WITH HYPOGLYCEMIA WITHOUT COMA, WITH LONG-TERM CURRENT USE OF INSULIN (HCC): ICD-10-CM

## 2024-08-08 DIAGNOSIS — T78.40XS ALLERGY, SEQUELA: ICD-10-CM

## 2024-08-08 RX ORDER — ZOLPIDEM TARTRATE 10 MG/1
10 TABLET ORAL DAILY
Qty: 30 TABLET | Refills: 0 | Status: SHIPPED | OUTPATIENT
Start: 2024-08-08

## 2024-08-08 RX ORDER — GABAPENTIN 400 MG/1
CAPSULE ORAL
Qty: 100 CAPSULE | Refills: 1 | Status: SHIPPED | OUTPATIENT
Start: 2024-08-08

## 2024-08-08 RX ORDER — BUSPIRONE HYDROCHLORIDE 30 MG/1
30 TABLET ORAL 2 TIMES DAILY
Qty: 200 TABLET | Refills: 1 | Status: SHIPPED | OUTPATIENT
Start: 2024-08-08

## 2024-08-08 RX ORDER — FAMOTIDINE 20 MG/1
20 TABLET, FILM COATED ORAL 2 TIMES DAILY PRN
Qty: 100 TABLET | Refills: 1 | Status: SHIPPED | OUTPATIENT
Start: 2024-08-08

## 2024-08-08 RX ORDER — MONTELUKAST SODIUM 10 MG/1
10 TABLET ORAL
Qty: 100 TABLET | Refills: 1 | Status: SHIPPED | OUTPATIENT
Start: 2024-08-08

## 2024-08-08 NOTE — TELEPHONE ENCOUNTER
----- Message from Claudette WILD sent at 8/7/2024 11:50 AM EDT -----  Regarding: [address] DIABETIC EYE EXAM  08/07/24 11:51 AM    Hello, our patient Dahlia Kraus has had Diabetic Eye Exam completed/performed. Please assist in updating the patient chart by pulling the document from the Media Tab. The date of service is 09/25/2023 scanned in on 8/6/2024 by [facility].     Thank you,  Claudette Dwyer  [facility] INTERNAL MED [address]

## 2024-08-08 NOTE — TELEPHONE ENCOUNTER
Medication: busPIRone (BUSPAR) 30 MG tablet     Dose/Frequency: TAKE 1 TABLET (30 MG TOTAL) BY MOUTH 2 (TWO) TIMES A DAY     Quantity: TAKE 1 TABLET (30 MG TOTAL) BY MOUTH 2 (TWO) TIMES A DAY     Pharmacy: [facility] [address]     Office:   [x] PCP/Provider -   [] Speciality/Provider -     Does the patient have enough for 3 days?   [] Yes   [x] No - Send as HP to POD      Medication: famotidine (PEPCID) 20 mg tablet     Dose/Frequency: TAKE 1 TABLET (20 MG TOTAL) BY MOUTH 2 (TWO) TIMES A DAY AS NEEDED FOR INDIGESTION     Quantity: 90 tablet     Pharmacy: [address]     Office:   [x] PCP/Provider -   [] Speciality/Provider -     Does the patient have enough for 3 days?   [] Yes   [x] No - Send as HP to POD      Medication: zolpidem (AMBIEN) 10 mg tablet     Dose/Frequency: TAKE 1 TABLET (10 MG TOTAL) BY MOUTH DAILY     Quantity: 30 tablet     Pharmacy: [facility] [address]     Office:   [x] PCP/Provider -   [] Speciality/Provider -     Does the patient have enough for 3 days?   [] Yes   [x] No - Send as HP to POD      Medication: gabapentin (NEURONTIN) 400 mg capsule     Dose/Frequency: TAKE 2 CAPSULES BY MOUTH 3 TIMES A DAY     Quantity: 90 capsule     Pharmacy: [facility] [address]     Office:   [x] PCP/Provider -   [] Speciality/Provider -     Does the patient have enough for 3 days?   [] Yes   [x] No - Send as HP to POD      Medication: montelukast (SINGULAIR) 10 mg tablet     Dose/Frequency: TAKE 1 TABLET (10 MG TOTAL) BY MOUTH DAILY AT BEDTIME    Quantity: 90 tablet      Pharmacy: [facility] [address]     Office:   [x] PCP/Provider -   [] Speciality/Provider -     Does the patient have enough for 3 days?   [] Yes   [x] No - Send as HP to POD

## 2024-08-09 NOTE — TELEPHONE ENCOUNTER
Upon review of the In Basket request and the patient's chart, initial outreach has been made via fax to facility. Please see Contacts section for details.     Thank you  Karon Rodgers MA

## 2024-08-09 NOTE — TELEPHONE ENCOUNTER
Upon review of the In Basket request we have found this is a duplicate request and no further action is needed. This request is currently being processed and documentation is being completed in the initial request. This message will now be closed.      Any additional questions or concerns should be emailed to the Practice Liaisons via the appropriate education email address, please do not reply via In Basket.    Thank you  Karon Rodgers MA   [facility]

## 2024-08-12 NOTE — TELEPHONE ENCOUNTER
Upon review of the In Basket request we were able to locate, review, and update the patient chart as requested for Diabetic Eye Exam.    Any additional questions or concerns should be emailed to the Practice Liaisons via the appropriate education email address, please do not reply via In Basket.    Thank you  Karon Rodgers MA   [facility]

## 2024-08-15 ENCOUNTER — HOSPITAL ENCOUNTER (OUTPATIENT)
Dept: MRI IMAGING | Facility: HOSPITAL | Age: 54
End: 2024-08-15
Payer: COMMERCIAL

## 2024-08-15 DIAGNOSIS — R29.6 REPEATED FALLS: ICD-10-CM

## 2024-08-15 PROCEDURE — 72141 MRI NECK SPINE W/O DYE: CPT

## 2024-08-20 PROBLEM — U07.1 COVID-19: Status: RESOLVED | Noted: 2020-12-06 | Resolved: 2024-08-20

## 2024-08-20 PROBLEM — I51.9 HEART DISEASE: Status: RESOLVED | Noted: 2021-04-27 | Resolved: 2024-08-20

## 2024-08-20 PROBLEM — J41.8 MIXED SIMPLE AND MUCOPURULENT CHRONIC BRONCHITIS (HCC): Status: RESOLVED | Noted: 2019-01-08 | Resolved: 2024-08-20

## 2024-08-27 ENCOUNTER — TELEPHONE (OUTPATIENT)
Age: 54
End: 2024-08-27

## 2024-08-27 NOTE — TELEPHONE ENCOUNTER
Pt called, said she needs an updated letter for [employer] so they wont shut off her power. She is going to call them to get the information and give us a call back shortly.

## 2024-08-29 ENCOUNTER — CONSULT (OUTPATIENT)
Dept: NEUROLOGY | Facility: CLINIC | Age: 54
End: 2024-08-29

## 2024-08-29 VITALS
SYSTOLIC BLOOD PRESSURE: 120 MMHG | HEART RATE: 92 BPM | HEIGHT: 65 IN | DIASTOLIC BLOOD PRESSURE: 86 MMHG | OXYGEN SATURATION: 96 % | WEIGHT: 144.2 LBS | BODY MASS INDEX: 24.03 KG/M2

## 2024-08-29 DIAGNOSIS — G62.9 PERIPHERAL NEUROPATHY: ICD-10-CM

## 2024-08-29 DIAGNOSIS — R56.9 SEIZURE (HCC): ICD-10-CM

## 2024-08-29 DIAGNOSIS — R25.1 TREMOR: Primary | ICD-10-CM

## 2024-08-29 RX ORDER — CARBIDOPA AND LEVODOPA 25; 100 MG/1; MG/1
1 TABLET ORAL 3 TIMES DAILY
Qty: 90 TABLET | Refills: 3 | Status: SHIPPED | OUTPATIENT
Start: 2024-08-29

## 2024-08-29 NOTE — PROGRESS NOTES
Gait slightly wide based, stride length slightly decreased. Not shuffling. Takes 3 steps to turn. Arm swing relatively symmetric, maybe slightly decreased on right. Mild bradykinesia. Mildly decreased vibration. Normal proprioception. Tone normal at baseline with slight increase on rigth with contralateral activation. No tremor. She describes intermittent rest tremor that is moderate/high amplitude with supination/pronation character, last occurred yesterday and noted by family.

## 2024-08-29 NOTE — PROGRESS NOTES
"Neurology Residency Continuity Clinic Note    Patient ID: Dahlia Kraus 54 y.o. female  Format of Visit: In-Person  Nature of Visit: Consultation  Referral Reason: Tremor  Referring Provider: Rekha Bryan PA-C  Primary Care Provider: Rekha Bryan PA-C    Assessment/Plan:  1. Tremor  Assessment & Plan:  Assessment:  Patient is a 54-year-old female with extensive past medical history including allergies, hypercholesterolemia, insomnia, chronic opioid use, urinary incontinence, hemochromatosis, GERD, coronary artery disease, chronic pain, chronic fatigue, asthma, hypertension, obstructive sleep apnea, sick sinus syndrome, paroxysmal atrial fibrillation, migraine, IBS, bipolar, anxiety, PTSD, diabetes, intermittent explosive disorder amongst others the presented to the residency neurology clinic for evaluation of tremor.  During evaluation the patient also noted to have a history of peripheral neuropathy, seizures, occipital neuralgia for which close follow-up will be scheduled.  In terms of tremor, patient reported tremor to have begun few months ago, involves bilateral hands with left hand being worse.  Tremor occurs \"when I walk\" patient requires extra time to get up from seated position, onset of symptoms gradual around 2 months ago, tremor is of intermittent severity, exacerbated by activity, tremor affects handwriting, eating from spoon, drink from cup, is alleviated by sleep and resting extremity, symptoms occur intermittently, patient admitted to symptoms of change in voice, head titubation, sleep disturbance, drooling asleep, postural changes, difficulty initiating movement, change in handwriting, difficulty walking, and balance problems, patient had brother that was diagnosed with Parkinson disease 2 months ago there is currently on the medication and reported to be unable to drive.  Patient describes symptoms of orthostasis, bloating, early satiety, diarrhea, dry eyes, blurred " vision.    Patient's physical examination demonstrated flattened affect, decreased blinking, bradykinesia in bilateral upper and lower extremities, cogwheel rigidity in bilateral upper extremities, tremor was not noted in bilateral upper and lower extremities, head to the patient was noted, decreased arm swing was noted from the left, en bloc turning was noted, shuffling gait was noted, interruption of smooth tapping of fingers in bilateral upper extremities noted.    Plan:  - Case discussed with attending Neurologist Dr. Wilson  - Please obtain an MRI of the brain with and without contrast  - Please start a medication called carbidopa-levodopa 0.5 tablet by mouth every 8 hours, then after 3 days you can start taking 1 tablet by mouth every 8 hours  - Please see referral for PT to help with walking/balance  - Please follow up with PCP and other medical providers  - Please follow up with neurology in 2 months  - Please call for questions or concerns  Orders:  -     Ambulatory Referral to Neurology  -     [place]; Future  -     Vitamin B12/Folate, Serum Panel; Future  -     TSH, 3rd generation with Free T4 reflex; Future  -     carbidopa-levodopa (Sinemet)  mg per tablet; Take 1 tablet by mouth 3 (three) times a day  -     MRI brain with and without contrast; Future; Expected date: 08/29/2024  -     Ambulatory Referral to Physical Therapy; Future  2. Peripheral neuropathy  Assessment & Plan:  Patient reported known history of peripheral neuropathy, had an EMG completed previously which demonstrated electrodiagnostic evidence of a sensorimotor polyneuropathy with axonal features and a secondary demyelinating response. There is no electrodiagnostic evidence of a lumbar radiculopathy or plexopathy.    Plan:  -Vitamin B12, vitamin B 9, TSH, EMMA, Lyme disease, Sjogren's antibodies, hemoglobin A1c, vitamin B6, and BMP ordered.  - Follow up in 2 months.  Orders:  -     [place]; Future  -     Vitamin  "B12/Folate, Serum Panel; Future  -     TSH, 3rd generation with Free T4 reflex; Future  -     EMMA Screen w/ Reflex to Titer/Pattern; Future  -     Lyme Total AB W Reflex to IGM/IGG; Future  -     Sjogren's Antibodies; Future  -     Hemoglobin A1C; Future  -     Basic metabolic panel; Future  -     Vitamin B6; Future  -     Ceruloplasmin; Future  -     Ambulatory Referral to Physical Therapy; Future  3. Seizure (HCC)  Assessment & Plan:  Patient will be scheduling close follow-up for further discussion in regards to seizures. Patient did report to attending physician that she has \"shaking episodes\" that would last \"a few seconds\", and reported that \"I was told that our shake\".    Plan:  - Please obtain an EEG to help evaluation of seizure  - Seizure safety and precaution education provided  - Patient does not currently drive  - Call office for any seizure-like activity and proceed to Emergency room or call 911  - Follow up with neurology in 2 months.  Orders:  -     MRI brain with and without contrast; Future; Expected date: 08/29/2024  -     EEG Routine and awake; Future    Subjective:  Dahlia Kraus is an/a 54 y.o.female  that presented to the residency neurology clinic for evaluation of tremor.  The patient was accompanied by no-one at bedside.     During office visit today, 8/29/2024, the patient reported:  Tremor  - She complains of tremor.   - Tremor began about 3 months ago.   - Her tremor primarily involves the bilateral hand and the left hand is worse and occurs \"When I walk, it takes me a while to get up\" .    - Onset of symptoms was gradual, starting about 2 months ago.   - Tremor is currently of intermittent severity, exacerbated by activity (intention).   - This tremor affects her handwriting, eating from a spoon, and drinking from a cup.   - Tremor is alleviated by sleep and resting extremity.   - Symptoms occur intermittently.  - she also describes symptoms of voice change, sleep disturbance, " "drooling during sleep (wet pillows), postural changes, difficulty in initiating movement, change in handwriting, difficulty with walking, and balance problems.  - Prior evaluation has included None.  - Previous treatments include: None  - Patient reported that her brother was diagnosed with PD 2 months go, and he was started on a medication. Patient reported that her brother is unable to drive  - She also describes autonomic symptoms of orthostasis, bloating, early satiety, diarrhea, dry eyes, and blurred vision and she denies constipation, dry mouth, lack of sweating, and sexual dysfunction.     During the interview the patient also noted that she does have peripheral neuropathy, does have a history of seizure, does have a history of occipital neuralgia and patient will be scheduled for a early follow-up in order to address these issues, studies were ordered to facilitate workup.  Patient did report to attending physician that she has \"shaking episodes\" that would last \"a few seconds\", and reported that \"I was told that our shake\".  The patient only recently was able to reestablish care after a delay due to obtaining medical insurance.    The following portions of the patient's history were reviewed and updated as appropriate: allergies, current medications, past family history, past medical history, past social history, past surgical history, and problem list.    Objective:  Vitals:    08/29/24 1339   BP: 120/86   Pulse: 92   SpO2: 96%     Physical Exam  Vitals and nursing note reviewed.   HENT:      Head: Normocephalic.      Right Ear: Hearing normal.      Left Ear: Hearing normal.      Nose: Nose normal.      Mouth/Throat:      Mouth: Mucous membranes are moist.      Pharynx: Oropharynx is clear.   Eyes:      General: Lids are normal.         Right eye: No discharge.         Left eye: No discharge.      Extraocular Movements: Extraocular movements intact.      Pupils: Pupils are equal, round, and reactive to " light.   Cardiovascular:      Rate and Rhythm: Normal rate.   Pulmonary:      Effort: No respiratory distress.   Abdominal:      General: There is no distension.   Musculoskeletal:      Right lower leg: No edema.      Left lower leg: No edema.   Skin:     General: Skin is warm and dry.      Coloration: Skin is not jaundiced.   Neurological:      Mental Status: She is alert.      Motor: Motor strength is normal.     Deep Tendon Reflexes:      Reflex Scores:       Tricep reflexes are 2+ on the right side and 2+ on the left side.       Bicep reflexes are 2+ on the right side and 2+ on the left side.       Brachioradialis reflexes are 2+ on the right side and 2+ on the left side.       Patellar reflexes are 2+ on the right side and 2+ on the left side.       Achilles reflexes are 1+ on the right side and 1+ on the left side.        Neurological Exam  Mental Status  Alert. Oriented to person, place and time. Speech: Soft speech.    Cranial Nerves  CN II: Right visual acuity: Counts fingers. Left visual acuity: Counts fingers. Right monocular defect. Left monocular defect. Right funduscopic exam: not visualized. Left funduscopic exam: not visualized.  CN III, IV, VI: Extraocular movements intact bilaterally. Normal lids and orbits bilaterally. Pupils equal round and reactive to light bilaterally.  CN V:  Right: Facial sensation is normal.  Left: Facial sensation is normal on the left.  CN VII:  Right: There is no facial weakness.  Left: There is no facial weakness.  CN VIII:  Right: Hearing is normal.  Left: Hearing is normal.  CN IX, X: Palate elevates symmetrically  CN XI:  Right: Sternocleidomastoid strength is normal. Trapezius strength is normal.  Left: Sternocleidomastoid strength is normal. Trapezius strength is normal.  CN XII: Tongue midline without atrophy or fasciculations.    Motor  Normal muscle bulk throughout. No fasciculations present. Increased muscle tone. The following abnormal movements were seen:  Strength is 5/5 throughout all four extremities.    - Patient demonstrated flattened affect, decreased blinking  - Bradykinesia noted in bilateral upper and lower extremities  - Cogwheel rigidity noticed in bilateral upper extremities  - Tremor was not noted in bilateral upper and lower extremities  - Decreased arm swing on left  - En bloc turning demonstrated  - Shuffling gait noted  - Demonstrated interruption of smooth tapping of fingers in bilateral upper extremities.    Sensory  Light touch abnormality: Temperature is normal in upper and lower extremities. Vibration abnormality: Proprioception is normal in upper and lower extremities.   - Patient does have known peripheral neuropathy, bilateral lower extremities demonstrated evidence of decreased vibratory sensation at the great toes, decreased temperature sensation in feet, and intact proprioception at great toes..    Reflexes                                            Right                      Left  Brachioradialis                    2+                         2+  Biceps                                 2+                         2+  Triceps                                2+                         2+  Patellar                                2+                         2+  Achilles                                1+                         1+    Right pathological reflexes: Martita's absent. Crossed adductor absent. Ankle clonus absent.  Left pathological reflexes: Martita's absent. Crossed adductor absent. Ankle clonus absent.    Coordination  Right: Finger-to-nose normal. Rapid alternating movement normal. Heel-to-shin normal.Left: Finger-to-nose normal. Rapid alternating movement normal. Heel-to-shin normal.    Gait  Casual gait: Narrow stance. Shuffling gait. Normal right arm swing. Reduced left arm swing. Tandem gait abnormality: Able to rise from chair without using arms.    Imaging Studies:  [facility]: 2017: No acute intracranial process  MRI Brain 204: No  intracranial hemorrhage mass effect or identifiable infarct. mild mucosal thickening ethmoid and left maxillary sinuses.   [facility] 2014: No definite intracranial hemorrhage.  No mass effect or identifiable infarct.   [facility] 2014: 4.4 x 5.6 hyperdensity identified in the posterior cranial fossa, concerning for a focus of intracranial/extra-axial hemorrhage, as described above.       Lab Studies:  A1c: 1/24: 15.7  TSH 9/23: 0.916  LDL 9/23: 52    ROS:  Review of Systems   Constitutional:  Positive for fatigue. Negative for chills, diaphoresis and fever.   HENT:  Positive for voice change. Negative for congestion, ear pain, nosebleeds, sinus pain, sore throat and trouble swallowing.    Eyes:  Negative for photophobia, pain and visual disturbance.   Respiratory:  Negative for cough, chest tightness and shortness of breath.    Cardiovascular:  Negative for chest pain and palpitations.   Gastrointestinal:  Positive for diarrhea. Negative for abdominal pain, constipation and vomiting.   Genitourinary:  Negative for dysuria and hematuria.   Musculoskeletal:  Negative for arthralgias, back pain, neck pain and neck stiffness.   Skin:  Negative for color change and rash.   Neurological:  Positive for tremors, numbness and headaches. Negative for dizziness, seizures, syncope, facial asymmetry, speech difficulty, weakness and light-headedness.   Psychiatric/Behavioral:  Negative for agitation, confusion, dysphoric mood, hallucinations and suicidal ideas. The patient is not nervous/anxious.    All other systems reviewed and are negative.    This note was completed in part utilizing Dragon Dictation Software. Grammatical errors, random word insertions, spelling mistakes, and incomplete sentences may be an occasional consequence of this system secondary to software limitations, ambient noise, and hardware issues.  If you have any questions or concerns about the content, text, or information contained within the body of this dictation,  please contact the provider for clarification.

## 2024-08-29 NOTE — PATIENT INSTRUCTIONS
- Please obtain an MRI of the brain  - Please start a medication called carbidopa-levodopa 0.5 tablet by mouth every 8 hours, then after 3 days you can start taking 1 tablet by mouth every 8 hours  - Please go to a [facility] to obtain blood work for further evaluation of tremor and peripheral neuropathy  - Please see referral for PT to help with walking/balance  - Please follow up with PCP and other medical providers  - Please obtain an EEG to help evaluation of seizure  - Please follow up with neurology in 2 months  - Please call for questions or concerns            FIRST AID FOR SEIZURES    What to Do If You Witness a Seizure:     Generalized convulsive (called a generalized tonic-clonic or grand mal seizure). During this seizure, the person may cry out, suddenly stiffen up, make jerking movements, and fall.  The person may turn pale or blue from difficulty breathing.    Actions:  Stay calm. Talk in a soothing voice and if possible keep onlookers away.  Prevent injury. Move objects away that the person might hit while jerking uncontrollably.   Time when the seizure starts and ends. Most seizures stop after only a few minutes.  Turn him or her gently onto one side. This will help keep the airway clear.   Never place anything in his/her mouth or give him/her anything by mouth during a seizure.   -- Do not give the person water, pills, or food until fully alert.   Loosen tight clothing or jewelry around his/her neck.  Make the person as comfortable as possible.   Place something soft under their head.   Do not hold the person down. If the person having a seizure thrashes around there is no need for you to restrain them. They are more likely to be combative if restrained. Remember to consider your safety as well.   Keep onlookers away.   Be sensitive and supportive, and ask others to do the same.   Stay with the person until he/she is fully alert.    Complex partial seizure (confusional spells). During this  kind of seizure, the person may have a glassy stare; give no response or inappropriate responses when questioned; sit, stand, or walk about aimlessly; make lip smacking or chewing motions; fidget with clothes; appear to be drunk, drugged, or confused.    Actions:  Make sure the person is safe and won’t harm themselves.  Try to remove harmful objects from around the person (tools, utensils, glasses).  Do NOT be aggressive or attempt to restrain the person. They are more likely to be combative if restrained. Remember to consider your safety as well.  Help prevent the person from wandering, and direct the person to chair or safe position.  Never place anything in his/her mouth or give him/her anything by mouth during a seizure.   -- Do not give the person water, pills, or food until fully alert.   Keep onlookers away.   Be sensitive and supportive, and ask others to do the same.   Stay with the person until he/she is fully alert.    CALL 911 if:  A convulsive seizure lasts more than 5 minutes.  The person turns blue during the seizure.  The person does not start breathing after the seizure. Begin mouth to mouth resuscitation if this would occur.  The person has one seizure right after the other without coming back to normal consciousness between the seizures.  The person has not regained consciousness or is still confused after 30 minutes.  You know the person does not have epilepsy.  You know the person has diabetes or low blood sugar.  The person is pregnant, ill, or injured.   The seizure occurred in water, because the person may have inhaled water.  The person requests an ambulance or medical help.     Rescue medication  Your doctor may prescribe a rescue medication such as lorazepam (Ativan), diazepam (Valium / Diastat), or clonazepam (Klonopin) to terminate a seizure or if you have a history of cluster of seizures.  Follow the instructions given by your doctor for these medications    Recognizing Common Seizures  (examples)   Simple partial seizures: Isolated twitching, numbness, sweating, dizziness, nausea/vomiting, disturbances to hearing, vision, smell or taste. No loss of consciousness occurs, and the person remains aware of his/her environment.   Complex partial seizures: Staring, motionless, picking at clothes, smacking lips, swallowing repeatedly or wandering around. The person is not aware of their surroundings and is not fully responsive.  Atonic seizures: “Drop attacks” or sudden, rapid fall to ground with rapid recovery.   Myoclonic seizures: Brief forceful jerks which can affect the whole body or just part of it.   Absence seizures: May appear to be daydreaming or “spacing out.” The person is momentarily unresponsive and unaware of what is happening around him/her.   Tonic seizures: Stiffening of part or of the entire body.  Generalized Tonic-Clonic Seizures. “Grand-mal seizure.” Sudden loss of consciousness with body stiffening followed by continuous jerking movements. A blue tinge around the mouth is likely but lack of oxygen is rare. Loss of bladder and/or bowel control may occur.

## 2024-08-30 NOTE — ASSESSMENT & PLAN NOTE
Patient reported known history of peripheral neuropathy, had an EMG completed previously which demonstrated electrodiagnostic evidence of a sensorimotor polyneuropathy with axonal features and a secondary demyelinating response. There is no electrodiagnostic evidence of a lumbar radiculopathy or plexopathy.    Plan:  -Vitamin B12, vitamin B 9, TSH, EMMA, Lyme disease, Sjogren's antibodies, hemoglobin A1c, vitamin B6, and BMP ordered.  - Follow up in 2 months.

## 2024-08-30 NOTE — ASSESSMENT & PLAN NOTE
"Assessment:  Patient is a 54-year-old female with extensive past medical history including allergies, hypercholesterolemia, insomnia, chronic opioid use, urinary incontinence, hemochromatosis, GERD, coronary artery disease, chronic pain, chronic fatigue, asthma, hypertension, obstructive sleep apnea, sick sinus syndrome, paroxysmal atrial fibrillation, migraine, IBS, bipolar, anxiety, PTSD, diabetes, intermittent explosive disorder amongst others the presented to the residency neurology clinic for evaluation of tremor.  During evaluation the patient also noted to have a history of peripheral neuropathy, seizures, occipital neuralgia for which close follow-up will be scheduled.  In terms of tremor, patient reported tremor to have begun few months ago, involves bilateral hands with left hand being worse.  Tremor occurs \"when I walk\" patient requires extra time to get up from seated position, onset of symptoms gradual around 2 months ago, tremor is of intermittent severity, exacerbated by activity, tremor affects handwriting, eating from spoon, drink from cup, is alleviated by sleep and resting extremity, symptoms occur intermittently, patient admitted to symptoms of change in voice, head titubation, sleep disturbance, drooling asleep, postural changes, difficulty initiating movement, change in handwriting, difficulty walking, and balance problems, patient had brother that was diagnosed with Parkinson disease 2 months ago there is currently on the medication and reported to be unable to drive.  Patient describes symptoms of orthostasis, bloating, early satiety, diarrhea, dry eyes, blurred vision.    Patient's physical examination demonstrated flattened affect, decreased blinking, bradykinesia in bilateral upper and lower extremities, cogwheel rigidity in bilateral upper extremities, tremor was not noted in bilateral upper and lower extremities, head to the patient was noted, decreased arm swing was noted from the left, " en bloc turning was noted, shuffling gait was noted, interruption of smooth tapping of fingers in bilateral upper extremities noted.    Plan:  - Case discussed with attending Neurologist Dr. Wilson  - Please obtain an MRI of the brain with and without contrast  - Please start a medication called carbidopa-levodopa 0.5 tablet by mouth every 8 hours, then after 3 days you can start taking 1 tablet by mouth every 8 hours  - Please see referral for PT to help with walking/balance  - Please follow up with PCP and other medical providers  - Please follow up with neurology in 2 months  - Please call for questions or concerns

## 2024-08-30 NOTE — ASSESSMENT & PLAN NOTE
"Patient will be scheduling close follow-up for further discussion in regards to seizures. Patient did report to attending physician that she has \"shaking episodes\" that would last \"a few seconds\", and reported that \"I was told that our shake\".    Plan:  - Please obtain an EEG to help evaluation of seizure  - Seizure safety and precaution education provided  - Patient does not currently drive  - Call office for any seizure-like activity and proceed to Emergency room or call 911  - Follow up with neurology in 2 months.  "

## 2024-09-03 DIAGNOSIS — F43.10 PTSD (POST-TRAUMATIC STRESS DISORDER): ICD-10-CM

## 2024-09-03 NOTE — PROGRESS NOTES
4/25/2023    Kathi Mera  1970  [id number]      Chief Complaint: Follow-up for microhematuria and incontinence    History of Present Illness  Kathi Mera is a 52 y o  female with a history of microhematuria and incontinence  The patient was seen in January 2023 complaining of continuous incontinence using nearly 300 diapers per month  She was emptying reasonably well with PVR of 100  UA dip was positive for blood and was not sent for micro however urine culture obtained on the same day was also positive for Salmonella  The patient has received two separate courses of antibiotics for persistent Salmonella infection and repeat urine culture  The patient had a urinalysis at [facility] 4/21/2023 that appears contaminated with squamous and transitional epithelial cells but also demonstrates 21-50 RBCs/hpf, and 500mg/dL or more of glucose in her urine  She is an insulin-dependent diabetic and does not take any oral diabetes meds  The patient denies any current urinary symptoms outside of her typical incontinence  The patient reports seeing a urologist previously in [place] who diagnosed her with overactive bladder  She was previously on Ditropan which she states did not work at all  She has been dealing with incontinence for years however she previously had better sensation with urgency prior to her incontinence  Now urine just leaks out and she does not feel it at all      Past Medical History  Past Medical History:   Diagnosis Date   • Abnormal ECG    • Abnormal Pap smear of cervix    • Asthma    • Atrial fibrillation (HCC)    • Chlamydia    • Chronic kidney disease    • Coronary artery disease    • Depression    • Diabetes mellitus (HCC)    • Fibromyalgia    • Heart disease    • History of transfusion    • Hypertension    • Migraines    • Myocardial infarction [facility])    • Neuromuscular disorder ([place] [address] )    • Opioid abuse, in remission [facility])    • Sick sinus syndrome ([place] [address] )    • Stroke [facility])    • PT called requesting samples of Eliquis until her mail order prescription arrives.    Called pt to let her know that I had some samples and would put them up front for her to p/u at her convenience.    PT was agreeable   "Varicella        Past Surgical History  Past Surgical History:   Procedure Laterality Date   • APPENDECTOMY     •  SECTION         Physical Exam  /82 (BP Location: Right arm, Patient Position: Sitting, Cuff Size: Adult)   Pulse 64   Ht 5' 5\" (1 651 m)   Wt 73 5 kg (162 lb)   SpO2 99%   BMI 26 96 kg/m²     General:  Healthy appearing female in no acute distress  Head:  Normocephalic, atraumatic  Cardiovascular:  Regular rate  Respiratory:  Patient has unlabored respirations  Assessment  52-year-old female with history of overactive bladder with incontinence, recent persistent Salmonella UTI, and cystoscopy demonstrating an inflammatory appearance  I had a discussion with the patient about her symptoms and the possible etiologies  We discussed that glucose is only noted in the urine when the blood sugar level is elevated typically above about 180  She has had multiple recent urinalysis with glucose in her urine and she does not take any oral pharmacotherapy that would cause intentional spillage of sugar in the urine  This suggest that her diabetes is poorly controlled which can lead to bladder dysfunction and decreased sensation  The patient really wants to know what is going on and why  I explained the role for urodynamics and assessing bladder dysfunction  Her bladder has an inflammatory appearance and I think a repeat examination is warranted  We will send urine for culture and cytology today and plan to repeat cystoscopy in a few months  We discussed her allergy to cephalosporins  She does not recall what the allergy was since it was so long ago  She recently had a course of cefdinir and reports no adverse events  In light of this given recent culture susceptible to Rocephin I elected to give her Rocephin in office today  No immediate issues were noted  Plan  1  Follow-up urine culture and cytology  2  Rx for cefdinir x1 week sent to pharmacy  3   Discontinue oxybutynin " "and initiate Sanctura  Rx for Sanctura 20 mg twice daily sent to pharmacy  Discussed expected side effects  4  Obtain urodynamics with urine culture 2 weeks prior  5  We will perform cystoscopy the day after urodynamics to discuss the results and also to perform cystoscopy while urine is appropriately treated    Nichole Graham MD  [facility], located within [facility]    Portions of the above record have been created with voice recognition software  Occasional wrong word or \"sound alike\" substitution may have occurred due to the inherent limitations of voice recognition software  Please read the chart carefully and recognize, using context, where substitution may have occurred  For further clarification, please contact me directly     "

## 2024-09-04 RX ORDER — ZOLPIDEM TARTRATE 10 MG/1
10 TABLET ORAL DAILY
Qty: 30 TABLET | Refills: 0 | Status: SHIPPED | OUTPATIENT
Start: 2024-09-04

## 2024-09-05 DIAGNOSIS — F43.10 PTSD (POST-TRAUMATIC STRESS DISORDER): ICD-10-CM

## 2024-09-05 DIAGNOSIS — F41.9 ANXIETY: ICD-10-CM

## 2024-09-05 PROBLEM — E78.00 PURE HYPERCHOLESTEROLEMIA: Status: RESOLVED | Noted: 2017-09-22 | Resolved: 2024-09-05

## 2024-09-05 RX ORDER — ALPRAZOLAM 1 MG
1 TABLET ORAL 4 TIMES DAILY PRN
Qty: 60 TABLET | Refills: 0 | Status: SHIPPED | OUTPATIENT
Start: 2024-09-05

## 2024-09-05 RX ORDER — ZOLPIDEM TARTRATE 10 MG/1
10 TABLET ORAL DAILY
Qty: 30 TABLET | Refills: 0 | Status: CANCELLED | OUTPATIENT
Start: 2024-09-05

## 2024-09-05 NOTE — PATIENT INSTRUCTIONS
"Patient Education     Low blood sugar in people with diabetes   The Basics   Written by the doctors and editors at [facility]   What is low blood sugar? -- This is when the level of sugar in a person's blood gets too low. It is also called \"hypoglycemia.\"  Low blood sugar can cause symptoms ranging from sweating and feeling hungry to passing out.  Low blood sugar can happen in people with diabetes who take certain medicines, including insulin, other medicines given as shots, and some types of pills.  When can people with diabetes get low blood sugar? -- People with diabetes can get low blood sugar when they:   Take too much medicine, including insulin, other medicines given as shots, or certain diabetes pills   Do not eat enough food   Exercise too much without eating a snack or reducing their insulin dose   Wait too long between meals   Drink too much alcohol or drink alcohol on an empty stomach  What are the symptoms of low blood sugar? -- The symptoms can be different from person to person, and can change over time. During the early stages of low blood sugar, a person can:   Sweat or tremble   Feel hungry   Feel worried  People who have early symptoms should check their blood sugar level to see if it is low and needs to be treated. If low blood sugar levels are not treated, severe symptoms can occur. These can include:   Trouble walking or feeling weak   Trouble seeing clearly   Being confused or acting in a strange way   Passing out or having a seizure  Some people do not get symptoms during the early stages of low blood sugar. Doctors sometimes call this \"hypoglycemia unawareness.\" People with hypoglycemia unawareness are more likely to have severe symptoms, because they might not know that they have low blood sugar until they have severe symptoms. Hypoglycemia unawareness is more likely in people who:   Have had type 1 diabetes for more than 5 to 10 years   Have frequent episodes of low blood sugar   Use insulin " to keep their blood sugar level tightly managed   Are tired   Drink a lot of alcohol   Take certain medicines for high blood pressure or diabetes  How is low blood sugar treated? -- It can be treated with:   Quick sources of sugar - People can eat or drink quick sources of sugar (table 1). Foods that have fat, such as chocolate or cheese, do not treat low blood sugar as quickly. You and a family member should carry a quick source of sugar at all times.   A dose of glucagon - Glucagon is a hormone that can quickly raise blood sugar levels and stop severe symptoms. It comes as a shot (figure 1) or a nose spray. If your doctor recommends that you carry glucagon with you, they will tell you when and how to use it. If possible, it's also a good idea to have a family member, friend, or roommate learn how to give you glucagon. That way, they can give it to you if you can't do it yourself.  After treating low blood sugar, it is very important to recheck your blood sugar level to make sure that it rises and stays in the normal range. Once your blood sugar is normal, eat a small snack that contains protein, fat, and carbohydrate. This can help keep your blood sugar stable.  What should I do after treatment? -- After treatment for low blood sugar, most people can get back to their usual routine. But your doctor or nurse might recommend that you check your blood sugar level more often during the next 2 to 3 days.  If your low blood sugar was treated with glucagon, call your doctor or nurse. They might change the dose of your diabetes medicine.  How can I prevent low blood sugar? -- The best way is to:   Check your blood sugar levels often - Your doctor or nurse will tell you how and when to check your blood sugar levels at home. They will also tell you what your blood sugar levels should be, and when to treat low blood sugar.   Learn the symptoms of low blood sugar, and be ready to treat it in the early stages. Treating low  "blood sugar early can prevent severe symptoms.  When should I go to a hospital or call for an ambulance? -- A family member or friend should take you to a hospital or call for an ambulance (in the [place] and [place], call 9-1-1) if you:   Are still confused 15 minutes after being treated with a dose of glucagon   Have passed out, and there is no glucagon nearby   Still have low blood sugar after treatment  If you have low blood sugar, do not try to drive yourself to the hospital. Driving with low blood sugar can be dangerous.  All topics are updated as new evidence becomes available and our peer review process is complete.  This topic retrieved from [employer] on: Apr 11, 2024.  Topic 99017 Version 22.0  Release: 32.3.2 - C32.100  © 2024 UpToDate, Inc[employer] and/or its affiliates. All rights reserved.  table 1: Quick sources of sugar to treat low blood sugar  3 or 4 glucose tablets   ½ cup of juice or regular soda (not sugar-free)   2 tablespoons of raisins   4 or 5 saltine crackers   1 tablespoon of sugar   1 tablespoon of honey or corn syrup   6 to 8 hard candies   These sources of sugar act quickly to treat low blood sugar levels. People with diabetes who use insulin or certain other diabetes medicines should carry at least 1 of these items at all times.  Graphic 56779 Version 4.0  figure 1: Glucagon autoinjector     Some people carry glucagon in the form of an autoinjector \"pen.\" This makes it easy to give a dose into the upper arm, thigh, or belly.  Graphic 853179 Version 2.0  Consumer Information Use and Disclaimer   Disclaimer: This generalized information is a limited summary of diagnosis, treatment, and/or medication information. It is not meant to be comprehensive and should be used as a tool to help the user understand and/or assess potential diagnostic and treatment options. It does NOT include all information about conditions, treatments, medications, side effects, or risks that may apply to a specific patient. It " is not intended to be medical advice or a substitute for the medical advice, diagnosis, or treatment of a health care provider based on the health care provider's examination and assessment of a patient's specific and unique circumstances. Patients must speak with a health care provider for complete information about their health, medical questions, and treatment options, including any risks or benefits regarding use of medications. This information does not endorse any treatments or medications as safe, effective, or approved for treating a specific patient. UpToDate, Inc. and its affiliates disclaim any warranty or liability relating to this information or the use thereof.The use of this information is governed by the Terms of Use, available at https://www.[employer] [place] Wineryuwer.com/en/know/clinical-effectiveness-terms. 2024© UpToDate, Inc. and its affiliates and/or licensors. All rights reserved.  Copyright   © 2024 UpToDate, Inc. and/or its affiliates. All rights reserved.

## 2024-09-06 ENCOUNTER — OFFICE VISIT (OUTPATIENT)
Age: 54
End: 2024-09-06
Payer: COMMERCIAL

## 2024-09-06 ENCOUNTER — APPOINTMENT (OUTPATIENT)
Dept: LAB | Facility: CLINIC | Age: 54
End: 2024-09-06
Payer: COMMERCIAL

## 2024-09-06 VITALS
HEART RATE: 112 BPM | HEIGHT: 65 IN | OXYGEN SATURATION: 96 % | DIASTOLIC BLOOD PRESSURE: 78 MMHG | WEIGHT: 138 LBS | SYSTOLIC BLOOD PRESSURE: 118 MMHG | BODY MASS INDEX: 22.99 KG/M2

## 2024-09-06 DIAGNOSIS — E78.2 MIXED HYPERLIPIDEMIA: ICD-10-CM

## 2024-09-06 DIAGNOSIS — F17.210 SMOKING GREATER THAN 20 PACK YEARS: ICD-10-CM

## 2024-09-06 DIAGNOSIS — I48.92 ATRIAL FLUTTER, UNSPECIFIED TYPE (HCC): ICD-10-CM

## 2024-09-06 DIAGNOSIS — G62.9 PERIPHERAL NEUROPATHY: ICD-10-CM

## 2024-09-06 DIAGNOSIS — E11.649 TYPE 2 DIABETES MELLITUS WITH HYPOGLYCEMIA WITHOUT COMA, WITH LONG-TERM CURRENT USE OF INSULIN (HCC): ICD-10-CM

## 2024-09-06 DIAGNOSIS — Z79.4 TYPE 2 DIABETES MELLITUS WITH HYPOGLYCEMIA WITHOUT COMA, WITH LONG-TERM CURRENT USE OF INSULIN (HCC): ICD-10-CM

## 2024-09-06 DIAGNOSIS — I10 BENIGN ESSENTIAL HYPERTENSION: ICD-10-CM

## 2024-09-06 DIAGNOSIS — Z00.6 ENCOUNTER FOR EXAMINATION FOR NORMAL COMPARISON OR CONTROL IN CLINICAL RESEARCH PROGRAM: Primary | ICD-10-CM

## 2024-09-06 DIAGNOSIS — Z13.220 SCREENING FOR LIPID DISORDERS: ICD-10-CM

## 2024-09-06 DIAGNOSIS — R49.0 HOARSENESS OF VOICE: ICD-10-CM

## 2024-09-06 DIAGNOSIS — R53.83 OTHER FATIGUE: ICD-10-CM

## 2024-09-06 DIAGNOSIS — M54.2 CERVICALGIA: ICD-10-CM

## 2024-09-06 DIAGNOSIS — R25.1 TREMOR: ICD-10-CM

## 2024-09-06 DIAGNOSIS — J06.9 UPPER RESPIRATORY TRACT INFECTION, UNSPECIFIED TYPE: Primary | ICD-10-CM

## 2024-09-06 DIAGNOSIS — Z12.31 ENCOUNTER FOR SCREENING MAMMOGRAM FOR MALIGNANT NEOPLASM OF BREAST: ICD-10-CM

## 2024-09-06 DIAGNOSIS — J41.8 MIXED SIMPLE AND MUCOPURULENT CHRONIC BRONCHITIS (HCC): ICD-10-CM

## 2024-09-06 DIAGNOSIS — F11.90 CHRONIC, CONTINUOUS USE OF OPIOIDS: ICD-10-CM

## 2024-09-06 LAB
ALBUMIN SERPL BCG-MCNC: 4.5 G/DL (ref 3.5–5)
ALP SERPL-CCNC: 76 U/L (ref 34–104)
ALT SERPL W P-5'-P-CCNC: 8 U/L (ref 7–52)
ANA SER QL IA: NEGATIVE
ANION GAP SERPL CALCULATED.3IONS-SCNC: 11 MMOL/L (ref 4–13)
AST SERPL W P-5'-P-CCNC: 10 U/L (ref 13–39)
B BURGDOR IGG SERPL QL IA: NEGATIVE
B BURGDOR IGG+IGM SER QL IA: POSITIVE
B BURGDOR IGM SERPL QL IA: POSITIVE
BASOPHILS # BLD AUTO: 0.07 THOUSANDS/ÂΜL (ref 0–0.1)
BASOPHILS NFR BLD AUTO: 1 % (ref 0–1)
BILIRUB SERPL-MCNC: 0.53 MG/DL (ref 0.2–1)
BUN SERPL-MCNC: 12 MG/DL (ref 5–25)
CALCIUM SERPL-MCNC: 9.4 MG/DL (ref 8.4–10.2)
CHLORIDE SERPL-SCNC: 100 MMOL/L (ref 96–108)
CHOLEST SERPL-MCNC: 143 MG/DL
CO2 SERPL-SCNC: 24 MMOL/L (ref 21–32)
CREAT SERPL-MCNC: 1.06 MG/DL (ref 0.6–1.3)
CREAT UR-MCNC: 77.5 MG/DL
EOSINOPHIL # BLD AUTO: 0.08 THOUSAND/ÂΜL (ref 0–0.61)
EOSINOPHIL NFR BLD AUTO: 1 % (ref 0–6)
ERYTHROCYTE [DISTWIDTH] IN BLOOD BY AUTOMATED COUNT: 13.4 % (ref 11.6–15.1)
EST. AVERAGE GLUCOSE BLD GHB EST-MCNC: 189 MG/DL
FOLATE SERPL-MCNC: 9.1 NG/ML
GFR SERPL CREATININE-BSD FRML MDRD: 58 ML/MIN/1.73SQ M
GLUCOSE P FAST SERPL-MCNC: 376 MG/DL (ref 65–99)
HBA1C MFR BLD: 8.2 %
HCT VFR BLD AUTO: 46.1 % (ref 34.8–46.1)
HDLC SERPL-MCNC: 37 MG/DL
HGB BLD-MCNC: 15.1 G/DL (ref 11.5–15.4)
IMM GRANULOCYTES # BLD AUTO: 0.02 THOUSAND/UL (ref 0–0.2)
IMM GRANULOCYTES NFR BLD AUTO: 0 % (ref 0–2)
LDLC SERPL CALC-MCNC: 60 MG/DL (ref 0–100)
LYMPHOCYTES # BLD AUTO: 2.53 THOUSANDS/ÂΜL (ref 0.6–4.47)
LYMPHOCYTES NFR BLD AUTO: 32 % (ref 14–44)
MCH RBC QN AUTO: 29.8 PG (ref 26.8–34.3)
MCHC RBC AUTO-ENTMCNC: 32.8 G/DL (ref 31.4–37.4)
MCV RBC AUTO: 91 FL (ref 82–98)
MICROALBUMIN UR-MCNC: 322.9 MG/L
MICROALBUMIN/CREAT 24H UR: 417 MG/G CREATININE (ref 0–30)
MONOCYTES # BLD AUTO: 0.74 THOUSAND/ÂΜL (ref 0.17–1.22)
MONOCYTES NFR BLD AUTO: 9 % (ref 4–12)
NEUTROPHILS # BLD AUTO: 4.57 THOUSANDS/ÂΜL (ref 1.85–7.62)
NEUTS SEG NFR BLD AUTO: 57 % (ref 43–75)
NRBC BLD AUTO-RTO: 0 /100 WBCS
PLATELET # BLD AUTO: 187 THOUSANDS/UL (ref 149–390)
PMV BLD AUTO: 13.8 FL (ref 8.9–12.7)
POTASSIUM SERPL-SCNC: 4.4 MMOL/L (ref 3.5–5.3)
PROT SERPL-MCNC: 7.2 G/DL (ref 6.4–8.4)
RBC # BLD AUTO: 5.06 MILLION/UL (ref 3.81–5.12)
SARS-COV-2 AG UPPER RESP QL IA: NEGATIVE
SODIUM SERPL-SCNC: 135 MMOL/L (ref 135–147)
TRIGL SERPL-MCNC: 229 MG/DL
TSH SERPL DL<=0.05 MIU/L-ACNC: 1.31 UIU/ML (ref 0.45–4.5)
VALID CONTROL: NORMAL
VIT B12 SERPL-MCNC: 320 PG/ML (ref 180–914)
WBC # BLD AUTO: 8.01 THOUSAND/UL (ref 4.31–10.16)

## 2024-09-06 PROCEDURE — 86235 NUCLEAR ANTIGEN ANTIBODY: CPT

## 2024-09-06 PROCEDURE — 82390 ASSAY OF CERULOPLASMIN: CPT

## 2024-09-06 PROCEDURE — 82570 ASSAY OF URINE CREATININE: CPT

## 2024-09-06 PROCEDURE — 82525 ASSAY OF COPPER: CPT

## 2024-09-06 PROCEDURE — 80053 COMPREHEN METABOLIC PANEL: CPT

## 2024-09-06 PROCEDURE — 82607 VITAMIN B-12: CPT

## 2024-09-06 PROCEDURE — 36415 COLL VENOUS BLD VENIPUNCTURE: CPT

## 2024-09-06 PROCEDURE — 86617 LYME DISEASE ANTIBODY: CPT

## 2024-09-06 PROCEDURE — 86038 ANTINUCLEAR ANTIBODIES: CPT

## 2024-09-06 PROCEDURE — 99214 OFFICE O/P EST MOD 30 MIN: CPT

## 2024-09-06 PROCEDURE — 87811 SARS-COV-2 COVID19 W/OPTIC: CPT

## 2024-09-06 PROCEDURE — 85025 COMPLETE CBC W/AUTO DIFF WBC: CPT

## 2024-09-06 PROCEDURE — 84207 ASSAY OF VITAMIN B-6: CPT

## 2024-09-06 PROCEDURE — 86618 LYME DISEASE ANTIBODY: CPT

## 2024-09-06 PROCEDURE — 82746 ASSAY OF FOLIC ACID SERUM: CPT

## 2024-09-06 PROCEDURE — 80061 LIPID PANEL: CPT

## 2024-09-06 PROCEDURE — 82043 UR ALBUMIN QUANTITATIVE: CPT

## 2024-09-06 PROCEDURE — 84443 ASSAY THYROID STIM HORMONE: CPT

## 2024-09-06 RX ORDER — GUAIFENESIN 600 MG/1
1200 TABLET, EXTENDED RELEASE ORAL 2 TIMES DAILY
Qty: 40 TABLET | Refills: 0 | Status: SHIPPED | OUTPATIENT
Start: 2024-09-06 | End: 2024-09-16

## 2024-09-06 NOTE — PROGRESS NOTES
INTERNAL MEDICINE FOLLOW-UP VISIT  [facility] INTERNAL MEDICINE [address]    NAME: Dahlia Kraus  AGE: 54 y.o. SEX: female  : 1970     DATE: 2024     Assessment and Plan:   1. Upper respiratory tract infection, unspecified type  COVID-negative in office.  Likely viral in nature.  Recommended using Mucinex every 12 hours, over-the-counter Flonase, and increasing fluid intake.  - POCT Rapid Covid Ag    2. Cervicalgia  Reviewed cervical spine MRI in office that showed spondylosis associated with spinal canal and neural foraminal narrowing detailed level by level above and most notable at C5-C6.  She has an upcoming appointment with pain management to review treatment options.    3. Atrial flutter, unspecified type (HCC)  Currently on Xarelto 20 mg and metoprolol 100 mg daily.  - Ambulatory Referral to Cardiology; Future    4. Benign essential hypertension  BP in office is 118/78.  No home BPs.  Limit salt intake to less than 2000 mg daily.  - Ambulatory Referral to Cardiology; Future    5. Mixed simple and mucopurulent chronic bronchitis (HCC)  She denies any shortness of breath.  She does admit to a cough and some wheezing since being sick the past few days.    7. Type 2 diabetes mellitus with hypoglycemia without coma, with long-term current use of insulin (HCC)  Due for A1c and CMP.  - Comprehensive metabolic panel  - Albumin / creatinine urine ratio    9. Chronic, continuous use of opioids  She follows with pain management.  Currently on Roxicodone daily.    11. Mixed hyperlipidemia  Due for lipid panel.  Currently on atorvastatin 10 mg daily.  - Lipid Panel with Direct LDL reflex    12. Other fatigue  - CBC and differential  - TSH, 3rd generation with Free T4 reflex    13. Hoarseness of voice  Will obtain TSH and thyroid ultrasound due to history of voice hoarseness happening for the past 3 months.  - US thyroid; Future    14. Smoking greater than 20 pack  years  - CT lung screening program; Future    15. Encounter for screening mammogram for malignant neoplasm of breast  - Mammo screening bilateral w 3d and cad; Future      Tobacco Cessation Counseling: Tobacco cessation counseling was provided. The patient is sincerely urged to quit consumption of tobacco. She is not ready to quit tobacco. Medication options discussed.        No follow-ups on file.       Chief Complaint:     Chief Complaint   Patient presents with    Follow-up     MRI review      History of Present Illness:   Patient is a 53 yo female that presents today for a 6 month f/up.  She admits to sinus congestion that started about 4 days ago.  She has not tried anything over-the-counter.  She denies any sick contacts.    The following portions of the patient's history were reviewed and updated as appropriate: allergies, current medications, past family history, past medical history, past social history, past surgical history and problem list.     Review of Systems:     Review of Systems   Constitutional:  Negative for chills and fever.   HENT:  Positive for ear pain, rhinorrhea, sinus pressure, sinus pain and sore throat. Negative for ear discharge and trouble swallowing.    Eyes:  Negative for visual disturbance.   Respiratory:  Positive for cough and wheezing. Negative for shortness of breath.    Cardiovascular:  Negative for chest pain.   Gastrointestinal:  Positive for diarrhea and vomiting. Negative for abdominal pain, constipation and nausea.   Genitourinary:  Negative for difficulty urinating and dysuria.   Musculoskeletal:  Positive for arthralgias and back pain.   Skin:  Negative for rash.   Neurological:  Negative for dizziness, light-headedness and headaches.        Past Medical History:     Past Medical History:   Diagnosis Date    Abnormal ECG     Abnormal Pap smear of cervix     Allergic     Anemia     Anxiety     Asthma     Atrial fibrillation (HCC)     Chlamydia     Chronic kidney disease   "   Coronary artery disease     COVID-19 12/06/2020    Depression     Diabetes mellitus (HCC)     Fibromyalgia     GERD (gastroesophageal reflux disease)     Headache(784.0)     Heart disease     Heart murmur     History of transfusion     Hypertension     Inflammatory bowel disease     Migraines     Mixed simple and mucopurulent chronic bronchitis (HCC) 01/08/2019    Myocardial infarction (HCC)     Neuromuscular disorder (HCC)     Opioid abuse, in remission (HCC)     Otitis media     Pneumonia     Scoliosis     Seizures (HCC)     Shingles     Sick sinus syndrome (HCC)     Stroke (HCC)     Urinary tract infection     Varicella     Visual impairment         Current Medications:     Current Outpatient Medications:     albuterol (PROVENTIL HFA,VENTOLIN HFA) 90 mcg/act inhaler, Inhale 2 puffs every 4 (four) hours as needed, Disp: , Rfl:     Alcohol Swabs (Pharmacist Choice Alcohol) PADS, 5 (five) times a day, Disp: , Rfl:     ALPRAZolam (XANAX) 1 mg tablet, Take 1 tablet (1 mg total) by mouth 4 (four) times a day as needed for anxiety, Disp: 60 tablet, Rfl: 0    ammonium lactate (LAC-HYDRIN) 12 % lotion, APPLY TOPICALLY 2 (TWO) TIMES A DAY AS NEEDED FOR DRY SKIN, Disp: 227 g, Rfl: 3    atorvastatin (LIPITOR) 10 mg tablet, Take 10 mg by mouth daily, Disp: , Rfl:     BD Pen Needle Nkechi 2nd Gen 32G X 4 MM MISC, USE AS DIRECTED FOR BEFORE A MEAL AND AT BEDTIME GLUCOSE INJECTION, Disp: , Rfl:     BD PrecisionGlide Needle 23G X 1-1/2\" MISC, USE AS DIRECTED TO ADMINISTER NALOXONE INJECTION.  MAY DISPENSE 23-25 GAUGE 1-1.5\" NEEDLE., Disp: , Rfl:     Blood Glucose Monitoring Suppl (ONE TOUCH ULTRA 2) w/Device KIT, Use as directed, Disp: , Rfl:     busPIRone (BUSPAR) 30 MG tablet, Take 1 tablet (30 mg total) by mouth 2 (two) times a day, Disp: 200 tablet, Rfl: 1    carbidopa-levodopa (Sinemet)  mg per tablet, Take 1 tablet by mouth 3 (three) times a day, Disp: 90 tablet, Rfl: 3    Continuous Blood Gluc  (Dexcom G6 " ) ALEKS, USE 1 DEVICE 4 (FOUR) TIMES A DAY (WITH MEALS AND AT BEDTIME), Disp: 1 each, Rfl: 6    Continuous Blood Gluc Transmit (Dexcom G6 Transmitter) MISC, USE 4 TIMES A DAY (WITH MEALS AND AT BEDTIME), Disp: 1 each, Rfl: 3    Continuous Glucose Sensor (Dexcom G6 Sensor) MISC, CHANGE SENSOR EVERY 10 DAYS, Disp: 3 each, Rfl: 6    EPINEPHrine (EPIPEN) 0.3 mg/0.3 mL SOAJ, Inject 0.3 mL (0.3 mg total) into a muscle once for 1 dose, Disp: 0.6 mL, Rfl: 0    famotidine (PEPCID) 20 mg tablet, Take 1 tablet (20 mg total) by mouth 2 (two) times a day as needed for indigestion, Disp: 100 tablet, Rfl: 1    fexofenadine (ALLEGRA) 180 MG tablet, TAKE 1 TABLET (180 MG TOTAL) BY MOUTH DAILY, Disp: 90 tablet, Rfl: 3    fluticasone (Flovent Diskus) 250 mcg/actuation diskus inhaler, Inhale 1-2 puffs 2 (two) times a day Rinse mouth after use., Disp: 60 each, Rfl: 1    gabapentin (NEURONTIN) 100 mg capsule, TAKE 1 CAPSULE (100 MG TOTAL) BY MOUTH DAILY AT BEDTIME TAKE ADDITIONAL 100 MG CAPSULE TO TOTAL 900 MG AT BEDTIME, Disp: 90 capsule, Rfl: 3    gabapentin (NEURONTIN) 400 mg capsule, TAKE 2 CAPSULES BY MOUTH 3 TIMES A DAY, Disp: 100 capsule, Rfl: 1    hydrALAZINE (APRESOLINE) 50 mg tablet, Take 50 mg by mouth 3 (three) times a day, Disp: , Rfl:     Incontinence Supply Disposable (Depend Undergarment Ex Absorb) MISC, Use 4 (four) times a day as needed (incontinence), Disp: 120 each, Rfl: 10    insulin lispro (HumaLOG) 100 units/mL injection pen, INJECT 20 UNITS UNDER THE SKIN 3 (THREE) TIMES A DAY BEFORE MEALS INDICATIONS: TYPE 2 DIABETES MELLITUS., Disp: 15 mL, Rfl: 3    Lantus SoloStar 100 units/mL SOPN, INJECT 55 UNITS UNDER THE SKIN 2 (TWO) TIMES A DAY AT LUNCH AND AT BEDTIME., Disp: 45 mL, Rfl: 3    leflunomide (ARAVA) 20 MG tablet, Take 1 tablet (20 mg total) by mouth daily, Disp: 90 tablet, Rfl: 2    lisinopril (ZESTRIL) 10 mg tablet, Take 1 tablet (10 mg total) by mouth daily, Disp: 90 tablet, Rfl: 2    lovastatin  (MEVACOR) 10 MG tablet, Take 10 mg by mouth, Disp: , Rfl:     metFORMIN (GLUCOPHAGE-XR) 750 mg 24 hr tablet, TAKE 1 TABLET (750 MG TOTAL) BY MOUTH 2 (TWO) TIMES A DAY WITH MEALS., Disp: , Rfl:     metoprolol succinate (TOPROL-XL) 100 mg 24 hr tablet, Take 100 mg by mouth daily, Disp: , Rfl:     Mirabegron ER (Myrbetriq) 25 MG TB24, TAKE 25 MG BY MOUTH IN THE MORNING, Disp: 90 tablet, Rfl: 3    montelukast (SINGULAIR) 10 mg tablet, Take 1 tablet (10 mg total) by mouth daily at bedtime, Disp: 100 tablet, Rfl: 1    naloxone (NARCAN) 0.4 mg/mL injection, , Disp: , Rfl:     Nutritional Supplements (Glucerna Hunger Smart Shake) LIQD, Take 1 Can by mouth 3 (three) times a day, Disp: 296 mL, Rfl: 5    ondansetron (ZOFRAN-ODT) 4 mg disintegrating tablet, TAKE 1 TABLET (4 MG TOTAL) BY MOUTH EVERY 6 (SIX) HOURS AS NEEDED FOR NAUSEA, Disp: 10 tablet, Rfl: 0    OneTouch Delica Lancets 33G MISC, Inject under the skin 2 (two) times a day, Disp: 100 each, Rfl: 5    OneTouch Ultra test strip, TEST TWICE DAILY OR AS DIRECTED BY MD, Disp: , Rfl:     Oral Electrolytes (PEDIALYTE SINGLES PO), Take 8 oz by mouth if needed, Disp: , Rfl:     oxyCODONE (ROXICODONE) 5 immediate release tablet, 10 mg pt reports she takes this 4 times per day but is out of the medication currently, Disp: , Rfl:     pantoprazole (PROTONIX) 40 mg tablet, Take 1 tablet (40 mg total) by mouth 2 (two) times a day, Disp: 90 tablet, Rfl: 3    polyethylene glycol (GOLYTELY) 4000 mL solution, Take 4,000 mL by mouth once for 1 dose, Disp: 4000 mL, Rfl: 0    potassium chloride (K-DUR,KLOR-CON) 20 mEq tablet, Take 1 tablet (20 mEq total) by mouth daily, Disp: 30 tablet, Rfl: 1    prazosin (MINIPRESS) 5 mg capsule, TAKE 1 CAPSULE (5 MG TOTAL) BY MOUTH DAILY AT BEDTIME, Disp: 90 capsule, Rfl: 3    risperiDONE (RisperDAL) 3 mg tablet, TAKE 1 TABLET (3 MG TOTAL) BY MOUTH 2 (TWO) TIMES A DAY, Disp: 180 tablet, Rfl: 0    rivaroxaban (XARELTO) 20 mg tablet, Take 20 mg by mouth,  Disp: , Rfl:     sertraline (ZOLOFT) 100 mg tablet, Take 1 tablet (100 mg total) by mouth daily, Disp: 90 tablet, Rfl: 3    Spacer/Aero-Holding Chambers (AeroChamber Plus Geoff-Vu w/Mask) MISC, Inhale daily at bedtime as needed (wheezing), Disp: 1 each, Rfl: 0    trospium chloride (SANCTURA) 20 mg tablet, Take 1 tablet (20 mg total) by mouth 2 (two) times a day, Disp: 180 tablet, Rfl: 3    Trulicity 1.5 MG/0.5ML injection, INJECT 1.5 MG UNDER THE SKIN EVERY 7 DAYS., Disp: 2 mL, Rfl: 3    zolpidem (AMBIEN) 10 mg tablet, TAKE 1 TABLET (10 MG TOTAL) BY MOUTH DAILY, Disp: 30 tablet, Rfl: 0    BD Pen Needle Nkechi 2nd Gen 32G X 4 MM MISC, USE WITH INSULIN 5 TIMES A DAY (Patient not taking: Reported on 8/29/2024), Disp: 200 each, Rfl: 3    Oral Electrolytes (Pedialyte) PACK, Take 237 mL by mouth 2 (two) times a day (Patient not taking: Reported on 8/29/2024), Disp: , Rfl:     pantoprazole (PROTONIX) 40 mg tablet, Take 1 tablet (40 mg total) by mouth 2 (two) times a day (Patient not taking: Reported on 8/29/2024), Disp: 180 tablet, Rfl: 3    polyethylene glycol (GOLYTELY) 4000 mL solution, Take 4,000 mL by mouth once for 1 dose (Patient not taking: Reported on 8/29/2024), Disp: 4000 mL, Rfl: 0    potassium chloride (MICRO-K) 10 MEQ CR capsule, TAKE 2 CAPSULES (20 MEQ TOTAL) BY MOUTH 2 (TWO) TIMES A DAY (Patient not taking: Reported on 8/29/2024), Disp: 90 capsule, Rfl: 3    Varenicline Tartrate, Starter, 0.5 MG X 11 & 1 MG X 42 TBPK, 0.5 MG ONCE DAILY FOR 3 DAYS THEN 0.5MG TWICE DAILY FOR DAYS 4-7 THEN 1 MG TWICE DAILY (Patient not taking: Reported on 8/29/2024), Disp: 53 each, Rfl: 0     Allergies:     Allergies   Allergen Reactions    Other Anaphylaxis     Capzasin HP -- severe burning and swelling of the skin     Clarithromycin GI Intolerance    Acetaminophen GI Intolerance    Bactrim [Sulfamethoxazole-Trimethoprim] Itching, GI Intolerance, Dizziness, Confusion and Lightheadedness     Patient passes out when on this  "medication for several days     Cephalosporins Hives     Tolerated Cefdinir 2/2023    Latex Hives    Oxycodone-Acetaminophen GI Intolerance    Penicillins Diarrhea    Trazodone Diarrhea    Capsaicin Rash    Naproxen Palpitations        Physical Exam:     /78 (BP Location: Left arm, Patient Position: Sitting, Cuff Size: Adult)   Pulse (!) 112   Ht 5' 5\" (1.651 m)   Wt 62.6 kg (138 lb)   SpO2 96%   BMI 22.96 kg/m²     Physical Exam  Vitals and nursing note reviewed.   Constitutional:       General: She is awake. She is not in acute distress.     Appearance: Normal appearance. She is well-developed, well-groomed and normal weight.   HENT:      Head: Normocephalic and atraumatic.      Right Ear: Hearing, tympanic membrane, ear canal and external ear normal.      Left Ear: Hearing, tympanic membrane, ear canal and external ear normal.      Nose: Nose normal.      Mouth/Throat:      Lips: Pink.      Mouth: Mucous membranes are moist.      Pharynx: Uvula midline. Posterior oropharyngeal erythema present.   Eyes:      General: Lids are normal. Vision grossly intact. Gaze aligned appropriately.      Conjunctiva/sclera: Conjunctivae normal.   Neck:      Vascular: No carotid bruit.      Trachea: Trachea and phonation normal.   Cardiovascular:      Rate and Rhythm: Normal rate and regular rhythm.      Heart sounds: Normal heart sounds, S1 normal and S2 normal. No murmur heard.     No friction rub. No gallop.   Pulmonary:      Effort: Pulmonary effort is normal. No respiratory distress.      Breath sounds: Normal breath sounds and air entry. No decreased breath sounds, wheezing, rhonchi or rales.   Abdominal:      General: Abdomen is protuberant.   Musculoskeletal:         General: No swelling.      Cervical back: Neck supple.      Right lower leg: No edema.      Left lower leg: No edema.   Skin:     General: Skin is warm.      Capillary Refill: Capillary refill takes less than 2 seconds.   Neurological:      Mental " Status: She is alert.   Psychiatric:         Attention and Perception: Attention and perception normal.         Mood and Affect: Mood and affect normal.         Speech: Speech normal.         Behavior: Behavior normal. Behavior is cooperative.         Thought Content: Thought content normal.         Cognition and Memory: Cognition and memory normal.         Judgment: Judgment normal.           Data:     Laboratory Results: I have personally reviewed the pertinent laboratory results/reports   Radiology/Other Diagnostic Testing Results: I have personally reviewed pertinent reports.      Rekha Bryan PA-C  [facility] INTERNAL MEDICINE [address]

## 2024-09-07 DIAGNOSIS — A69.20 LYME DISEASE: Primary | ICD-10-CM

## 2024-09-07 LAB
CERULOPLASMIN SERPL-MCNC: 26.8 MG/DL (ref 19–39)
ENA SS-A AB SER-ACNC: <0.2 AI (ref 0–0.9)
ENA SS-B AB SER-ACNC: <0.2 AI (ref 0–0.9)

## 2024-09-07 RX ORDER — DOXYCYCLINE 100 MG/1
100 CAPSULE ORAL 2 TIMES DAILY
Qty: 28 CAPSULE | Refills: 0 | Status: SHIPPED | OUTPATIENT
Start: 2024-09-07 | End: 2024-09-21

## 2024-09-09 ENCOUNTER — TELEPHONE (OUTPATIENT)
Age: 54
End: 2024-09-09

## 2024-09-09 NOTE — TELEPHONE ENCOUNTER
----- Message from Rekha Bryan PA-C sent at 9/9/2024  7:38 AM EDT -----  Can we call her and let her know that doxycycline was sent to the pharmacy for acute Lyme disease?  ----- Message -----  From: Alvin Naranjo DO  Sent: 9/7/2024  12:16 AM EDT  To: Rekha Bryan PA-C    Dear Kelly Bryan,     I hope you are doing well. We have a mutual patient, Mrs. Reilly. The patient was recently evaluated by neurology for tremor and during this visit the patient brought up numerous other medical issues such as neuropathy, seizures and cervicalgia. The patient has an appointment with neurology in December of this year to further evaluate these issues. To get things started lab work and imaging studies were ordered of which a Lyme antibody with reflex study just returned as positive for IgM antibodies indicating an acute infection.     Lyme total antibodies: Positive  Lyme IgM reflex: Positive    I am currently on night-service and unable to contact the patient via [place]. I would very much appreciate your help in getting into contact with the patient and have her started on Lyme-disease treatment and follow-up I have ordered a 14 day course of doxycycline (100mg PO Q12H) that the patient may start if you agreeable.     Thank you for your help in caring for our patient.    Teja,     Dr. Marcella DO  [facility] Neurology Res[facility] - PGY-IV

## 2024-09-09 NOTE — TELEPHONE ENCOUNTER
----- Message from Rekha Bryan PA-C sent at 9/9/2024  7:40 AM EDT -----  A1c looks so much better!  It was 15 last time, now it is 8.2.  Keep up the good work.  Triglycerides are still moderately elevated, watch the red meats, dairy products, fried foods, and fast foods.

## 2024-09-10 LAB — COPPER SERPL-MCNC: 98 UG/DL (ref 80–158)

## 2024-09-11 LAB — VIT B6 SERPL-MCNC: 2.7 UG/L (ref 3.4–65.2)

## 2024-09-12 DIAGNOSIS — E53.1 VITAMIN B6 DEFICIENCY: Primary | ICD-10-CM

## 2024-09-12 RX ORDER — LANOLIN ALCOHOL/MO/W.PET/CERES
50 CREAM (GRAM) TOPICAL DAILY
Qty: 30 TABLET | Refills: 0 | Status: SHIPPED | OUTPATIENT
Start: 2024-09-12 | End: 2024-10-12

## 2024-09-13 DIAGNOSIS — E11.649 TYPE 2 DIABETES MELLITUS WITH HYPOGLYCEMIA WITHOUT COMA, WITH LONG-TERM CURRENT USE OF INSULIN (HCC): ICD-10-CM

## 2024-09-13 DIAGNOSIS — Z79.4 TYPE 2 DIABETES MELLITUS WITH HYPOGLYCEMIA WITHOUT COMA, WITH LONG-TERM CURRENT USE OF INSULIN (HCC): ICD-10-CM

## 2024-09-13 RX ORDER — DULAGLUTIDE 1.5 MG/.5ML
INJECTION, SOLUTION SUBCUTANEOUS
Qty: 2 ML | Refills: 3 | Status: SHIPPED | OUTPATIENT
Start: 2024-09-13

## 2024-09-16 ENCOUNTER — TELEPHONE (OUTPATIENT)
Dept: GASTROENTEROLOGY | Facility: CLINIC | Age: 54
End: 2024-09-16

## 2024-09-16 DIAGNOSIS — F41.9 ANXIETY: ICD-10-CM

## 2024-09-16 DIAGNOSIS — N39.45 CONTINUOUS LEAKAGE OF URINE: ICD-10-CM

## 2024-09-16 NOTE — TELEPHONE ENCOUNTER
09/16/24  Screened by: Jacinta Santos MA    Referring Provider recall    Pre- Screening:     There is no height or weight on file to calculate BMI.  Has patient been referred for a routine screening Colonoscopy? yes  Is the patient between 45-75 years old? yes      Previous Colonoscopy yes   If yes:    Date: 2023    Facility: mo    Reason: poor prep      SCHEDULING STAFF: If the patient is between 45yrs-49yrs, please advise patient to confirm benefits/coverage with their insurance company for a routine screening colonoscopy, some insurance carriers will only cover at 50yrs or older. If the patient is over 75years old, please schedule an office visit.     Does the patient want to see a Gastroenterologist prior to their procedure OR are they having any GI symptoms? yes diarrhea    Has the patient been hospitalized or had abdominal surgery in the past 6 months? no    Does the patient use supplemental oxygen? no    Does the patient take Coumadin, Lovenox, Plavix, Elliquis, Xarelto, or other blood thinning medication? yes    Has the patient had a stroke, cardiac event, or stent placed in the past year? no    SCHEDULING STAFF: If patient answers NO to above questions, then schedule procedure. If patient answers YES to above questions, then schedule office appointment.     If patient is between 45yrs - 49yrs, please advise patient that we will have to confirm benefits & coverage with their insurance company for a routine screening colonoscopy.

## 2024-09-17 LAB
APOB+LDLR+PCSK9 GENE MUT ANL BLD/T: NOT DETECTED
BRCA1+BRCA2 DEL+DUP + FULL MUT ANL BLD/T: NOT DETECTED
MLH1+MSH2+MSH6+PMS2 GN DEL+DUP+FUL M: NOT DETECTED

## 2024-09-17 RX ORDER — INCONTINENCE PAD,LINER,DISP
EACH MISCELLANEOUS 4 TIMES DAILY PRN
Qty: 120 EACH | Refills: 2 | Status: SHIPPED | OUTPATIENT
Start: 2024-09-17

## 2024-09-18 RX ORDER — ALPRAZOLAM 1 MG
1 TABLET ORAL 4 TIMES DAILY PRN
Qty: 120 TABLET | Refills: 0 | Status: SHIPPED | OUTPATIENT
Start: 2024-09-18

## 2024-09-19 ENCOUNTER — HOSPITAL ENCOUNTER (OUTPATIENT)
Dept: CT IMAGING | Facility: HOSPITAL | Age: 54
End: 2024-09-19
Attending: INTERNAL MEDICINE
Payer: COMMERCIAL

## 2024-09-19 ENCOUNTER — TELEPHONE (OUTPATIENT)
Age: 54
End: 2024-09-19

## 2024-09-19 DIAGNOSIS — A02.9 SALMONELLA INFECTION: ICD-10-CM

## 2024-09-19 PROCEDURE — G1004 CDSM NDSC: HCPCS

## 2024-09-19 PROCEDURE — 71260 CT THORAX DX C+: CPT

## 2024-09-19 PROCEDURE — 74177 CT ABD & PELVIS W/CONTRAST: CPT

## 2024-09-19 RX ADMIN — IOHEXOL 100 ML: 350 INJECTION, SOLUTION INTRAVENOUS at 12:41

## 2024-09-20 ENCOUNTER — TELEPHONE (OUTPATIENT)
Age: 54
End: 2024-09-20

## 2024-09-20 NOTE — TELEPHONE ENCOUNTER
Abimael Wolff, pt's  from [facility] called asking that Rekha call him regarding a fall assessment do to the pt's recent falls.  He also wants to discuss supplies needed for the patient.  His phone# is [phone].

## 2024-09-23 DIAGNOSIS — E87.6 LOW BLOOD POTASSIUM: ICD-10-CM

## 2024-09-23 RX ORDER — POTASSIUM CHLORIDE 750 MG/1
20 CAPSULE, EXTENDED RELEASE ORAL 2 TIMES DAILY
Qty: 90 CAPSULE | Refills: 3 | Status: SHIPPED | OUTPATIENT
Start: 2024-09-23

## 2024-09-24 ENCOUNTER — HOSPITAL ENCOUNTER (OUTPATIENT)
Dept: ULTRASOUND IMAGING | Facility: HOSPITAL | Age: 54
Discharge: HOME | End: 2024-09-24
Payer: COMMERCIAL

## 2024-09-24 DIAGNOSIS — R49.0 HOARSENESS OF VOICE: ICD-10-CM

## 2024-09-24 PROCEDURE — 76536 US EXAM OF HEAD AND NECK: CPT

## 2024-09-25 ENCOUNTER — TELEPHONE (OUTPATIENT)
Age: 54
End: 2024-09-25

## 2024-09-25 ENCOUNTER — HOSPITAL ENCOUNTER (OUTPATIENT)
Age: 54
Discharge: HOME | End: 2024-09-25
Payer: COMMERCIAL

## 2024-09-25 VITALS — HEIGHT: 65 IN | BODY MASS INDEX: 23.49 KG/M2 | WEIGHT: 141 LBS

## 2024-09-25 DIAGNOSIS — Z12.31 ENCOUNTER FOR SCREENING MAMMOGRAM FOR MALIGNANT NEOPLASM OF BREAST: ICD-10-CM

## 2024-09-25 PROCEDURE — 77063 BREAST TOMOSYNTHESIS BI: CPT

## 2024-09-25 PROCEDURE — 77067 SCR MAMMO BI INCL CAD: CPT

## 2024-09-25 NOTE — TELEPHONE ENCOUNTER
----- Message from Rekha Bryan PA-C sent at 9/25/2024  3:13 PM EDT -----  Thyroid ultrasound revealed multiple thyroid nodules, but none require further follow-up or biopsy.

## 2024-09-26 ENCOUNTER — TELEPHONE (OUTPATIENT)
Age: 54
End: 2024-09-26

## 2024-09-30 DIAGNOSIS — Z79.4 TYPE 2 DIABETES MELLITUS WITH HYPOGLYCEMIA WITHOUT COMA, WITH LONG-TERM CURRENT USE OF INSULIN (HCC): ICD-10-CM

## 2024-09-30 DIAGNOSIS — F43.10 PTSD (POST-TRAUMATIC STRESS DISORDER): ICD-10-CM

## 2024-09-30 DIAGNOSIS — E11.649 TYPE 2 DIABETES MELLITUS WITH HYPOGLYCEMIA WITHOUT COMA, WITH LONG-TERM CURRENT USE OF INSULIN (HCC): ICD-10-CM

## 2024-09-30 RX ORDER — ZOLPIDEM TARTRATE 10 MG/1
10 TABLET ORAL DAILY
Qty: 30 TABLET | Refills: 0 | Status: SHIPPED | OUTPATIENT
Start: 2024-09-30

## 2024-10-01 ENCOUNTER — TELEPHONE (OUTPATIENT)
Age: 54
End: 2024-10-01

## 2024-10-01 DIAGNOSIS — E11.649 TYPE 2 DIABETES MELLITUS WITH HYPOGLYCEMIA WITHOUT COMA, WITH LONG-TERM CURRENT USE OF INSULIN (HCC): ICD-10-CM

## 2024-10-01 DIAGNOSIS — Z79.4 TYPE 2 DIABETES MELLITUS WITH HYPOGLYCEMIA WITHOUT COMA, WITH LONG-TERM CURRENT USE OF INSULIN (HCC): ICD-10-CM

## 2024-10-01 DIAGNOSIS — T78.40XS ALLERGY, SEQUELA: ICD-10-CM

## 2024-10-01 DIAGNOSIS — K21.9 GASTROESOPHAGEAL REFLUX DISEASE WITHOUT ESOPHAGITIS: ICD-10-CM

## 2024-10-01 RX ORDER — PANTOPRAZOLE SODIUM 40 MG/1
40 TABLET, DELAYED RELEASE ORAL 2 TIMES DAILY
Qty: 180 TABLET | Refills: 3 | Status: SHIPPED | OUTPATIENT
Start: 2024-10-01

## 2024-10-01 RX ORDER — FEXOFENADINE HCL 180 MG/1
180 TABLET ORAL DAILY
Qty: 90 TABLET | Refills: 3 | Status: SHIPPED | OUTPATIENT
Start: 2024-10-01

## 2024-10-01 RX ORDER — GABAPENTIN 400 MG/1
CAPSULE ORAL
Qty: 100 CAPSULE | Refills: 1 | Status: SHIPPED | OUTPATIENT
Start: 2024-10-01 | End: 2024-10-01 | Stop reason: SDUPTHER

## 2024-10-01 RX ORDER — GABAPENTIN 400 MG/1
CAPSULE ORAL
Qty: 100 CAPSULE | Refills: 1 | Status: SHIPPED | OUTPATIENT
Start: 2024-10-01 | End: 2024-10-01

## 2024-10-01 RX ORDER — GABAPENTIN 400 MG/1
CAPSULE ORAL
Qty: 540 CAPSULE | Refills: 1 | Status: SHIPPED | OUTPATIENT
Start: 2024-10-01

## 2024-10-01 NOTE — TELEPHONE ENCOUNTER
Drea from [facility] called.  States they are preparing pt's pill packets and need updated refills with appropriate quantities for the following medications:    Medication: Allegra 180mg     Dose/Frequency: 1 tablet by mouth daily    Quantity: 90    Pharmacy: [facility]    Office:   [x] PCP/Provider -   [] Speciality/Provider -     Does the patient have enough for 3 days?   [x] Yes   [] No - Send as HP to POD    ___________________    Medication: Pantoprazole 40mg     Dose/Frequency: 1 tablet by  mouth 2 times a day     Quantity: 180    Pharmacy: [facility]    Office:   [x] PCP/Provider -   [] Speciality/Provider -     Does the patient have enough for 3 days?   [x] Yes   [] No - Send as HP to POD    ___________________  Please double check sig for Gabapentin:      Medication: Gabapentin 400mg    Dose/Frequency: Take 2 capsules by mouth 3 times a day     Quantity: 540    Pharmacy: [facility]    Office:   [x] PCP/Provider -   [] Speciality/Provider -     Does the patient have enough for 3 days?   [x] Yes   [] No - Send as HP to POD

## 2024-10-03 ENCOUNTER — TELEPHONE (OUTPATIENT)
Age: 54
End: 2024-10-03

## 2024-10-03 NOTE — TELEPHONE ENCOUNTER
Patient called regarding diapers, a raised toilet seat with handles, bars for the tub, and bed rails.  She said she needs this stuff to help her function.  She said she  needs the diapers right away.    Please advise and notify.    Thank you.

## 2024-10-15 ENCOUNTER — TELEPHONE (OUTPATIENT)
Age: 54
End: 2024-10-15

## 2024-10-15 PROBLEM — N18.31 CKD STAGE 3A, GFR 45-59 ML/MIN (HCC): Status: ACTIVE | Noted: 2024-10-15

## 2024-10-15 NOTE — TELEPHONE ENCOUNTER
Patient requested to change her follow up appointment to a virtual because she has no ride to come in but doesn't want to cancel her appointment. Patient also stated that she may not have a ride for quite sometime from here on out

## 2024-10-17 ENCOUNTER — TELEPHONE (OUTPATIENT)
Age: 54
End: 2024-10-17

## 2024-10-17 NOTE — TELEPHONE ENCOUNTER
Patient having issues connecting to video, Unable to transfer to office without call dropping please advise.

## 2024-10-22 ENCOUNTER — TELEPHONE (OUTPATIENT)
Age: 54
End: 2024-10-22

## 2024-10-22 DIAGNOSIS — K21.9 GASTROESOPHAGEAL REFLUX DISEASE WITHOUT ESOPHAGITIS: ICD-10-CM

## 2024-10-22 RX ORDER — FAMOTIDINE 20 MG/1
20 TABLET, FILM COATED ORAL 2 TIMES DAILY PRN
Qty: 100 TABLET | Refills: 1 | Status: SHIPPED | OUTPATIENT
Start: 2024-10-22

## 2024-10-25 DIAGNOSIS — F43.10 PTSD (POST-TRAUMATIC STRESS DISORDER): ICD-10-CM

## 2024-10-25 RX ORDER — ZOLPIDEM TARTRATE 10 MG/1
10 TABLET ORAL DAILY
Qty: 30 TABLET | Refills: 0 | Status: SHIPPED | OUTPATIENT
Start: 2024-10-25

## 2024-10-26 DIAGNOSIS — J06.9 UPPER RESPIRATORY TRACT INFECTION, UNSPECIFIED TYPE: ICD-10-CM

## 2024-10-26 DIAGNOSIS — F41.9 ANXIETY: ICD-10-CM

## 2024-10-26 DIAGNOSIS — E53.1 VITAMIN B6 DEFICIENCY: ICD-10-CM

## 2024-10-28 RX ORDER — ALPRAZOLAM 1 MG/1
1 TABLET ORAL 4 TIMES DAILY PRN
Qty: 120 TABLET | Refills: 0 | Status: SHIPPED | OUTPATIENT
Start: 2024-10-28

## 2024-10-28 RX ORDER — GUAIFENESIN 600 MG/1
1200 TABLET, EXTENDED RELEASE ORAL 2 TIMES DAILY
Qty: 40 TABLET | Refills: 0 | Status: SHIPPED | OUTPATIENT
Start: 2024-10-28 | End: 2024-11-04

## 2024-10-29 DIAGNOSIS — F43.10 PTSD (POST-TRAUMATIC STRESS DISORDER): ICD-10-CM

## 2024-10-29 DIAGNOSIS — M06.9 RHEUMATOID ARTHRITIS INVOLVING BOTH HANDS, UNSPECIFIED WHETHER RHEUMATOID FACTOR PRESENT (HCC): ICD-10-CM

## 2024-10-29 NOTE — TELEPHONE ENCOUNTER
Reason for call:   [x] Refill   [] Prior Auth  [] Other:     Office:   [x] PCP/Provider -   [] Specialty/Provider -     Medication:   Leflunomide 20mg- take 1 tablet by mouth daily  Risperidone 3mg- take 1 tablet by mouth 2 times a day      Pharmacy: [facility]    Does the patient have enough for 3 days?   [x] Yes   [] No - Send as HP to POD

## 2024-10-30 ENCOUNTER — TELEPHONE (OUTPATIENT)
Age: 54
End: 2024-10-30

## 2024-10-30 RX ORDER — LEFLUNOMIDE 20 MG/1
20 TABLET ORAL DAILY
Qty: 90 TABLET | Refills: 0 | OUTPATIENT
Start: 2024-10-30

## 2024-10-30 RX ORDER — PYRIDOXINE HCL (VITAMIN B6) 25 MG
25 TABLET ORAL DAILY
Qty: 30 TABLET | Refills: 0 | Status: SHIPPED | OUTPATIENT
Start: 2024-10-30 | End: 2024-11-29

## 2024-10-30 RX ORDER — RISPERIDONE 3 MG/1
3 TABLET ORAL 2 TIMES DAILY
Qty: 180 TABLET | Refills: 0 | Status: SHIPPED | OUTPATIENT
Start: 2024-10-30

## 2024-10-30 NOTE — TELEPHONE ENCOUNTER
[facility] called stating they sent over script requests in Aug & Sept but no one has sent anything over.     Incontinent supplies including   Pull-ons and Underpads    Fax [phone]

## 2024-10-30 NOTE — TELEPHONE ENCOUNTER
I think I've filled this out?  She has been trying to get these for weeks.  Can we figure out what is going on?

## 2024-10-30 NOTE — TELEPHONE ENCOUNTER
Pharmacy called about leflunomide not being received. After reviewing seems pcp wants patient to call rheumatologist

## 2024-11-02 DIAGNOSIS — J06.9 UPPER RESPIRATORY TRACT INFECTION, UNSPECIFIED TYPE: ICD-10-CM

## 2024-11-04 DIAGNOSIS — E11.44 DIABETIC AMYOTROPHY ASSOCIATED WITH TYPE 2 DIABETES MELLITUS (HCC): ICD-10-CM

## 2024-11-04 DIAGNOSIS — E11.9 INSULIN DEPENDENT TYPE 2 DIABETES MELLITUS (HCC): ICD-10-CM

## 2024-11-04 DIAGNOSIS — Z79.4 INSULIN DEPENDENT TYPE 2 DIABETES MELLITUS (HCC): ICD-10-CM

## 2024-11-04 RX ORDER — GABAPENTIN 100 MG/1
CAPSULE ORAL
Qty: 90 CAPSULE | Refills: 3 | Status: SHIPPED | OUTPATIENT
Start: 2024-11-04

## 2024-11-04 RX ORDER — PROCHLORPERAZINE 25 MG/1
SUPPOSITORY RECTAL
Qty: 3 EACH | Refills: 6 | Status: SHIPPED | OUTPATIENT
Start: 2024-11-04

## 2024-11-04 RX ORDER — GUAIFENESIN 600 MG/1
1200 TABLET, EXTENDED RELEASE ORAL 2 TIMES DAILY
Qty: 40 TABLET | Refills: 0 | Status: SHIPPED | OUTPATIENT
Start: 2024-11-04 | End: 2024-11-14

## 2024-11-14 DIAGNOSIS — E87.6 LOW BLOOD POTASSIUM: ICD-10-CM

## 2024-11-14 RX ORDER — POTASSIUM CHLORIDE 750 MG/1
20 CAPSULE, EXTENDED RELEASE ORAL 2 TIMES DAILY
Qty: 90 CAPSULE | Refills: 3 | Status: SHIPPED | OUTPATIENT
Start: 2024-11-14

## 2024-11-19 DIAGNOSIS — F43.10 PTSD (POST-TRAUMATIC STRESS DISORDER): ICD-10-CM

## 2024-11-20 RX ORDER — ZOLPIDEM TARTRATE 10 MG/1
10 TABLET ORAL DAILY
Qty: 30 TABLET | Refills: 0 | Status: SHIPPED | OUTPATIENT
Start: 2024-11-20

## 2024-11-22 DIAGNOSIS — F41.9 ANXIETY: ICD-10-CM

## 2024-11-22 RX ORDER — ALPRAZOLAM 1 MG/1
1 TABLET ORAL 4 TIMES DAILY PRN
Qty: 120 TABLET | Refills: 0 | Status: SHIPPED | OUTPATIENT
Start: 2024-11-22

## 2024-11-25 DIAGNOSIS — E53.1 VITAMIN B6 DEFICIENCY: ICD-10-CM

## 2024-11-26 RX ORDER — PYRIDOXINE HCL (VITAMIN B6) 25 MG
25 TABLET ORAL DAILY
Qty: 30 TABLET | Refills: 0 | Status: SHIPPED | OUTPATIENT
Start: 2024-11-26

## 2024-12-02 ENCOUNTER — TELEPHONE (OUTPATIENT)
Age: 54
End: 2024-12-02

## 2024-12-02 NOTE — TELEPHONE ENCOUNTER
"Behavioral Health Outpatient Intake Questions    Referred By   : internal medicine    Please advise interviewee that they need to answer all questions truthfully to allow for best care, and any misrepresentations of information may affect their ability to be seen at this clinic   => Was this discussed? Yes     Behavioral Health Outpatient Intake History -     Presenting Problem (in patient's own words): Dx PTSD, anxiety, depression, bipolar    Are there any communication barriers for this patient?     No                                                 Are you taking any psychiatric medications? Yes     If \"YES\" -What are they  Zoloft, Buspar, Xanax     If \"YES\" -Who prescribes? PCP    Has the Patient previously received outpatient Talk Therapy or Medication Management from [facility]  No     Has the Patient abused alcohol or other substances in the last 6 months ? No  No concerns of substance abuse are reported.    Legal History-     Is this treatment court ordered? No     Has the Patient been convicted of a felony?  No    ACCEPTED as a patient Yes  If \"Yes\" Appointment Date: NP appt 3/10 at 11 am with [facility]  NP packet sent via [place]    Referred Elsewhere? No    Name of Insurance Co:[facility]   Insurance ID#[id number]  Insurance Phone #  If ins is primary or secondary? Primary  Verified via [employer].  "

## 2024-12-06 ENCOUNTER — TELEPHONE (OUTPATIENT)
Dept: NEUROLOGY | Facility: CLINIC | Age: 54
End: 2024-12-06

## 2024-12-06 DIAGNOSIS — E11.649 TYPE 2 DIABETES MELLITUS WITH HYPOGLYCEMIA WITHOUT COMA, WITH LONG-TERM CURRENT USE OF INSULIN (HCC): ICD-10-CM

## 2024-12-06 DIAGNOSIS — Z79.4 TYPE 2 DIABETES MELLITUS WITH HYPOGLYCEMIA WITHOUT COMA, WITH LONG-TERM CURRENT USE OF INSULIN (HCC): ICD-10-CM

## 2024-12-06 RX ORDER — INSULIN GLARGINE 100 [IU]/ML
INJECTION, SOLUTION SUBCUTANEOUS
Qty: 105 ML | Refills: 1 | Status: SHIPPED | OUTPATIENT
Start: 2024-12-06

## 2024-12-06 NOTE — TELEPHONE ENCOUNTER
Left voicemail for patient, notifying that Dr. Naranjo had placed an order back at the last office visit for an EEG and MRI. Noticed patient had not yet scheduled exam. Mentioned to patient on voicemail she could give central scheduling a call for assistance on scheduling exam. Provided telephone number.

## 2024-12-07 DIAGNOSIS — R25.1 TREMOR: ICD-10-CM

## 2024-12-09 RX ORDER — CARBIDOPA AND LEVODOPA 25; 100 MG/1; MG/1
1 TABLET ORAL 3 TIMES DAILY
Qty: 90 TABLET | Refills: 0 | Status: SHIPPED | OUTPATIENT
Start: 2024-12-09

## 2024-12-11 NOTE — TELEPHONE ENCOUNTER
Pt called in about MRI and EEG before seeing Dr. Naranjo. I canceled appt on 12/17 and pt is going to call [facility] to get testing and imaging scheduled.   She will call back to r/s her f/u appt.

## 2024-12-11 NOTE — TELEPHONE ENCOUNTER
Pt is scheduled for MRI and EEG on 1/17/25.    She is scheduled to see Dr Naranjo on 2/13/25 for follow up after the tests.

## 2024-12-16 DIAGNOSIS — Z79.4 TYPE 2 DIABETES MELLITUS WITH HYPOGLYCEMIA WITHOUT COMA, WITH LONG-TERM CURRENT USE OF INSULIN (HCC): ICD-10-CM

## 2024-12-16 DIAGNOSIS — E11.649 TYPE 2 DIABETES MELLITUS WITH HYPOGLYCEMIA WITHOUT COMA, WITH LONG-TERM CURRENT USE OF INSULIN (HCC): ICD-10-CM

## 2024-12-16 DIAGNOSIS — F43.10 PTSD (POST-TRAUMATIC STRESS DISORDER): ICD-10-CM

## 2024-12-17 DIAGNOSIS — F41.9 ANXIETY: ICD-10-CM

## 2024-12-17 RX ORDER — DULAGLUTIDE 1.5 MG/.5ML
INJECTION, SOLUTION SUBCUTANEOUS
Qty: 2 ML | Refills: 0 | Status: SHIPPED | OUTPATIENT
Start: 2024-12-17

## 2024-12-18 RX ORDER — ZOLPIDEM TARTRATE 10 MG/1
10 TABLET ORAL DAILY
Qty: 30 TABLET | Refills: 0 | Status: SHIPPED | OUTPATIENT
Start: 2024-12-18

## 2024-12-18 RX ORDER — ALPRAZOLAM 1 MG/1
1 TABLET ORAL 4 TIMES DAILY PRN
Qty: 120 TABLET | Refills: 0 | Status: SHIPPED | OUTPATIENT
Start: 2024-12-18

## 2024-12-19 DIAGNOSIS — F43.10 PTSD (POST-TRAUMATIC STRESS DISORDER): ICD-10-CM

## 2024-12-20 RX ORDER — RISPERIDONE 3 MG/1
3 TABLET ORAL 2 TIMES DAILY
Qty: 180 TABLET | Refills: 0 | Status: SHIPPED | OUTPATIENT
Start: 2024-12-20

## 2024-12-23 DIAGNOSIS — E53.1 VITAMIN B6 DEFICIENCY: ICD-10-CM

## 2024-12-24 PROBLEM — E04.1 THYROID NODULE: Status: ACTIVE | Noted: 2024-12-24

## 2024-12-25 RX ORDER — DIPHENHYDRAMINE HYDROCHLORIDE 25 MG/1
CAPSULE ORAL
Qty: 30 TABLET | Refills: 0 | Status: SHIPPED | OUTPATIENT
Start: 2024-12-25

## 2024-12-26 DIAGNOSIS — R25.1 TREMOR: Primary | ICD-10-CM

## 2025-01-03 DIAGNOSIS — R25.1 TREMOR: ICD-10-CM

## 2025-01-03 RX ORDER — CARBIDOPA AND LEVODOPA 25; 100 MG/1; MG/1
1 TABLET ORAL 3 TIMES DAILY
Qty: 90 TABLET | Refills: 3 | Status: SHIPPED | OUTPATIENT
Start: 2025-01-03

## 2025-01-09 DIAGNOSIS — Z79.4 TYPE 2 DIABETES MELLITUS WITH HYPOGLYCEMIA WITHOUT COMA, WITH LONG-TERM CURRENT USE OF INSULIN (HCC): ICD-10-CM

## 2025-01-09 DIAGNOSIS — E11.649 TYPE 2 DIABETES MELLITUS WITH HYPOGLYCEMIA WITHOUT COMA, WITH LONG-TERM CURRENT USE OF INSULIN (HCC): ICD-10-CM

## 2025-01-10 RX ORDER — DULAGLUTIDE 1.5 MG/.5ML
INJECTION, SOLUTION SUBCUTANEOUS
Qty: 2 ML | Refills: 0 | Status: SHIPPED | OUTPATIENT
Start: 2025-01-10

## 2025-01-12 NOTE — TELEPHONE ENCOUNTER
Voice mail message left by Doloris Lesch a nurse calling from [facility]  I was just calling about a mutual patient who's having surgery with us on the 6th and we need her health question or her clearance  Surgical clearance faxed over to us  Her name is [facility]  Meeks's date of birth is [birth year] and she's having surgery with us on the 6th if so  The 6th of July  So if you could please fax that over to us  My phone or my fax number is 610 on [phone]  Again that's [phone] and my telephone number is [phone] ext [phone] Again, this is about Alonzo Fry who's having surgery with us on the 6th and we need her health questionnaire or health clearance faxed over to us  Thank you and have a wonderful day  I returned her call to inquire about the form as I do not see one in the chart  Will wait to hear back from the nurse  No

## 2025-01-13 DIAGNOSIS — F41.9 ANXIETY: ICD-10-CM

## 2025-01-13 DIAGNOSIS — F43.10 PTSD (POST-TRAUMATIC STRESS DISORDER): ICD-10-CM

## 2025-01-14 ENCOUNTER — OFFICE VISIT (OUTPATIENT)
Age: 55
End: 2025-01-14
Payer: COMMERCIAL

## 2025-01-14 VITALS
BODY MASS INDEX: 26.33 KG/M2 | DIASTOLIC BLOOD PRESSURE: 62 MMHG | OXYGEN SATURATION: 99 % | HEIGHT: 65 IN | RESPIRATION RATE: 16 BRPM | SYSTOLIC BLOOD PRESSURE: 122 MMHG | HEART RATE: 68 BPM | WEIGHT: 158 LBS

## 2025-01-14 DIAGNOSIS — L65.9 HAIR LOSS: Primary | ICD-10-CM

## 2025-01-14 DIAGNOSIS — L98.9 LESION OF SKIN OF SCALP: ICD-10-CM

## 2025-01-14 DIAGNOSIS — R49.0 HOARSENESS OF VOICE: ICD-10-CM

## 2025-01-14 DIAGNOSIS — B37.9 YEAST INFECTION: ICD-10-CM

## 2025-01-14 PROCEDURE — 99214 OFFICE O/P EST MOD 30 MIN: CPT

## 2025-01-14 RX ORDER — ALPRAZOLAM 1 MG/1
1 TABLET ORAL 4 TIMES DAILY PRN
Qty: 120 TABLET | Refills: 0 | Status: SHIPPED | OUTPATIENT
Start: 2025-01-14

## 2025-01-14 RX ORDER — ZOLPIDEM TARTRATE 10 MG/1
10 TABLET ORAL DAILY
Qty: 30 TABLET | Refills: 0 | Status: SHIPPED | OUTPATIENT
Start: 2025-01-14

## 2025-01-14 RX ORDER — FLUCONAZOLE 150 MG/1
150 TABLET ORAL ONCE
Qty: 1 TABLET | Refills: 0 | Status: SHIPPED | OUTPATIENT
Start: 2025-01-14 | End: 2025-01-14

## 2025-01-14 NOTE — PROGRESS NOTES
"Name: Dahlia Kraus      : 1970      MRN: [id number]  Encounter Provider: María Waters PA-C  Encounter Date: 2025   Encounter department: [facility] [address]  :  Assessment & Plan  Hair loss  Unclear etiology to new onset of hair loss.  Will rule out any underlying conditions including thyroid or iron abnormalities.  Patient states she has a dermatologist, recommended she discuss this with them-has an appointment in March  Orders:    TSH, 3rd generation with Free T4 reflex; Future    Iron Panel (Includes Ferritin, Iron Sat%, Iron, and TIBC); Future    Lesion of skin of scalp  No active lesion right now.       Hoarseness of voice  Had ultrasound of thyroid on 2024 which showed multiple nodules that did not require follow-up/biopsy.  Will refer to ENT for further evaluation.  Orders:    Ambulatory Referral to Otolaryngology; Future    Yeast infection    Orders:    fluconazole (DIFLUCAN) 150 mg tablet; Take 1 tablet (150 mg total) by mouth once for 1 dose           History of Present Illness     Patient is 54 year old female presenting for an acute visit  3 months ago-noticed scabbing on her head, had itching and pain with it and then pulled the scab and her hair came out.  States this happens \"here and there\".  She has never had this happen before-states her mother was losing her hair when she was older as well.  She has never seen anyone for this issue.  States she just recently started to have dandruff.  States it is not actively occurring right now while she is in the office.    States she has a yeast infection-she knows she has 1 as she has gotten them numerous times in her life.    Additionally she mentions about her hoarseness of her voice.  She was recently seen by Rekha for this back in September.  She wants to know why it is still happening.      Review of Systems   Constitutional:  Negative for chills, fatigue and fever.   HENT:  Positive for voice " "change. Negative for ear discharge, ear pain, postnasal drip, rhinorrhea, sinus pressure, sinus pain, sore throat, tinnitus and trouble swallowing.    Eyes:  Negative for pain, discharge and itching.   Respiratory:  Negative for cough, shortness of breath and wheezing.    Cardiovascular:  Negative for chest pain, palpitations and leg swelling.   Gastrointestinal:  Negative for abdominal pain, constipation, diarrhea, nausea and vomiting.   Endocrine: Negative for polydipsia, polyphagia and polyuria.   Genitourinary:  Negative for difficulty urinating, frequency, hematuria and urgency.   Musculoskeletal:  Negative for arthralgias, joint swelling and myalgias.   Skin:  Negative for color change.        Itchy scalp/scabs on scalp   Allergic/Immunologic: Negative for environmental allergies.   Neurological:  Negative for dizziness, weakness, light-headedness, numbness and headaches.   Hematological:  Negative for adenopathy.   Psychiatric/Behavioral:  Negative for decreased concentration and sleep disturbance. The patient is not nervous/anxious.        Objective   /62 (BP Location: Left arm, Patient Position: Sitting, Cuff Size: Standard)   Pulse 68   Resp 16   Ht 5' 5\" (1.651 m)   Wt 71.7 kg (158 lb)   SpO2 99%   BMI 26.29 kg/m²      Physical Exam  Vitals and nursing note reviewed.   Constitutional:       General: She is awake. She is not in acute distress.     Appearance: Normal appearance. She is well-developed, well-groomed and normal weight.   HENT:      Head: Normocephalic and atraumatic.      Right Ear: Hearing and external ear normal.      Left Ear: Hearing and external ear normal.      Nose: Nose normal.      Mouth/Throat:      Lips: Pink.      Mouth: Mucous membranes are moist.   Eyes:      General: Lids are normal. Vision grossly intact. Gaze aligned appropriately.      Conjunctiva/sclera: Conjunctivae normal.   Neck:      Vascular: No carotid bruit.      Trachea: Trachea and phonation normal. "   Cardiovascular:      Rate and Rhythm: Normal rate and regular rhythm.      Heart sounds: Normal heart sounds, S1 normal and S2 normal. No murmur heard.     No friction rub. No gallop.   Pulmonary:      Effort: Pulmonary effort is normal. No respiratory distress.      Breath sounds: Normal breath sounds and air entry. No decreased breath sounds, wheezing, rhonchi or rales.   Abdominal:      General: Abdomen is flat. Bowel sounds are normal.      Palpations: Abdomen is soft.      Tenderness: There is no abdominal tenderness.   Musculoskeletal:         General: No swelling.      Cervical back: Neck supple.      Right lower leg: No edema.      Left lower leg: No edema.   Skin:     General: Skin is warm.      Capillary Refill: Capillary refill takes less than 2 seconds.      Comments: No visible lesions on scalp.  Signs of potential dandruff.  Patient brought in a bag of her hair with scab on the end of it.   Neurological:      Mental Status: She is alert.   Psychiatric:         Attention and Perception: Attention and perception normal.         Mood and Affect: Mood and affect normal.         Speech: Speech normal.         Behavior: Behavior normal. Behavior is cooperative.         Thought Content: Thought content normal.         Cognition and Memory: Cognition and memory normal.         Judgment: Judgment normal.

## 2025-01-17 ENCOUNTER — LAB (OUTPATIENT)
Dept: LAB | Facility: HOSPITAL | Age: 55
End: 2025-01-17
Payer: COMMERCIAL

## 2025-01-17 ENCOUNTER — DOCUMENTATION (OUTPATIENT)
Age: 55
End: 2025-01-17

## 2025-01-17 ENCOUNTER — HOSPITAL ENCOUNTER (OUTPATIENT)
Dept: NEUROLOGY | Facility: HOSPITAL | Age: 55
End: 2025-01-17
Payer: COMMERCIAL

## 2025-01-17 ENCOUNTER — HOSPITAL ENCOUNTER (OUTPATIENT)
Dept: MRI IMAGING | Facility: HOSPITAL | Age: 55
End: 2025-01-17
Payer: COMMERCIAL

## 2025-01-17 ENCOUNTER — TELEPHONE (OUTPATIENT)
Dept: NEUROLOGY | Facility: CLINIC | Age: 55
End: 2025-01-17

## 2025-01-17 DIAGNOSIS — R25.1 TREMOR: ICD-10-CM

## 2025-01-17 DIAGNOSIS — L65.9 HAIR LOSS: Primary | ICD-10-CM

## 2025-01-17 DIAGNOSIS — R56.9 SEIZURE (HCC): ICD-10-CM

## 2025-01-17 LAB
ANION GAP SERPL CALCULATED.3IONS-SCNC: 9 MMOL/L (ref 4–13)
BUN SERPL-MCNC: 18 MG/DL (ref 5–25)
CALCIUM SERPL-MCNC: 9.2 MG/DL (ref 8.4–10.2)
CHLORIDE SERPL-SCNC: 97 MMOL/L (ref 96–108)
CO2 SERPL-SCNC: 27 MMOL/L (ref 21–32)
CREAT SERPL-MCNC: 1.42 MG/DL (ref 0.6–1.3)
FERRITIN SERPL-MCNC: 53 NG/ML (ref 11–307)
GFR SERPL CREATININE-BSD FRML MDRD: 41 ML/MIN/1.73SQ M
GLUCOSE P FAST SERPL-MCNC: 413 MG/DL (ref 65–99)
IRON SATN MFR SERPL: 18 % (ref 15–50)
IRON SERPL-MCNC: 57 UG/DL (ref 50–212)
POTASSIUM SERPL-SCNC: 4.6 MMOL/L (ref 3.5–5.3)
SODIUM SERPL-SCNC: 133 MMOL/L (ref 135–147)
TIBC SERPL-MCNC: 316.4 UG/DL (ref 250–450)
TRANSFERRIN SERPL-MCNC: 226 MG/DL (ref 203–362)
TSH SERPL DL<=0.05 MIU/L-ACNC: 1.68 UIU/ML (ref 0.45–4.5)
UIBC SERPL-MCNC: 259 UG/DL (ref 155–355)

## 2025-01-17 PROCEDURE — 83540 ASSAY OF IRON: CPT

## 2025-01-17 PROCEDURE — 80048 BASIC METABOLIC PNL TOTAL CA: CPT

## 2025-01-17 PROCEDURE — 36415 COLL VENOUS BLD VENIPUNCTURE: CPT

## 2025-01-17 PROCEDURE — 95816 EEG AWAKE AND DROWSY: CPT

## 2025-01-17 PROCEDURE — 84443 ASSAY THYROID STIM HORMONE: CPT

## 2025-01-17 PROCEDURE — 82728 ASSAY OF FERRITIN: CPT

## 2025-01-17 PROCEDURE — 95816 EEG AWAKE AND DROWSY: CPT | Performed by: STUDENT IN AN ORGANIZED HEALTH CARE EDUCATION/TRAINING PROGRAM

## 2025-01-17 PROCEDURE — 83550 IRON BINDING TEST: CPT

## 2025-01-17 NOTE — TELEPHONE ENCOUNTER
This writer was contacted in regards to a critical lab result of elevated blood glucose of 413 on a BMP that resulted. This writer attempted to call the patient x2 without success. This writer also contacted the patient’s primary care provider with this information and received acknowledgment.

## 2025-01-20 DIAGNOSIS — E53.1 VITAMIN B6 DEFICIENCY: ICD-10-CM

## 2025-01-20 RX ORDER — DIPHENHYDRAMINE HYDROCHLORIDE 25 MG/1
CAPSULE ORAL
Qty: 30 TABLET | Refills: 0 | OUTPATIENT
Start: 2025-01-20

## 2025-01-21 ENCOUNTER — APPOINTMENT (OUTPATIENT)
Dept: RADIOLOGY | Facility: CLINIC | Age: 55
End: 2025-01-21
Payer: COMMERCIAL

## 2025-01-21 ENCOUNTER — OFFICE VISIT (OUTPATIENT)
Dept: URGENT CARE | Facility: CLINIC | Age: 55
End: 2025-01-21
Payer: COMMERCIAL

## 2025-01-21 VITALS
HEIGHT: 65 IN | RESPIRATION RATE: 20 BRPM | BODY MASS INDEX: 25.83 KG/M2 | OXYGEN SATURATION: 97 % | DIASTOLIC BLOOD PRESSURE: 72 MMHG | WEIGHT: 155 LBS | TEMPERATURE: 96.2 F | SYSTOLIC BLOOD PRESSURE: 101 MMHG | HEART RATE: 77 BPM

## 2025-01-21 DIAGNOSIS — S29.011A INTERCOSTAL MUSCLE STRAIN, INITIAL ENCOUNTER: Primary | ICD-10-CM

## 2025-01-21 DIAGNOSIS — S29.9XXA RIB INJURY: ICD-10-CM

## 2025-01-21 PROCEDURE — 99213 OFFICE O/P EST LOW 20 MIN: CPT | Performed by: FAMILY MEDICINE

## 2025-01-21 PROCEDURE — 71101 X-RAY EXAM UNILAT RIBS/CHEST: CPT

## 2025-01-21 RX ORDER — LIDOCAINE 4 G/G
1 PATCH TOPICAL DAILY
Qty: 10 PATCH | Refills: 0 | Status: SHIPPED | OUTPATIENT
Start: 2025-01-21

## 2025-01-21 NOTE — TELEPHONE ENCOUNTER
- Repeat Vitamin B6 to be obtained during next office visit before restarting supplementation.    [facility] PACU then home

## 2025-01-21 NOTE — PROGRESS NOTES
[facility]        NAME: Dahlia Kraus is a 54 y.o. female  : 1970    MRN: [id number]  DATE: 2025  TIME: 10:15 AM    Assessment and Plan   Intercostal muscle strain, initial encounter [S29.011A]  1. Intercostal muscle strain, initial encounter  XR ribs left w pa chest min 3 views    Lidocaine 4 % PTCH        No obvious rib fractures on x-ray; official read pending.  Likely experiencing an intercostal muscle strain.  Ace wrap to the ribs and lidocaine patches prescribed.    Patient Instructions     Follow up with PCP in 3-5 days.  Proceed to  ER if symptoms worsen.    If tests have been performed at [facility], our office will contact you with results if changes need to be made to the care plan discussed with you at the visit.  You can review your full [facility] on [facility].    Chief Complaint     Chief Complaint   Patient presents with    Fall     Fall  could not get up x 7 hrs injured r head and ribs on left ribs. Fall yesterday x 2 head and ribs again. Denies losing conscientious with either fall.          History of Present Illness       54-year-old female with pertinent history of DM 2, and relatively new essential tremors and lower extremity weakness making her a fall risk.  She presents today having fallen 3 times over the past 2 days.  2 nights ago, she fell due to lower extremity weakness and was on the ground for 7 hours unable to get back up.  Has a life alert which was being charged and her cell phone was not within reach.  Eventually she was able to get herself up by the following morning.  Again later that evening she fell twice while attempting to get out the bathroom.  Attributes the falls to lower extremity weakness and her tremors.  Admits to hitting her head during the falls, but denies any LOC, altered mentation, headaches or visual disturbances.  Of note, has had a complicated medical history involving CVA, MI, intracranial hemorrhage, thyroid  "disorder among other things.    Fall  Pertinent negatives include no abdominal pain, fever, headaches or nausea.       Review of Systems   Review of Systems   Constitutional:  Negative for chills and fever.   Respiratory:  Positive for shortness of breath (Left posterior rib pain with deep respirations). Negative for cough.    Cardiovascular:  Negative for chest pain.   Gastrointestinal:  Negative for abdominal pain and nausea.   Neurological:  Positive for weakness. Negative for dizziness, syncope and headaches.         Current Medications       Current Outpatient Medications:     Alcohol Swabs (Pharmacist Choice Alcohol) PADS, 5 (five) times a day, Disp: , Rfl:     ALPRAZolam (XANAX) 1 mg tablet, TAKE 1 TABLET (1 MG TOTAL) BY MOUTH 4 (FOUR) TIMES A DAY AS NEEDED FOR ANXIETY, Disp: 120 tablet, Rfl: 0    ammonium lactate (LAC-HYDRIN) 12 % lotion, APPLY TOPICALLY 2 (TWO) TIMES A DAY AS NEEDED FOR DRY SKIN, Disp: 227 g, Rfl: 3    atorvastatin (LIPITOR) 10 mg tablet, Take 10 mg by mouth daily, Disp: , Rfl:     BD Pen Needle Nkechi 2nd Gen 32G X 4 MM MISC, USE AS DIRECTED FOR BEFORE A MEAL AND AT BEDTIME GLUCOSE INJECTION, Disp: , Rfl:     BD Pen Needle Nkechi 2nd Gen 32G X 4 MM MISC, USE WITH INSULIN 5 TIMES A DAY, Disp: 200 each, Rfl: 3    BD PrecisionGlide Needle 23G X 1-1/2\" MISC, USE AS DIRECTED TO ADMINISTER NALOXONE INJECTION.  MAY DISPENSE 23-25 GAUGE 1-1.5\" NEEDLE., Disp: , Rfl:     Blood Glucose Monitoring Suppl (ONE TOUCH ULTRA 2) w/Device KIT, Use as directed, Disp: , Rfl:     busPIRone (BUSPAR) 30 MG tablet, Take 1 tablet (30 mg total) by mouth 2 (two) times a day, Disp: 200 tablet, Rfl: 1    carbidopa-levodopa (SINEMET)  mg per tablet, TAKE 1 TABLET BY MOUTH 3 (THREE) TIMES A DAY, Disp: 90 tablet, Rfl: 3    Continuous Blood Gluc  (Dexcom G6 ) ALEKS, USE 1 DEVICE 4 (FOUR) TIMES A DAY (WITH MEALS AND AT BEDTIME), Disp: 1 each, Rfl: 6    Continuous Blood Gluc Transmit (Dexcom G6 Transmitter) " MISC, USE 4 TIMES A DAY (WITH MEALS AND AT BEDTIME), Disp: 1 each, Rfl: 3    Continuous Glucose Sensor (Dexcom G6 Sensor) MISC, CHANGE SENSOR EVERY 10 DAYS, Disp: 3 each, Rfl: 6    EPINEPHrine (EPIPEN) 0.3 mg/0.3 mL SOAJ, Inject 0.3 mL (0.3 mg total) into a muscle once for 1 dose, Disp: 0.6 mL, Rfl: 0    famotidine (PEPCID) 20 mg tablet, TAKE 1 TABLET (20 MG TOTAL) BY MOUTH 2 (TWO) TIMES A DAY AS NEEDED FOR INDIGESTION, Disp: 100 tablet, Rfl: 1    fexofenadine (ALLEGRA) 180 MG tablet, Take 1 tablet (180 mg total) by mouth daily, Disp: 90 tablet, Rfl: 3    fluticasone (Flovent Diskus) 250 mcg/actuation diskus inhaler, Inhale 1-2 puffs 2 (two) times a day Rinse mouth after use., Disp: 60 each, Rfl: 1    gabapentin (NEURONTIN) 100 mg capsule, TAKE 1 CAPSULE (100 MG TOTAL) BY MOUTH DAILY AT BEDTIME TAKE ADDITIONAL 800 MG CAPSULE TO TOTAL 900 MG AT BEDTIME, Disp: 90 capsule, Rfl: 3    gabapentin (NEURONTIN) 400 mg capsule, TAKE 2 CAPSULES (400 MG TOTAL) BY MOUTH 3 (THREE) TIMES A DAY, Disp: 540 capsule, Rfl: 1    Incontinence Supply Disposable (Depend Undergarment Ex Absorb) MISC, Use 4 (four) times a day as needed (incontinence), Disp: 120 each, Rfl: 2    insulin lispro (HumaLOG) 100 units/mL injection pen, INJECT 20 UNITS UNDER THE SKIN 3 (THREE) TIMES A DAY BEFORE MEALS INDICATIONS: TYPE 2 DIABETES MELLITUS., Disp: 15 mL, Rfl: 3    Lantus SoloStar 100 units/mL SOPN, INJECT 55 UNITS UNDER THE SKIN 2 (TWO) TIMES A DAY AT LUNCH AND AT BEDTIME., Disp: 105 mL, Rfl: 1    leflunomide (ARAVA) 20 MG tablet, Take 1 tablet (20 mg total) by mouth daily, Disp: 90 tablet, Rfl: 2    Lidocaine 4 % PTCH, Apply 1 patch topically in the morning, Disp: 10 patch, Rfl: 0    lisinopril (ZESTRIL) 10 mg tablet, Take 1 tablet (10 mg total) by mouth daily, Disp: 90 tablet, Rfl: 2    lovastatin (MEVACOR) 10 MG tablet, Take 10 mg by mouth, Disp: , Rfl:     metFORMIN (GLUCOPHAGE-XR) 750 mg 24 hr tablet, TAKE 1 TABLET (750 MG TOTAL) BY MOUTH 2  (TWO) TIMES A DAY WITH MEALS., Disp: , Rfl:     metoprolol succinate (TOPROL-XL) 100 mg 24 hr tablet, Take 100 mg by mouth daily, Disp: , Rfl:     montelukast (SINGULAIR) 10 mg tablet, Take 1 tablet (10 mg total) by mouth daily at bedtime, Disp: 100 tablet, Rfl: 1    Nutritional Supplements (Glucerna Hunger Smart Shake) LIQD, Take 1 Can by mouth 3 (three) times a day, Disp: 296 mL, Rfl: 5    OneTouch Delica Lancets 33G MISC, Inject under the skin 2 (two) times a day, Disp: 100 each, Rfl: 5    OneTouch Ultra test strip, TEST TWICE DAILY OR AS DIRECTED BY MD, Disp: , Rfl:     Oral Electrolytes (PEDIALYTE SINGLES PO), Take 8 oz by mouth if needed, Disp: , Rfl:     oxyCODONE (ROXICODONE) 5 immediate release tablet, 10 mg pt reports she takes this 4 times per day but is out of the medication currently, Disp: , Rfl:     pantoprazole (PROTONIX) 40 mg tablet, Take 1 tablet (40 mg total) by mouth 2 (two) times a day, Disp: 90 tablet, Rfl: 3    pantoprazole (PROTONIX) 40 mg tablet, Take 1 tablet (40 mg total) by mouth 2 (two) times a day, Disp: 180 tablet, Rfl: 3    potassium chloride (K-DUR,KLOR-CON) 20 mEq tablet, Take 1 tablet (20 mEq total) by mouth daily, Disp: 30 tablet, Rfl: 1    prazosin (MINIPRESS) 5 mg capsule, TAKE 1 CAPSULE (5 MG TOTAL) BY MOUTH DAILY AT BEDTIME, Disp: 90 capsule, Rfl: 3    Pyridoxine HCl (vitamin B-6) 25 MG tablet, TAKE 1 TABLET (25 MG TOTAL) BY MOUTH DAILY, Disp: 30 tablet, Rfl: 0    risperiDONE (RisperDAL) 3 mg tablet, TAKE 1 TABLET (3 MG TOTAL) BY MOUTH 2 (TWO) TIMES A DAY, Disp: 180 tablet, Rfl: 0    rivaroxaban (XARELTO) 20 mg tablet, Take 20 mg by mouth, Disp: , Rfl:     sertraline (ZOLOFT) 100 mg tablet, Take 1 tablet (100 mg total) by mouth daily, Disp: 90 tablet, Rfl: 3    Spacer/Aero-Holding Chambers (AeroChamber Plus Geoff-Vu w/Mask) MISC, Inhale daily at bedtime as needed (wheezing), Disp: 1 each, Rfl: 0    trospium chloride (SANCTURA) 20 mg tablet, Take 1 tablet (20 mg total) by mouth  2 (two) times a day, Disp: 180 tablet, Rfl: 3    Trulicity 1.5 MG/0.5ML SOAJ, INJECT 1.5 MG UNDER THE SKIN EVERY 7 DAYS., Disp: 2 mL, Rfl: 0    zolpidem (AMBIEN) 10 mg tablet, TAKE 1 TABLET (10 MG TOTAL) BY MOUTH DAILY, Disp: 30 tablet, Rfl: 0    albuterol (PROVENTIL HFA,VENTOLIN HFA) 90 mcg/act inhaler, Inhale 2 puffs every 4 (four) hours as needed (Patient not taking: Reported on 1/21/2025), Disp: , Rfl:     hydrALAZINE (APRESOLINE) 50 mg tablet, Take 50 mg by mouth 3 (three) times a day, Disp: , Rfl:     Mirabegron ER (Myrbetriq) 25 MG TB24, TAKE 25 MG BY MOUTH IN THE MORNING (Patient not taking: Reported on 1/21/2025), Disp: 90 tablet, Rfl: 3    naloxone (NARCAN) 0.4 mg/mL injection, , Disp: , Rfl:     ondansetron (ZOFRAN-ODT) 4 mg disintegrating tablet, TAKE 1 TABLET (4 MG TOTAL) BY MOUTH EVERY 6 (SIX) HOURS AS NEEDED FOR NAUSEA (Patient not taking: Reported on 1/21/2025), Disp: 10 tablet, Rfl: 0    Oral Electrolytes (Pedialyte) PACK, Take 237 mL by mouth 2 (two) times a day (Patient not taking: Reported on 1/14/2025), Disp: , Rfl:     polyethylene glycol (GOLYTELY) 4000 mL solution, Take 4,000 mL by mouth once for 1 dose (Patient not taking: Reported on 8/29/2024), Disp: 4000 mL, Rfl: 0    polyethylene glycol (GOLYTELY) 4000 mL solution, Take 4,000 mL by mouth once for 1 dose, Disp: 4000 mL, Rfl: 0    potassium chloride (MICRO-K) 10 MEQ CR capsule, TAKE 2 CAPSULES (20 MEQ TOTAL) BY MOUTH 2 (TWO) TIMES A DAY (Patient not taking: Reported on 1/21/2025), Disp: 90 capsule, Rfl: 3    Varenicline Tartrate, Starter, 0.5 MG X 11 & 1 MG X 42 TBPK, 0.5 MG ONCE DAILY FOR 3 DAYS THEN 0.5MG TWICE DAILY FOR DAYS 4-7 THEN 1 MG TWICE DAILY (Patient not taking: Reported on 1/21/2025), Disp: 53 each, Rfl: 0    Current Allergies     Allergies as of 01/21/2025 - Reviewed 01/21/2025   Allergen Reaction Noted    Other Anaphylaxis 11/28/2017    Clarithromycin GI Intolerance 12/31/2020    Acetaminophen GI Intolerance 11/28/2017     Bactrim [sulfamethoxazole-trimethoprim] Itching, GI Intolerance, Dizziness, Confusion, and Lightheadedness 2022    Cephalosporins Hives 2017    Latex Hives 2017    Oxycodone-acetaminophen GI Intolerance 2017    Penicillins Diarrhea 2017    Trazodone Diarrhea 2017    Capsaicin Rash 2020    Naproxen Palpitations 2017            The following portions of the patient's history were reviewed and updated as appropriate: allergies, current medications, past family history, past medical history, past social history, past surgical history and problem list.     Past Medical History:   Diagnosis Date    Abnormal ECG     Abnormal Pap smear of cervix     Allergic     Anemia     Anxiety     Asthma     Atrial fibrillation (HCC)     Bursitis of left hip 10/28/2014    Chlamydia     Chronic kidney disease     Coronary artery disease     COVID-19 2020    Depression     Diabetes mellitus (HCC)     Fibromyalgia     GERD (gastroesophageal reflux disease)     Headache(784.0)     Heart disease     Heart murmur     History of transfusion     Hypertension     Inflammatory bowel disease     Migraines     Mixed simple and mucopurulent chronic bronchitis (HCC) 2019    Myocardial infarction (HCC)     Neuromuscular disorder (HCC)     Opioid abuse, in remission (HCC)     Otitis media     Pneumonia     Scoliosis     Seizures (HCC)     Shingles     Sick sinus syndrome (HCC)     Stroke (HCC)     Urinary tract infection     Varicella     Visual impairment        Past Surgical History:   Procedure Laterality Date    APPENDECTOMY      CATARACT EXTRACTION Right 2023     SECTION      EGD AND COLONOSCOPY      EYE SURGERY Left 2023    FRACTURE SURGERY      HIP SURGERY Right     HYSTERECTOMY      TUBAL LIGATION         Family History   Problem Relation Age of Onset    Asthma Mother     Cancer Mother         Bladder Cancer    Diabetes Mother     Heart disease Mother     Hypertension  "Mother     Stroke Mother     Mental illness Mother     Depression Mother     COPD Mother     Arthritis Mother     Hearing loss Mother     Vision loss Mother     Glaucoma Mother     Anxiety disorder Mother     Psychiatric Illness Mother     Asthma Father     Diabetes Father     Heart disease Father     Hypertension Father     Arthritis Father     Mental illness Daughter     Depression Daughter     Cancer Maternal Grandfather     Heart disease Maternal Grandfather     Arthritis Maternal Grandfather     Cancer Paternal Grandmother     Diabetes Paternal Grandmother     Arthritis Paternal Grandfather     Asthma Brother     Diabetes Brother     Heart disease Brother     Hypertension Brother     Mental illness Brother     Arthritis Brother     Hypertension Brother     Psychiatric Illness Brother     Mental illness Maternal Aunt     Mental illness Maternal Aunt     Asthma Paternal Aunt     ADD / ADHD Cousin     ADD / ADHD Cousin          Medications have been verified.        Objective   /72 (Patient Position: Sitting)   Pulse 77   Temp (!) 96.2 °F (35.7 °C)   Resp 20   Ht 5' 5\" (1.651 m)   Wt 70.3 kg (155 lb)   SpO2 97%   BMI 25.79 kg/m²   No LMP recorded. Patient has had a hysterectomy.       Physical Exam     Physical Exam  Vitals and nursing note reviewed.   Constitutional:       General: She is in acute distress.      Appearance: Normal appearance. She is normal weight. She is not ill-appearing, toxic-appearing or diaphoretic.   HENT:      Head: Normocephalic and atraumatic.   Eyes:      General:         Right eye: No discharge.         Left eye: No discharge.      Conjunctiva/sclera: Conjunctivae normal.   Cardiovascular:      Rate and Rhythm: Normal rate and regular rhythm.   Pulmonary:      Effort: Respiratory distress present.      Breath sounds: Normal breath sounds. No wheezing, rhonchi or rales.   Chest:      Chest wall: Tenderness (Severe tenderness to palpation of the left lower ribs along the mid " axillary line.) present.   Musculoskeletal:         General: Tenderness present.   Skin:     General: Skin is warm.      Findings: No erythema.   Neurological:      General: No focal deficit present.      Mental Status: She is alert and oriented to person, place, and time.   Psychiatric:         Mood and Affect: Mood normal.         Behavior: Behavior normal.         Thought Content: Thought content normal.         Judgment: Judgment normal.

## 2025-01-22 ENCOUNTER — TELEPHONE (OUTPATIENT)
Age: 55
End: 2025-01-22

## 2025-01-22 DIAGNOSIS — E83.110 HEREDITARY HEMOCHROMATOSIS (HCC): Primary | ICD-10-CM

## 2025-01-22 NOTE — TELEPHONE ENCOUNTER
Patient called, she is requesting a referral to see  Hematology - Oncology. She is switching from [facility] to .     She is asking if she could see, Sara Greco PA-C at [facility]. P: [phone].      Patient would like a call once the referral is placed.

## 2025-01-25 DIAGNOSIS — E87.6 LOW BLOOD POTASSIUM: ICD-10-CM

## 2025-01-27 ENCOUNTER — APPOINTMENT (EMERGENCY)
Dept: RADIOLOGY | Facility: HOSPITAL | Age: 55
End: 2025-01-27
Payer: COMMERCIAL

## 2025-01-27 ENCOUNTER — HOSPITAL ENCOUNTER (EMERGENCY)
Facility: HOSPITAL | Age: 55
Discharge: HOME | End: 2025-01-27
Attending: EMERGENCY MEDICINE
Payer: COMMERCIAL

## 2025-01-27 VITALS
DIASTOLIC BLOOD PRESSURE: 91 MMHG | SYSTOLIC BLOOD PRESSURE: 157 MMHG | OXYGEN SATURATION: 96 % | HEART RATE: 84 BPM | RESPIRATION RATE: 15 BRPM | TEMPERATURE: 98.6 F

## 2025-01-27 DIAGNOSIS — S22.32XA CLOSED FRACTURE OF ONE RIB OF LEFT SIDE, INITIAL ENCOUNTER: Primary | ICD-10-CM

## 2025-01-27 DIAGNOSIS — R07.89 LEFT-SIDED CHEST WALL PAIN: ICD-10-CM
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
## 2025-01-27 LAB
ALBUMIN SERPL BCG-MCNC: 3.8 G/DL (ref 3.5–5)
ALP SERPL-CCNC: 72 U/L (ref 34–104)
ALT SERPL W P-5'-P-CCNC: 4 U/L (ref 7–52)
ANION GAP SERPL CALCULATED.3IONS-SCNC: 10 MMOL/L (ref 4–13)
APTT PPP: 30 SECONDS (ref 23–34)
AST SERPL W P-5'-P-CCNC: 15 U/L (ref 13–39)
BASOPHILS # BLD AUTO: 0.07 THOUSANDS/ΜL (ref 0–0.1)
BASOPHILS NFR BLD AUTO: 1 % (ref 0–1)
BILIRUB SERPL-MCNC: 0.37 MG/DL (ref 0.2–1)
BUN SERPL-MCNC: 14 MG/DL (ref 5–25)
CALCIUM SERPL-MCNC: 8.5 MG/DL (ref 8.4–10.2)
CHLORIDE SERPL-SCNC: 104 MMOL/L (ref 96–108)
CO2 SERPL-SCNC: 19 MMOL/L (ref 21–32)
CREAT SERPL-MCNC: 1.11 MG/DL (ref 0.6–1.3)
EOSINOPHIL # BLD AUTO: 0.19 THOUSAND/ΜL (ref 0–0.61)
EOSINOPHIL NFR BLD AUTO: 2 % (ref 0–6)
ERYTHROCYTE [DISTWIDTH] IN BLOOD BY AUTOMATED COUNT: 13.6 % (ref 11.6–15.1)
GFR SERPL CREATININE-BSD FRML MDRD: 55 ML/MIN/1.73SQ M
GLUCOSE SERPL-MCNC: 390 MG/DL (ref 65–140)
HCT VFR BLD AUTO: 44.5 % (ref 34.8–46.1)
HGB BLD-MCNC: 14.4 G/DL (ref 11.5–15.4)
IMM GRANULOCYTES # BLD AUTO: 0.04 THOUSAND/UL (ref 0–0.2)
IMM GRANULOCYTES NFR BLD AUTO: 0 % (ref 0–2)
INR PPP: 0.99 (ref 0.85–1.19)
LYMPHOCYTES # BLD AUTO: 2.33 THOUSANDS/ΜL (ref 0.6–4.47)
LYMPHOCYTES NFR BLD AUTO: 20 % (ref 14–44)
MCH RBC QN AUTO: 30.5 PG (ref 26.8–34.3)
MCHC RBC AUTO-ENTMCNC: 32.4 G/DL (ref 31.4–37.4)
MCV RBC AUTO: 94 FL (ref 82–98)
MONOCYTES # BLD AUTO: 0.69 THOUSAND/ΜL (ref 0.17–1.22)
MONOCYTES NFR BLD AUTO: 6 % (ref 4–12)
NEUTROPHILS # BLD AUTO: 8.53 THOUSANDS/ΜL (ref 1.85–7.62)
NEUTS SEG NFR BLD AUTO: 71 % (ref 43–75)
NRBC BLD AUTO-RTO: 0 /100 WBCS
PLATELET # BLD AUTO: 221 THOUSANDS/UL (ref 149–390)
PMV BLD AUTO: 12.4 FL (ref 8.9–12.7)
POTASSIUM SERPL-SCNC: 4.7 MMOL/L (ref 3.5–5.3)
PROT SERPL-MCNC: 6.8 G/DL (ref 6.4–8.4)
PROTHROMBIN TIME: 13.6 SECONDS (ref 12.3–15)
RBC # BLD AUTO: 4.72 MILLION/UL (ref 3.81–5.12)
SODIUM SERPL-SCNC: 133 MMOL/L (ref 135–147)
WBC # BLD AUTO: 11.85 THOUSAND/UL (ref 4.31–10.16)

## 2025-01-27 PROCEDURE — 99284 EMERGENCY DEPT VISIT MOD MDM: CPT

## 2025-01-27 PROCEDURE — 99285 EMERGENCY DEPT VISIT HI MDM: CPT | Performed by: EMERGENCY MEDICINE

## 2025-01-27 PROCEDURE — 70450 CT HEAD/BRAIN W/O DYE: CPT

## 2025-01-27 PROCEDURE — 85610 PROTHROMBIN TIME: CPT | Performed by: EMERGENCY MEDICINE

## 2025-01-27 PROCEDURE — 72125 CT NECK SPINE W/O DYE: CPT

## 2025-01-27 PROCEDURE — 85730 THROMBOPLASTIN TIME PARTIAL: CPT | Performed by: EMERGENCY MEDICINE

## 2025-01-27 PROCEDURE — 71260 CT THORAX DX C+: CPT

## 2025-01-27 PROCEDURE — 36415 COLL VENOUS BLD VENIPUNCTURE: CPT | Performed by: EMERGENCY MEDICINE

## 2025-01-27 PROCEDURE — 74177 CT ABD & PELVIS W/CONTRAST: CPT

## 2025-01-27 PROCEDURE — 80053 COMPREHEN METABOLIC PANEL: CPT | Performed by: EMERGENCY MEDICINE

## 2025-01-27 PROCEDURE — 96374 THER/PROPH/DIAG INJ IV PUSH: CPT

## 2025-01-27 PROCEDURE — 85025 COMPLETE CBC W/AUTO DIFF WBC: CPT | Performed by: EMERGENCY MEDICINE

## 2025-01-27 RX ORDER — METHOCARBAMOL 500 MG/1
500 TABLET, FILM COATED ORAL 2 TIMES DAILY
Qty: 20 TABLET | Refills: 0 | Status: SHIPPED | OUTPATIENT
Start: 2025-01-27

## 2025-01-27 RX ORDER — MORPHINE SULFATE 4 MG/ML
4 INJECTION, SOLUTION INTRAMUSCULAR; INTRAVENOUS ONCE
Status: COMPLETED | OUTPATIENT
Start: 2025-01-27 | End: 2025-01-27

## 2025-01-27 RX ORDER — LIDOCAINE 50 MG/G
1 PATCH TOPICAL ONCE
Status: DISCONTINUED | OUTPATIENT
Start: 2025-01-27 | End: 2025-01-27 | Stop reason: HOSPADM

## 2025-01-27 RX ORDER — POTASSIUM CHLORIDE 750 MG/1
20 CAPSULE, EXTENDED RELEASE ORAL 2 TIMES DAILY
Qty: 90 CAPSULE | Refills: 3 | Status: SHIPPED | OUTPATIENT
Start: 2025-01-27

## 2025-01-27 RX ADMIN — LIDOCAINE 1 PATCH: 50 PATCH CUTANEOUS at 13:33

## 2025-01-27 RX ADMIN — IOHEXOL 100 ML: 350 INJECTION, SOLUTION INTRAVENOUS at 14:08

## 2025-01-27 RX ADMIN — MORPHINE SULFATE 4 MG: 4 INJECTION INTRAVENOUS at 13:32

## 2025-01-27 NOTE — DISCHARGE INSTRUCTIONS
Follow-up with primary care and pain management for further care. Contact info provided below if needed.  Use over the counter medications as stated on the bottle as needed for pain control.  Take your new medications as prescribed.   Return to the ED with new or worsening symptoms.

## 2025-01-27 NOTE — ED PROVIDER NOTES
Time reflects when diagnosis was documented in both MDM as applicable and the Disposition within this note       Time User Action Codes Description Comment    1/27/2025  3:08 PM Jerica Sears Add [W19.XXXD,  Y92.009] Fall at home, subsequent encounter     1/27/2025  3:08 PM Lorrie Searshryn Add [R07.89] Left-sided chest wall pain     1/27/2025  3:08 PM Jerica Sears Add [S22.32XA] Closed fracture of one rib of left side, initial encounter     1/27/2025  3:08 PM Jerica Sears Modify [W19.XXXD,  Y92.009] Fall at home, subsequent encounter     1/27/2025  3:08 PM Lorrie Searshryn Modify [S22.32XA] Closed fracture of one rib of left side, initial encounter           ED Disposition       ED Disposition   Discharge    Condition   Stable    Date/Time   Mon Jan 27, 2025  3:08 PM    Comment   Dahlia Kraus discharge to home/self care.                   Assessment & Plan       Medical Decision Making  Pt is a 53yo F who presents with pain secondary to fall.     As patient hit head and is on blood thinners, will obtain CT of the head and neck.  As patient with worsening left-sided chest and left-sided abdominal pain, will obtain CT scan.  Will treat symptomatically and reassess.  See ED course for results and details.    Plan to discharge pt with f/u to PCP and pain management. Discussed returning the ED with new or worsening of symptoms. Discussed use of over the counter medications as stated on the bottle as needed for pain. Discussed taking new medication as prescribed. Pt expressed understanding of discharge instructions, return precautions, and medication instructions and is stable for discharge at this time. All questions were answered and pt was discharged without incident.      Amount and/or Complexity of Data Reviewed  Labs: ordered. Decision-making details documented in ED Course.  Radiology: ordered. Decision-making details documented in ED Course.    Risk  Prescription drug management.        ED  Course as of 01/27/25 1601   Mon Jan 27, 2025   1342 WBC(!): 11.85  Mildly elevated. Non-diagnostic.    1342 Hemoglobin: 14.4  WNL   1342 CBC and differential(!)  Reviewed and without actionable derangement.    1349 Blood Pressure: 148/77  Interval improvement.    1353 PTT: 30  WNL   1353 POCT INR: 0.99  WNL   1357 GLUCOSE(!): 390  Elevated without evidence of gap. Hx of DM.    1358 Comprehensive metabolic panel(!)  Reviewed and without actionable derangement.    1449 CT head wo contrast  No acute intracranial abnormality.   1449 CT spine cervical without contrast  No cervical spine fracture or traumatic malalignment.   1455 CT chest abdomen pelvis w contrast  Acute nondisplaced mildly angulated fracture of the left eighth rib involving its most distal portion. No pleural effusion or pneumothorax.  Clear lungs.  Fatty liver.   1507 Pt made aware of all results and plan for DC with multimodal pain control at home. Pt has oxycodone prescribed by pain management and was encouraged to continue to use that PRN for severe pain and to follow up with pain management as well as PCP. Pt agreeable.        Medications   lidocaine (LIDODERM) 5 % patch 1 patch (1 patch Topical Medication Applied 1/27/25 1333)   morphine injection 4 mg (4 mg Intravenous Given 1/27/25 1332)   iohexol (OMNIPAQUE) 350 MG/ML injection (MULTI-DOSE) 100 mL (100 mL Intravenous Given 1/27/25 1408)       ED Risk Strat Scores                                              History of Present Illness       Chief Complaint   Patient presents with    Fall     Had fall on 21st, seen at urgent care for pain in L rib, xray poss rib fx. States hit her head at that time and is on xarelto, but was not addressed, denies any head or neck pain       Past Medical History:   Diagnosis Date    Abnormal ECG     Abnormal Pap smear of cervix     Allergic     Anemia     Anxiety     Asthma     Atrial fibrillation (HCC)     Bursitis of left hip 10/28/2014    Chlamydia     Chronic  kidney disease     Coronary artery disease     COVID-19 2020    Depression     Diabetes mellitus (HCC)     Fibromyalgia     GERD (gastroesophageal reflux disease)     Headache(784.0)     Heart disease     Heart murmur     History of transfusion     Hypertension     Inflammatory bowel disease     Migraines     Mixed simple and mucopurulent chronic bronchitis (HCC) 2019    Myocardial infarction (HCC)     Neuromuscular disorder (HCC)     Opioid abuse, in remission (HCC)     Otitis media     Pneumonia     Scoliosis     Seizures (HCC)     Shingles     Sick sinus syndrome (HCC)     Stroke (HCC)     Urinary tract infection     Varicella     Visual impairment       Past Surgical History:   Procedure Laterality Date    APPENDECTOMY      CATARACT EXTRACTION Right 2023     SECTION      EGD AND COLONOSCOPY      EYE SURGERY Left 2023    FRACTURE SURGERY      HIP SURGERY Right     HYSTERECTOMY      TUBAL LIGATION        Family History   Problem Relation Age of Onset    Asthma Mother     Cancer Mother         Bladder Cancer    Diabetes Mother     Heart disease Mother     Hypertension Mother     Stroke Mother     Mental illness Mother     Depression Mother     COPD Mother     Arthritis Mother     Hearing loss Mother     Vision loss Mother     Glaucoma Mother     Anxiety disorder Mother     Psychiatric Illness Mother     Asthma Father     Diabetes Father     Heart disease Father     Hypertension Father     Arthritis Father     Mental illness Daughter     Depression Daughter     Cancer Maternal Grandfather     Heart disease Maternal Grandfather     Arthritis Maternal Grandfather     Cancer Paternal Grandmother     Diabetes Paternal Grandmother     Arthritis Paternal Grandfather     Asthma Brother     Diabetes Brother     Heart disease Brother     Hypertension Brother     Mental illness Brother     Arthritis Brother     Hypertension Brother     Psychiatric Illness Brother     Mental illness Maternal Aunt      Mental illness Maternal Aunt     Asthma Paternal Aunt     ADD / ADHD Cousin     ADD / ADHD Cousin       Social History     Tobacco Use    Smoking status: Every Day     Current packs/day: 1.00     Average packs/day: 0.6 packs/day for 42.1 years (25.9 ttl pk-yrs)     Types: Cigarettes     Start date: 1/1/1992    Smokeless tobacco: Never    Tobacco comments:     Patient stated that she is not ready to quit smoking    Vaping Use    Vaping status: Never Used   Substance Use Topics    Alcohol use: Not Currently    Drug use: Never      E-Cigarette/Vaping    E-Cigarette Use Never User       E-Cigarette/Vaping Substances    Nicotine No     THC No     CBD No     Flavoring No     Other No     Unknown No       I have reviewed and agree with the history as documented.     Pt is a 55yo F who presents for pain from fall.  Patient reports that approximately 1 week ago she had a fall.  Patient reports that she has tremors and is being worked up for Parkinson's.  Patient reports that due to this she intermittently becomes weak and has frequent falls.  Patient reports last week she fell and struck the left side of her chest.  Patient was seen at urgent care.  Patient was diagnosed with a muscle strain, however x-ray showed a possible single nondisplaced rib fracture.  Patient reports that nondisplaced rib fracture.  Patient reports that she has been using oxycodone that is prescribed by her pain management doctor for the pain but it is getting worse.  Patient reports the pain is now in her upper abdomen on the left side as well.  Patient also reports that she did hit her head with the fall and did not have any cranial imaging.  Patient reports that she is on Xarelto.  Patient denies loss of consciousness.          Objective       ED Triage Vitals [01/27/25 1300]   Temperature Pulse Blood Pressure Respirations SpO2 Patient Position - Orthostatic VS   98.6 °F (37 °C) 93 (!) 179/95 20 96 % Sitting      Temp Source Heart Rate Source BP  Location FiO2 (%) Pain Score    Tympanic Monitor Left arm -- 10 - Worst Possible Pain      Vitals      Date and Time Temp Pulse SpO2 Resp BP Pain Score FACES Pain Rating User   01/27/25 1515 -- 84 96 % 15 157/91 -- --    01/27/25 1400 -- 86 94 % 15 128/83 -- --    01/27/25 1332 -- -- -- -- -- 10 - Worst Possible Pain --    01/27/25 1330 -- 89 94 % 15 148/77 -- --    01/27/25 1300 98.6 °F (37 °C) 93 96 % 20 179/95 10 - Worst Possible Pain -- LS            Physical Exam  Vitals reviewed.   Constitutional:       Appearance: She is well-developed. She is not toxic-appearing or diaphoretic.      Comments: Uncomfortable appearing   HENT:      Head: Normocephalic and atraumatic.      Right Ear: External ear normal.      Left Ear: External ear normal.      Nose: Nose normal.      Mouth/Throat:      Pharynx: Oropharynx is clear. No oropharyngeal exudate or posterior oropharyngeal erythema.   Eyes:      Pupils: Pupils are equal, round, and reactive to light.   Cardiovascular:      Rate and Rhythm: Normal rate and regular rhythm.      Heart sounds: Normal heart sounds.   Pulmonary:      Effort: Pulmonary effort is normal. No respiratory distress.      Breath sounds: Normal breath sounds. No wheezing or rales.   Chest:      Chest wall: Tenderness (L lateral) present.   Abdominal:      General: Bowel sounds are normal. There is no distension.      Palpations: Abdomen is soft.      Tenderness: There is abdominal tenderness (LUQ). There is no guarding or rebound.   Musculoskeletal:         General: Normal range of motion.      Cervical back: Normal range of motion and neck supple. No tenderness.      Thoracic back: No tenderness.      Lumbar back: No tenderness.      Right lower leg: No edema.      Left lower leg: No edema.      Comments: CMS intact distally in all extremities   Skin:     General: Skin is warm and dry.      Capillary Refill: Capillary refill takes less than 2 seconds.      Coloration: Skin is not pale.       Findings: No erythema or rash.   Neurological:      General: No focal deficit present.      Mental Status: She is alert and oriented to person, place, and time.   Psychiatric:         Speech: Speech normal.         Behavior: Behavior is cooperative.         Results Reviewed       Procedure Component Value Units Date/Time    Comprehensive metabolic panel [[id number]]  (Abnormal) Collected: 01/27/25 1330    Lab Status: Final result Specimen: Blood from Arm, Right Updated: 01/27/25 1357     Sodium 133 mmol/L      Potassium 4.7 mmol/L      Chloride 104 mmol/L      CO2 19 mmol/L      ANION GAP 10 mmol/L      BUN 14 mg/dL      Creatinine 1.11 mg/dL      Glucose 390 mg/dL      Calcium 8.5 mg/dL      AST 15 U/L      ALT 4 U/L      Alkaline Phosphatase 72 U/L      Total Protein 6.8 g/dL      Albumin 3.8 g/dL      Total Bilirubin 0.37 mg/dL      eGFR 55 ml/min/1.73sq m     Narrative:      National Kid[employer] guidelines for Chronic Kidney Disease (CKD):     Stage 1 with normal or high GFR (GFR > 90 mL/min/1.73 square meters)    Stage 2 Mild CKD (GFR = 60-89 mL/min/1.73 square meters)    Stage 3A Moderate CKD (GFR = 45-59 mL/min/1.73 square meters)    Stage 3B Moderate CKD (GFR = 30-44 mL/min/1.73 square meters)    Stage 4 Severe CKD (GFR = 15-29 mL/min/1.73 square meters)    Stage 5 End Stage CKD (GFR <15 mL/min/1.73 square meters)  Note: GFR calculation is accurate only with a steady state creatinine    Protime-INR [[id number]]  (Normal) Collected: 01/27/25 1330    Lab Status: Final result Specimen: Blood from Arm, Right Updated: 01/27/25 1353     Protime 13.6 seconds      INR 0.99    Narrative:      INR Therapeutic Range    Indication                                             INR Range      Atrial Fibrillation                                               2.0-3.0  Hypercoagulable State                                    2.0.2.3  Left Ventricular Asist Device                            2.0-3.0  Mechanical  Heart Valve                                  -    Aortic(with afib, MI, embolism, HF, LA enlargement,    and/or coagulopathy)                                     2.0-3.0 (2.5-3.5)     Mitral                                                             2.5-3.5  Prosthetic/Bioprosthetic Heart Valve               2.0-3.0  Venous thromboembolism (VTE: VT, PE        2.0-3.0    APTT [[id number]]  (Normal) Collected: 01/27/25 1330    Lab Status: Final result Specimen: Blood from Arm, Right Updated: 01/27/25 1353     PTT 30 seconds     CBC and differential [[id number]]  (Abnormal) Collected: 01/27/25 1330    Lab Status: Final result Specimen: Blood from Arm, Right Updated: 01/27/25 1341     WBC 11.85 Thousand/uL      RBC 4.72 Million/uL      Hemoglobin 14.4 g/dL      Hematocrit 44.5 %      MCV 94 fL      MCH 30.5 pg      MCHC 32.4 g/dL      RDW 13.6 %      MPV 12.4 fL      Platelets 221 Thousands/uL      nRBC 0 /100 WBCs      Segmented % 71 %      Immature Grans % 0 %      Lymphocytes % 20 %      Monocytes % 6 %      Eosinophils Relative 2 %      Basophils Relative 1 %      Absolute Neutrophils 8.53 Thousands/µL      Absolute Immature Grans 0.04 Thousand/uL      Absolute Lymphocytes 2.33 Thousands/µL      Absolute Monocytes 0.69 Thousand/µL      Eosinophils Absolute 0.19 Thousand/µL      Basophils Absolute 0.07 Thousands/µL             CT head wo contrast   Final Interpretation by Abimael Field MD (01/27 0935)      No acute intracranial abnormality.                  Workstation performed: [id number]         CT spine cervical without contrast   Final Interpretation by Abimael Field MD (01/27 6634)      No cervical spine fracture or traumatic malalignment.                  Workstation performed: [id number]         CT chest abdomen pelvis w contrast   Final Interpretation by Abimael Field MD (01/27 4066)      Acute nondisplaced mildly angulated fracture of the left eighth rib involving its most distal portion. No pleural  "effusion or pneumothorax.      Clear lungs.      Fatty liver.               Workstation performed: [id number]             Procedures    ED Medication and Procedure Management   Prior to Admission Medications   Prescriptions Last Dose Informant Patient Reported? Taking?   ALPRAZolam (XANAX) 1 mg tablet   No No   Sig: TAKE 1 TABLET (1 MG TOTAL) BY MOUTH 4 (FOUR) TIMES A DAY AS NEEDED FOR ANXIETY   Alcohol Swabs (Pharmacist Choice Alcohol) PADS  Self Yes No   Si (five) times a day   BD Pen Needle Nkechi 2nd Gen 32G X 4 MM MISC  Self Yes No   Sig: USE AS DIRECTED FOR BEFORE A MEAL AND AT BEDTIME GLUCOSE INJECTION   BD Pen Needle Nkechi 2nd Gen 32G X 4 MM MISC  Self No No   Sig: USE WITH INSULIN 5 TIMES A DAY   BD PrecisionGlide Needle 23G X 1-1/2\" MISC  Self Yes No   Sig: USE AS DIRECTED TO ADMINISTER NALOXONE INJECTION.  MAY DISPENSE 23-25 GAUGE 1-1.5\" NEEDLE.   Blood Glucose Monitoring Suppl (ONE TOUCH ULTRA 2) w/Device KIT  Self Yes No   Sig: Use as directed   Continuous Blood Gluc  (Dexcom G6 ) ALEKS   No No   Sig: USE 1 DEVICE 4 (FOUR) TIMES A DAY (WITH MEALS AND AT BEDTIME)   Continuous Blood Gluc Transmit (Dexcom G6 Transmitter) MISC  Self No No   Sig: USE 4 TIMES A DAY (WITH MEALS AND AT BEDTIME)   Continuous Glucose Sensor (Dexcom G6 Sensor) MISC   No No   Sig: CHANGE SENSOR EVERY 10 DAYS   EPINEPHrine (EPIPEN) 0.3 mg/0.3 mL SOAJ   No No   Sig: Inject 0.3 mL (0.3 mg total) into a muscle once for 1 dose   Incontinence Supply Disposable (Depend Undergarment Ex Absorb) MISC   No No   Sig: Use 4 (four) times a day as needed (incontinence)   Lantus SoloStar 100 units/mL SOPN   No No   Sig: INJECT 55 UNITS UNDER THE SKIN 2 (TWO) TIMES A DAY AT LUNCH AND AT BEDTIME.   Lidocaine 4 % PTCH   No No   Sig: Apply 1 patch topically in the morning   Mirabegron ER (Myrbetriq) 25 MG TB24   No No   Sig: TAKE 25 MG BY MOUTH IN THE MORNING   Patient not taking: Reported on 2025   Nutritional Supplements " (Glucerna Hunger Smart Shake) LIQD   No No   Sig: Take 1 Can by mouth 3 (three) times a day   OneTouch Delica Lancets 33G MISC   No No   Sig: Inject under the skin 2 (two) times a day   OneTouch Ultra test strip  Self Yes No   Sig: TEST TWICE DAILY OR AS DIRECTED BY MD   Oral Electrolytes (PEDIALYTE SINGLES PO)   Yes No   Sig: Take 8 oz by mouth if needed   Oral Electrolytes (Pedialyte) PACK   Yes No   Sig: Take 237 mL by mouth 2 (two) times a day   Patient not taking: Reported on 1/14/2025   Pyridoxine HCl (vitamin B-6) 25 MG tablet   No No   Sig: TAKE 1 TABLET (25 MG TOTAL) BY MOUTH DAILY   Spacer/Aero-Holding Chambers (AeroChamber Plus Geoff-Vu w/Mask) MISC   No No   Sig: Inhale daily at bedtime as needed (wheezing)   Trulicity 1.5 MG/0.5ML SOAJ   No No   Sig: INJECT 1.5 MG UNDER THE SKIN EVERY 7 DAYS.   albuterol (PROVENTIL HFA,VENTOLIN HFA) 90 mcg/act inhaler  Self Yes No   Sig: Inhale 2 puffs every 4 (four) hours as needed   Patient not taking: Reported on 1/21/2025   ammonium lactate (LAC-HYDRIN) 12 % lotion   No No   Sig: APPLY TOPICALLY 2 (TWO) TIMES A DAY AS NEEDED FOR DRY SKIN   atorvastatin (LIPITOR) 10 mg tablet  Self Yes No   Sig: Take 10 mg by mouth daily   busPIRone (BUSPAR) 30 MG tablet   No No   Sig: Take 1 tablet (30 mg total) by mouth 2 (two) times a day   carbidopa-levodopa (SINEMET)  mg per tablet   No No   Sig: TAKE 1 TABLET BY MOUTH 3 (THREE) TIMES A DAY   famotidine (PEPCID) 20 mg tablet   No No   Sig: TAKE 1 TABLET (20 MG TOTAL) BY MOUTH 2 (TWO) TIMES A DAY AS NEEDED FOR INDIGESTION   fexofenadine (ALLEGRA) 180 MG tablet   No No   Sig: Take 1 tablet (180 mg total) by mouth daily   fluticasone (Flovent Diskus) 250 mcg/actuation diskus inhaler   No No   Sig: Inhale 1-2 puffs 2 (two) times a day Rinse mouth after use.   gabapentin (NEURONTIN) 100 mg capsule   No No   Sig: TAKE 1 CAPSULE (100 MG TOTAL) BY MOUTH DAILY AT BEDTIME TAKE ADDITIONAL 800 MG CAPSULE TO TOTAL 900 MG AT BEDTIME    gabapentin (NEURONTIN) 400 mg capsule   No No   Sig: TAKE 2 CAPSULES (400 MG TOTAL) BY MOUTH 3 (THREE) TIMES A DAY   hydrALAZINE (APRESOLINE) 50 mg tablet  Self Yes No   Sig: Take 50 mg by mouth 3 (three) times a day   insulin lispro (HumaLOG) 100 units/mL injection pen   No No   Sig: INJECT 20 UNITS UNDER THE SKIN 3 (THREE) TIMES A DAY BEFORE MEALS INDICATIONS: TYPE 2 DIABETES MELLITUS.   leflunomide (ARAVA) 20 MG tablet   No No   Sig: Take 1 tablet (20 mg total) by mouth daily   lisinopril (ZESTRIL) 10 mg tablet   No No   Sig: Take 1 tablet (10 mg total) by mouth daily   lovastatin (MEVACOR) 10 MG tablet  Self Yes No   Sig: Take 10 mg by mouth   metFORMIN (GLUCOPHAGE-XR) 750 mg 24 hr tablet  Self Yes No   Sig: TAKE 1 TABLET (750 MG TOTAL) BY MOUTH 2 (TWO) TIMES A DAY WITH MEALS.   metoprolol succinate (TOPROL-XL) 100 mg 24 hr tablet  Self Yes No   Sig: Take 100 mg by mouth daily   montelukast (SINGULAIR) 10 mg tablet   No No   Sig: Take 1 tablet (10 mg total) by mouth daily at bedtime   naloxone (NARCAN) 0.4 mg/mL injection  Self Yes No   Patient not taking: Reported on 1/21/2025   ondansetron (ZOFRAN-ODT) 4 mg disintegrating tablet  Self No No   Sig: TAKE 1 TABLET (4 MG TOTAL) BY MOUTH EVERY 6 (SIX) HOURS AS NEEDED FOR NAUSEA   Patient not taking: Reported on 1/21/2025   oxyCODONE (ROXICODONE) 5 immediate release tablet  Self Yes No   Sig: 10 mg pt reports she takes this 4 times per day but is out of the medication currently   pantoprazole (PROTONIX) 40 mg tablet   No No   Sig: Take 1 tablet (40 mg total) by mouth 2 (two) times a day   pantoprazole (PROTONIX) 40 mg tablet   No No   Sig: Take 1 tablet (40 mg total) by mouth 2 (two) times a day   polyethylene glycol (GOLYTELY) 4000 mL solution   No No   Sig: Take 4,000 mL by mouth once for 1 dose   Patient not taking: Reported on 8/29/2024   polyethylene glycol (GOLYTELY) 4000 mL solution   No No   Sig: Take 4,000 mL by mouth once for 1 dose   potassium chloride  (K-DUR,KLOR-CON) 20 mEq tablet  Self No No   Sig: Take 1 tablet (20 mEq total) by mouth daily   potassium chloride (MICRO-K) 10 MEQ CR capsule   No No   Sig: TAKE 2 CAPSULES (20 MEQ TOTAL) BY MOUTH 2 (TWO) TIMES A DAY   prazosin (MINIPRESS) 5 mg capsule   No No   Sig: TAKE 1 CAPSULE (5 MG TOTAL) BY MOUTH DAILY AT BEDTIME   risperiDONE (RisperDAL) 3 mg tablet   No No   Sig: TAKE 1 TABLET (3 MG TOTAL) BY MOUTH 2 (TWO) TIMES A DAY   rivaroxaban (XARELTO) 20 mg tablet  Self Yes No   Sig: Take 20 mg by mouth   sertraline (ZOLOFT) 100 mg tablet   No No   Sig: Take 1 tablet (100 mg total) by mouth daily   trospium chloride (SANCTURA) 20 mg tablet   No No   Sig: Take 1 tablet (20 mg total) by mouth 2 (two) times a day   zolpidem (AMBIEN) 10 mg tablet   No No   Sig: TAKE 1 TABLET (10 MG TOTAL) BY MOUTH DAILY      Facility-Administered Medications: None     Discharge Medication List as of 1/27/2025  3:09 PM        START taking these medications    Details   methocarbamol (ROBAXIN) 500 mg tablet Take 1 tablet (500 mg total) by mouth 2 (two) times a day, Starting Mon 1/27/2025, Normal           CONTINUE these medications which have NOT CHANGED    Details   albuterol (PROVENTIL HFA,VENTOLIN HFA) 90 mcg/act inhaler Inhale 2 puffs every 4 (four) hours as needed, Starting Sat 6/4/2022, Historical Med      Alcohol Swabs (Pharmacist Choice Alcohol) PADS 5 (five) times a day, Starting Mon 10/24/2022, Historical Med      ALPRAZolam (XANAX) 1 mg tablet TAKE 1 TABLET (1 MG TOTAL) BY MOUTH 4 (FOUR) TIMES A DAY AS NEEDED FOR ANXIETY, Starting Tue 1/14/2025, Normal      ammonium lactate (LAC-HYDRIN) 12 % lotion APPLY TOPICALLY 2 (TWO) TIMES A DAY AS NEEDED FOR DRY SKIN, Starting Mon 3/25/2024, Normal      atorvastatin (LIPITOR) 10 mg tablet Take 10 mg by mouth daily, Starting Tue 8/2/2022, Until Tue 1/21/2025, Historical Med      !! BD Pen Needle Nkechi 2nd Gen 32G X 4 MM MISC USE AS DIRECTED FOR BEFORE A MEAL AND AT BEDTIME GLUCOSE  "INJECTION, Historical Med      !! BD Pen Needle Nkechi 2nd Gen 32G X 4 MM MISC USE WITH INSULIN 5 TIMES A DAY, Normal      BD PrecisionGlide Needle 23G X 1-1/2\" MISC USE AS DIRECTED TO ADMINISTER NALOXONE INJECTION.  MAY DISPENSE 23-25 GAUGE 1-1.5\" NEEDLE., Historical Med      Blood Glucose Monitoring Suppl (ONE TOUCH ULTRA 2) w/Device KIT Use as directed, Starting Tue 5/3/2022, Historical Med      busPIRone (BUSPAR) 30 MG tablet Take 1 tablet (30 mg total) by mouth 2 (two) times a day, Starting Thu 8/8/2024, Normal      carbidopa-levodopa (SINEMET)  mg per tablet TAKE 1 TABLET BY MOUTH 3 (THREE) TIMES A DAY, Starting Fri 1/3/2025, Normal      Continuous Blood Gluc  (Dexcom G6 ) ALEKS USE 1 DEVICE 4 (FOUR) TIMES A DAY (WITH MEALS AND AT BEDTIME), Starting Fri 2/23/2024, Normal      Continuous Blood Gluc Transmit (Dexcom G6 Transmitter) MISC USE 4 TIMES A DAY (WITH MEALS AND AT BEDTIME), Normal      Continuous Glucose Sensor (Dexcom G6 Sensor) MISC CHANGE SENSOR EVERY 10 DAYS, Normal      EPINEPHrine (EPIPEN) 0.3 mg/0.3 mL SOAJ Inject 0.3 mL (0.3 mg total) into a muscle once for 1 dose, Starting Mon 2/26/2024, Normal      famotidine (PEPCID) 20 mg tablet TAKE 1 TABLET (20 MG TOTAL) BY MOUTH 2 (TWO) TIMES A DAY AS NEEDED FOR INDIGESTION, Starting Tue 10/22/2024, Normal      fexofenadine (ALLEGRA) 180 MG tablet Take 1 tablet (180 mg total) by mouth daily, Starting Tue 10/1/2024, Normal      fluticasone (Flovent Diskus) 250 mcg/actuation diskus inhaler Inhale 1-2 puffs 2 (two) times a day Rinse mouth after use., Starting Thu 2/22/2024, Normal      !! gabapentin (NEURONTIN) 100 mg capsule TAKE 1 CAPSULE (100 MG TOTAL) BY MOUTH DAILY AT BEDTIME TAKE ADDITIONAL 800 MG CAPSULE TO TOTAL 900 MG AT BEDTIME, Normal      !! gabapentin (NEURONTIN) 400 mg capsule TAKE 2 CAPSULES (400 MG TOTAL) BY MOUTH 3 (THREE) TIMES A DAY, Normal      hydrALAZINE (APRESOLINE) 50 mg tablet Take 50 mg by mouth 3 (three) times a " day, Historical Med      Incontinence Supply Disposable (Depend Undergarment Ex Absorb) MISC Use 4 (four) times a day as needed (incontinence), Starting Tue 9/17/2024, Normal      insulin lispro (HumaLOG) 100 units/mL injection pen INJECT 20 UNITS UNDER THE SKIN 3 (THREE) TIMES A DAY BEFORE MEALS INDICATIONS: TYPE 2 DIABETES MELLITUS., Normal      Lantus SoloStar 100 units/mL SOPN INJECT 55 UNITS UNDER THE SKIN 2 (TWO) TIMES A DAY AT LUNCH AND AT BEDTIME., Normal      leflunomide (ARAVA) 20 MG tablet Take 1 tablet (20 mg total) by mouth daily, Starting Thu 2/22/2024, Normal      Lidocaine 4 % PTCH Apply 1 patch topically in the morning, Starting Tue 1/21/2025, Normal      lisinopril (ZESTRIL) 10 mg tablet Take 1 tablet (10 mg total) by mouth daily, Starting Thu 2/22/2024, Normal      lovastatin (MEVACOR) 10 MG tablet Take 10 mg by mouth, Historical Med      metFORMIN (GLUCOPHAGE-XR) 750 mg 24 hr tablet TAKE 1 TABLET (750 MG TOTAL) BY MOUTH 2 (TWO) TIMES A DAY WITH MEALS., Historical Med      metoprolol succinate (TOPROL-XL) 100 mg 24 hr tablet Take 100 mg by mouth daily, Starting Tue 8/2/2022, Historical Med      Mirabegron ER (Myrbetriq) 25 MG TB24 TAKE 25 MG BY MOUTH IN THE MORNING, Starting Mon 4/1/2024, Normal      montelukast (SINGULAIR) 10 mg tablet Take 1 tablet (10 mg total) by mouth daily at bedtime, Starting Thu 8/8/2024, Normal      naloxone (NARCAN) 0.4 mg/mL injection Historical Med      Nutritional Supplements (Glucerna Hunger Smart Shake) LIQD Take 1 Can by mouth 3 (three) times a day, Starting Tue 5/14/2024, Normal      ondansetron (ZOFRAN-ODT) 4 mg disintegrating tablet TAKE 1 TABLET (4 MG TOTAL) BY MOUTH EVERY 6 (SIX) HOURS AS NEEDED FOR NAUSEA, Starting Tue 1/2/2024, Normal      OneTouch Delica Lancets 33G MISC Inject under the skin 2 (two) times a day, Starting Mon 6/17/2024, Normal      OneTouch Ultra test strip TEST TWICE DAILY OR AS DIRECTED BY MD, Historical Med      Oral Electrolytes  (PEDIALYTE SINGLES PO) Take 8 oz by mouth if needed, Starting Fri 4/5/2024, Historical Med      Oral Electrolytes (Pedialyte) PACK Take 237 mL by mouth 2 (two) times a day, Starting Fri 1/26/2024, Until Mon 1/20/2025, Historical Med      oxyCODONE (ROXICODONE) 5 immediate release tablet 10 mg pt reports she takes this 4 times per day but is out of the medication currently, Starting Fri 7/7/2023, Historical Med      !! pantoprazole (PROTONIX) 40 mg tablet Take 1 tablet (40 mg total) by mouth 2 (two) times a day, Starting Tue 4/9/2024, Normal      !! pantoprazole (PROTONIX) 40 mg tablet Take 1 tablet (40 mg total) by mouth 2 (two) times a day, Starting Tue 10/1/2024, Normal      polyethylene glycol (GOLYTELY) 4000 mL solution Take 4,000 mL by mouth once for 1 dose, Starting Thu 3/21/2024, Normal      polyethylene glycol (GOLYTELY) 4000 mL solution Take 4,000 mL by mouth once for 1 dose, Starting Mon 3/25/2024, Normal      potassium chloride (K-DUR,KLOR-CON) 20 mEq tablet Take 1 tablet (20 mEq total) by mouth daily, Starting Mon 8/22/2022, Normal      potassium chloride (MICRO-K) 10 MEQ CR capsule TAKE 2 CAPSULES (20 MEQ TOTAL) BY MOUTH 2 (TWO) TIMES A DAY, Starting Mon 1/27/2025, Normal      prazosin (MINIPRESS) 5 mg capsule TAKE 1 CAPSULE (5 MG TOTAL) BY MOUTH DAILY AT BEDTIME, Starting Mon 4/1/2024, Normal      Pyridoxine HCl (vitamin B-6) 25 MG tablet TAKE 1 TABLET (25 MG TOTAL) BY MOUTH DAILY, Normal      risperiDONE (RisperDAL) 3 mg tablet TAKE 1 TABLET (3 MG TOTAL) BY MOUTH 2 (TWO) TIMES A DAY, Starting Fri 12/20/2024, Normal      rivaroxaban (XARELTO) 20 mg tablet Take 20 mg by mouth, Starting Wed 3/2/2022, Historical Med      sertraline (ZOLOFT) 100 mg tablet Take 1 tablet (100 mg total) by mouth daily, Starting Fri 6/7/2024, Normal      Spacer/Aero-Holding Chambers (AeroChamber Plus Geoff-Vu w/Mask) MISC Inhale daily at bedtime as needed (wheezing), Starting Thu 2/22/2024, Normal      trospium chloride  (SANCTURA) 20 mg tablet Take 1 tablet (20 mg total) by mouth 2 (two) times a day, Starting u 2/22/2024, Normal      Trulicity 1.5 MG/0.5ML SOAJ INJECT 1.5 MG UNDER THE SKIN EVERY 7 DAYS., Normal      zolpidem (AMBIEN) 10 mg tablet TAKE 1 TABLET (10 MG TOTAL) BY MOUTH DAILY, Starting Tue 1/14/2025, Normal       !! - Potential duplicate medications found. Please discuss with provider.        No discharge procedures on file.  ED SEPSIS DOCUMENTATION   Time reflects when diagnosis was documented in both MDM as applicable and the Disposition within this note       Time User Action Codes Description Comment    1/27/2025  3:08 PM Jerica Sears [W19.XXXD,  Y92.009] Fall at home, subsequent encounter     1/27/2025  3:08 PM Jerica Sears [R07.89] Left-sided chest wall pain     1/27/2025  3:08 PM Jerica Sears Add [S22.32XA] Closed fracture of one rib of left side, initial encounter     1/27/2025  3:08 PM Jerica Sears Modify [W19.XXXD,  Y92.009] Fall at home, subsequent encounter     1/27/2025  3:08 PM Jerica Sears Modify [S22.32XA] Closed fracture of one rib of left side, initial encounter                  Jerica Sears MD  01/27/25 9431

## 2025-01-29 LAB
ATRIAL RATE: 91 BPM
P AXIS: 7 DEGREES
PR INTERVAL: 150 MS
QRS AXIS: 64 DEGREES
QRSD INTERVAL: 80 MS
QT INTERVAL: 376 MS
QTC INTERVAL: 462 MS
T WAVE AXIS: 120 DEGREES
VENTRICULAR RATE: 91 BPM

## 2025-01-29 PROCEDURE — 93010 ELECTROCARDIOGRAM REPORT: CPT | Performed by: INTERNAL MEDICINE

## 2025-01-31 ENCOUNTER — TELEPHONE (OUTPATIENT)
Age: 55
End: 2025-01-31

## 2025-01-31 DIAGNOSIS — E11.649 TYPE 2 DIABETES MELLITUS WITH HYPOGLYCEMIA WITHOUT COMA, WITH LONG-TERM CURRENT USE OF INSULIN (HCC): Primary | ICD-10-CM

## 2025-01-31 DIAGNOSIS — Z79.4 TYPE 2 DIABETES MELLITUS WITH HYPOGLYCEMIA WITHOUT COMA, WITH LONG-TERM CURRENT USE OF INSULIN (HCC): Primary | ICD-10-CM

## 2025-01-31 RX ORDER — KETOROLAC TROMETHAMINE 30 MG/ML
1 INJECTION, SOLUTION INTRAMUSCULAR; INTRAVENOUS ONCE
Qty: 1 EACH | Refills: 0 | Status: SHIPPED | OUTPATIENT
Start: 2025-01-31 | End: 2025-01-31

## 2025-01-31 RX ORDER — ACYCLOVIR 800 MG/1
1 TABLET ORAL
Qty: 6 EACH | Refills: 3 | Status: SHIPPED | OUTPATIENT
Start: 2025-01-31

## 2025-01-31 NOTE — TELEPHONE ENCOUNTER
Aware thank s  
Lydia sent over to the pharmacy instead. 
Patient called stating when she went to refill Dexcom G6 both  and Transmitter however the pharmacy said they are having a difficult time getting.     They asked if Dr. Chowdhury would send in an alternative Lydia (all components)    Patient has been without it for a week.     [facility]     Please notify patient   
.

## 2025-02-03 DIAGNOSIS — Z79.4 TYPE 2 DIABETES MELLITUS WITH HYPOGLYCEMIA WITHOUT COMA, WITH LONG-TERM CURRENT USE OF INSULIN (HCC): ICD-10-CM

## 2025-02-03 DIAGNOSIS — T78.40XS ALLERGY, SEQUELA: ICD-10-CM

## 2025-02-03 DIAGNOSIS — E11.649 TYPE 2 DIABETES MELLITUS WITH HYPOGLYCEMIA WITHOUT COMA, WITH LONG-TERM CURRENT USE OF INSULIN (HCC): ICD-10-CM

## 2025-02-04 RX ORDER — MONTELUKAST SODIUM 10 MG/1
10 TABLET ORAL
Qty: 100 TABLET | Refills: 1 | Status: SHIPPED | OUTPATIENT
Start: 2025-02-04

## 2025-02-04 RX ORDER — DULAGLUTIDE 1.5 MG/.5ML
INJECTION, SOLUTION SUBCUTANEOUS
Qty: 2 ML | Refills: 5 | Status: SHIPPED | OUTPATIENT
Start: 2025-02-04

## 2025-02-05 ENCOUNTER — APPOINTMENT (OUTPATIENT)
Age: 55
End: 2025-02-05
Payer: COMMERCIAL

## 2025-02-05 ENCOUNTER — APPOINTMENT (OUTPATIENT)
Dept: LAB | Facility: CLINIC | Age: 55
End: 2025-02-05
Payer: COMMERCIAL

## 2025-02-05 ENCOUNTER — OFFICE VISIT (OUTPATIENT)
Age: 55
End: 2025-02-05
Payer: COMMERCIAL

## 2025-02-05 VITALS
WEIGHT: 156 LBS | HEART RATE: 73 BPM | BODY MASS INDEX: 25.99 KG/M2 | OXYGEN SATURATION: 100 % | SYSTOLIC BLOOD PRESSURE: 134 MMHG | HEIGHT: 65 IN | DIASTOLIC BLOOD PRESSURE: 80 MMHG

## 2025-02-05 DIAGNOSIS — E78.2 MIXED HYPERLIPIDEMIA: ICD-10-CM

## 2025-02-05 DIAGNOSIS — E11.649 TYPE 2 DIABETES MELLITUS WITH HYPOGLYCEMIA WITHOUT COMA, WITH LONG-TERM CURRENT USE OF INSULIN (HCC): ICD-10-CM

## 2025-02-05 DIAGNOSIS — S22.32XS CLOSED FRACTURE OF ONE RIB OF LEFT SIDE, SEQUELA: Primary | ICD-10-CM

## 2025-02-05 DIAGNOSIS — E46 PROTEIN-CALORIE MALNUTRITION, UNSPECIFIED SEVERITY (HCC): ICD-10-CM

## 2025-02-05 DIAGNOSIS — Z23 ENCOUNTER FOR IMMUNIZATION: ICD-10-CM

## 2025-02-05 DIAGNOSIS — R30.0 DYSURIA: ICD-10-CM

## 2025-02-05 DIAGNOSIS — I48.92 ATRIAL FLUTTER, UNSPECIFIED TYPE (HCC): ICD-10-CM

## 2025-02-05 DIAGNOSIS — Z78.0 POSTMENOPAUSE: ICD-10-CM

## 2025-02-05 DIAGNOSIS — R53.83 FATIGUE, UNSPECIFIED TYPE: ICD-10-CM

## 2025-02-05 DIAGNOSIS — Z79.4 TYPE 2 DIABETES MELLITUS WITH HYPOGLYCEMIA WITHOUT COMA, WITH LONG-TERM CURRENT USE OF INSULIN (HCC): ICD-10-CM

## 2025-02-05 DIAGNOSIS — Z00.00 ANNUAL PHYSICAL EXAM: ICD-10-CM

## 2025-02-05 DIAGNOSIS — R56.9 SEIZURE (HCC): ICD-10-CM

## 2025-02-05 DIAGNOSIS — R53.83 FATIGUE, UNSPECIFIED TYPE: Primary | ICD-10-CM

## 2025-02-05 LAB
BACTERIA UR QL AUTO: ABNORMAL /HPF
BILIRUB UR QL STRIP: NEGATIVE
BUN SERPL-MCNC: 15 MG/DL (ref 5–25)
CHOLEST SERPL-MCNC: 144 MG/DL (ref ?–200)
CLARITY UR: CLEAR
COLOR UR: ABNORMAL
CREAT SERPL-MCNC: 1.4 MG/DL (ref 0.6–1.3)
EST. AVERAGE GLUCOSE BLD GHB EST-MCNC: 223 MG/DL
GFR SERPL CREATININE-BSD FRML MDRD: 42 ML/MIN/1.73SQ M
GLUCOSE UR STRIP-MCNC: ABNORMAL MG/DL
HBA1C MFR BLD: 9.4 %
HDLC SERPL-MCNC: 33 MG/DL
HGB UR QL STRIP.AUTO: NEGATIVE
HYALINE CASTS #/AREA URNS LPF: ABNORMAL /LPF
KETONES UR STRIP-MCNC: NEGATIVE MG/DL
LEUKOCYTE ESTERASE UR QL STRIP: NEGATIVE
NITRITE UR QL STRIP: NEGATIVE
NON-SQ EPI CELLS URNS QL MICRO: ABNORMAL /HPF
NONHDLC SERPL-MCNC: 111 MG/DL
PH UR STRIP.AUTO: 6 [PH]
PROT UR STRIP-MCNC: ABNORMAL MG/DL
RBC #/AREA URNS AUTO: ABNORMAL /HPF
SP GR UR STRIP.AUTO: 1.02 (ref 1–1.03)
TRIGL SERPL-MCNC: 454 MG/DL (ref ?–150)
TSH SERPL DL<=0.05 MIU/L-ACNC: 1.46 UIU/ML (ref 0.45–4.5)
UROBILINOGEN UR STRIP-ACNC: <2 MG/DL
WBC #/AREA URNS AUTO: ABNORMAL /HPF

## 2025-02-05 PROCEDURE — 84520 ASSAY OF UREA NITROGEN: CPT

## 2025-02-05 PROCEDURE — 80061 LIPID PANEL: CPT

## 2025-02-05 PROCEDURE — 36415 COLL VENOUS BLD VENIPUNCTURE: CPT

## 2025-02-05 PROCEDURE — 99214 OFFICE O/P EST MOD 30 MIN: CPT

## 2025-02-05 PROCEDURE — 90673 RIV3 VACCINE NO PRESERV IM: CPT

## 2025-02-05 PROCEDURE — 83036 HEMOGLOBIN GLYCOSYLATED A1C: CPT

## 2025-02-05 PROCEDURE — 90471 IMMUNIZATION ADMIN: CPT

## 2025-02-05 PROCEDURE — 82565 ASSAY OF CREATININE: CPT

## 2025-02-05 PROCEDURE — 84443 ASSAY THYROID STIM HORMONE: CPT

## 2025-02-05 PROCEDURE — 99396 PREV VISIT EST AGE 40-64: CPT

## 2025-02-05 PROCEDURE — 81001 URINALYSIS AUTO W/SCOPE: CPT

## 2025-02-05 RX ORDER — LACTOSE-REDUCED FOOD 0.05 G-1.5
1 LIQUID (ML) ORAL 3 TIMES DAILY
Qty: 296 ML | Refills: 5 | Status: SHIPPED | OUTPATIENT
Start: 2025-02-05

## 2025-02-05 NOTE — PROGRESS NOTES
Name: Dahlia Kraus      : 1970      MRN: [id number]  Encounter Provider: María Waters PA-C  Encounter Date: 2025   Encounter [facility]  :  Assessment & Plan  Closed fracture of one rib of left side, sequela  Patient recently seen in ER on 2025 after sustaining a fall.  Had full workup as she is on blood thinners.  Negative for any head bleed.  But positive for closed nondisplaced fracture of left eighth rib.  She is overall doing okay, pain is a bit bothersome but she feels like it slowly getting better.       Type 2 diabetes mellitus with hypoglycemia without coma, with long-term current use of insulin (HCC)    Lab Results   Component Value Date    HGBA1C 8.2 (H) 2024     Takes 55 units of lantus and 25 units of humalog. Also takes metformin and trulicity. Doesn't always take humalog after every meal-she forgets. BG's at home are running 200-250.  Due for A1c check.  Will follow-up regarding results  Orders:    Hemoglobin A1C; Future    Mixed hyperlipidemia  Stable  Orders:    TSH, 3rd generation with Free T4 reflex; Future    Atrial flutter, unspecified type (HCC)  Stable on Xarelto.       Annual physical exam  Patient is a 54 year old female presenting for annual physical. Overall doing well. UTD on all health maintenance aside for DEXA.        Postmenopause  Due for DEXA  Orders:    DXA bone density spine hip and pelvis; Future    Protein-calorie malnutrition, unspecified severity (HCC)  Given script for glucerna  Orders:    Nutritional Supplements (Glucerna Hunger Smart Shake) LIQD; Take 1 Can by mouth 3 (three) times a day    Encounter for immunization  Given flu vaccine in office  Orders:    influenza vaccine, recombinant, PF, 0.5 mL IM (Flublok)    Dysuria  Endorses burning and urinary frequency x 1 week. Will follow up and treat accordingly  Orders:    UA w Reflex to Microscopic w Reflex to Culture; Future    Seizure  "([facility])  Followed by neurology. Recently had EEG and was instructed to get MRI as well.               History of Present Illness   Patient is a 54 year old female presenting for office visit.   Recently fell and broke a rib on 1/27/25. Feels like her pain has improved slowly since she was discharged.      Was seen by Neurology a few months ago who recommended she get some imaging done. She Is planning to get MRI scheduled that they had recommended. She recently had the EEG completed     Overall she is doing well. She is hopeful to have this pain go away soon in her ribs and for warm weather to come so she can be outside more.       Review of Systems   Constitutional:  Negative for chills, fatigue and fever.   HENT:  Negative for ear discharge, ear pain, postnasal drip, rhinorrhea, sinus pressure, sinus pain, sore throat, tinnitus and trouble swallowing.    Eyes:  Negative for pain, discharge and itching.   Respiratory:  Negative for cough, shortness of breath and wheezing.    Cardiovascular:  Negative for chest pain, palpitations and leg swelling.   Gastrointestinal:  Negative for abdominal pain, constipation, diarrhea, nausea and vomiting.   Endocrine: Negative for polydipsia, polyphagia and polyuria.   Genitourinary:  Positive for dysuria and frequency. Negative for difficulty urinating, hematuria and urgency.   Musculoskeletal:  Positive for myalgias. Negative for arthralgias and joint swelling.        Left sided rib pain   Skin:  Negative for color change.   Allergic/Immunologic: Negative for environmental allergies.   Neurological:  Negative for dizziness, weakness, light-headedness, numbness and headaches.   Hematological:  Negative for adenopathy.   Psychiatric/Behavioral:  Negative for decreased concentration and sleep disturbance. The patient is not nervous/anxious.        Objective   /80   Pulse 73   Ht 5' 5\" (1.651 m)   Wt 70.8 kg (156 lb)   SpO2 100%   BMI 25.96 kg/m²      Physical Exam  Vitals " and nursing note reviewed.   Constitutional:       General: She is awake. She is not in acute distress.     Appearance: Normal appearance. She is well-developed, well-groomed and normal weight.   HENT:      Head: Normocephalic and atraumatic.      Right Ear: Hearing and external ear normal.      Left Ear: Hearing and external ear normal.      Nose: Nose normal.      Mouth/Throat:      Lips: Pink.      Mouth: Mucous membranes are moist.   Eyes:      General: Lids are normal. Vision grossly intact. Gaze aligned appropriately.      Conjunctiva/sclera: Conjunctivae normal.   Neck:      Vascular: No carotid bruit.      Trachea: Trachea and phonation normal.   Cardiovascular:      Rate and Rhythm: Normal rate and regular rhythm.      Heart sounds: Normal heart sounds, S1 normal and S2 normal. No murmur heard.     No friction rub. No gallop.   Pulmonary:      Effort: Pulmonary effort is normal. No respiratory distress.      Breath sounds: Normal breath sounds and air entry. No decreased breath sounds, wheezing, rhonchi or rales.   Abdominal:      General: Abdomen is flat. Bowel sounds are normal.      Palpations: Abdomen is soft.      Tenderness: There is no abdominal tenderness.   Musculoskeletal:         General: No swelling.      Cervical back: Neck supple.      Right lower leg: No edema.      Left lower leg: No edema.   Skin:     General: Skin is warm.      Capillary Refill: Capillary refill takes less than 2 seconds.   Neurological:      Mental Status: She is alert.   Psychiatric:         Attention and Perception: Attention and perception normal.         Mood and Affect: Mood and affect normal.         Speech: Speech normal.         Behavior: Behavior normal. Behavior is cooperative.         Thought Content: Thought content normal.         Cognition and Memory: Cognition and memory normal.         Judgment: Judgment normal.

## 2025-02-05 NOTE — ASSESSMENT & PLAN NOTE
Lab Results   Component Value Date    HGBA1C 8.2 (H) 09/06/2024     Takes 55 units of lantus and 25 units of humalog. Also takes metformin and trulicity. Doesn't always take humalog after every meal-she forgets. BG's at home are running 200-250.  Due for A1c check.  Will follow-up regarding results  Orders:    Hemoglobin A1C; Future

## 2025-02-08 DIAGNOSIS — F41.9 ANXIETY: ICD-10-CM

## 2025-02-08 DIAGNOSIS — F43.10 PTSD (POST-TRAUMATIC STRESS DISORDER): ICD-10-CM

## 2025-02-10 RX ORDER — ZOLPIDEM TARTRATE 10 MG/1
10 TABLET ORAL DAILY
Qty: 30 TABLET | Refills: 0 | Status: SHIPPED | OUTPATIENT
Start: 2025-02-10

## 2025-02-10 RX ORDER — ALPRAZOLAM 1 MG/1
1 TABLET ORAL 4 TIMES DAILY PRN
Qty: 120 TABLET | Refills: 0 | Status: SHIPPED | OUTPATIENT
Start: 2025-02-10

## 2025-02-11 ENCOUNTER — OFFICE VISIT (OUTPATIENT)
Dept: URGENT CARE | Facility: CLINIC | Age: 55
End: 2025-02-11
Payer: COMMERCIAL

## 2025-02-11 ENCOUNTER — TELEPHONE (OUTPATIENT)
Dept: NEUROLOGY | Facility: CLINIC | Age: 55
End: 2025-02-11

## 2025-02-11 ENCOUNTER — DOCUMENTATION (OUTPATIENT)
Dept: ADMINISTRATIVE | Facility: OTHER | Age: 55
End: 2025-02-11

## 2025-02-11 VITALS
SYSTOLIC BLOOD PRESSURE: 140 MMHG | HEIGHT: 65 IN | WEIGHT: 151 LBS | OXYGEN SATURATION: 97 % | TEMPERATURE: 97.8 F | BODY MASS INDEX: 25.16 KG/M2 | HEART RATE: 110 BPM | DIASTOLIC BLOOD PRESSURE: 88 MMHG | RESPIRATION RATE: 18 BRPM

## 2025-02-11 DIAGNOSIS — Z20.828 EXPOSURE TO THE FLU: Primary | ICD-10-CM

## 2025-02-11 PROCEDURE — 99213 OFFICE O/P EST LOW 20 MIN: CPT | Performed by: PHYSICIAN ASSISTANT

## 2025-02-11 RX ORDER — KETOROLAC TROMETHAMINE 30 MG/ML
INJECTION, SOLUTION INTRAMUSCULAR; INTRAVENOUS
COMMUNITY
Start: 2025-02-01

## 2025-02-11 RX ORDER — IPRATROPIUM BROMIDE AND ALBUTEROL SULFATE 2.5; .5 MG/3ML; MG/3ML
3 SOLUTION RESPIRATORY (INHALATION) ONCE
Status: COMPLETED | OUTPATIENT
Start: 2025-02-11 | End: 2025-02-11

## 2025-02-11 RX ORDER — IPRATROPIUM BROMIDE AND ALBUTEROL SULFATE 2.5; .5 MG/3ML; MG/3ML
3 SOLUTION RESPIRATORY (INHALATION) EVERY 6 HOURS PRN
Qty: 3 ML | Refills: 0 | Status: SHIPPED | OUTPATIENT
Start: 2025-02-11 | End: 2025-03-13

## 2025-02-11 RX ADMIN — IPRATROPIUM BROMIDE AND ALBUTEROL SULFATE 3 ML: 2.5; .5 SOLUTION RESPIRATORY (INHALATION) at 11:37

## 2025-02-11 NOTE — PROGRESS NOTES
Blood pressure elevated  Appointment department: [facility]  Appointment provider: Abbey Mcintosh PA-C  Blood pressure   02/11/25 1125 140/88   02/11/25 1109 146/88

## 2025-02-11 NOTE — PROGRESS NOTES
"  [facility]        NAME: Dahlia Kraus is a 54 y.o. female  : 1970    MRN: [id number]  DATE: 2025  TIME: 11:49 AM    Assessment and Plan   Exposure to the flu [Z20.828]  1. Exposure to the flu  ipratropium-albuterol (DUO-NEB) 0.5-2.5 mg/3 mL inhalation solution 3 mL    ipratropium-albuterol (DUO-NEB) 0.5-2.5 mg/3 mL nebulizer solution            Patient Instructions     Patient Instructions   Patient Education     Flu in adults - Discharge instructions   The Basics   Written by the doctors and editors at [facility]   What are discharge instructions? -- Discharge instructions are information about how to take care of yourself after getting medical care for a health problem.  What is the flu? -- This is an infection that can cause fever, cough, body aches, and other symptoms. The most common type of flu is the \"seasonal\" flu. There are different forms of seasonal flu, for example, \"type A\" and \"type B.\" The medical term for the flu is \"influenza.\"  All forms of the flu are caused by viruses. Antibiotics do not work to treat the flu. Doctors might prescribe you an \"antiviral\" medicine. If so, follow your doctor's instructions. The flu can be dangerous because it can cause a serious lung infection called pneumonia.  How do I care for myself at home? -- Ask the doctor or nurse what you should do when you go home. Make sure that you understand exactly what you need to do to care for yourself. Ask questions if there is anything you do not understand.  You should also:   Take all of your medicines as instructed, even if you are feeling better.   Get lots of rest. Sleep when you feel tired. Avoid doing tiring activities.   Take warm, steamy showers to help soothe your cough.   Use hard candy or cough drops to soothe a sore throat and cough.   Try to thin mucus:   Drink lots of liquids.   Use a cool mist humidifier, if your doctor told you to. Keep the humidifier clean.   Use saline nose " drops to relieve stuffiness.   Take a medicine like acetaminophen (sample brand name: Tylenol) or ibuprofen (sample brand names: Advil, Motrin) to help bring down your fever.   Dress in lightweight clothes if you have a fever. Cover with a light sheet or blanket if needed. This will help keep you from getting too warm.   Lower the chance of passing the infection to others:   Stay home while you are feeling sick or have a fever.   At home, try to limit close contact with other people. You can also help protect others by wearing a face mask.   Wash your hands often (figure 1). Alcohol-based hand sanitizers also work to kill germs.   Cover your mouth and nose with the inside of your elbow when you cough or sneeze.   Avoid touching your eyes, nose, and mouth.   Do not share cups, food, towels, bedding, or other personal items with others.   Clean items and surfaces you often touch. Examples include sinks, counters, tables, door handles, remotes, and phones. Germs can often live on surfaces for a few hours. Use a bleach and water mixture or a cleaning product that gets rid of viruses.   Do not go to work or school until your symptoms are improving and your fever has been gone for at least 24 hours without taking medicine such as acetaminophen.  It's also important to get a flu vaccine each year. Some years, the flu vaccine is more effective than others. But even in years when it is less effective, it still helps prevent some cases of the flu. It can also help keep you from getting severely ill if you do get the flu.  What follow-up care do I need? -- Your doctor or nurse will tell you if you need to make a follow-up appointment. If so, make sure that you know when and where to go.  When should I call the doctor? -- Call for emergency help right away (in the [place] and [place], call 9-1-1) if you:   Are having so much trouble breathing that you can only say 1 or 2 words at a time   Need to sit upright at all times to be able  to breathe, or cannot lie down   Are very tired from working to catch your breath, or are sweating from trying to breathe  Call for advice if you:   Have trouble breathing when talking or sitting still   Have severe chest discomfort   Feel confused or disoriented   Are vomiting and can't keep liquids down   Have early signs of fluid loss, such as:   Dark-colored urine   Dry mouth   Muscle cramps   Lack of energy   Feeling lightheaded when you stand up  All topics are updated as new evidence becomes available and our peer review process is complete.  This topic retrieved from [employer] on: Apr 27, 2024.  Topic 339445 Version 2.0  Release: 32.4.2 - C32.116  © 2024 UpToDate, Inc. and/or its affiliates. All rights reserved.  figure 1: How to wash your hands     Wet your hands with clean water, and apply a small amount of soap. Lather and rub hands together for at least 20 seconds. Clean your wrists, palms, backs of your hands, between your fingers, tips of your fingers, thumbs, and under and around your nails. Rinse well, and dry your hands using a clean towel.  Graphic 263112 Version 7.0  Consumer Information Use and Disclaimer   Disclaimer: This generalized information is a limited summary of diagnosis, treatment, and/or medication information. It is not meant to be comprehensive and should be used as a tool to help the user understand and/or assess potential diagnostic and treatment options. It does NOT include all information about conditions, treatments, medications, side effects, or risks that may apply to a specific patient. It is not intended to be medical advice or a substitute for the medical advice, diagnosis, or treatment of a health care provider based on the health care provider's examination and assessment of a patient's specific and unique circumstances. Patients must speak with a health care provider for complete information about their health, medical questions, and treatment options, including any  risks or benefits regarding use of medications. This information does not endorse any treatments or medications as safe, effective, or approved for treating a specific patient. UpToDate, Inc. and its affiliates disclaim any warranty or liability relating to this information or the use thereof.The use of this information is governed by the Terms of Use, available at https://www.[employer].com/en/know/clinical-effectiveness-terms. 2024© UpToDate, Inc. and its affiliates and/or licensors. All rights reserved.  Copyright   © 2024 UpToDate, Inc. and/or its affiliates. All rights reserved.        Follow up with PCP in 3-5 days.  Proceed to  ER if symptoms worsen.    Chief Complaint     Chief Complaint   Patient presents with    Flu Symptoms     Since last Tuesday cough, bodyaches chills headaches, and runny nose. OTC Excedrin dayquill nyquil         History of Present Illness       The pt is a 54-year-old female presenting today for 7-day history of cough, myalgias and congestion.  Stated that aide at home was diagnosed with the flu over the weekend.  Has been taking over-the-counter medications but continues to have symptoms.  States she has been using her albuterol inhaler to help with wheezing. Here with her granddaughter who has the same symptoms.         Review of Systems   Review of Systems   Constitutional:  Positive for chills, fatigue and fever. Negative for activity change and appetite change.   HENT:  Positive for congestion and rhinorrhea. Negative for sinus pressure, sinus pain and sore throat.    Respiratory:  Positive for cough and wheezing. Negative for chest tightness and shortness of breath.    Cardiovascular:  Negative for chest pain and palpitations.   Gastrointestinal:  Positive for diarrhea. Negative for nausea and vomiting.   Musculoskeletal:  Negative for arthralgias and myalgias.   Skin:  Negative for color change and pallor.   Neurological:  Positive for headaches.         Current Medications  "      Current Outpatient Medications:     albuterol (PROVENTIL HFA,VENTOLIN HFA) 90 mcg/act inhaler, Inhale 2 puffs every 4 (four) hours as needed, Disp: , Rfl:     Alcohol Swabs (Pharmacist Choice Alcohol) PADS, 5 (five) times a day, Disp: , Rfl:     ALPRAZolam (XANAX) 1 mg tablet, TAKE 1 TABLET (1 MG TOTAL) BY MOUTH 4 (FOUR) TIMES A DAY AS NEEDED FOR ANXIETY, Disp: 120 tablet, Rfl: 0    ammonium lactate (LAC-HYDRIN) 12 % lotion, APPLY TOPICALLY 2 (TWO) TIMES A DAY AS NEEDED FOR DRY SKIN, Disp: 227 g, Rfl: 3    BD Pen Needle Nkechi 2nd Gen 32G X 4 MM MISC, USE AS DIRECTED FOR BEFORE A MEAL AND AT BEDTIME GLUCOSE INJECTION, Disp: , Rfl:     BD Pen Needle Nkechi 2nd Gen 32G X 4 MM MISC, USE WITH INSULIN 5 TIMES A DAY, Disp: 200 each, Rfl: 3    BD PrecisionGlide Needle 23G X 1-1/2\" MISC, USE AS DIRECTED TO ADMINISTER NALOXONE INJECTION.  MAY DISPENSE 23-25 GAUGE 1-1.5\" NEEDLE., Disp: , Rfl:     Blood Glucose Monitoring Suppl (ONE TOUCH ULTRA 2) w/Device KIT, Use as directed, Disp: , Rfl:     busPIRone (BUSPAR) 30 MG tablet, Take 1 tablet (30 mg total) by mouth 2 (two) times a day, Disp: 200 tablet, Rfl: 1    carbidopa-levodopa (SINEMET)  mg per tablet, TAKE 1 TABLET BY MOUTH 3 (THREE) TIMES A DAY, Disp: 90 tablet, Rfl: 3    Continuous Blood Gluc Transmit (Dexcom G6 Transmitter) MISC, USE 4 TIMES A DAY (WITH MEALS AND AT BEDTIME), Disp: 1 each, Rfl: 3    Continuous Glucose  (FreeStyle Lydia 3 [place]) ALEKS, USE 1 EACH 1 (ONE) TIME FOR 1 DOSE, Disp: , Rfl:     Continuous Glucose Sensor (FreeStyle Lydia 3 Sensor) MISC, Use 1 each every 14 (fourteen) days, Disp: 6 each, Rfl: 3    Dulaglutide (Trulicity) 1.5 MG/0.5ML SOAJ, INJECT 1.5 MG UNDER THE SKIN EVERY 7 DAYS., Disp: 2 mL, Rfl: 5    famotidine (PEPCID) 20 mg tablet, TAKE 1 TABLET (20 MG TOTAL) BY MOUTH 2 (TWO) TIMES A DAY AS NEEDED FOR INDIGESTION, Disp: 100 tablet, Rfl: 1    fexofenadine (ALLEGRA) 180 MG tablet, Take 1 tablet (180 mg total) by mouth daily, " Disp: 90 tablet, Rfl: 3    fluticasone (Flovent Diskus) 250 mcg/actuation diskus inhaler, Inhale 1-2 puffs 2 (two) times a day Rinse mouth after use., Disp: 60 each, Rfl: 1    gabapentin (NEURONTIN) 100 mg capsule, TAKE 1 CAPSULE (100 MG TOTAL) BY MOUTH DAILY AT BEDTIME TAKE ADDITIONAL 800 MG CAPSULE TO TOTAL 900 MG AT BEDTIME, Disp: 90 capsule, Rfl: 3    gabapentin (NEURONTIN) 400 mg capsule, TAKE 2 CAPSULES (400 MG TOTAL) BY MOUTH 3 (THREE) TIMES A DAY, Disp: 540 capsule, Rfl: 1    hydrALAZINE (APRESOLINE) 50 mg tablet, Take 50 mg by mouth 3 (three) times a day, Disp: , Rfl:     Incontinence Supply Disposable (Depend Undergarment Ex Absorb) MISC, Use 4 (four) times a day as needed (incontinence), Disp: 120 each, Rfl: 2    insulin lispro (HumaLOG) 100 units/mL injection pen, INJECT 20 UNITS UNDER THE SKIN 3 (THREE) TIMES A DAY BEFORE MEALS INDICATIONS: TYPE 2 DIABETES MELLITUS., Disp: 15 mL, Rfl: 3    ipratropium-albuterol (DUO-NEB) 0.5-2.5 mg/3 mL nebulizer solution, Take 3 mL by nebulization every 6 (six) hours as needed for wheezing or shortness of breath, Disp: 3 mL, Rfl: 0    Lantus SoloStar 100 units/mL SOPN, INJECT 55 UNITS UNDER THE SKIN 2 (TWO) TIMES A DAY AT LUNCH AND AT BEDTIME., Disp: 105 mL, Rfl: 1    leflunomide (ARAVA) 20 MG tablet, Take 1 tablet (20 mg total) by mouth daily, Disp: 90 tablet, Rfl: 2    Lidocaine 4 % PTCH, Apply 1 patch topically in the morning, Disp: 10 patch, Rfl: 0    lisinopril (ZESTRIL) 10 mg tablet, Take 1 tablet (10 mg total) by mouth daily, Disp: 90 tablet, Rfl: 2    lovastatin (MEVACOR) 10 MG tablet, Take 10 mg by mouth, Disp: , Rfl:     metFORMIN (GLUCOPHAGE-XR) 750 mg 24 hr tablet, TAKE 1 TABLET (750 MG TOTAL) BY MOUTH 2 (TWO) TIMES A DAY WITH MEALS., Disp: , Rfl:     metoprolol succinate (TOPROL-XL) 100 mg 24 hr tablet, Take 100 mg by mouth daily, Disp: , Rfl:     montelukast (SINGULAIR) 10 mg tablet, TAKE 1 TABLET (10 MG TOTAL) BY MOUTH DAILY AT BEDTIME, Disp: 100 tablet,  Rfl: 1    Nutritional Supplements (Glucerna Hunger Smart Shake) LIQD, Take 1 Can by mouth 3 (three) times a day, Disp: 296 mL, Rfl: 5    OneTouch Delica Lancets 33G MISC, Inject under the skin 2 (two) times a day, Disp: 100 each, Rfl: 5    OneTouch Ultra test strip, TEST TWICE DAILY OR AS DIRECTED BY MD, Disp: , Rfl:     Oral Electrolytes (PEDIALYTE SINGLES PO), Take 8 oz by mouth if needed, Disp: , Rfl:     oxyCODONE (ROXICODONE) 5 immediate release tablet, 10 mg pt reports she takes this 4 times per day but is out of the medication currently, Disp: , Rfl:     pantoprazole (PROTONIX) 40 mg tablet, Take 1 tablet (40 mg total) by mouth 2 (two) times a day, Disp: 90 tablet, Rfl: 3    pantoprazole (PROTONIX) 40 mg tablet, Take 1 tablet (40 mg total) by mouth 2 (two) times a day, Disp: 180 tablet, Rfl: 3    potassium chloride (K-DUR,KLOR-CON) 20 mEq tablet, Take 1 tablet (20 mEq total) by mouth daily, Disp: 30 tablet, Rfl: 1    potassium chloride (MICRO-K) 10 MEQ CR capsule, TAKE 2 CAPSULES (20 MEQ TOTAL) BY MOUTH 2 (TWO) TIMES A DAY, Disp: 90 capsule, Rfl: 3    prazosin (MINIPRESS) 5 mg capsule, TAKE 1 CAPSULE (5 MG TOTAL) BY MOUTH DAILY AT BEDTIME, Disp: 90 capsule, Rfl: 3    Pyridoxine HCl (vitamin B-6) 25 MG tablet, TAKE 1 TABLET (25 MG TOTAL) BY MOUTH DAILY, Disp: 30 tablet, Rfl: 0    risperiDONE (RisperDAL) 3 mg tablet, TAKE 1 TABLET (3 MG TOTAL) BY MOUTH 2 (TWO) TIMES A DAY, Disp: 180 tablet, Rfl: 0    rivaroxaban (XARELTO) 20 mg tablet, Take 20 mg by mouth, Disp: , Rfl:     sertraline (ZOLOFT) 100 mg tablet, Take 1 tablet (100 mg total) by mouth daily, Disp: 90 tablet, Rfl: 3    Spacer/Aero-Holding Chambers (AeroChamber Plus Geoff-Vu w/Mask) MISC, Inhale daily at bedtime as needed (wheezing), Disp: 1 each, Rfl: 0    trospium chloride (SANCTURA) 20 mg tablet, Take 1 tablet (20 mg total) by mouth 2 (two) times a day, Disp: 180 tablet, Rfl: 3    zolpidem (AMBIEN) 10 mg tablet, TAKE 1 TABLET (10 MG TOTAL) BY MOUTH  DAILY, Disp: 30 tablet, Rfl: 0    atorvastatin (LIPITOR) 10 mg tablet, Take 10 mg by mouth daily, Disp: , Rfl:     EPINEPHrine (EPIPEN) 0.3 mg/0.3 mL SOAJ, Inject 0.3 mL (0.3 mg total) into a muscle once for 1 dose, Disp: 0.6 mL, Rfl: 0    methocarbamol (ROBAXIN) 500 mg tablet, Take 1 tablet (500 mg total) by mouth 2 (two) times a day (Patient not taking: Reported on 2/11/2025), Disp: 20 tablet, Rfl: 0    Mirabegron ER (Myrbetriq) 25 MG TB24, TAKE 25 MG BY MOUTH IN THE MORNING (Patient not taking: Reported on 2/11/2025), Disp: 90 tablet, Rfl: 3    naloxone (NARCAN) 0.4 mg/mL injection, , Disp: , Rfl:     ondansetron (ZOFRAN-ODT) 4 mg disintegrating tablet, TAKE 1 TABLET (4 MG TOTAL) BY MOUTH EVERY 6 (SIX) HOURS AS NEEDED FOR NAUSEA (Patient not taking: Reported on 1/21/2025), Disp: 10 tablet, Rfl: 0    Oral Electrolytes (Pedialyte) PACK, Take 237 mL by mouth 2 (two) times a day (Patient not taking: Reported on 1/14/2025), Disp: , Rfl:     polyethylene glycol (GOLYTELY) 4000 mL solution, Take 4,000 mL by mouth once for 1 dose (Patient not taking: Reported on 8/29/2024), Disp: 4000 mL, Rfl: 0    polyethylene glycol (GOLYTELY) 4000 mL solution, Take 4,000 mL by mouth once for 1 dose, Disp: 4000 mL, Rfl: 0  No current facility-administered medications for this visit.    Current Allergies     Allergies as of 02/11/2025 - Reviewed 02/05/2025   Allergen Reaction Noted    Other Anaphylaxis 11/28/2017    Clarithromycin GI Intolerance 12/31/2020    Acetaminophen GI Intolerance 11/28/2017    Bactrim [sulfamethoxazole-trimethoprim] Itching, GI Intolerance, Dizziness, Confusion, and Lightheadedness 05/24/2022    Cephalosporins Hives 11/28/2017    Latex Hives 11/28/2017    Oxycodone-acetaminophen GI Intolerance 11/28/2017    Penicillins Diarrhea 11/28/2017    Trazodone Diarrhea 11/28/2017    Capsaicin Rash 12/07/2020    Naproxen Palpitations 11/28/2017            The following portions of the patient's history were reviewed and  updated as appropriate: allergies, current medications, past family history, past medical history, past social history, past surgical history and problem list.     Past Medical History:   Diagnosis Date    Abnormal ECG     Abnormal Pap smear of cervix     Allergic     Anemia     Anxiety     Asthma     Atrial fibrillation (HCC)     Bursitis of left hip 10/28/2014    Chlamydia     Chronic kidney disease     Coronary artery disease     COVID-19 2020    Depression     Diabetes mellitus (HCC)     Fibromyalgia     GERD (gastroesophageal reflux disease)     Headache(784.0)     Heart disease     Heart murmur     History of transfusion     Hypertension     Inflammatory bowel disease     Migraines     Mixed simple and mucopurulent chronic bronchitis (HCC) 2019    Myocardial infarction (HCC)     Neuromuscular disorder (HCC)     Opioid abuse, in remission (HCC)     Otitis media     Pneumonia     Scoliosis     Seizures (HCC)     Shingles     Sick sinus syndrome (HCC)     Stroke (HCC)     Urinary tract infection     Varicella     Visual impairment        Past Surgical History:   Procedure Laterality Date    APPENDECTOMY      CATARACT EXTRACTION Right 2023     SECTION      EGD AND COLONOSCOPY      EYE SURGERY Left 2023    FRACTURE SURGERY      HIP SURGERY Right     HYSTERECTOMY      TUBAL LIGATION         Family History   Problem Relation Age of Onset    Asthma Mother     Cancer Mother         Bladder Cancer    Diabetes Mother     Heart disease Mother     Hypertension Mother     Stroke Mother     Mental illness Mother     Depression Mother     COPD Mother     Arthritis Mother     Hearing loss Mother     Vision loss Mother     Glaucoma Mother     Anxiety disorder Mother     Psychiatric Illness Mother     Asthma Father     Diabetes Father     Heart disease Father     Hypertension Father     Arthritis Father     Mental illness Daughter     Depression Daughter     Cancer Maternal Grandfather     Heart  "disease Maternal Grandfather     Arthritis Maternal Grandfather     Cancer Paternal Grandmother     Diabetes Paternal Grandmother     Arthritis Paternal Grandfather     Asthma Brother     Diabetes Brother     Heart disease Brother     Hypertension Brother     Mental illness Brother     Arthritis Brother     Hypertension Brother     Psychiatric Illness Brother     Mental illness Maternal Aunt     Mental illness Maternal Aunt     Asthma Paternal Aunt     ADD / ADHD Cousin     ADD / ADHD Cousin          Medications have been verified.        Objective   /88   Pulse (!) 110   Temp 97.8 °F (36.6 °C)   Resp 18   Ht 5' 5\" (1.651 m)   Wt 68.5 kg (151 lb)   SpO2 97%   BMI 25.13 kg/m²        Physical Exam     Physical Exam  Vitals reviewed.   Constitutional:       General: She is not in acute distress.     Appearance: Normal appearance. She is well-developed and normal weight. She is not ill-appearing, toxic-appearing or diaphoretic.   HENT:      Head: Normocephalic and atraumatic.      Right Ear: Tympanic membrane and ear canal normal. No drainage, swelling or tenderness. No middle ear effusion. Tympanic membrane is not erythematous.      Left Ear: Tympanic membrane and ear canal normal. No drainage, swelling or tenderness.  No middle ear effusion. Tympanic membrane is not erythematous.      Nose: No congestion or rhinorrhea.      Mouth/Throat:      Mouth: Mucous membranes are moist. No oral lesions.      Pharynx: Oropharynx is clear. Uvula midline. Posterior oropharyngeal erythema present. No pharyngeal swelling, oropharyngeal exudate or uvula swelling.      Tonsils: No tonsillar exudate or tonsillar abscesses. 0 on the right. 0 on the left.   Eyes:      Extraocular Movements:      Right eye: Normal extraocular motion.      Left eye: Normal extraocular motion.      Conjunctiva/sclera: Conjunctivae normal.      Pupils: Pupils are equal, round, and reactive to light.   Cardiovascular:      Rate and Rhythm: Normal " rate and regular rhythm.      Heart sounds: Normal heart sounds. No murmur heard.     No friction rub. No gallop.   Pulmonary:      Effort: Pulmonary effort is normal. No respiratory distress.      Breath sounds: No stridor. Wheezing (diffuse) present. No rhonchi or rales.   Chest:      Chest wall: No tenderness.   Skin:     General: Skin is warm and dry.      Capillary Refill: Capillary refill takes less than 2 seconds.   Neurological:      Mental Status: She is alert.

## 2025-02-11 NOTE — PATIENT INSTRUCTIONS
"Patient Education     Flu in adults - Discharge instructions   The Basics   Written by the doctors and editors at [facility]   What are discharge instructions? -- Discharge instructions are information about how to take care of yourself after getting medical care for a health problem.  What is the flu? -- This is an infection that can cause fever, cough, body aches, and other symptoms. The most common type of flu is the \"seasonal\" flu. There are different forms of seasonal flu, for example, \"type A\" and \"type B.\" The medical term for the flu is \"influenza.\"  All forms of the flu are caused by viruses. Antibiotics do not work to treat the flu. Doctors might prescribe you an \"antiviral\" medicine. If so, follow your doctor's instructions. The flu can be dangerous because it can cause a serious lung infection called pneumonia.  How do I care for myself at home? -- Ask the doctor or nurse what you should do when you go home. Make sure that you understand exactly what you need to do to care for yourself. Ask questions if there is anything you do not understand.  You should also:   Take all of your medicines as instructed, even if you are feeling better.   Get lots of rest. Sleep when you feel tired. Avoid doing tiring activities.   Take warm, steamy showers to help soothe your cough.   Use hard candy or cough drops to soothe a sore throat and cough.   Try to thin mucus:   Drink lots of liquids.   Use a cool mist humidifier, if your doctor told you to. Keep the humidifier clean.   Use saline nose drops to relieve stuffiness.   Take a medicine like acetaminophen (sample brand name: Tylenol) or ibuprofen (sample brand names: Advil, Motrin) to help bring down your fever.   Dress in lightweight clothes if you have a fever. Cover with a light sheet or blanket if needed. This will help keep you from getting too warm.   Lower the chance of passing the infection to others:   Stay home while you are feeling sick or have a fever.   At " home, try to limit close contact with other people. You can also help protect others by wearing a face mask.   Wash your hands often (figure 1). Alcohol-based hand sanitizers also work to kill germs.   Cover your mouth and nose with the inside of your elbow when you cough or sneeze.   Avoid touching your eyes, nose, and mouth.   Do not share cups, food, towels, bedding, or other personal items with others.   Clean items and surfaces you often touch. Examples include sinks, counters, tables, door handles, remotes, and phones. Germs can often live on surfaces for a few hours. Use a bleach and water mixture or a cleaning product that gets rid of viruses.   Do not go to work or school until your symptoms are improving and your fever has been gone for at least 24 hours without taking medicine such as acetaminophen.  It's also important to get a flu vaccine each year. Some years, the flu vaccine is more effective than others. But even in years when it is less effective, it still helps prevent some cases of the flu. It can also help keep you from getting severely ill if you do get the flu.  What follow-up care do I need? -- Your doctor or nurse will tell you if you need to make a follow-up appointment. If so, make sure that you know when and where to go.  When should I call the doctor? -- Call for emergency help right away (in the [place] and [place], call 9-1-1) if you:   Are having so much trouble breathing that you can only say 1 or 2 words at a time   Need to sit upright at all times to be able to breathe, or cannot lie down   Are very tired from working to catch your breath, or are sweating from trying to breathe  Call for advice if you:   Have trouble breathing when talking or sitting still   Have severe chest discomfort   Feel confused or disoriented   Are vomiting and can't keep liquids down   Have early signs of fluid loss, such as:   Dark-colored urine   Dry mouth   Muscle cramps   Lack of energy   Feeling lightheaded  when you stand up  All topics are updated as new evidence becomes available and our peer review process is complete.  This topic retrieved from [employer] on: Apr 27, 2024.  Topic 730256 Version 2.0  Release: 32.4.2 - C32.116  © 2024 UpToDate, Inc. and/or its affiliates. All rights reserved.  figure 1: How to wash your hands     Wet your hands with clean water, and apply a small amount of soap. Lather and rub hands together for at least 20 seconds. Clean your wrists, palms, backs of your hands, between your fingers, tips of your fingers, thumbs, and under and around your nails. Rinse well, and dry your hands using a clean towel.  Graphic 996627 Version 7.0  Consumer Information Use and Disclaimer   Disclaimer: This generalized information is a limited summary of diagnosis, treatment, and/or medication information. It is not meant to be comprehensive and should be used as a tool to help the user understand and/or assess potential diagnostic and treatment options. It does NOT include all information about conditions, treatments, medications, side effects, or risks that may apply to a specific patient. It is not intended to be medical advice or a substitute for the medical advice, diagnosis, or treatment of a health care provider based on the health care provider's examination and assessment of a patient's specific and unique circumstances. Patients must speak with a health care provider for complete information about their health, medical questions, and treatment options, including any risks or benefits regarding use of medications. This information does not endorse any treatments or medications as safe, effective, or approved for treating a specific patient. UpToDate, Inc. and its affiliates disclaim any warranty or liability relating to this information or the use thereof.The use of this information is governed by the Terms of Use, available at https://www.woltersView Medicaluwer.com/en/know/clinical-effectiveness-terms. 2024©  [employer] and its affiliates and/or licensors. All rights reserved.  Copyright   © 2024 [employer] and/or its affiliates. All rights reserved.

## 2025-02-12 NOTE — PROGRESS NOTES
02/12/25 11:19 AM    Patient was called after the Urgent Care visit Patient declined to schedule appointment.    Patient is not having any issues with her BP - she is just sick and wants to rest.    Advised to call office if she has any questions or concerns    Thank you.  Aline Toure MA  [facility]

## 2025-02-13 DIAGNOSIS — E53.1 VITAMIN B6 DEFICIENCY: ICD-10-CM

## 2025-02-13 DIAGNOSIS — K21.9 GASTROESOPHAGEAL REFLUX DISEASE WITHOUT ESOPHAGITIS: ICD-10-CM

## 2025-02-13 DIAGNOSIS — F43.10 PTSD (POST-TRAUMATIC STRESS DISORDER): ICD-10-CM

## 2025-02-14 ENCOUNTER — RESULTS FOLLOW-UP (OUTPATIENT)
Age: 55
End: 2025-02-14

## 2025-02-14 DIAGNOSIS — F63.81 INTERMITTENT EXPLOSIVE DISORDER: ICD-10-CM

## 2025-02-14 DIAGNOSIS — N39.41 URGENCY INCONTINENCE: ICD-10-CM

## 2025-02-14 DIAGNOSIS — E53.1 VITAMIN B6 DEFICIENCY: ICD-10-CM

## 2025-02-14 RX ORDER — DIPHENHYDRAMINE HYDROCHLORIDE 25 MG/1
CAPSULE ORAL
Qty: 30 TABLET | Refills: 0 | Status: SHIPPED | OUTPATIENT
Start: 2025-02-14

## 2025-02-14 RX ORDER — MIRABEGRON 25 MG/1
25 TABLET, FILM COATED, EXTENDED RELEASE ORAL DAILY
Qty: 90 TABLET | Refills: 1 | Status: SHIPPED | OUTPATIENT
Start: 2025-02-14

## 2025-02-14 RX ORDER — DIPHENHYDRAMINE HYDROCHLORIDE 25 MG/1
CAPSULE ORAL
Qty: 30 TABLET | Refills: 0 | OUTPATIENT
Start: 2025-02-14

## 2025-02-14 RX ORDER — BUSPIRONE HYDROCHLORIDE 30 MG/1
30 TABLET ORAL 2 TIMES DAILY
Qty: 200 TABLET | Refills: 1 | Status: SHIPPED | OUTPATIENT
Start: 2025-02-14

## 2025-02-14 RX ORDER — SERTRALINE HYDROCHLORIDE 100 MG/1
100 TABLET, FILM COATED ORAL DAILY
Qty: 90 TABLET | Refills: 1 | Status: SHIPPED | OUTPATIENT
Start: 2025-02-14

## 2025-02-14 RX ORDER — FAMOTIDINE 20 MG/1
20 TABLET, FILM COATED ORAL 2 TIMES DAILY PRN
Qty: 100 TABLET | Refills: 1 | Status: SHIPPED | OUTPATIENT
Start: 2025-02-14

## 2025-02-17 DIAGNOSIS — Z79.4 TYPE 2 DIABETES MELLITUS WITH HYPOGLYCEMIA WITHOUT COMA, WITH LONG-TERM CURRENT USE OF INSULIN (HCC): ICD-10-CM

## 2025-02-17 DIAGNOSIS — E11.649 TYPE 2 DIABETES MELLITUS WITH HYPOGLYCEMIA WITHOUT COMA, WITH LONG-TERM CURRENT USE OF INSULIN (HCC): ICD-10-CM

## 2025-02-17 DIAGNOSIS — F43.10 PTSD (POST-TRAUMATIC STRESS DISORDER): ICD-10-CM

## 2025-02-18 RX ORDER — GABAPENTIN 400 MG/1
CAPSULE ORAL
Qty: 540 CAPSULE | Refills: 1 | Status: SHIPPED | OUTPATIENT
Start: 2025-02-18

## 2025-02-18 RX ORDER — ZOLPIDEM TARTRATE 10 MG/1
10 TABLET ORAL DAILY
Qty: 30 TABLET | OUTPATIENT
Start: 2025-02-18

## 2025-03-07 DIAGNOSIS — F41.9 ANXIETY: ICD-10-CM

## 2025-03-07 DIAGNOSIS — F43.10 PTSD (POST-TRAUMATIC STRESS DISORDER): ICD-10-CM

## 2025-03-07 RX ORDER — ZOLPIDEM TARTRATE 10 MG/1
10 TABLET ORAL DAILY
Qty: 30 TABLET | Refills: 0 | Status: SHIPPED | OUTPATIENT
Start: 2025-03-07

## 2025-03-07 RX ORDER — PRAZOSIN HYDROCHLORIDE 5 MG/1
5 CAPSULE ORAL
Qty: 90 CAPSULE | Refills: 1 | Status: SHIPPED | OUTPATIENT
Start: 2025-03-07

## 2025-03-07 RX ORDER — ALPRAZOLAM 1 MG/1
1 TABLET ORAL 4 TIMES DAILY PRN
Qty: 120 TABLET | Refills: 0 | Status: SHIPPED | OUTPATIENT
Start: 2025-03-07

## 2025-03-11 DIAGNOSIS — E53.1 VITAMIN B6 DEFICIENCY: ICD-10-CM

## 2025-03-11 RX ORDER — DIPHENHYDRAMINE HYDROCHLORIDE 25 MG/1
CAPSULE ORAL
Qty: 30 TABLET | Refills: 0 | Status: SHIPPED | OUTPATIENT
Start: 2025-03-11

## 2025-03-12 ENCOUNTER — TELEPHONE (OUTPATIENT)
Dept: PSYCHIATRY | Facility: CLINIC | Age: 55
End: 2025-03-12

## 2025-03-12 NOTE — LETTER
25     Dahlia Kraus   : 1970   [address]   [address]  [address]  [facility] [address]       It is the policy of [facility] to monitor and manage appointments that have been no-showed or cancelled with less than 48-hour notice. This is necessary to ensure that we are able to provide timely access for all patients to our providers. Undue numbers of unutilized appointments delays necessary medical care for all patients.      Dear Dahlia Kraus       We are sorry that you missed your appointment with Fe Arana PA-C on 3/10/2025.Your health and follow-up care are important to us. As this was a New Patient appointment, you will need to contact the office at [phone] if you would like to reschedule.    Please be aware that our office policy states that if you 'no show' two or more Medication Management  appointments without prior notice of cancellation within in a calendar year, you may be discharged from Medication Management  treatment.  We want to bring this to your attention now to prevent an interruption of your care.  If you have any questions about this policy, please call us at the number above.     If we do not hear from you within 10 business days to make a follow up appointment, we will assume you are no longer interested in care here.    Thank you in advance for your cooperation and assistance.       Sincerely,      [facility] Support Staff

## 2025-03-12 NOTE — TELEPHONE ENCOUNTER
NO-SHOW LETTER MAILED TO Dahlia Kraus.  ADDRESS: [address] [place] [address] [address] [place] [id number]  SENT VIA [employer]

## 2025-03-17 ENCOUNTER — TELEPHONE (OUTPATIENT)
Age: 55
End: 2025-03-17

## 2025-03-17 NOTE — TELEPHONE ENCOUNTER
Patient ERA Kidd from [facility] called in with patient on the line to schedule a NP TT appt and a NP MM appt.     Writer got verbal consent to speak to ERA.     Patients has been scheduled for NP TT appt on 5/15 @11am and f/u indu on   6/26 @2:30pm    Patient has been scheduled sandy NP MM appt on 4/2 @1pm and f/u appt on 4/17 @2pm.       Call back [phone]

## 2025-03-24 ENCOUNTER — TELEPHONE (OUTPATIENT)
Dept: PSYCHIATRY | Facility: CLINIC | Age: 55
End: 2025-03-24

## 2025-03-24 NOTE — TELEPHONE ENCOUNTER
One week follow up call for New Patient appointment with   Inocencio Suarez, PhD   on 04/02/2025 was made on 03/24/2025. Writer informed patient of New Patient paperwork needing to be completed 5 days prior to the appointment. Writer confirmed paperwork has been sent via [employer].    Appointment was made on: 03/17/2025

## 2025-03-25 ENCOUNTER — OFFICE VISIT (OUTPATIENT)
Age: 55
End: 2025-03-25
Payer: COMMERCIAL

## 2025-03-25 VITALS
OXYGEN SATURATION: 99 % | DIASTOLIC BLOOD PRESSURE: 82 MMHG | SYSTOLIC BLOOD PRESSURE: 136 MMHG | WEIGHT: 152 LBS | HEART RATE: 88 BPM | BODY MASS INDEX: 25.33 KG/M2 | HEIGHT: 65 IN

## 2025-03-25 DIAGNOSIS — R92.8 ABNORMAL MAMMOGRAM OF RIGHT BREAST: ICD-10-CM

## 2025-03-25 DIAGNOSIS — Z12.11 SCREENING FOR COLON CANCER: ICD-10-CM

## 2025-03-25 DIAGNOSIS — N17.9 ACUTE KIDNEY INJURY (HCC): ICD-10-CM

## 2025-03-25 DIAGNOSIS — Z72.0 TOBACCO USE: ICD-10-CM

## 2025-03-25 DIAGNOSIS — M70.21 OLECRANON BURSITIS OF RIGHT ELBOW: ICD-10-CM

## 2025-03-25 DIAGNOSIS — J18.9 PNEUMONIA DUE TO INFECTIOUS ORGANISM, UNSPECIFIED LATERALITY, UNSPECIFIED PART OF LUNG: ICD-10-CM

## 2025-03-25 DIAGNOSIS — Z79.899 POLYPHARMACY: ICD-10-CM

## 2025-03-25 DIAGNOSIS — G47.00 INSOMNIA, UNSPECIFIED TYPE: ICD-10-CM

## 2025-03-25 DIAGNOSIS — G93.40 ENCEPHALOPATHY, UNSPECIFIED TYPE: ICD-10-CM

## 2025-03-25 DIAGNOSIS — Z00.00 ANNUAL PHYSICAL EXAM: Primary | ICD-10-CM

## 2025-03-25 PROCEDURE — 99396 PREV VISIT EST AGE 40-64: CPT

## 2025-03-25 RX ORDER — ZOLPIDEM TARTRATE 5 MG/1
5 TABLET ORAL
Qty: 30 TABLET | Refills: 0 | Status: SHIPPED | OUTPATIENT
Start: 2025-03-25

## 2025-03-25 RX ORDER — NICOTINE 21 MG/24HR
1 PATCH, TRANSDERMAL 24 HOURS TRANSDERMAL EVERY 24 HOURS
Qty: 28 PATCH | Refills: 0 | Status: SHIPPED | OUTPATIENT
Start: 2025-03-25

## 2025-03-25 NOTE — PROGRESS NOTES
Adult Annual Physical  Name: Dahlia Kraus      : 1970      MRN: [id number]  Encounter Provider: María Waters PA-C  Encounter Date: 3/25/2025   Encounter department: [facility] INTERNAL MEDICINE [facility] [address]    Assessment & Plan    Preventive Screenings:    - Breast cancer screening: screening up-to-date     Immunizations:  - Immunizations due: Hepatitis A         History of Present Illness     Adult Annual Physical:  Patient presents for annual physical.     Diet and Physical Activity:  - Diet/Nutrition: no special diet.  - Exercise: no formal exercise.    General Health:  - Sleep: sleeps well.  - Hearing: normal hearing right ear.  - Vision: no vision problems.  - Dental: regular dental visits.    /GYN Health:  - Follows with GYN: yes.   - History of STDs: no    Review of Systems      Objective   There were no vitals taken for this visit.    Physical Exam

## 2025-03-25 NOTE — PATIENT INSTRUCTIONS
"Patient Education     Routine physical for adults   The Basics   Written by the doctors and editors at [facility]   What is a physical? -- A physical is a routine visit, or \"check-up,\" with your doctor. You might also hear it called a \"wellness visit\" or \"preventive visit.\"  During each visit, the doctor will:   Ask about your physical and mental health   Ask about your habits, behaviors, and lifestyle   Do an exam   Give you vaccines if needed   Talk to you about any medicines you take   Give advice about your health   Answer your questions  Getting regular check-ups is an important part of taking care of your health. It can help your doctor find and treat any problems you have. But it's also important for preventing health problems.  A routine physical is different from a \"sick visit.\" A sick visit is when you see a doctor because of a health concern or problem. Since physicals are scheduled ahead of time, you can think about what you want to ask the doctor.  How often should I get a physical? -- It depends on your age and health. In general, for people age 21 years and older:   If you are younger than 50 years, you might be able to get a physical every 3 years.   If you are 50 years or older, your doctor might recommend a physical every year.  If you have an ongoing health condition, like diabetes or high blood pressure, your doctor will probably want to see you more often.  What happens during a physical? -- In general, each visit will include:   Physical exam - The doctor or nurse will check your height, weight, heart rate, and blood pressure. They will also look at your eyes and ears. They will ask about how you are feeling and whether you have any symptoms that bother you.   Medicines - It's a good idea to bring a list of all the medicines you take to each doctor visit. Your doctor will talk to you about your medicines and answer any questions. Tell them if you are having any side effects that bother you. You " "should also tell them if you are having trouble paying for any of your medicines.   Habits and behaviors - This includes:   Your diet   Your exercise habits   Whether you smoke, drink alcohol, or use drugs   Whether you are sexually active   Whether you feel safe at home  Your doctor will talk to you about things you can do to improve your health and lower your risk of health problems. They will also offer help and support. For example, if you want to quit smoking, they can give you advice and might prescribe medicines. If you want to improve your diet or get more physical activity, they can help you with this, too.   Lab tests, if needed - The tests you get will depend on your age and situation. For example, your doctor might want to check your:   Cholesterol   Blood sugar   Iron level   Vaccines - The recommended vaccines will depend on your age, health, and what vaccines you already had. Vaccines are very important because they can prevent certain serious or deadly infections.   Discussion of screening - \"Screening\" means checking for diseases or other health problems before they cause symptoms. Your doctor can recommend screening based on your age, risk, and preferences. This might include tests to check for:   Cancer, such as breast, prostate, cervical, ovarian, colorectal, prostate, lung, or skin cancer   Sexually transmitted infections, such as chlamydia and gonorrhea   Mental health conditions like depression and anxiety  Your doctor will talk to you about the different types of screening tests. They can help you decide which screenings to have. They can also explain what the results might mean.   Answering questions - The physical is a good time to ask the doctor or nurse questions about your health. If needed, they can refer you to other doctors or specialists, too.  Adults older than 65 years often need other care, too. As you get older, your doctor will talk to you about:   How to prevent falling at " home   Hearing or vision tests   Memory testing   How to take your medicines safely   Making sure that you have the help and support you need at home  All topics are updated as new evidence becomes available and our peer review process is complete.  This topic retrieved from [employer] on: May 02, 2024.  Topic 246523 Version 1.0  Release: 32.4.3 - C32.122  © 2024 UpToDate, Inc. and/or its affiliates. All rights reserved.  Consumer Information Use and Disclaimer   Disclaimer: This generalized information is a limited summary of diagnosis, treatment, and/or medication information. It is not meant to be comprehensive and should be used as a tool to help the user understand and/or assess potential diagnostic and treatment options. It does NOT include all information about conditions, treatments, medications, side effects, or risks that may apply to a specific patient. It is not intended to be medical advice or a substitute for the medical advice, diagnosis, or treatment of a health care provider based on the health care provider's examination and assessment of a patient's specific and unique circumstances. Patients must speak with a health care provider for complete information about their health, medical questions, and treatment options, including any risks or benefits regarding use of medications. This information does not endorse any treatments or medications as safe, effective, or approved for treating a specific patient. UpToDate, Inc. and its affiliates disclaim any warranty or liability relating to this information or the use thereof.The use of this information is governed by the Terms of Use, available at https://www.woltersSierra Surgicaluwer.com/en/know/clinical-effectiveness-terms. 2024© UpToDate, Inc. and its affiliates and/or licensors. All rights reserved.  Copyright   © 2024 UpToDate, Inc. and/or its affiliates. All rights reserved.

## 2025-03-25 NOTE — PROGRESS NOTES
Name: Dahlia Kraus      : 1970      MRN: [id number]  Encounter Provider: María Waters PA-C  Encounter Date: 3/25/2025   Encounter department: [facility]IC[facility] [address]  :  Assessment & Plan  Annual physical exam  Patient is a pleasant 55-year-old female presenting for annual physical exam.  Overall doing okay.  Recently had lab work and is up-to-date on baseline blood work.       Pneumonia due to infectious organism, unspecified laterality, unspecified part of lung  Was recently evaluated at [facility] on 3/10/2025 and subsequently treated for pneumonia.  Lung exam overall unremarkable in office today.  Appears to be clinically improving       Acute kidney injury (HCC)  While in the hospital at the recent encounter she was found to have an ANDREW.       Polypharmacy  Patient suspected to have altered mental status due to polypharmacy induced encephalopathy.  Consideration for slowly adjusting medication over time.       Encephalopathy, unspecified type  Patient had presented to the hospital on 3/10/2025 for altered mental status and weakness for a week.  Workup/suspicion significant for polypharmacy induced encephalopathy.  Clinically she improved in the ER after fluids and some medication decreases       Tobacco use  Patient would like to stop smoking.  Does have history of seizures so Wellbutrin is contraindicated.  Recommended we try patches.  She states Chantix does not work for her.  Orders:    nicotine (NICODERM CQ) 21 mg/24 hr TD 24 hr patch; Place 1 patch on the skin over 24 hours every 24 hours    Olecranon bursitis of right elbow  Patient has bursa of the right olecranon region.  Not tender to palpation no redness.  States that has grown a bit in size.  Will refer out to Ortho  Orders:    Ambulatory Referral to Orthopedic Surgery; Future    Screening for colon cancer  Patient due for colonoscopy as previous colonoscopy was incomplete due to poor bowel  prep  Orders:    Ambulatory Referral to Gastroenterology; Future    Insomnia, unspecified type  Patient states that Ambien is no longer working for her however she has long history of use of this medication.  Discussed tapering methods and will continue to follow as we slowly taper off Ambien and discuss other options.  May consider ramelteon in the future as her insomnia appears to be mixed  Orders:    zolpidem (AMBIEN) 5 mg tablet; Take 1 tablet (5 mg total) by mouth daily at bedtime as needed for sleep    Abnormal mammogram of right breast  Per mammogram from 9/2024-incomplete imaging of right breast and recommended diagnostic mammogram of right breast.  Not due for mammogram until this upcoming September  Orders:    Mammo diagnostic right w 3d and cad; Future           History of Present Illness   Patient is a 55 year old female presenting for physical       Review of Systems   Constitutional:  Negative for chills, fatigue and fever.   HENT:  Negative for ear discharge, ear pain, postnasal drip, rhinorrhea, sinus pressure, sinus pain, sore throat, tinnitus and trouble swallowing.    Eyes:  Negative for pain, discharge and itching.   Respiratory:  Negative for cough, shortness of breath and wheezing.    Cardiovascular:  Negative for chest pain, palpitations and leg swelling.   Gastrointestinal:  Negative for abdominal pain, constipation, diarrhea, nausea and vomiting.   Endocrine: Negative for polydipsia, polyphagia and polyuria.   Genitourinary:  Negative for difficulty urinating, frequency, hematuria and urgency.   Musculoskeletal:  Negative for arthralgias, joint swelling and myalgias.   Skin:  Negative for color change.   Allergic/Immunologic: Negative for environmental allergies.   Neurological:  Negative for dizziness, weakness, light-headedness, numbness and headaches.   Hematological:  Negative for adenopathy.   Psychiatric/Behavioral:  Negative for decreased concentration and sleep disturbance. The  "patient is not nervous/anxious.        Objective   /82   Pulse 88   Ht 5' 5\" (1.651 m)   Wt 68.9 kg (152 lb)   SpO2 99%   BMI 25.29 kg/m²      Physical Exam  Vitals and nursing note reviewed.   Constitutional:       General: She is awake. She is not in acute distress.     Appearance: Normal appearance. She is well-developed, well-groomed and normal weight.   HENT:      Head: Normocephalic and atraumatic.      Right Ear: Hearing and external ear normal.      Left Ear: Hearing and external ear normal.      Nose: Nose normal.      Mouth/Throat:      Lips: Pink.      Mouth: Mucous membranes are moist.   Eyes:      General: Lids are normal. Vision grossly intact. Gaze aligned appropriately.      Conjunctiva/sclera: Conjunctivae normal.   Neck:      Vascular: No carotid bruit.      Trachea: Trachea and phonation normal.   Cardiovascular:      Rate and Rhythm: Normal rate and regular rhythm.      Heart sounds: Normal heart sounds, S1 normal and S2 normal. No murmur heard.     No friction rub. No gallop.   Pulmonary:      Effort: Pulmonary effort is normal. No respiratory distress.      Breath sounds: Normal breath sounds and air entry. No decreased breath sounds, wheezing, rhonchi or rales.   Abdominal:      General: Abdomen is flat. Bowel sounds are normal.      Palpations: Abdomen is soft.      Tenderness: There is no abdominal tenderness.   Musculoskeletal:         General: No swelling.      Cervical back: Neck supple.      Right lower leg: No edema.      Left lower leg: No edema.      Comments: Bursa of right olecranon region   Skin:     General: Skin is warm.      Capillary Refill: Capillary refill takes less than 2 seconds.   Neurological:      Mental Status: She is alert.   Psychiatric:         Attention and Perception: Attention and perception normal.         Mood and Affect: Mood and affect normal.         Speech: Speech normal.         Behavior: Behavior normal. Behavior is cooperative.         Thought " Content: Thought content normal.         Cognition and Memory: Cognition and memory normal.         Judgment: Judgment normal.

## 2025-03-26 DIAGNOSIS — Z79.4 TYPE 2 DIABETES MELLITUS WITH HYPOGLYCEMIA WITHOUT COMA, WITH LONG-TERM CURRENT USE OF INSULIN (HCC): ICD-10-CM

## 2025-03-26 DIAGNOSIS — E11.649 TYPE 2 DIABETES MELLITUS WITH HYPOGLYCEMIA WITHOUT COMA, WITH LONG-TERM CURRENT USE OF INSULIN (HCC): ICD-10-CM

## 2025-03-27 RX ORDER — LANCETS 33 GAUGE
EACH MISCELLANEOUS
Qty: 100 EACH | Refills: 5 | Status: SHIPPED | OUTPATIENT
Start: 2025-03-27

## 2025-03-28 ENCOUNTER — TELEPHONE (OUTPATIENT)
Age: 55
End: 2025-03-28

## 2025-03-28 NOTE — TELEPHONE ENCOUNTER
Patient is calling regarding cancelling an NP appointment.an f/u    Date/Time: 4/2 @ 1pm np and 4/17 @ 2    Reason: conflict    Patient was rescheduled: YES [x] NO []  If yes, when was Patient reschedule for: 4/16 and 4/30    Patient requesting call back to reschedule: YES [] NO [x]

## 2025-03-31 DIAGNOSIS — F43.10 PTSD (POST-TRAUMATIC STRESS DISORDER): ICD-10-CM

## 2025-04-01 DIAGNOSIS — F43.10 PTSD (POST-TRAUMATIC STRESS DISORDER): ICD-10-CM

## 2025-04-01 RX ORDER — RISPERIDONE 3 MG/1
3 TABLET ORAL 2 TIMES DAILY
Qty: 180 TABLET | Refills: 0 | Status: SHIPPED | OUTPATIENT
Start: 2025-04-01

## 2025-04-02 DIAGNOSIS — F41.9 ANXIETY: ICD-10-CM

## 2025-04-02 RX ORDER — ZOLPIDEM TARTRATE 10 MG/1
10 TABLET ORAL DAILY
Qty: 30 TABLET | Refills: 0 | Status: SHIPPED | OUTPATIENT
Start: 2025-04-02 | End: 2025-04-09

## 2025-04-03 DIAGNOSIS — E53.1 VITAMIN B6 DEFICIENCY: ICD-10-CM

## 2025-04-03 RX ORDER — ALPRAZOLAM 1 MG/1
1 TABLET ORAL 4 TIMES DAILY PRN
Qty: 120 TABLET | Refills: 0 | Status: SHIPPED | OUTPATIENT
Start: 2025-04-03

## 2025-04-04 RX ORDER — DIPHENHYDRAMINE HYDROCHLORIDE 25 MG/1
25 CAPSULE ORAL DAILY
Qty: 30 TABLET | Refills: 0 | Status: SHIPPED | OUTPATIENT
Start: 2025-04-04

## 2025-04-08 DIAGNOSIS — R25.1 TREMOR: ICD-10-CM

## 2025-04-08 DIAGNOSIS — F43.10 PTSD (POST-TRAUMATIC STRESS DISORDER): ICD-10-CM

## 2025-04-09 RX ORDER — ZOLPIDEM TARTRATE 10 MG/1
10 TABLET ORAL DAILY
Qty: 30 TABLET | Refills: 0 | Status: SHIPPED | OUTPATIENT
Start: 2025-04-09

## 2025-04-09 RX ORDER — CARBIDOPA AND LEVODOPA 25; 100 MG/1; MG/1
1 TABLET ORAL 3 TIMES DAILY
Qty: 90 TABLET | Refills: 0 | Status: SHIPPED | OUTPATIENT
Start: 2025-04-09

## 2025-04-09 NOTE — TELEPHONE ENCOUNTER
I called the patient and left a voicemail to please return my call in regards to rescheduling her follow up appt with Dr. Naranjo.  There is an opening on 4/24 at 3:00 pm or 6/5 at 4:30 pm should she return my call.  I left my direct [employer]s number for call back.

## 2025-04-14 ENCOUNTER — TELEPHONE (OUTPATIENT)
Age: 55
End: 2025-04-14

## 2025-04-14 NOTE — TELEPHONE ENCOUNTER
Yahaira PT states she is obligated to notify PCP that pt reported having a fall on Saturday 4/12/25.  Pt reports she injured left knee but is not wanting to seek medical attention at this time.  No further concerns at this time.

## 2025-04-16 ENCOUNTER — TELEPHONE (OUTPATIENT)
Dept: PSYCHIATRY | Facility: CLINIC | Age: 55
End: 2025-04-16

## 2025-04-16 NOTE — TELEPHONE ENCOUNTER
NO-SHOW LETTER MAILED TO Dahlia Kraus.  ADDRESS: [address] [place] [address] [address] [place] [id number]  SENT VIA [employer]

## 2025-04-17 DIAGNOSIS — Z72.0 TOBACCO USE: ICD-10-CM

## 2025-04-26 DIAGNOSIS — E87.6 LOW BLOOD POTASSIUM: ICD-10-CM

## 2025-04-28 ENCOUNTER — DOCUMENTATION (OUTPATIENT)
Dept: ADMINISTRATIVE | Facility: OTHER | Age: 55
End: 2025-04-28

## 2025-04-28 DIAGNOSIS — F41.9 ANXIETY: ICD-10-CM

## 2025-04-28 RX ORDER — POTASSIUM CHLORIDE 750 MG/1
20 CAPSULE, EXTENDED RELEASE ORAL 2 TIMES DAILY
Qty: 90 CAPSULE | Refills: 3 | Status: SHIPPED | OUTPATIENT
Start: 2025-04-28

## 2025-04-28 NOTE — PROGRESS NOTES
04/28/25 4:00 PM    Osteoporosis Management Post Fracture outreach is not required; there is an active DEXA screening order on file.    Thank you.  Lm Monique MA  [facility]

## 2025-04-29 RX ORDER — ALPRAZOLAM 1 MG/1
1 TABLET ORAL 4 TIMES DAILY PRN
Qty: 120 TABLET | Refills: 0 | Status: SHIPPED | OUTPATIENT
Start: 2025-04-29

## 2025-04-30 DIAGNOSIS — K21.9 GASTROESOPHAGEAL REFLUX DISEASE WITHOUT ESOPHAGITIS: ICD-10-CM

## 2025-04-30 RX ORDER — FAMOTIDINE 20 MG/1
20 TABLET, FILM COATED ORAL 2 TIMES DAILY PRN
Qty: 100 TABLET | Refills: 1 | Status: SHIPPED | OUTPATIENT
Start: 2025-04-30

## 2025-05-02 DIAGNOSIS — E53.1 VITAMIN B6 DEFICIENCY: ICD-10-CM

## 2025-05-02 RX ORDER — DIPHENHYDRAMINE HYDROCHLORIDE 25 MG/1
25 CAPSULE ORAL DAILY
Qty: 30 TABLET | Refills: 0 | Status: SHIPPED | OUTPATIENT
Start: 2025-05-02

## 2025-05-08 ENCOUNTER — CONSULT (OUTPATIENT)
Dept: MULTI SPECIALTY CLINIC | Facility: CLINIC | Age: 55
End: 2025-05-08

## 2025-05-08 VITALS — DIASTOLIC BLOOD PRESSURE: 86 MMHG | HEART RATE: 79 BPM | TEMPERATURE: 98 F | SYSTOLIC BLOOD PRESSURE: 121 MMHG

## 2025-05-08 DIAGNOSIS — R49.0 HOARSENESS OF VOICE: ICD-10-CM

## 2025-05-08 DIAGNOSIS — J38.1 POLYPOID CORDITIS: Primary | ICD-10-CM

## 2025-05-08 NOTE — PROGRESS NOTES
[facility]  OTOLARYNGOLOGY - HEAD & NECK SURGERY    Dahlia Kraus  [id number]  1970    HISTORY & PHYSICAL    Assessment:  1. 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  Recheck in two months.  Advised smoking cessation and referral to speech therapy.     -------------------------------------------------    Chief Complaint   Patient presents with    Consult     Hoarseness in voice        History of Present Illness: 55 year old woman.    Hoarseness 6 months  Throat pain, left ear pain more than right  Lower voice/tone  No problems eating  Smoker - 20 cigs a day  Since 1993 she has been a smoker  Swallowing is OK.       Referring Provider: María [place] PA-C  [address] [facility]  [address]  [place] [address]      Allergies   Allergen Reactions    Other Anaphylaxis     Capzasin HP -- severe burning and swelling of the skin     Clarithromycin GI Intolerance    Acetaminophen GI Intolerance    Bactrim [Sulfamethoxazole-Trimethoprim] Itching, GI Intolerance, Dizziness, Confusion and Lightheadedness     Patient passes out when on this medication for several days     Cephalosporins Hives     Tolerated Cefdinir 2/2023    Latex Hives    Oxycodone-Acetaminophen GI Intolerance    Penicillins Diarrhea    Trazodone Diarrhea    Capsaicin Rash    Naproxen Palpitations       Past Medical History:   Diagnosis Date    Abnormal ECG     Abnormal Pap smear of cervix     Allergic     Anemia     Anxiety     Asthma     Atrial fibrillation (HCC)     Bursitis of left hip 10/28/2014    Chlamydia     Chronic kidney disease     Coronary artery disease     COVID-19 12/06/2020    Depression     Diabetes mellitus (HCC)     Fibromyalgia     GERD (gastroesophageal reflux disease)     Headache(784.0)     Heart disease     Heart murmur     History of transfusion     Hypertension     Inflammatory bowel disease     Migraines     Mixed simple and  mucopurulent chronic bronchitis (HCC) 2019    Myocardial infarction (HCC)     Neuromuscular disorder (HCC)     Opioid abuse, in remission (HCC)     Otitis media     Pneumonia     Scoliosis     Seizures (HCC)     Shingles     Sick sinus syndrome (HCC)     Stroke (HCC)     Urinary tract infection     Varicella     Visual impairment        Past Surgical History:   Procedure Laterality Date    APPENDECTOMY      CATARACT EXTRACTION Right 2023     SECTION      EGD AND COLONOSCOPY      EYE SURGERY Left 2023    FRACTURE SURGERY      HIP SURGERY Right     HYSTERECTOMY      TUBAL LIGATION         Family History   Problem Relation Age of Onset    Asthma Mother     Cancer Mother         Bladder Cancer    Diabetes Mother     Heart disease Mother     Hypertension Mother     Stroke Mother     Mental illness Mother     Depression Mother     COPD Mother     Arthritis Mother     Hearing loss Mother     Vision loss Mother     Glaucoma Mother     Anxiety disorder Mother     Psychiatric Illness Mother     Asthma Father     Diabetes Father     Heart disease Father     Hypertension Father     Arthritis Father     Mental illness Daughter     Depression Daughter     Cancer Maternal Grandfather     Heart disease Maternal Grandfather     Arthritis Maternal Grandfather     Cancer Paternal Grandmother     Diabetes Paternal Grandmother     Arthritis Paternal Grandfather     Asthma Brother     Diabetes Brother     Heart disease Brother     Hypertension Brother     Mental illness Brother     Arthritis Brother     Hypertension Brother     Psychiatric Illness Brother     Mental illness Maternal Aunt     Mental illness Maternal Aunt     Asthma Paternal Aunt     ADD / ADHD Cousin     ADD / ADHD Cousin         Social History     Tobacco Use    Smoking status: Every Day     Current packs/day: 1.00     Average packs/day: 0.6 packs/day for 42.4 years (26.2 ttl pk-yrs)     Types: Cigarettes     Start date: 1992    Smokeless  "tobacco: Never    Tobacco comments:     Patient stated that she is not ready to quit smoking    Vaping Use    Vaping status: Never Used   Substance Use Topics    Alcohol use: Not Currently    Drug use: Never       Current Outpatient Medications on File Prior to Visit   Medication Sig Dispense Refill    albuterol (PROVENTIL HFA,VENTOLIN HFA) 90 mcg/act inhaler Inhale 2 puffs every 4 (four) hours as needed      Alcohol Swabs (Pharmacist Choice Alcohol) PADS 5 (five) times a day      ALPRAZolam (XANAX) 1 mg tablet TAKE 1 TABLET (1 MG TOTAL) BY MOUTH 4 (FOUR) TIMES A DAY AS NEEDED FOR ANXIETY 120 tablet 0    ammonium lactate (LAC-HYDRIN) 12 % lotion APPLY TOPICALLY 2 (TWO) TIMES A DAY AS NEEDED FOR DRY SKIN 227 g 3    atorvastatin (LIPITOR) 10 mg tablet Take 10 mg by mouth daily      BD Pen Needle Nkechi 2nd Gen 32G X 4 MM MISC USE AS DIRECTED FOR BEFORE A MEAL AND AT BEDTIME GLUCOSE INJECTION      BD Pen Needle Nkechi 2nd Gen 32G X 4 MM MISC USE WITH INSULIN 5 TIMES A  each 3    BD PrecisionGlide Needle 23G X 1-1/2\" MISC USE AS DIRECTED TO ADMINISTER NALOXONE INJECTION.  MAY DISPENSE 23-25 GAUGE 1-1.5\" NEEDLE.      Blood Glucose Monitoring Suppl (ONE TOUCH ULTRA 2) w/Device KIT Use as directed      busPIRone (BUSPAR) 30 MG tablet TAKE 1 TABLET BY MOUTH TWICE A  tablet 1    carbidopa-levodopa (SINEMET)  mg per tablet TAKE 1 TABLET BY MOUTH 3 (THREE) TIMES A DAY 90 tablet 0    Continuous Blood Gluc Transmit (Dexcom G6 Transmitter) MISC USE 4 TIMES A DAY (WITH MEALS AND AT BEDTIME) 1 each 3    Continuous Glucose  (FreeStyle Lydia 3 Titus) ALEKS USE 1 EACH 1 (ONE) TIME FOR 1 DOSE      Continuous Glucose Sensor (FreeStyle Lydia 3 Sensor) MISC Use 1 each every 14 (fourteen) days 6 each 3    Dulaglutide (Trulicity) 1.5 MG/0.5ML SOAJ INJECT 1.5 MG UNDER THE SKIN EVERY 7 DAYS. 2 mL 5    EPINEPHrine (EPIPEN) 0.3 mg/0.3 mL SOAJ Inject 0.3 mL (0.3 mg total) into a muscle once for 1 dose 0.6 mL 0    " famotidine (PEPCID) 20 mg tablet TAKE 1 TABLET (20 MG TOTAL) BY MOUTH 2 (TWO) TIMES A DAY AS NEEDED FOR INDIGESTION 100 tablet 1    fexofenadine (ALLEGRA) 180 MG tablet Take 1 tablet (180 mg total) by mouth daily 90 tablet 3    fluticasone (Flovent Diskus) 250 mcg/actuation diskus inhaler Inhale 1-2 puffs 2 (two) times a day Rinse mouth after use. 60 each 1    gabapentin (NEURONTIN) 100 mg capsule TAKE 1 CAPSULE (100 MG TOTAL) BY MOUTH DAILY AT BEDTIME TAKE ADDITIONAL 800 MG CAPSULE TO TOTAL 900 MG AT BEDTIME 90 capsule 3    gabapentin (NEURONTIN) 400 mg capsule TAKE 2 CAPSULES (400 MG TOTAL) BY MOUTH 3 (THREE) TIMES A  capsule 1    hydrALAZINE (APRESOLINE) 50 mg tablet Take 50 mg by mouth 3 (three) times a day      Incontinence Supply Disposable (Depend Undergarment Ex Absorb) MISC Use 4 (four) times a day as needed (incontinence) 120 each 2    insulin lispro (HumaLOG) 100 units/mL injection pen INJECT 20 UNITS UNDER THE SKIN 3 (THREE) TIMES A DAY BEFORE MEALS INDICATIONS: TYPE 2 DIABETES MELLITUS. 15 mL 3    Lancets (OneTouch Delica Plus [id number]) MISC INJECT UNDER THE SKIN 2 (TWO) TIMES A  each 5    Lantus SoloStar 100 units/mL SOPN INJECT 55 UNITS UNDER THE SKIN 2 (TWO) TIMES A DAY AT LUNCH AND AT BEDTIME. 105 mL 1    leflunomide (ARAVA) 20 MG tablet Take 1 tablet (20 mg total) by mouth daily 90 tablet 2    Lidocaine 4 % PTCH Apply 1 patch topically in the morning 10 patch 0    lisinopril (ZESTRIL) 10 mg tablet Take 1 tablet (10 mg total) by mouth daily 90 tablet 2    lovastatin (MEVACOR) 10 MG tablet Take 10 mg by mouth      metFORMIN (GLUCOPHAGE-XR) 750 mg 24 hr tablet TAKE 1 TABLET (750 MG TOTAL) BY MOUTH 2 (TWO) TIMES A DAY WITH MEALS.      methocarbamol (ROBAXIN) 500 mg tablet Take 1 tablet (500 mg total) by mouth 2 (two) times a day 20 tablet 0    metoprolol succinate (TOPROL-XL) 100 mg 24 hr tablet Take 100 mg by mouth daily      montelukast (SINGULAIR) 10 mg tablet TAKE 1 TABLET (10 MG  TOTAL) BY MOUTH DAILY AT BEDTIME 100 tablet 1    Myrbetriq 25 MG TB24 TAKE 25 MG BY MOUTH IN THE MORNING 90 tablet 1    naloxone (NARCAN) 0.4 mg/mL injection  (Patient not taking: Reported on 3/25/2025)      nicotine (Nicotine Step 1) 21 mg/24 hr TD 24 hr patch PLACE 1 PATCH ON THE SKIN OVER 24 HOURS EVERY 24 HOURS 30 patch 2    Nutritional Supplements (Glucerna Hunger Smart Shake) LIQD Take 1 Can by mouth 3 (three) times a day 296 mL 5    ondansetron (ZOFRAN-ODT) 4 mg disintegrating tablet TAKE 1 TABLET (4 MG TOTAL) BY MOUTH EVERY 6 (SIX) HOURS AS NEEDED FOR NAUSEA (Patient not taking: Reported on 1/21/2025) 10 tablet 0    OneTouch Ultra test strip TEST TWICE DAILY OR AS DIRECTED BY MD      Oral Electrolytes (PEDIALYTE SINGLES PO) Take 8 oz by mouth if needed      Oral Electrolytes (Pedialyte) PACK Take 237 mL by mouth 2 (two) times a day (Patient not taking: Reported on 1/14/2025)      oxyCODONE (ROXICODONE) 5 immediate release tablet 10 mg pt reports she takes this 4 times per day but is out of the medication currently      pantoprazole (PROTONIX) 40 mg tablet Take 1 tablet (40 mg total) by mouth 2 (two) times a day 90 tablet 3    pantoprazole (PROTONIX) 40 mg tablet Take 1 tablet (40 mg total) by mouth 2 (two) times a day 180 tablet 3    polyethylene glycol (GOLYTELY) 4000 mL solution Take 4,000 mL by mouth once for 1 dose 4000 mL 0    potassium chloride (K-DUR,KLOR-CON) 20 mEq tablet Take 1 tablet (20 mEq total) by mouth daily 30 tablet 1    potassium chloride (MICRO-K) 10 MEQ CR capsule TAKE 2 CAPSULES (20 MEQ TOTAL) BY MOUTH 2 (TWO) TIMES A DAY 90 capsule 3    prazosin (MINIPRESS) 5 mg capsule TAKE 1 CAPSULE (5 MG TOTAL) BY MOUTH DAILY AT BEDTIME 90 capsule 1    Pyridoxine HCl (vitamin B-6) 25 MG tablet TAKE 1 TABLET (25 MG TOTAL) BY MOUTH DAILY 30 tablet 0    risperiDONE (RisperDAL) 3 mg tablet TAKE 1 TABLET (3 MG TOTAL) BY MOUTH 2 (TWO) TIMES A  tablet 0    rivaroxaban (XARELTO) 20 mg tablet Take 20  mg by mouth      sertraline (ZOLOFT) 100 mg tablet TAKE 1 TABLET (100 MG TOTAL) BY MOUTH DAILY 90 tablet 1    Spacer/Aero-Holding Chambers (AeroChamber Plus Geoff-Vu w/Mask) MISC Inhale daily at bedtime as needed (wheezing) 1 each 0    trospium chloride (SANCTURA) 20 mg tablet Take 1 tablet (20 mg total) by mouth 2 (two) times a day 180 tablet 3    zolpidem (AMBIEN) 10 mg tablet TAKE 1 TABLET (10 MG TOTAL) BY MOUTH DAILY 30 tablet 0    zolpidem (AMBIEN) 5 mg tablet Take 1 tablet (5 mg total) by mouth daily at bedtime as needed for sleep 30 tablet 0     No current facility-administered medications on file prior to visit.       Review of Systems:  10-point ROS performed.  Patient denies fevers or chills.  All other systems reviewed and found to be negative unless otherwise noted in the HPI or MA note.       Vitals:    05/08/25 1302   BP: 121/86   BP Location: Left arm   Patient Position: Sitting   Cuff Size: Large   Pulse: 79   Temp: 98 °F (36.7 °C)   TempSrc: Temporal         General Appearance: No apparent distress    Head: Normocephalic, atraumatic.     Face: Symmetric without obvious lesions.    Eyes: Conjunctiva clear, extraocular movements are intact.    Ears: Pinna normal shape and position.  Canals are clear.  TMs intact with no middle ear effusion.    Nose: External pyramid midline.  Mucosa appears healthy.  Turbinates are normal in size.  Septum relatively midline.      Oral cavity/Oropharynx: No mucosal lesions, masses, or pharyngeal asymmetry.     Neck: No cervical lymphadenopathy or masses appreciated    Skin: Skin warm and dry.    Neurological: Cranial nerves II to XII are intact.    Respiratory: No stridor or labored breathing.    Cardiovascular: Good perfusion of the upper extremities.  No cyanosis of the fingers or hands.     Psychiatric: Alert and oriented.      Procedure: After adequate anesthesia and decongestion with a mixture of Afrin and lidocaine, the scope was passed into the bilateral nasal  cavity. No purulence, polyps or crusting. 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        Ta Lopez MD  Otolaryngology - Head & Neck Surgery  [facility]            ** Please Note: Dictation voice to text software may have been used in the creation of this document. **

## 2025-05-09 DIAGNOSIS — R25.1 TREMOR: ICD-10-CM

## 2025-05-09 DIAGNOSIS — F43.10 PTSD (POST-TRAUMATIC STRESS DISORDER): ICD-10-CM

## 2025-05-09 DIAGNOSIS — E53.1 VITAMIN B6 DEFICIENCY: ICD-10-CM

## 2025-05-09 RX ORDER — BUSPIRONE HYDROCHLORIDE 30 MG/1
30 TABLET ORAL 2 TIMES DAILY
Qty: 60 TABLET | Refills: 5 | Status: SHIPPED | OUTPATIENT
Start: 2025-05-09

## 2025-05-09 RX ORDER — DIPHENHYDRAMINE HYDROCHLORIDE 25 MG/1
25 CAPSULE ORAL DAILY
Qty: 30 TABLET | Refills: 0 | OUTPATIENT
Start: 2025-05-09

## 2025-05-09 RX ORDER — CARBIDOPA AND LEVODOPA 25; 100 MG/1; MG/1
1 TABLET ORAL 3 TIMES DAILY
Qty: 90 TABLET | Refills: 0 | Status: SHIPPED | OUTPATIENT
Start: 2025-05-09

## 2025-05-10 DIAGNOSIS — F63.81 INTERMITTENT EXPLOSIVE DISORDER: ICD-10-CM

## 2025-05-11 RX ORDER — SERTRALINE HYDROCHLORIDE 100 MG/1
100 TABLET, FILM COATED ORAL DAILY
Qty: 90 TABLET | Refills: 1 | Status: SHIPPED | OUTPATIENT
Start: 2025-05-11

## 2025-05-12 ENCOUNTER — HOSPITAL ENCOUNTER (OUTPATIENT)
Facility: HOSPITAL | Age: 55
Setting detail: OBSERVATION
Discharge: HOME | End: 2025-05-14
Attending: EMERGENCY MEDICINE
Payer: COMMERCIAL

## 2025-05-12 ENCOUNTER — APPOINTMENT (EMERGENCY)
Dept: RADIOLOGY | Facility: HOSPITAL | Age: 55
End: 2025-05-12
Attending: EMERGENCY MEDICINE
Payer: COMMERCIAL

## 2025-05-12 DIAGNOSIS — E11.649 TYPE 2 DIABETES MELLITUS WITH HYPOGLYCEMIA WITHOUT COMA, WITH LONG-TERM CURRENT USE OF INSULIN (HCC): ICD-10-CM

## 2025-05-12 DIAGNOSIS — Z79.4 TYPE 2 DIABETES MELLITUS WITH HYPOGLYCEMIA WITHOUT COMA, WITH LONG-TERM CURRENT USE OF INSULIN (HCC): ICD-10-CM

## 2025-05-12 DIAGNOSIS — R41.82 ALTERED MENTAL STATUS: Primary | ICD-10-CM

## 2025-05-12 PROBLEM — E87.5 HYPERKALEMIA: Status: ACTIVE | Noted: 2025-05-12

## 2025-05-12 LAB
ALBUMIN SERPL BCG-MCNC: 3.5 G/DL (ref 3.5–5)
ALBUMIN SERPL BCG-MCNC: 4.1 G/DL (ref 3.5–5)
ALP SERPL-CCNC: 81 U/L (ref 34–104)
ALP SERPL-CCNC: 82 U/L (ref 34–104)
ALT SERPL W P-5'-P-CCNC: 4 U/L (ref 7–52)
ALT SERPL W P-5'-P-CCNC: 6 U/L (ref 7–52)
ANION GAP SERPL CALCULATED.3IONS-SCNC: 8 MMOL/L (ref 4–13)
ANION GAP SERPL CALCULATED.3IONS-SCNC: 9 MMOL/L (ref 4–13)
APAP SERPL-MCNC: 7 UG/ML (ref 10–20)
APAP SERPL-MCNC: <2 UG/ML (ref 10–20)
AST SERPL W P-5'-P-CCNC: 20 U/L (ref 13–39)
AST SERPL W P-5'-P-CCNC: 20 U/L (ref 13–39)
ATRIAL RATE: 85 BPM
BASOPHILS # BLD AUTO: 0.09 THOUSANDS/ÂΜL (ref 0–0.1)
BASOPHILS NFR BLD AUTO: 1 % (ref 0–1)
BILIRUB SERPL-MCNC: 0.26 MG/DL (ref 0.2–1)
BILIRUB SERPL-MCNC: 0.29 MG/DL (ref 0.2–1)
BUN SERPL-MCNC: 15 MG/DL (ref 5–25)
BUN SERPL-MCNC: 18 MG/DL (ref 5–25)
CALCIUM SERPL-MCNC: 8.6 MG/DL (ref 8.4–10.2)
CALCIUM SERPL-MCNC: 9.2 MG/DL (ref 8.4–10.2)
CHLORIDE SERPL-SCNC: 102 MMOL/L (ref 96–108)
CHLORIDE SERPL-SCNC: 105 MMOL/L (ref 96–108)
CO2 SERPL-SCNC: 24 MMOL/L (ref 21–32)
CO2 SERPL-SCNC: 25 MMOL/L (ref 21–32)
CREAT SERPL-MCNC: 1.3 MG/DL (ref 0.6–1.3)
CREAT SERPL-MCNC: 1.31 MG/DL (ref 0.6–1.3)
EOSINOPHIL # BLD AUTO: 0.16 THOUSAND/ÂΜL (ref 0–0.61)
EOSINOPHIL NFR BLD AUTO: 2 % (ref 0–6)
ERYTHROCYTE [DISTWIDTH] IN BLOOD BY AUTOMATED COUNT: 14.8 % (ref 11.6–15.1)
ETHANOL SERPL-MCNC: <10 MG/DL
ETHANOL SERPL-MCNC: <10 MG/DL
GFR SERPL CREATININE-BSD FRML MDRD: 45 ML/MIN/1.73SQ M
GFR SERPL CREATININE-BSD FRML MDRD: 45 ML/MIN/1.73SQ M
GLUCOSE SERPL-MCNC: 146 MG/DL (ref 65–140)
GLUCOSE SERPL-MCNC: 157 MG/DL (ref 65–140)
GLUCOSE SERPL-MCNC: 180 MG/DL (ref 65–140)
GLUCOSE SERPL-MCNC: 199 MG/DL (ref 65–140)
GLUCOSE SERPL-MCNC: 213 MG/DL (ref 65–140)
HCT VFR BLD AUTO: 41.3 % (ref 34.8–46.1)
HGB BLD-MCNC: 13.5 G/DL (ref 11.5–15.4)
IMM GRANULOCYTES # BLD AUTO: 0.05 THOUSAND/UL (ref 0–0.2)
IMM GRANULOCYTES NFR BLD AUTO: 1 % (ref 0–2)
LYMPHOCYTES # BLD AUTO: 2.64 THOUSANDS/ÂΜL (ref 0.6–4.47)
LYMPHOCYTES NFR BLD AUTO: 25 % (ref 14–44)
MCH RBC QN AUTO: 29.4 PG (ref 26.8–34.3)
MCHC RBC AUTO-ENTMCNC: 32.7 G/DL (ref 31.4–37.4)
MCV RBC AUTO: 90 FL (ref 82–98)
MONOCYTES # BLD AUTO: 0.74 THOUSAND/ÂΜL (ref 0.17–1.22)
MONOCYTES NFR BLD AUTO: 7 % (ref 4–12)
NEUTROPHILS # BLD AUTO: 6.96 THOUSANDS/ÂΜL (ref 1.85–7.62)
NEUTS SEG NFR BLD AUTO: 64 % (ref 43–75)
NRBC BLD AUTO-RTO: 0 /100 WBCS
P AXIS: 25 DEGREES
PLATELET # BLD AUTO: 272 THOUSANDS/UL (ref 149–390)
PMV BLD AUTO: 12.4 FL (ref 8.9–12.7)
POTASSIUM SERPL-SCNC: 4.9 MMOL/L (ref 3.5–5.3)
POTASSIUM SERPL-SCNC: 5.5 MMOL/L (ref 3.5–5.3)
PR INTERVAL: 160 MS
PROT SERPL-MCNC: 5.6 G/DL (ref 6.4–8.4)
PROT SERPL-MCNC: 6.9 G/DL (ref 6.4–8.4)
QRS AXIS: 9 DEGREES
QRSD INTERVAL: 82 MS
QT INTERVAL: 374 MS
QTC INTERVAL: 445 MS
RBC # BLD AUTO: 4.59 MILLION/UL (ref 3.81–5.12)
SALICYLATES SERPL-MCNC: 13 MG/DL (ref 5–20)
SALICYLATES SERPL-MCNC: 8 MG/DL (ref 5–20)
SODIUM SERPL-SCNC: 135 MMOL/L (ref 135–147)
SODIUM SERPL-SCNC: 138 MMOL/L (ref 135–147)
T WAVE AXIS: 97 DEGREES
VENTRICULAR RATE: 85 BPM
WBC # BLD AUTO: 10.64 THOUSAND/UL (ref 4.31–10.16)

## 2025-05-12 PROCEDURE — 36415 COLL VENOUS BLD VENIPUNCTURE: CPT

## 2025-05-12 PROCEDURE — 93010 ELECTROCARDIOGRAM REPORT: CPT | Performed by: INTERNAL MEDICINE

## 2025-05-12 PROCEDURE — 96360 HYDRATION IV INFUSION INIT: CPT

## 2025-05-12 PROCEDURE — 82077 ASSAY SPEC XCP UR&BREATH IA: CPT

## 2025-05-12 PROCEDURE — 70450 CT HEAD/BRAIN W/O DYE: CPT

## 2025-05-12 PROCEDURE — 99285 EMERGENCY DEPT VISIT HI MDM: CPT | Performed by: EMERGENCY MEDICINE

## 2025-05-12 PROCEDURE — 80179 DRUG ASSAY SALICYLATE: CPT

## 2025-05-12 PROCEDURE — 99285 EMERGENCY DEPT VISIT HI MDM: CPT

## 2025-05-12 PROCEDURE — 96361 HYDRATE IV INFUSION ADD-ON: CPT

## 2025-05-12 PROCEDURE — 80143 DRUG ASSAY ACETAMINOPHEN: CPT

## 2025-05-12 PROCEDURE — 93005 ELECTROCARDIOGRAM TRACING: CPT

## 2025-05-12 PROCEDURE — 99244 OFF/OP CNSLTJ NEW/EST MOD 40: CPT | Performed by: EMERGENCY MEDICINE

## 2025-05-12 PROCEDURE — 85025 COMPLETE CBC W/AUTO DIFF WBC: CPT

## 2025-05-12 PROCEDURE — 99223 1ST HOSP IP/OBS HIGH 75: CPT

## 2025-05-12 PROCEDURE — 80053 COMPREHEN METABOLIC PANEL: CPT

## 2025-05-12 PROCEDURE — 82948 REAGENT STRIP/BLOOD GLUCOSE: CPT

## 2025-05-12 RX ORDER — WATER 10 ML/10ML
INJECTION INTRAMUSCULAR; INTRAVENOUS; SUBCUTANEOUS
Status: DISPENSED
Start: 2025-05-12 | End: 2025-05-13

## 2025-05-12 RX ORDER — FAMOTIDINE 20 MG/1
20 TABLET, FILM COATED ORAL 2 TIMES DAILY PRN
Status: DISCONTINUED | OUTPATIENT
Start: 2025-05-12 | End: 2025-05-14 | Stop reason: HOSPADM

## 2025-05-12 RX ORDER — INSULIN LISPRO 100 [IU]/ML
1-5 INJECTION, SOLUTION INTRAVENOUS; SUBCUTANEOUS
Status: DISCONTINUED | OUTPATIENT
Start: 2025-05-12 | End: 2025-05-14 | Stop reason: HOSPADM

## 2025-05-12 RX ORDER — METOPROLOL SUCCINATE 100 MG/1
100 TABLET, EXTENDED RELEASE ORAL DAILY
Status: DISCONTINUED | OUTPATIENT
Start: 2025-05-13 | End: 2025-05-14 | Stop reason: HOSPADM

## 2025-05-12 RX ORDER — BUSPIRONE HYDROCHLORIDE 10 MG/1
30 TABLET ORAL 2 TIMES DAILY
Status: DISCONTINUED | OUTPATIENT
Start: 2025-05-12 | End: 2025-05-14 | Stop reason: HOSPADM

## 2025-05-12 RX ORDER — GABAPENTIN 400 MG/1
400 CAPSULE ORAL 3 TIMES DAILY
Status: DISCONTINUED | OUTPATIENT
Start: 2025-05-12 | End: 2025-05-12

## 2025-05-12 RX ORDER — ZOLPIDEM TARTRATE 5 MG/1
5 TABLET ORAL
Status: DISCONTINUED | OUTPATIENT
Start: 2025-05-12 | End: 2025-05-14 | Stop reason: HOSPADM

## 2025-05-12 RX ORDER — CARBIDOPA AND LEVODOPA 25; 100 MG/1; MG/1
1 TABLET ORAL 3 TIMES DAILY
Status: DISCONTINUED | OUTPATIENT
Start: 2025-05-12 | End: 2025-05-14 | Stop reason: HOSPADM

## 2025-05-12 RX ORDER — MONTELUKAST SODIUM 10 MG/1
10 TABLET ORAL
Status: DISCONTINUED | OUTPATIENT
Start: 2025-05-12 | End: 2025-05-14 | Stop reason: HOSPADM

## 2025-05-12 RX ORDER — SERTRALINE HYDROCHLORIDE 100 MG/1
100 TABLET, FILM COATED ORAL
Status: DISCONTINUED | OUTPATIENT
Start: 2025-05-12 | End: 2025-05-14 | Stop reason: HOSPADM

## 2025-05-12 RX ORDER — PANTOPRAZOLE SODIUM 40 MG/1
40 TABLET, DELAYED RELEASE ORAL 2 TIMES DAILY
Status: DISCONTINUED | OUTPATIENT
Start: 2025-05-12 | End: 2025-05-14 | Stop reason: HOSPADM

## 2025-05-12 RX ORDER — LISINOPRIL 10 MG/1
10 TABLET ORAL DAILY
Status: DISCONTINUED | OUTPATIENT
Start: 2025-05-13 | End: 2025-05-14 | Stop reason: HOSPADM

## 2025-05-12 RX ORDER — PRAVASTATIN SODIUM 40 MG
40 TABLET ORAL
Status: DISCONTINUED | OUTPATIENT
Start: 2025-05-12 | End: 2025-05-12

## 2025-05-12 RX ORDER — ALPRAZOLAM 0.5 MG
1 TABLET ORAL 3 TIMES DAILY PRN
Status: DISCONTINUED | OUTPATIENT
Start: 2025-05-12 | End: 2025-05-14 | Stop reason: HOSPADM

## 2025-05-12 RX ORDER — INSULIN LISPRO 100 [IU]/ML
20 INJECTION, SOLUTION INTRAVENOUS; SUBCUTANEOUS
Status: DISCONTINUED | OUTPATIENT
Start: 2025-05-12 | End: 2025-05-14

## 2025-05-12 RX ORDER — GABAPENTIN 300 MG/1
300 CAPSULE ORAL 3 TIMES DAILY
Status: DISCONTINUED | OUTPATIENT
Start: 2025-05-12 | End: 2025-05-14 | Stop reason: HOSPADM

## 2025-05-12 RX ORDER — INSULIN GLARGINE 100 [IU]/ML
55 INJECTION, SOLUTION SUBCUTANEOUS 2 TIMES DAILY
Status: DISCONTINUED | OUTPATIENT
Start: 2025-05-12 | End: 2025-05-14 | Stop reason: HOSPADM

## 2025-05-12 RX ORDER — HALOPERIDOL 5 MG/ML
2 INJECTION INTRAMUSCULAR ONCE
Status: COMPLETED | OUTPATIENT
Start: 2025-05-12 | End: 2025-05-12

## 2025-05-12 RX ORDER — NICOTINE 21 MG/24HR
1 PATCH, TRANSDERMAL 24 HOURS TRANSDERMAL DAILY
Status: DISCONTINUED | OUTPATIENT
Start: 2025-05-13 | End: 2025-05-14 | Stop reason: HOSPADM

## 2025-05-12 RX ORDER — BUPRENORPHINE 5 UG/H
5 PATCH TRANSDERMAL
Status: DISCONTINUED | OUTPATIENT
Start: 2025-05-13 | End: 2025-05-14 | Stop reason: HOSPADM

## 2025-05-12 RX ORDER — ATORVASTATIN CALCIUM 10 MG/1
10 TABLET, FILM COATED ORAL DAILY
Status: DISCONTINUED | OUTPATIENT
Start: 2025-05-13 | End: 2025-05-14 | Stop reason: HOSPADM

## 2025-05-12 RX ORDER — ALBUTEROL SULFATE 90 UG/1
2 INHALANT RESPIRATORY (INHALATION) EVERY 4 HOURS PRN
Status: DISCONTINUED | OUTPATIENT
Start: 2025-05-12 | End: 2025-05-14 | Stop reason: HOSPADM

## 2025-05-12 RX ORDER — GABAPENTIN 100 MG/1
100 CAPSULE ORAL
Status: DISCONTINUED | OUTPATIENT
Start: 2025-05-12 | End: 2025-05-12

## 2025-05-12 RX ORDER — HYDRALAZINE HYDROCHLORIDE 50 MG/1
50 TABLET, FILM COATED ORAL 3 TIMES DAILY
Status: DISCONTINUED | OUTPATIENT
Start: 2025-05-12 | End: 2025-05-14 | Stop reason: HOSPADM

## 2025-05-12 RX ORDER — OXYCODONE HYDROCHLORIDE 5 MG/1
5 TABLET ORAL EVERY 4 HOURS PRN
Status: DISCONTINUED | OUTPATIENT
Start: 2025-05-12 | End: 2025-05-14 | Stop reason: HOSPADM

## 2025-05-12 RX ORDER — RISPERIDONE 3 MG/1
3 TABLET ORAL 2 TIMES DAILY
Status: DISCONTINUED | OUTPATIENT
Start: 2025-05-12 | End: 2025-05-14 | Stop reason: HOSPADM

## 2025-05-12 RX ADMIN — CARBIDOPA AND LEVODOPA 1 TABLET: 25; 100 TABLET ORAL at 17:02

## 2025-05-12 RX ADMIN — INSULIN GLARGINE 55 UNITS: 100 INJECTION, SOLUTION SUBCUTANEOUS at 22:08

## 2025-05-12 RX ADMIN — PANTOPRAZOLE SODIUM 40 MG: 40 TABLET, DELAYED RELEASE ORAL at 18:03

## 2025-05-12 RX ADMIN — MONTELUKAST 10 MG: 10 TABLET, FILM COATED ORAL at 22:08

## 2025-05-12 RX ADMIN — HALOPERIDOL LACTATE 2 MG: 5 INJECTION, SOLUTION INTRAMUSCULAR at 22:43

## 2025-05-12 RX ADMIN — HYDRALAZINE HYDROCHLORIDE 50 MG: 25 TABLET ORAL at 22:08

## 2025-05-12 RX ADMIN — SERTRALINE HYDROCHLORIDE 100 MG: 100 TABLET ORAL at 22:08

## 2025-05-12 RX ADMIN — HYDRALAZINE HYDROCHLORIDE 50 MG: 25 TABLET ORAL at 17:02

## 2025-05-12 RX ADMIN — BUSPIRONE HYDROCHLORIDE 30 MG: 10 TABLET ORAL at 18:03

## 2025-05-12 RX ADMIN — ZOLPIDEM TARTRATE 5 MG: 5 TABLET, FILM COATED ORAL at 22:08

## 2025-05-12 RX ADMIN — GABAPENTIN 300 MG: 300 CAPSULE ORAL at 22:08

## 2025-05-12 RX ADMIN — SODIUM CHLORIDE 1000 ML: 0.9 INJECTION, SOLUTION INTRAVENOUS at 13:22

## 2025-05-12 RX ADMIN — RISPERIDONE 3 MG: 3 TABLET ORAL at 18:03

## 2025-05-12 RX ADMIN — CARBIDOPA AND LEVODOPA 1 TABLET: 25; 100 TABLET ORAL at 22:08

## 2025-05-12 RX ADMIN — OXYCODONE HYDROCHLORIDE 5 MG: 5 TABLET ORAL at 18:03

## 2025-05-12 RX ADMIN — INSULIN LISPRO 1 UNITS: 100 INJECTION, SOLUTION INTRAVENOUS; SUBCUTANEOUS at 17:03

## 2025-05-12 NOTE — H&P
"H&P - Hospitalist   Name: Dahlia Kraus 55 y.o. female I MRN: [id number]  Unit/Bed#: ED 21 I Date of Admission: 5/12/2025   Date of Service: 5/12/2025 I Hospital Day: 0     Assessment & Plan  AMS (altered mental status)  Patient comes here for altered mental status and was noted to be drowsy.  Hemodynamically stable  Salicylate level 13, acetaminophen 7, alcohol negative  Will get a urine drug screen  CT head shows no acute intracranial abnormality  No focal signs and symptoms of infection  Reviewed meds  Patient takes alprazolam 1 mg 4 times daily as needed  Buprenorphine patch  Buspirone 30 mg twice daily  Gabapentin 800 3 times daily  Myrbetriq 5 mg daily  Risperidone 3 mg twice daily  Sertraline 100 mg once daily  Zolpidem 10 mg once daily  Carbidopa levodopa  3 times daily  Likely in the setting of polypharmacy  Patient appears oriented however response slowly on arrival patient was more lethargic according to ED  Plan:  Will get a geriatrics consult, for tomorrow  Will continue to monitor in the inpatient setting  Salicylate level was elevated at 13, ED did speak with toxicology for recommendations.  Was able to discuss with them and they think this is likely from polypharmacy as well.  They did note that patient was taking gabapentin 800 times a day.  Will cut this down to 300 mg 3 times daily  Will hold prazosin  Will decrease alprazolam to 3 times a day as needed  DM (diabetes mellitus), type 2 (HCC)  Lab Results   Component Value Date    HGBA1C 9.4 (H) 02/05/2025       No results for input(s): \"POCGLU\" in the last 72 hours.    Blood Sugar Average: Last 72 hrs:  Patient appears to have uncontrolled type 2 diabetes    Patient takes Trulicity, glargine 5 units twice daily at lunch and bedtime  Sliding scale      Benign essential hypertension  Metoprolol succinate 100 mg once daily    Coronary artery disease involving native coronary artery of native heart without angina pectoris  Atorvastatin 10 mg " once daily  Metoprolol succinate 100 mg once daily    GERD (gastroesophageal reflux disease)  Pantoprazole 40 mg twice daily  CKD stage 3a, GFR 45-59 ml/min ([facility])  Lab Results   Component Value Date    EGFR 45 05/12/2025    EGFR 50 (L) 03/11/2025    EGFR 32 (L) 03/10/2025    CREATININE 1.31 (H) 05/12/2025    CREATININE 1.26 (H) 03/11/2025    CREATININE 1.8 (H) 03/10/2025   POA 1.3 baseline is seems to be around 1.2-1.8  Continue to monitor    Hyperkalemia        VTE Pharmacologic Prophylaxis:   Moderate Risk (Score 3-4) - Pharmacological DVT Prophylaxis Ordered: enoxaparin (Lovenox).  Code Status: Level 1 full code  Discussion with family: Attempted to update  (daughter) via phone. Left voicemail.     Anticipated Length of Stay: Patient will be admitted on an observation basis with an anticipated length of stay of less than 2 midnights secondary to AMS.    History of Present Illness   Chief Complaint: Altered mental status    Dahlia Kraus is a 55 y.o. female with a PMH of type 2 diabetes, hypertension, GERD, CKD stage III coming in for altered mental status.  Patient does have history of chronic pain and takes multitude of medications.  Patient Stays with daughter at home.  And was brought in due to confusion and being disoriented.  Patient does complain of diffuse pain.  No signs and symptoms of fever.  No cough, no dysuria.  Observe for further pain management.          Review of Systems   Constitutional:  Negative for chills and fever.   HENT:  Negative for ear pain and sore throat.    Eyes:  Negative for pain and visual disturbance.   Respiratory:  Negative for cough and shortness of breath.    Cardiovascular:  Negative for chest pain and palpitations.   Gastrointestinal:  Negative for abdominal pain and vomiting.   Genitourinary:  Negative for dysuria and hematuria.   Musculoskeletal:  Negative for arthralgias and back pain.   Skin:  Negative for color change and rash.   Neurological:   Negative for seizures and syncope.   All other systems reviewed and are negative.      Historical Information   Past Medical History:   Diagnosis Date    Abnormal ECG     Abnormal Pap smear of cervix     Allergic     Anemia     Anxiety     Asthma     Atrial fibrillation (HCC)     Bursitis of left hip 10/28/2014    Chlamydia     Chronic kidney disease     Coronary artery disease     COVID-19 2020    Depression     Diabetes mellitus (HCC)     Fibromyalgia     GERD (gastroesophageal reflux disease)     Headache(784.0)     Heart disease     Heart murmur     History of transfusion     Hypertension     Inflammatory bowel disease     Migraines     Mixed simple and mucopurulent chronic bronchitis (HCC) 2019    Myocardial infarction (HCC)     Neuromuscular disorder (HCC)     Opioid abuse, in remission (HCC)     Otitis media     Pneumonia     Scoliosis     Seizures (HCC)     Shingles     Sick sinus syndrome (HCC)     Stroke (HCC)     Urinary tract infection     Varicella     Visual impairment      Past Surgical History:   Procedure Laterality Date    APPENDECTOMY      CATARACT EXTRACTION Right 2023     SECTION      EGD AND COLONOSCOPY      EYE SURGERY Left 2023    FRACTURE SURGERY      HIP SURGERY Right     HYSTERECTOMY      TUBAL LIGATION       Social History     Tobacco Use    Smoking status: Every Day     Current packs/day: 1.00     Average packs/day: 0.6 packs/day for 42.4 years (26.2 ttl pk-yrs)     Types: Cigarettes     Start date: 1992    Smokeless tobacco: Never    Tobacco comments:     Patient stated that she is not ready to quit smoking    Vaping Use    Vaping status: Never Used   Substance and Sexual Activity    Alcohol use: Not Currently    Drug use: Never    Sexual activity: Not Currently     Partners: Male     Birth control/protection: Abstinence, Surgical, Female Sterilization     E-Cigarette/Vaping    E-Cigarette Use Never User      E-Cigarette/Vaping Substances    Nicotine No   "   THC No     CBD No     Flavoring No     Other No     Unknown No      Family history non-contributory  Social History:  Marital Status: Single   Occupation: None  Patient Pre-hospital Living Situation: Home  Patient Pre-hospital Level of Mobility: walks  Patient Pre-hospital Diet Restrictions: None    Meds/Allergies   I have reviewed home medications with patient personally.  Prior to Admission medications    Medication Sig Start Date End Date Taking? Authorizing Provider   albuterol (PROVENTIL HFA,VENTOLIN HFA) 90 mcg/act inhaler Inhale 2 puffs every 4 (four) hours as needed 6/4/22   Historical Provider, MD   Alcohol Swabs (Pharmacist Choice Alcohol) PADS 5 (five) times a day 10/24/22   Historical Provider, MD   ALPRAZolam (XANAX) 1 mg tablet TAKE 1 TABLET (1 MG TOTAL) BY MOUTH 4 (FOUR) TIMES A DAY AS NEEDED FOR ANXIETY 4/29/25   María Waters PA-C   ammonium lactate (LAC-HYDRIN) 12 % lotion APPLY TOPICALLY 2 (TWO) TIMES A DAY AS NEEDED FOR DRY SKIN 3/25/24   Rekha Bryan PA-C   atorvastatin (LIPITOR) 10 mg tablet Take 10 mg by mouth daily 8/2/22 1/21/25  Historical Provider, MD   BD Pen Needle [place] 2nd Gen 32G X 4 MM MISC USE AS DIRECTED FOR BEFORE A MEAL AND AT BEDTIME GLUCOSE INJECTION 4/5/22   Historical Provider, MD   BD Pen Needle [place] 2nd Gen 32G X 4 MM MISC USE WITH INSULIN 5 TIMES A DAY 5/15/23   CRISTINO Carreno   BD PrecisionGlide Needle 23G X 1-1/2\" MISC USE AS DIRECTED TO ADMINISTER NALOXONE INJECTION.  MAY DISPENSE 23-25 GAUGE 1-1.5\" NEEDLE. 5/25/22   Historical Provider, MD   Blood Glucose Monitoring Suppl (ONE TOUCH ULTRA 2) w/Device KIT Use as directed 5/3/22   Historical Provider, MD   busPIRone (BUSPAR) 30 MG tablet TAKE 1 TABLET BY MOUTH TWICE A DAY 5/9/25   Ravi Chowdhury MD   carbidopa-levodopa (SINEMET)  mg per tablet TAKE 1 TABLET BY MOUTH 3 (THREE) TIMES A DAY 5/9/25   Alvin Naranjo DO   Continuous Blood Gluc Transmit (Dexcom G6 Transmitter) MISC USE 4 TIMES A " DAY (WITH MEALS AND AT BEDTIME) 12/7/23   CRISTINO Carreno   Continuous Glucose  (FreeStyle Lydia 3 [place]) ALEKS USE 1 EACH 1 (ONE) TIME FOR 1 DOSE 2/1/25   Historical Provider, MD   Continuous Glucose Sensor (FreeStyle Lydia 3 Sensor) MISC Use 1 each every 14 (fourteen) days 1/31/25   Rekha Bryan PA-C   Dulaglutide (Trulicity) 1.5 MG/0.5ML SOAJ INJECT 1.5 MG UNDER THE SKIN EVERY 7 DAYS. 2/4/25   Rekha Bryan PA-C   EPINEPHrine (EPIPEN) 0.3 mg/0.3 mL SOAJ Inject 0.3 mL (0.3 mg total) into a muscle once for 1 dose 2/26/24 1/21/25  CRISTINO Carreno   famotidine (PEPCID) 20 mg tablet TAKE 1 TABLET (20 MG TOTAL) BY MOUTH 2 (TWO) TIMES A DAY AS NEEDED FOR INDIGESTION 4/30/25   Ravi Chowdhury MD   fexofenadine (ALLEGRA) 180 MG tablet Take 1 tablet (180 mg total) by mouth daily 10/1/24   Rekha Bryan PA-C   fluticasone (Flovent Diskus) 250 mcg/actuation diskus inhaler Inhale 1-2 puffs 2 (two) times a day Rinse mouth after use. 2/22/24   CRISTINO Carreno   gabapentin (NEURONTIN) 100 mg capsule TAKE 1 CAPSULE (100 MG TOTAL) BY MOUTH DAILY AT BEDTIME TAKE ADDITIONAL 800 MG CAPSULE TO TOTAL 900 MG AT BEDTIME 11/4/24   Rekha Bryan PA-C   gabapentin (NEURONTIN) 400 mg capsule TAKE 2 CAPSULES (400 MG TOTAL) BY MOUTH 3 (THREE) TIMES A DAY 2/18/25   Rekha Bryan PA-C   hydrALAZINE (APRESOLINE) 50 mg tablet Take 50 mg by mouth 3 (three) times a day    Historical Provider, MD   Incontinence Supply Disposable (Depend Undergarment Ex Absorb) MISC Use 4 (four) times a day as needed (incontinence) 9/17/24   Gustavo Howard PA-C   insulin lispro (HumaLOG) 100 units/mL injection pen INJECT 20 UNITS UNDER THE SKIN 3 (THREE) TIMES A DAY BEFORE MEALS INDICATIONS: TYPE 2 DIABETES MELLITUS. 2/9/24   CRISTINO Carreno   Lancets (OneTouch Delica Plus [id number]) MISC INJECT UNDER THE SKIN 2 (TWO) TIMES A DAY 3/27/25   MD Emil Rapp 100 units/mL SOPN  INJECT 55 UNITS UNDER THE SKIN 2 (TWO) TIMES A DAY AT LUNCH AND AT BEDTIME. 12/6/24   Rekha Bryan PA-C   leflunomide (ARAVA) 20 MG tablet Take 1 tablet (20 mg total) by mouth daily 2/22/24   CRISTINO Carreno   Lidocaine 4 % PTCH Apply 1 patch topically in the morning 1/21/25   Aureliano García MD   lisinopril (ZESTRIL) 10 mg tablet Take 1 tablet (10 mg total) by mouth daily 2/22/24   CRISTINO Carreno   lovastatin (MEVACOR) 10 MG tablet Take 10 mg by mouth    Historical Provider, MD   metFORMIN (GLUCOPHAGE-XR) 750 mg 24 hr tablet TAKE 1 TABLET (750 MG TOTAL) BY MOUTH 2 (TWO) TIMES A DAY WITH MEALS.    Historical Provider, MD   methocarbamol (ROBAXIN) 500 mg tablet Take 1 tablet (500 mg total) by mouth 2 (two) times a day 1/27/25   Jerica Sears MD   metoprolol succinate (TOPROL-XL) 100 mg 24 hr tablet Take 100 mg by mouth daily 8/2/22   Historical Provider, MD   montelukast (SINGULAIR) 10 mg tablet TAKE 1 TABLET (10 MG TOTAL) BY MOUTH DAILY AT BEDTIME 2/4/25   Ravi Chowdhury MD   Myrbetriq 25 MG TB24 TAKE 25 MG BY MOUTH IN THE MORNING 2/14/25   Rekha Bryan PA-C   naloxone (NARCAN) 0.4 mg/mL injection  7/6/22   Historical Provider, MD   nicotine (Nicotine Step 1) 21 mg/24 hr TD 24 hr patch PLACE 1 PATCH ON THE SKIN OVER 24 HOURS EVERY 24 HOURS 4/18/25 5/18/25  María Waters PA-C   Nutritional Supplements (Glucerna Hunger Smart Shake) LIQD Take 1 Can by mouth 3 (three) times a day 2/5/25   María Waters PA-C   ondansetron (ZOFRAN-ODT) 4 mg disintegrating tablet TAKE 1 TABLET (4 MG TOTAL) BY MOUTH EVERY 6 (SIX) HOURS AS NEEDED FOR NAUSEA  Patient not taking: Reported on 1/21/2025 1/2/24   Rekha Bryan PA-C   OneTouch Ultra test strip TEST TWICE DAILY OR AS DIRECTED BY MD 6/22/22   Historical Provider, MD   Oral Electrolytes (PEDIALYTE SINGLES PO) Take 8 oz by mouth if needed 4/5/24   Historical Provider, MD   Oral Electrolytes (Pedialyte) PACK Take 237 mL by  mouth 2 (two) times a day  Patient not taking: Reported on 1/14/2025 1/26/24 1/20/25  Historical Provider, MD   oxyCODONE (ROXICODONE) 5 immediate release tablet 10 mg pt reports she takes this 4 times per day but is out of the medication currently 7/7/23   Historical Provider, MD   pantoprazole (PROTONIX) 40 mg tablet Take 1 tablet (40 mg total) by mouth 2 (two) times a day 4/9/24   Oni Rae MD   pantoprazole (PROTONIX) 40 mg tablet Take 1 tablet (40 mg total) by mouth 2 (two) times a day 10/1/24   Rekha Bryan PA-C   polyethylene glycol (GOLYTELY) 4000 mL solution Take 4,000 mL by mouth once for 1 dose 3/25/24 1/14/25  Oni Rae MD   potassium chloride (K-DUR,KLOR-CON) 20 mEq tablet Take 1 tablet (20 mEq total) by mouth daily 8/22/22   CRISTINO Carreno   potassium chloride (MICRO-K) 10 MEQ CR capsule TAKE 2 CAPSULES (20 MEQ TOTAL) BY MOUTH 2 (TWO) TIMES A DAY 4/28/25   Rekha Bryan PA-C   prazosin (MINIPRESS) 5 mg capsule TAKE 1 CAPSULE (5 MG TOTAL) BY MOUTH DAILY AT BEDTIME 3/7/25   Rekha Bryan PA-C   Pyridoxine HCl (vitamin B-6) 25 MG tablet TAKE 1 TABLET (25 MG TOTAL) BY MOUTH DAILY 5/2/25   Alvin Naranjo DO   risperiDONE (RisperDAL) 3 mg tablet TAKE 1 TABLET (3 MG TOTAL) BY MOUTH 2 (TWO) TIMES A DAY 4/1/25   Rekha Bryan PA-C   rivaroxaban (XARELTO) 20 mg tablet Take 20 mg by mouth 3/2/22   Historical Provider, MD   sertraline (ZOLOFT) 100 mg tablet TAKE 1 TABLET (100 MG TOTAL) BY MOUTH DAILY 5/11/25   Rekha Bryan PA-C   Spacer/Aero-Holding Chambers (AeroChamber Plus Geoff-Vu w/Mask) MISC Inhale daily at bedtime as needed (wheezing) 2/22/24   CRISTINO Carreno   trospium chloride (SANCTURA) 20 mg tablet Take 1 tablet (20 mg total) by mouth 2 (two) times a day 2/22/24   CRISTINO Carreno   zolpidem (AMBIEN) 10 mg tablet TAKE 1 TABLET (10 MG TOTAL) BY MOUTH DAILY 4/9/25   María Waters PA-C   zolpidem (AMBIEN) 5 mg tablet Take 1 tablet (5 mg total) by  mouth daily at bedtime as needed for sleep 3/25/25   María Waters PA-C     Allergies   Allergen Reactions    Other Anaphylaxis     Capzasin HP -- severe burning and swelling of the skin     Clarithromycin GI Intolerance    Acetaminophen GI Intolerance    Bactrim [Sulfamethoxazole-Trimethoprim] Itching, GI Intolerance, Dizziness, Confusion and Lightheadedness     Patient passes out when on this medication for several days     Cephalosporins Hives     Tolerated Cefdinir 2/2023    Latex Hives    Oxycodone-Acetaminophen GI Intolerance    Penicillins Diarrhea    Trazodone Diarrhea    Capsaicin Rash    Naproxen Palpitations       Objective :  Temp:  [98.3 °F (36.8 °C)] 98.3 °F (36.8 °C)  HR:  [80-86] 80  BP: (134-174)/() 139/80  Resp:  [16-21] 17  SpO2:  [93 %-97 %] 97 %  O2 Device: None (Room air)    Physical Exam  Vitals and nursing note reviewed.   Constitutional:       General: She is not in acute distress.     Appearance: She is well-developed.   HENT:      Head: Normocephalic and atraumatic.      Nose: Nose normal.      Mouth/Throat:      Mouth: Mucous membranes are moist.   Eyes:      Conjunctiva/sclera: Conjunctivae normal.   Cardiovascular:      Rate and Rhythm: Normal rate and regular rhythm.      Heart sounds: No murmur heard.  Pulmonary:      Effort: Pulmonary effort is normal. No respiratory distress.      Breath sounds: Normal breath sounds.   Abdominal:      Palpations: Abdomen is soft.      Tenderness: There is no abdominal tenderness.   Musculoskeletal:      Cervical back: Neck supple.      Right lower leg: No edema.      Left lower leg: No edema.   Skin:     General: Skin is warm and dry.      Capillary Refill: Capillary refill takes less than 2 seconds.   Neurological:      General: No focal deficit present.      Mental Status: She is alert. She is disoriented.   Psychiatric:         Mood and Affect: Mood normal.          Lines/Drains:            Lab Results: I have reviewed the  following results:  Results from last 7 days   Lab Units 05/12/25  1315   WBC Thousand/uL 10.64*   HEMOGLOBIN g/dL 13.5   HEMATOCRIT % 41.3   PLATELETS Thousands/uL 272   SEGS PCT % 64   LYMPHO PCT % 25   MONO PCT % 7   EOS PCT % 2     Results from last 7 days   Lab Units 05/12/25  1315   SODIUM mmol/L 135   POTASSIUM mmol/L 5.5*   CHLORIDE mmol/L 102   CO2 mmol/L 24   BUN mg/dL 18   CREATININE mg/dL 1.31*   ANION GAP mmol/L 9   CALCIUM mg/dL 9.2   ALBUMIN g/dL 4.1   TOTAL BILIRUBIN mg/dL 0.29   ALK PHOS U/L 81   ALT U/L 6*   AST U/L 20   GLUCOSE RANDOM mg/dL 146*             Lab Results   Component Value Date    HGBA1C 9.4 (H) 02/05/2025    HGBA1C 8.2 (H) 09/06/2024    HGBA1C  01/17/2024     Unable to determine A1c due to interfering substance, reflexed to alternate methodology.    HGBA1C 15.7 (H) 01/17/2024           Imaging Results Review: I personally reviewed the following image studies/reports in PACS and discussed pertinent findings with Radiology: CT head. My interpretation of the radiology images/reports is: Head. Unremarkable  Other Study Results Review: EKG was reviewed.  NSR    Administrative Statements   I have spent a total time of 80 minutes in caring for this patient on the day of the visit/encounter including Diagnostic results, Prognosis, Risks and benefits of tx options, Instructions for management, Patient and family education, Importance of tx compliance, Risk factor reductions, Impressions, Counseling / Coordination of care, Documenting in the medical record, Reviewing/placing orders in the medical record (including tests, medications, and/or procedures), Obtaining or reviewing history  , and Communicating with other healthcare professionals .    ** Please Note: This note has been constructed using a voice recognition system. **

## 2025-05-12 NOTE — CONSULTS
Consultation - Medical Toxicology   Name: Dahlia Kraus 55 y.o. female I MRN: [id number]  Unit/Bed#: [facility] 602-01 I Date of Admission: 5/12/2025   Date of Service: 5/12/2025 I Hospital Day: 1       Inpatient consult to Toxicology     Date/Time  5/12/2025 4:31 PM     Performed by  Barbra Mcintosh MD   Authorized by  Haryr Celeste MD           Physician Requesting Evaluation: Harry Celeste, *   Reason for Evaluation / Principal Problem: Altered Mental Status    Assessment & Plan  AMS (altered mental status)  Patient presenting for AMS which has been present for 3 months, denies acute ingestion  Slow to answer questions and tired appearing, but normal vitals and physical exam. No toxidromes.  Labs significant for K 5.5, creatinine 1.3, normal AST/ALT, salicylate 13, Tylenol 7  Home meds include Xanax, Ambien, sertraline, oxycodone, risperidone, Buspar and gabapentin all of which have CNS depressant symptoms. Gabapentin was increased to 800mg TID 3 months ago. Gabapentin is renally excreted, so therapeutic levels can be toxic in the setting of poor renal function 2/2 CKD.   Patient also at risk for serotonin syndrome with both buspar and zoloft on med list, no current signs of SS    Recommendations  Repeat CMP and coma panel to ensure AST/ALT, salicylate and tylenol levels do not increase  Med reconciliation for concerns for polypharmacy. Overall cessation of gabapentin in a patient with CKD  Abrupt cessation of gabapentin can lead to withdrawal   CKD stage 3a, GFR 45-59 ml/min (HCC)  Lab Results   Component Value Date    EGFR 45 05/12/2025    EGFR 45 05/12/2025    EGFR 50 (L) 03/11/2025    CREATININE 1.30 05/12/2025    CREATININE 1.31 (H) 05/12/2025    CREATININE 1.26 (H) 03/11/2025         For further questions, please contact the medical  on call via SecureChat between 8am and 9pm. If between 9pm and 8am, please reach out to the [facility] at [phone].     History of Present  Illness   Dahlia Kraus is a 55 y.o. year old female PMH anxiety, depression, fibromyalgia, migraines and CKD who presented to [facility] for AMS. Patient presented to pain management appointment and was told to come to the ED for some confusion. Patient states she feels well overall. She does not feel confused and denies any other symptoms.   She denies any alcohol use, substance abuse.   Contacted patient's daughter, who states she has had this confusion for the last 3 months. She takes a multitude of medications including Xanax, Ambien, sertraline, oxycodone, risperidone, Buspar and gabapentin. On independent chart review, patient's gabapentin was increased to 800mg TID 3 months ago.     Review of Systems   Constitutional:  Negative for chills and fever.   HENT:  Negative for congestion and rhinorrhea.    Eyes:  Negative for visual disturbance.   Respiratory:  Negative for cough and shortness of breath.    Cardiovascular:  Negative for chest pain.   Gastrointestinal:  Negative for abdominal pain, nausea and vomiting.   Musculoskeletal:  Negative for back pain and neck pain.   Skin:  Negative for wound.   Neurological:  Negative for dizziness, light-headedness and headaches.       Historical Information   Medical History Review: I have reviewed the patient's PMH, PSH, Social History, Family History, Meds, and Allergies   Historical Information   Past Medical History:   Diagnosis Date    Abnormal ECG     Abnormal Pap smear of cervix     Allergic     Anemia     Anxiety     Asthma     Atrial fibrillation (HCC)     Bursitis of left hip 10/28/2014    Chlamydia     Chronic kidney disease     Coronary artery disease     COVID-19 12/06/2020    Depression     Diabetes mellitus (HCC)     Fibromyalgia     GERD (gastroesophageal reflux disease)     Headache(784.0)     Heart disease     Heart murmur     History of transfusion     Hypertension     Inflammatory bowel disease     Migraines     Mixed simple and  mucopurulent chronic bronchitis (HCC) 2019    Myocardial infarction (HCC)     Neuromuscular disorder (HCC)     Opioid abuse, in remission (HCC)     Otitis media     Pneumonia     Scoliosis     Seizures (HCC)     Shingles     Sick sinus syndrome (HCC)     Stroke (HCC)     Urinary tract infection     Varicella     Visual impairment      Past Surgical History:   Procedure Laterality Date    APPENDECTOMY      CATARACT EXTRACTION Right 2023     SECTION      EGD AND COLONOSCOPY      EYE SURGERY Left 2023    FRACTURE SURGERY      HIP SURGERY Right     HYSTERECTOMY      TUBAL LIGATION       Social History     Tobacco Use    Smoking status: Every Day     Current packs/day: 1.00     Average packs/day: 0.6 packs/day for 42.4 years (26.2 ttl pk-yrs)     Types: Cigarettes     Start date: 1992    Smokeless tobacco: Never    Tobacco comments:     Patient stated that she is not ready to quit smoking    Vaping Use    Vaping status: Never Used   Substance and Sexual Activity    Alcohol use: Not Currently    Drug use: Never    Sexual activity: Not Currently     Partners: Male     Birth control/protection: Abstinence, Surgical, Female Sterilization     E-Cigarette/Vaping    E-Cigarette Use Never User      E-Cigarette/Vaping Substances    Nicotine No     THC No     CBD No     Flavoring No     Other No     Unknown No      Family history non-contributory  Prior to Admission Medications   Prescriptions Last Dose Informant Patient Reported? Taking?   ALPRAZolam (XANAX) 1 mg tablet   No No   Sig: TAKE 1 TABLET (1 MG TOTAL) BY MOUTH 4 (FOUR) TIMES A DAY AS NEEDED FOR ANXIETY   Alcohol Swabs (Pharmacist Choice Alcohol) PADS  Self Yes No   Si (five) times a day   BD Pen Needle Nkechi 2nd Gen 32G X 4 MM MISC  Self Yes No   Sig: USE AS DIRECTED FOR BEFORE A MEAL AND AT BEDTIME GLUCOSE INJECTION   BD Pen Needle Nkechi 2nd Gen 32G X 4 MM MISC  Self No No   Sig: USE WITH INSULIN 5 TIMES A DAY   BD PrecisionGlide Needle 23G  "X 1-1/2\" MISC  Self Yes No   Sig: USE AS DIRECTED TO ADMINISTER NALOXONE INJECTION.  MAY DISPENSE 23-25 GAUGE 1-1.5\" NEEDLE.   Blood Glucose Monitoring Suppl (ONE TOUCH ULTRA 2) w/Device KIT  Self Yes No   Sig: Use as directed   Continuous Blood Gluc Transmit (Dexcom G6 Transmitter) MISC  Self No No   Sig: USE 4 TIMES A DAY (WITH MEALS AND AT BEDTIME)   Continuous Glucose  (FreeStyle Lydia 3 [place]) ALEKS   Yes No   Sig: USE 1 EACH 1 (ONE) TIME FOR 1 DOSE   Continuous Glucose Sensor (FreeStyle Lydia 3 Sensor) MISC   No No   Sig: Use 1 each every 14 (fourteen) days   Dulaglutide (Trulicity) 1.5 MG/0.5ML SOAJ   No No   Sig: INJECT 1.5 MG UNDER THE SKIN EVERY 7 DAYS.   EPINEPHrine (EPIPEN) 0.3 mg/0.3 mL SOAJ   No No   Sig: Inject 0.3 mL (0.3 mg total) into a muscle once for 1 dose   Incontinence Supply Disposable (Depend Undergarment Ex Absorb) MISC   No No   Sig: Use 4 (four) times a day as needed (incontinence)   Lancets (OneTouch Delica Plus [id number]) MISC   No No   Sig: INJECT UNDER THE SKIN 2 (TWO) TIMES A DAY   Lantus SoloStar 100 units/mL SOPN   No No   Sig: INJECT 55 UNITS UNDER THE SKIN 2 (TWO) TIMES A DAY AT LUNCH AND AT BEDTIME.   Lidocaine 4 % PTCH   No No   Sig: Apply 1 patch topically in the morning   Myrbetriq 25 MG TB24   No No   Sig: TAKE 25 MG BY MOUTH IN THE MORNING   Nutritional Supplements (Glucerna Hunger Smart Shake) LIQD   No No   Sig: Take 1 Can by mouth 3 (three) times a day   OneTouch Ultra test strip  Self Yes No   Sig: TEST TWICE DAILY OR AS DIRECTED BY MD   Oral Electrolytes (PEDIALYTE SINGLES PO)   Yes No   Sig: Take 8 oz by mouth if needed   Oral Electrolytes (Pedialyte) PACK   Yes No   Sig: Take 237 mL by mouth 2 (two) times a day   Patient not taking: Reported on 1/14/2025   Pyridoxine HCl (vitamin B-6) 25 MG tablet   No No   Sig: TAKE 1 TABLET (25 MG TOTAL) BY MOUTH DAILY   Spacer/Aero-Holding Chambers (AeroChamber Plus Geoff-Vu w/Mask) MISC   No No   Sig: Inhale daily at " bedtime as needed (wheezing)   albuterol (PROVENTIL HFA,VENTOLIN HFA) 90 mcg/act inhaler  Self Yes No   Sig: Inhale 2 puffs every 4 (four) hours as needed   ammonium lactate (LAC-HYDRIN) 12 % lotion   No No   Sig: APPLY TOPICALLY 2 (TWO) TIMES A DAY AS NEEDED FOR DRY SKIN   atorvastatin (LIPITOR) 10 mg tablet  Self Yes No   Sig: Take 10 mg by mouth daily   busPIRone (BUSPAR) 30 MG tablet   No No   Sig: TAKE 1 TABLET BY MOUTH TWICE A DAY   carbidopa-levodopa (SINEMET)  mg per tablet   No No   Sig: TAKE 1 TABLET BY MOUTH 3 (THREE) TIMES A DAY   famotidine (PEPCID) 20 mg tablet   No No   Sig: TAKE 1 TABLET (20 MG TOTAL) BY MOUTH 2 (TWO) TIMES A DAY AS NEEDED FOR INDIGESTION   fexofenadine (ALLEGRA) 180 MG tablet   No No   Sig: Take 1 tablet (180 mg total) by mouth daily   fluticasone (Flovent Diskus) 250 mcg/actuation diskus inhaler   No No   Sig: Inhale 1-2 puffs 2 (two) times a day Rinse mouth after use.   gabapentin (NEURONTIN) 100 mg capsule   No No   Sig: TAKE 1 CAPSULE (100 MG TOTAL) BY MOUTH DAILY AT BEDTIME TAKE ADDITIONAL 800 MG CAPSULE TO TOTAL 900 MG AT BEDTIME   gabapentin (NEURONTIN) 400 mg capsule   No No   Sig: TAKE 2 CAPSULES (400 MG TOTAL) BY MOUTH 3 (THREE) TIMES A DAY   hydrALAZINE (APRESOLINE) 50 mg tablet  Self Yes No   Sig: Take 50 mg by mouth 3 (three) times a day   insulin lispro (HumaLOG) 100 units/mL injection pen   No No   Sig: INJECT 20 UNITS UNDER THE SKIN 3 (THREE) TIMES A DAY BEFORE MEALS INDICATIONS: TYPE 2 DIABETES MELLITUS.   leflunomide (ARAVA) 20 MG tablet   No No   Sig: Take 1 tablet (20 mg total) by mouth daily   lisinopril (ZESTRIL) 10 mg tablet   No No   Sig: Take 1 tablet (10 mg total) by mouth daily   lovastatin (MEVACOR) 10 MG tablet  Self Yes No   Sig: Take 10 mg by mouth   metFORMIN (GLUCOPHAGE-XR) 750 mg 24 hr tablet  Self Yes No   Sig: TAKE 1 TABLET (750 MG TOTAL) BY MOUTH 2 (TWO) TIMES A DAY WITH MEALS.   methocarbamol (ROBAXIN) 500 mg tablet   No No   Sig: Take 1  tablet (500 mg total) by mouth 2 (two) times a day   metoprolol succinate (TOPROL-XL) 100 mg 24 hr tablet  Self Yes No   Sig: Take 100 mg by mouth daily   montelukast (SINGULAIR) 10 mg tablet   No No   Sig: TAKE 1 TABLET (10 MG TOTAL) BY MOUTH DAILY AT BEDTIME   naloxone (NARCAN) 0.4 mg/mL injection  Self Yes No   Patient not taking: Reported on 3/25/2025   nicotine (Nicotine Step 1) 21 mg/24 hr TD 24 hr patch   No No   Sig: PLACE 1 PATCH ON THE SKIN OVER 24 HOURS EVERY 24 HOURS   ondansetron (ZOFRAN-ODT) 4 mg disintegrating tablet  Self No No   Sig: TAKE 1 TABLET (4 MG TOTAL) BY MOUTH EVERY 6 (SIX) HOURS AS NEEDED FOR NAUSEA   Patient not taking: Reported on 1/21/2025   oxyCODONE (ROXICODONE) 5 immediate release tablet  Self Yes No   Sig: 10 mg pt reports she takes this 4 times per day but is out of the medication currently   pantoprazole (PROTONIX) 40 mg tablet   No No   Sig: Take 1 tablet (40 mg total) by mouth 2 (two) times a day   pantoprazole (PROTONIX) 40 mg tablet   No No   Sig: Take 1 tablet (40 mg total) by mouth 2 (two) times a day   polyethylene glycol (GOLYTELY) 4000 mL solution   No No   Sig: Take 4,000 mL by mouth once for 1 dose   potassium chloride (K-DUR,KLOR-CON) 20 mEq tablet  Self No No   Sig: Take 1 tablet (20 mEq total) by mouth daily   potassium chloride (MICRO-K) 10 MEQ CR capsule   No No   Sig: TAKE 2 CAPSULES (20 MEQ TOTAL) BY MOUTH 2 (TWO) TIMES A DAY   prazosin (MINIPRESS) 5 mg capsule   No No   Sig: TAKE 1 CAPSULE (5 MG TOTAL) BY MOUTH DAILY AT BEDTIME   risperiDONE (RisperDAL) 3 mg tablet   No No   Sig: TAKE 1 TABLET (3 MG TOTAL) BY MOUTH 2 (TWO) TIMES A DAY   rivaroxaban (XARELTO) 20 mg tablet  Self Yes No   Sig: Take 20 mg by mouth   sertraline (ZOLOFT) 100 mg tablet   No No   Sig: TAKE 1 TABLET (100 MG TOTAL) BY MOUTH DAILY   trospium chloride (SANCTURA) 20 mg tablet   No No   Sig: Take 1 tablet (20 mg total) by mouth 2 (two) times a day   zolpidem (AMBIEN) 10 mg tablet   No No    Sig: TAKE 1 TABLET (10 MG TOTAL) BY MOUTH DAILY   zolpidem (AMBIEN) 5 mg tablet   No No   Sig: Take 1 tablet (5 mg total) by mouth daily at bedtime as needed for sleep      Facility-Administered Medications: None     Social History     Tobacco Use    Smoking status: Every Day     Current packs/day: 1.00     Average packs/day: 0.6 packs/day for 42.4 years (26.2 ttl pk-yrs)     Types: Cigarettes     Start date: 1/1/1992    Smokeless tobacco: Never    Tobacco comments:     Patient stated that she is not ready to quit smoking    Vaping Use    Vaping status: Never Used   Substance and Sexual Activity    Alcohol use: Not Currently    Drug use: Never    Sexual activity: Not Currently     Partners: Male     Birth control/protection: Abstinence, Surgical, Female Sterilization     Family History   Problem Relation Age of Onset    Asthma Mother     Cancer Mother         Bladder Cancer    Diabetes Mother     Heart disease Mother     Hypertension Mother     Stroke Mother     Mental illness Mother     Depression Mother     COPD Mother     Arthritis Mother     Hearing loss Mother     Vision loss Mother     Glaucoma Mother     Anxiety disorder Mother     Psychiatric Illness Mother     Asthma Father     Diabetes Father     Heart disease Father     Hypertension Father     Arthritis Father     Mental illness Daughter     Depression Daughter     Cancer Maternal Grandfather     Heart disease Maternal Grandfather     Arthritis Maternal Grandfather     Cancer Paternal Grandmother     Diabetes Paternal Grandmother     Arthritis Paternal Grandfather     Asthma Brother     Diabetes Brother     Heart disease Brother     Hypertension Brother     Mental illness Brother     Arthritis Brother     Hypertension Brother     Psychiatric Illness Brother     Mental illness Maternal Aunt     Mental illness Maternal Aunt     Asthma Paternal Aunt     ADD / ADHD Cousin     ADD / ADHD Cousin        Meds/Allergies   Prior to Admission medications   "  Medication Sig Start Date End Date Taking? Authorizing Provider   albuterol (PROVENTIL HFA,VENTOLIN HFA) 90 mcg/act inhaler Inhale 2 puffs every 4 (four) hours as needed 6/4/22   Historical Provider, MD   Alcohol Swabs (Pharmacist Choice Alcohol) PADS 5 (five) times a day 10/24/22   Historical Provider, MD   ALPRAZolam (XANAX) 1 mg tablet TAKE 1 TABLET (1 MG TOTAL) BY MOUTH 4 (FOUR) TIMES A DAY AS NEEDED FOR ANXIETY 4/29/25   María Waters PA-C   ammonium lactate (LAC-HYDRIN) 12 % lotion APPLY TOPICALLY 2 (TWO) TIMES A DAY AS NEEDED FOR DRY SKIN 3/25/24   Rekha Bryan PA-C   atorvastatin (LIPITOR) 10 mg tablet Take 10 mg by mouth daily 8/2/22 1/21/25  Historical Provider, MD   BD Pen Needle Nkechi 2nd Gen 32G X 4 MM MISC USE AS DIRECTED FOR BEFORE A MEAL AND AT BEDTIME GLUCOSE INJECTION 4/5/22   Historical Provider, MD   BD Pen Needle Nkechi 2nd Gen 32G X 4 MM MISC USE WITH INSULIN 5 TIMES A DAY 5/15/23   CRISTINO Carreno   BD PrecisionGlide Needle 23G X 1-1/2\" MISC USE AS DIRECTED TO ADMINISTER NALOXONE INJECTION.  MAY DISPENSE 23-25 GAUGE 1-1.5\" NEEDLE. 5/25/22   Historical Provider, MD   Blood Glucose Monitoring Suppl (ONE TOUCH ULTRA 2) w/Device KIT Use as directed 5/3/22   Historical Provider, MD   busPIRone (BUSPAR) 30 MG tablet TAKE 1 TABLET BY MOUTH TWICE A DAY 5/9/25   Ravi Chowdhury MD   carbidopa-levodopa (SINEMET)  mg per tablet TAKE 1 TABLET BY MOUTH 3 (THREE) TIMES A DAY 5/9/25   Alvin Naranjo DO   Continuous Blood Gluc Transmit (Dexcom G6 Transmitter) MISC USE 4 TIMES A DAY (WITH MEALS AND AT BEDTIME) 12/7/23   CRISTINO Carreno   Continuous Glucose  (FreeStyle Lydia 3 [place]) ALEKS USE 1 EACH 1 (ONE) TIME FOR 1 DOSE 2/1/25   Historical Provider, MD   Continuous Glucose Sensor (FreeStyle Lydia 3 Sensor) MISC Use 1 each every 14 (fourteen) days 1/31/25   Rekha Bryan PA-C   Dulaglutide (Trulicity) 1.5 MG/0.5ML SOAJ INJECT 1.5 MG UNDER THE SKIN EVERY 7 DAYS. " 2/4/25   Rekha Bryan PA-C   EPINEPHrine (EPIPEN) 0.3 mg/0.3 mL SOAJ Inject 0.3 mL (0.3 mg total) into a muscle once for 1 dose 2/26/24 1/21/25  CRISTINO Carreno   famotidine (PEPCID) 20 mg tablet TAKE 1 TABLET (20 MG TOTAL) BY MOUTH 2 (TWO) TIMES A DAY AS NEEDED FOR INDIGESTION 4/30/25   Ravi Chowdhury MD   fexofenadine (ALLEGRA) 180 MG tablet Take 1 tablet (180 mg total) by mouth daily 10/1/24   Rekha Bryan PA-C   fluticasone (Flovent Diskus) 250 mcg/actuation diskus inhaler Inhale 1-2 puffs 2 (two) times a day Rinse mouth after use. 2/22/24   CRISTINO Carreno   gabapentin (NEURONTIN) 100 mg capsule TAKE 1 CAPSULE (100 MG TOTAL) BY MOUTH DAILY AT BEDTIME TAKE ADDITIONAL 800 MG CAPSULE TO TOTAL 900 MG AT BEDTIME 11/4/24   Rekha Bryan PA-C   gabapentin (NEURONTIN) 400 mg capsule TAKE 2 CAPSULES (400 MG TOTAL) BY MOUTH 3 (THREE) TIMES A DAY 2/18/25   Rekha Bryan PA-C   hydrALAZINE (APRESOLINE) 50 mg tablet Take 50 mg by mouth 3 (three) times a day    Historical Provider, MD   Incontinence Supply Disposable (Depend Undergarment Ex Absorb) MISC Use 4 (four) times a day as needed (incontinence) 9/17/24   Gustavo Howard PA-C   insulin lispro (HumaLOG) 100 units/mL injection pen INJECT 20 UNITS UNDER THE SKIN 3 (THREE) TIMES A DAY BEFORE MEALS INDICATIONS: TYPE 2 DIABETES MELLITUS. 2/9/24   CRISTINO Carreno   Lancets (OneTouch Delica Plus [id number]) MISC INJECT UNDER THE SKIN 2 (TWO) TIMES A DAY 3/27/25   Ravi Chowdhury MD   Lantus SoloStar 100 units/mL SOPN INJECT 55 UNITS UNDER THE SKIN 2 (TWO) TIMES A DAY AT LUNCH AND AT BEDTIME. 12/6/24   Rekha Bryan PA-C   leflunomide (ARAVA) 20 MG tablet Take 1 tablet (20 mg total) by mouth daily 2/22/24   CRISTINO Carreno   Lidocaine 4 % PTCH Apply 1 patch topically in the morning 1/21/25   Aureliano García MD   lisinopril (ZESTRIL) 10 mg tablet Take 1 tablet (10 mg total) by mouth daily 2/22/24   Dana Bautista  CRISTINO Alexandra   lovastatin (MEVACOR) 10 MG tablet Take 10 mg by mouth    Historical Provider, MD   metFORMIN (GLUCOPHAGE-XR) 750 mg 24 hr tablet TAKE 1 TABLET (750 MG TOTAL) BY MOUTH 2 (TWO) TIMES A DAY WITH MEALS.    Historical Provider, MD   methocarbamol (ROBAXIN) 500 mg tablet Take 1 tablet (500 mg total) by mouth 2 (two) times a day 1/27/25   Jerica Sears MD   metoprolol succinate (TOPROL-XL) 100 mg 24 hr tablet Take 100 mg by mouth daily 8/2/22   Historical Provider, MD   montelukast (SINGULAIR) 10 mg tablet TAKE 1 TABLET (10 MG TOTAL) BY MOUTH DAILY AT BEDTIME 2/4/25   Ravi Chowdhury MD   Myrbetriq 25 MG TB24 TAKE 25 MG BY MOUTH IN THE MORNING 2/14/25   Rekha Bryan PA-C   naloxone (NARCAN) 0.4 mg/mL injection  7/6/22   Historical Provider, MD   nicotine (Nicotine Step 1) 21 mg/24 hr TD 24 hr patch PLACE 1 PATCH ON THE SKIN OVER 24 HOURS EVERY 24 HOURS 4/18/25 5/18/25  María Waters PA-C   Nutritional Supplements (Glucerna Hunger Smart Shake) LIQD Take 1 Can by mouth 3 (three) times a day 2/5/25   María Waters PA-C   ondansetron (ZOFRAN-ODT) 4 mg disintegrating tablet TAKE 1 TABLET (4 MG TOTAL) BY MOUTH EVERY 6 (SIX) HOURS AS NEEDED FOR NAUSEA  Patient not taking: Reported on 1/21/2025 1/2/24   Rekha Bryan PA-C   OneTouch Ultra test strip TEST TWICE DAILY OR AS DIRECTED BY MD 6/22/22   Historical Provider, MD   Oral Electrolytes (PEDIALYTE SINGLES PO) Take 8 oz by mouth if needed 4/5/24   Historical Provider, MD   Oral Electrolytes (Pedialyte) PACK Take 237 mL by mouth 2 (two) times a day  Patient not taking: Reported on 1/14/2025 1/26/24 1/20/25  Historical Provider, MD   oxyCODONE (ROXICODONE) 5 immediate release tablet 10 mg pt reports she takes this 4 times per day but is out of the medication currently 7/7/23   Historical Provider, MD   pantoprazole (PROTONIX) 40 mg tablet Take 1 tablet (40 mg total) by mouth 2 (two) times a day 4/9/24   Oni Rae MD    pantoprazole (PROTONIX) 40 mg tablet Take 1 tablet (40 mg total) by mouth 2 (two) times a day 10/1/24   Rekha Bryan PA-C   polyethylene glycol (GOLYTELY) 4000 mL solution Take 4,000 mL by mouth once for 1 dose 3/25/24 1/14/25  Oni Rae MD   potassium chloride (K-DUR,KLOR-CON) 20 mEq tablet Take 1 tablet (20 mEq total) by mouth daily 8/22/22   CRISTINO Carreno   potassium chloride (MICRO-K) 10 MEQ CR capsule TAKE 2 CAPSULES (20 MEQ TOTAL) BY MOUTH 2 (TWO) TIMES A DAY 4/28/25   Rekha Bryan PA-C   prazosin (MINIPRESS) 5 mg capsule TAKE 1 CAPSULE (5 MG TOTAL) BY MOUTH DAILY AT BEDTIME 3/7/25   Rekha Bryan PA-C   Pyridoxine HCl (vitamin B-6) 25 MG tablet TAKE 1 TABLET (25 MG TOTAL) BY MOUTH DAILY 5/2/25   Alvin Naranjo DO   risperiDONE (RisperDAL) 3 mg tablet TAKE 1 TABLET (3 MG TOTAL) BY MOUTH 2 (TWO) TIMES A DAY 4/1/25   Rekha Bryan PA-C   rivaroxaban (XARELTO) 20 mg tablet Take 20 mg by mouth 3/2/22   Maxim Nunez MD   sertraline (ZOLOFT) 100 mg tablet TAKE 1 TABLET (100 MG TOTAL) BY MOUTH DAILY 5/11/25   Rekha Bryan PA-C   Spacer/Aero-Holding Chambers (AeroChamber Plus Geoff-Vu w/Mask) MISC Inhale daily at bedtime as needed (wheezing) 2/22/24   CRISTINO Carreno   trospium chloride (SANCTURA) 20 mg tablet Take 1 tablet (20 mg total) by mouth 2 (two) times a day 2/22/24   CRISTINO Carreno   zolpidem (AMBIEN) 10 mg tablet TAKE 1 TABLET (10 MG TOTAL) BY MOUTH DAILY 4/9/25   María Waters PA-C   zolpidem (AMBIEN) 5 mg tablet Take 1 tablet (5 mg total) by mouth daily at bedtime as needed for sleep 3/25/25   María Waters PA-C     Current Facility-Administered Medications:     albuterol (PROVENTIL HFA,VENTOLIN HFA) inhaler 2 puff, 2 puff, Inhalation, Q4H PRN, Harry Celeste MD    ALPRAZolam (XANAX) tablet 1 mg, 1 mg, Oral, TID PRN, Harry Celeste MD    [START ON 5/13/2025] atorvastatin (LIPITOR) tablet 10 mg, 10 mg, Oral,  Daily, Harry Celeste MD    busPIRone (BUSPAR) tablet 30 mg, 30 mg, Oral, BID, Harry Celeste MD, 30 mg at 05/12/25 1803    carbidopa-levodopa (SINEMET)  mg per tablet 1 tablet, 1 tablet, Oral, TID, Harry Celeste MD, 1 tablet at 05/12/25 1702    famotidine (PEPCID) tablet 20 mg, 20 mg, Oral, BID PRN, Harry Celeste MD    gabapentin (NEURONTIN) capsule 300 mg, 300 mg, Oral, TID, Harry Celeste MD    hydrALAZINE (APRESOLINE) tablet 50 mg, 50 mg, Oral, TID, Harry Celeste MD, 50 mg at 05/12/25 1702    insulin glargine (LANTUS) subcutaneous injection 55 Units 0.55 mL, 55 Units, Subcutaneous, BID, Harry Celeste MD    insulin lispro (HumALOG/ADMELOG) 100 units/mL subcutaneous injection 1-5 Units, 1-5 Units, Subcutaneous, TID AC, 1 Units at 05/12/25 1703 **AND** Fingerstick Glucose (POCT), , , TID AC, Harry Celeste MD    insulin lispro (HumALOG/ADMELOG) 100 units/mL subcutaneous injection 20 Units, 20 Units, Subcutaneous, TID With Meals, Harry Celeste MD    [START ON 5/13/2025] lisinopril (ZESTRIL) tablet 10 mg, 10 mg, Oral, Daily, Harry Celeste MD    [START ON 5/13/2025] metoprolol succinate (TOPROL-XL) 24 hr tablet 100 mg, 100 mg, Oral, Daily, Harry Celeste MD    montelukast (SINGULAIR) tablet 10 mg, 10 mg, Oral, HS, Harry Celeste MD    [START ON 5/13/2025] nicotine (NICODERM CQ) 21 mg/24 hr TD 24 hr patch 1 patch, 1 patch, Transdermal, Daily, Harry Celeste MD    oxyCODONE (ROXICODONE) IR tablet 5 mg, 5 mg, Oral, Q4H PRN, Harry Celeste MD, 5 mg at 05/12/25 1803    pantoprazole (PROTONIX) EC tablet 40 mg, 40 mg, Oral, BID, Harry Celeste MD, 40 mg at 05/12/25 1803    [Held by provider] prazosin (MINIPRESS) capsule 5 mg, 5 mg, Oral, HS, Harry Celeste MD    risperiDONE (RisperDAL) tablet 3 mg, 3 mg, Oral, BID, Harry Celeste MD, 3 mg at 05/12/25 1803     [START ON 5/13/2025] rivaroxaban (XARELTO) tablet 20 mg, 20 mg, Oral, Daily With Breakfast, Harry Celeste MD    sertraline (ZOLOFT) tablet 100 mg, 100 mg, Oral, HS, Harry Celeste MD    [START ON 5/13/2025] transdermal buprenorphine (BUTRANS) 5 mcg/hr TD patch 5 mcg, 5 mcg, Transdermal, Q7 Days, Harry Celeste MD    zolpidem (AMBIEN) tablet 5 mg, 5 mg, Oral, HS PRN, Harry Celeste MD   Allergies   Allergen Reactions    Other Anaphylaxis     Capzasin HP -- severe burning and swelling of the skin     Clarithromycin GI Intolerance    Acetaminophen GI Intolerance    Bactrim [Sulfamethoxazole-Trimethoprim] Itching, GI Intolerance, Dizziness, Confusion and Lightheadedness     Patient passes out when on this medication for several days     Cephalosporins Hives     Tolerated Cefdinir 2/2023    Latex Hives    Oxycodone-Acetaminophen GI Intolerance    Penicillins Diarrhea    Trazodone Diarrhea    Capsaicin Rash    Naproxen Palpitations       Objective :  Temp:  [98.3 °F (36.8 °C)-98.4 °F (36.9 °C)] 98.4 °F (36.9 °C)  HR:  [78-86] 80  BP: (132-179)/() 169/94  Resp:  [13-34] 20  SpO2:  [93 %-97 %] 95 %  O2 Device: None (Room air)      Intake/Output Summary (Last 24 hours) at 5/12/2025 2054  Last data filed at 5/12/2025 1522  Gross per 24 hour   Intake 1000 ml   Output --   Net 1000 ml       Physical Exam  Constitutional:       General: She is not in acute distress.  HENT:      Head: Normocephalic and atraumatic.      Mouth/Throat:      Mouth: Mucous membranes are moist.      Pharynx: Oropharynx is clear.   Eyes:      Extraocular Movements: Extraocular movements intact.      Conjunctiva/sclera: Conjunctivae normal.      Pupils: Pupils are equal, round, and reactive to light.      Comments: 4mm pupils, reactive bilaterally   Cardiovascular:      Rate and Rhythm: Normal rate and regular rhythm.      Pulses: Normal pulses.      Heart sounds: Normal heart sounds.   Pulmonary:      Effort:  Pulmonary effort is normal. No respiratory distress.   Abdominal:      General: Bowel sounds are normal.      Palpations: Abdomen is soft.      Tenderness: There is no abdominal tenderness.   Musculoskeletal:         General: No deformity. Normal range of motion.      Cervical back: Normal range of motion. No rigidity.   Skin:     General: Skin is warm and dry.      Capillary Refill: Capillary refill takes less than 2 seconds.   Neurological:      General: No focal deficit present.      Mental Status: She is alert and oriented to person, place, and time.      Comments: Normal DTR, no myoclonus, no rigidity in UE/LE           Lab Results: I have reviewed the following results:  Results from last 7 days   Lab Units 05/12/25  1315   WBC Thousand/uL 10.64*   HEMOGLOBIN g/dL 13.5   HEMATOCRIT % 41.3   PLATELETS Thousands/uL 272   SEGS PCT % 64   LYMPHO PCT % 25   MONO PCT % 7   EOS PCT % 2      Results from last 7 days   Lab Units 05/12/25  1841   POTASSIUM mmol/L 4.9   CHLORIDE mmol/L 105   CO2 mmol/L 25   BUN mg/dL 15   CREATININE mg/dL 1.30   CALCIUM mg/dL 8.6   ALBUMIN g/dL 3.5   ALK PHOS U/L 82   ALT U/L 4*   AST U/L 20                       Results from last 7 days   Lab Units 05/12/25  1841   ACETAMINOPHEN LVL ug/mL <2*   ETHANOL LVL mg/dL <10   SALICYLATE LVL mg/dL 8     Imaging Results Review: I reviewed radiology reports from this admission including: CT head.  Other Study Results Review: EKG was personally reviewed and my interpretation is: NSR. HR 85..    Administrative Statements

## 2025-05-12 NOTE — PLAN OF CARE
Problem: PAIN - ADULT  Goal: Verbalizes/displays adequate comfort level or baseline comfort level  Description: Interventions:- Encourage patient to monitor pain and request assistance- Assess pain using appropriate pain scale- Administer analgesics based on type and severity of pain and evaluate response- Implement non-pharmacological measures as appropriate and evaluate response- Consider cultural and social influences on pain and pain management- Notify physician/advanced practitioner if interventions unsuccessful or patient reports new pain  Outcome: Progressing     Problem: INFECTION - ADULT  Goal: Absence or prevention of progression during hospitalization  Description: INTERVENTIONS:- Assess and monitor for signs and symptoms of infection- Monitor lab/diagnostic results- Monitor all insertion sites, i.e. indwelling lines, tubes, and drains- Monitor endotracheal if appropriate and nasal secretions for changes in amount and color- [place] appropriate cooling/warming therapies per order- Administer medications as ordered- Instruct and encourage patient and family to use good hand hygiene technique- Identify and instruct in appropriate isolation precautions for identified infection/condition  Outcome: Progressing  Goal: Absence of fever/infection during neutropenic period  Description: INTERVENTIONS:- Monitor WBC  Outcome: Progressing     Problem: SAFETY ADULT  Goal: Patient will remain free of falls  Description: INTERVENTIONS:- Educate patient/family on patient safety including physical limitations- Instruct patient to call for assistance with activity - Consult OT/PT to assist with strengthening/mobility - Keep Call bell within reach- Keep bed low and locked with side rails adjusted as appropriate- Keep care items and personal belongings within reach- Initiate and maintain comfort rounds- Make Fall Risk Sign visible to staff- Offer Toileting every 2 Hours, in advance of need- Initiate/Maintain bed alarm-  Obtain necessary fall risk management equipment: - Apply yellow socks and bracelet for high fall risk patients- Consider moving patient to room near nurses station  Outcome: Progressing  Goal: Maintain or return to baseline ADL function  Description: INTERVENTIONS:-  Assess patient's ability to carry out ADLs; assess patient's baseline for ADL function and identify physical deficits which impact ability to perform ADLs (bathing, care of mouth/teeth, toileting, grooming, dressing, etc.)- Assess/evaluate cause of self-care deficits - Assess range of motion- Assess patient's mobility; develop plan if impaired- Assess patient's need for assistive devices and provide as appropriate- Encourage maximum independence but intervene and supervise when necessary- Involve family in performance of ADLs- Assess for home care needs following discharge - Consider OT consult to assist with ADL evaluation and planning for discharge- Provide patient education as appropriate  Outcome: Progressing  Goal: Maintains/Returns to pre admission functional level  Description: INTERVENTIONS:- Perform AM-PAC 6 Click Basic Mobility/ Daily Activity assessment daily.- Set and communicate daily mobility goal to care team and patient/family/caregiver. - Collaborate with rehabilitation services on mobility goals if consulted- Perform Range of Motion 3 times a day.- Reposition patient every 2 hours.- Dangle patient 3 times a day- Stand patient 3 times a day- Ambulate patient 3 times a day- Out of bed to chair 3 times a day - Out of bed for meals 3 times a day- Out of bed for toileting- Record patient progress and toleration of activity level   Outcome: Progressing     Problem: DISCHARGE PLANNING  Goal: Discharge to home or other facility with appropriate resources  Description: INTERVENTIONS:- Identify barriers to discharge w/patient and caregiver- Arrange for needed discharge resources and transportation as appropriate- Identify discharge learning  needs (meds, wound care, etc.)- Arrange for interpretive services to assist at discharge as needed- Refer to Case Management Department for coordinating discharge planning if the patient needs post-hospital services based on physician/advanced practitioner order or complex needs related to functional status, cognitive ability, or social support system  Outcome: Progressing     Problem: Knowledge Deficit  Goal: Patient/family/caregiver demonstrates understanding of disease process, treatment plan, medications, and discharge instructions  Description: Complete learning assessment and assess knowledge base.Interventions:- Provide teaching at level of understanding- Provide teaching via preferred learning methods  Outcome: Progressing

## 2025-05-12 NOTE — ASSESSMENT & PLAN NOTE
Patient comes here for altered mental status and was noted to be drowsy.  Hemodynamically stable  Salicylate level 13, acetaminophen 7, alcohol negative  Will get a urine drug screen  CT head shows no acute intracranial abnormality  No focal signs and symptoms of infection  Reviewed meds  Patient takes alprazolam 1 mg 4 times daily as needed  Buprenorphine patch  Buspirone 30 mg twice daily  Gabapentin 800 3 times daily  Myrbetriq 5 mg daily  Risperidone 3 mg twice daily  Sertraline 100 mg once daily  Zolpidem 10 mg once daily  Carbidopa levodopa  3 times daily  Likely in the setting of polypharmacy  Patient appears oriented however response slowly on arrival patient was more lethargic according to ED  Plan:  Will get a geriatrics consult, for tomorrow  Will continue to monitor in the inpatient setting  Salicylate level was elevated at 13, ED did speak with toxicology for recommendations.  Was able to discuss with them and they think this is likely from polypharmacy as well.  They did note that patient was taking gabapentin 800 times a day.  Will cut this down to 300 mg 3 times daily  Will hold prazosin  Will decrease alprazolam to 3 times a day as needed

## 2025-05-12 NOTE — ASSESSMENT & PLAN NOTE
Lab Results   Component Value Date    EGFR 45 05/12/2025    EGFR 50 (L) 03/11/2025    EGFR 32 (L) 03/10/2025    CREATININE 1.31 (H) 05/12/2025    CREATININE 1.26 (H) 03/11/2025    CREATININE 1.8 (H) 03/10/2025   POA 1.3 baseline is seems to be around 1.2-1.8  Continue to monitor

## 2025-05-12 NOTE — ED TRIAGE NOTES
Patient perseverating about granddaughter. States that she would hurt anybody who hurt her granddaughter.

## 2025-05-12 NOTE — ASSESSMENT & PLAN NOTE
"Lab Results   Component Value Date    HGBA1C 9.4 (H) 02/05/2025       No results for input(s): \"POCGLU\" in the last 72 hours.    Blood Sugar Average: Last 72 hrs:  Patient appears to have uncontrolled type 2 diabetes    Patient takes Trulicity, glargine 5 units twice daily at lunch and bedtime  Sliding scale      "

## 2025-05-12 NOTE — ED PROVIDER NOTES
Time reflects when diagnosis was documented in both MDM as applicable and the Disposition within this note       Time User Action Codes Description Comment    5/12/2025  3:13 PM Colin Casarezhi Add [R41.82] Altered mental status     5/14/2025  5:25 PM Kamron Acevedo Add [E11.649,  Z79.4] Type 2 diabetes mellitus with hypoglycemia without coma, with long-term current use of insulin (HCC)           ED Disposition       ED Disposition   Admit    Condition   Stable    Date/Time   Mon May 12, 2025  3:13 PM    Comment                  Assessment & Plan       Medical Decision Making  Amount and/or Complexity of Data Reviewed  Labs: ordered. Decision-making details documented in ED Course.  Radiology: ordered.    Risk  Decision regarding hospitalization.    Pt is a 55 y o female sent in from pain management office for encephalopathy.  Today is her first appointment with pain management and they said she is talking about her granddaughter who is not present there and seemed altered.  On triage they noted pinpoint pupils, I did not notice on my evaluation.  On my initial evaluation patient seems slow to respond, no focal deficits, no external signs of trauma, good respiratory rate, hemodynamically stable.  Currently complaining of diffuse pain everywhere, states her last Oxy was 2 days ago, prescribed by her PCP.    Initial presentation pt is hemodynamically stable, moving all extremeties    On exam   General: Altered, with no focal deficits, protecting airway  Head: Normocephalic, atraumatic, nontender.  Eyes: EOM-No subconjunctival hemorrhages.  ENT: Nose atraumatic. MMM  No malocclusion. No stridor. Normal phonation. No drooling. Normal swallowing.   Neck: Trachea midline. No JVD.  CV: RRR.   Lungs: CTAB No tachypnea. No paradoxical motion.  Abd: +BS, soft, NT/ND. No guarding/rigidity.   MSK: Full ROM throughout. No lower extremity edema.   Skin: Dry, intact.   Neuro: Altered, CN II-XII grossly intact. Motor/sensory  grossly intact.  Psychiatric/Behavioral: Altered   Exam: deferred      Ddx: Likely polypharmacy, will also evaluate with CBC CMP, panel and CT head to rule out other acute etiologies of encephalopathy.  Consulted toxicology as epic was flagging tox consult for aspirin level of 13 though it is within the range.  Spoke to daughter who states that she has episodes of encephalopathy secondary to medications periodically.  Admitted to medicine for further evaluation management.  Hyperkalemia at 5.5 without any EKG changes, no treatment initiated in the ED.            ED Course as of 05/20/25 1409   Mon May 12, 2025   1344 ANION GAP: 9   1348 55 y o female sent in from pain management office for encephalopathy.  Today is her first appointment with pain management and they said she is talking about her granddaughter who is not present there and seemed altered.  On triage they noted pinpoint pupils, I did not notice on my evaluation.  On my initial evaluation patient seems slow to respond, no focal deficits, no external signs of trauma, good respiratory rate, hemodynamically stable.   1348 Currently complaining of diffuse pain everywhere, states her last Oxy was 2 days ago, prescribed by her PCP.   1503 Texted SLIM   1503 Texted TOX for Salicylate level of 13        Medications   sodium chloride 0.9 % bolus 1,000 mL (0 mL Intravenous Stopped 5/12/25 1522)       ED Risk Strat Scores                    No data recorded        SBIRT 20yo+      Flowsheet Row Most Recent Value   Initial Alcohol Screen: US AUDIT-C     1. How often do you have a drink containing alcohol? 0 Filed at: 05/12/2025 1359   2. How many drinks containing alcohol do you have on a typical day you are drinking?  0 Filed at: 05/12/2025 1359   3b. FEMALE Any Age, or MALE 65+: How often do you have 4 or more drinks on one occassion? 0 Filed at: 05/12/2025 1359   Audit-C Score 0 Filed at: 05/12/2025 1359   DONNA: How many times in the past year have you...     Used an illegal drug or used a prescription medication for non-medical reasons? Never Filed at: 2025 7585                            History of Present Illness       Chief Complaint   Patient presents with    Altered Mental Status     Per EMS patient was at her pain management appointment and they called due to her having altered mental status. Pain management has no baseline due to this being first appointment. Patient looking for her granddaughter who is not present and was not present at appointment. Patient pupils pinpoint upon assessment. Is oriented to place and self.        Past Medical History:   Diagnosis Date    Abnormal ECG     Abnormal Pap smear of cervix     Allergic     Anemia     Anxiety     Asthma     Atrial fibrillation (HCC)     Bursitis of left hip 10/28/2014    Chlamydia     Chronic kidney disease     Coronary artery disease     COVID-19 2020    Depression     Diabetes mellitus (HCC)     Fibromyalgia     GERD (gastroesophageal reflux disease)     Headache(784.0)     Heart disease     Heart murmur     History of transfusion     Hypertension     Inflammatory bowel disease     Migraines     Mixed simple and mucopurulent chronic bronchitis (HCC) 2019    Myocardial infarction (HCC)     Neuromuscular disorder (HCC)     Opioid abuse, in remission (HCC)     Otitis media     Pneumonia     Scoliosis     Seizures (HCC)     Shingles     Sick sinus syndrome (HCC)     Stroke (HCC)     Urinary tract infection     Varicella     Visual impairment       Past Surgical History:   Procedure Laterality Date    APPENDECTOMY      CATARACT EXTRACTION Right 2023     SECTION      EGD AND COLONOSCOPY      EYE SURGERY Left 2023    FRACTURE SURGERY      HIP SURGERY Right     HYSTERECTOMY      TUBAL LIGATION        Family History   Problem Relation Age of Onset    Asthma Mother     Cancer Mother         Bladder Cancer    Diabetes Mother     Heart disease Mother     Hypertension Mother      Stroke Mother     Mental illness Mother     Depression Mother     COPD Mother     Arthritis Mother     Hearing loss Mother     Vision loss Mother     Glaucoma Mother     Anxiety disorder Mother     Psychiatric Illness Mother     Asthma Father     Diabetes Father     Heart disease Father     Hypertension Father     Arthritis Father     Mental illness Daughter     Depression Daughter     Cancer Maternal Grandfather     Heart disease Maternal Grandfather     Arthritis Maternal Grandfather     Cancer Paternal Grandmother     Diabetes Paternal Grandmother     Arthritis Paternal Grandfather     Asthma Brother     Diabetes Brother     Heart disease Brother     Hypertension Brother     Mental illness Brother     Arthritis Brother     Hypertension Brother     Psychiatric Illness Brother     Mental illness Maternal Aunt     Mental illness Maternal Aunt     Asthma Paternal Aunt     ADD / ADHD Cousin     ADD / ADHD Cousin       Social History     Tobacco Use    Smoking status: Every Day     Current packs/day: 1.00     Average packs/day: 0.6 packs/day for 42.4 years (26.3 ttl pk-yrs)     Types: Cigarettes     Start date: 1/1/1992    Smokeless tobacco: Never    Tobacco comments:     Patient stated that she is not ready to quit smoking    Vaping Use    Vaping status: Never Used   Substance Use Topics    Alcohol use: Not Currently    Drug use: Never      E-Cigarette/Vaping    E-Cigarette Use Never User       E-Cigarette/Vaping Substances    Nicotine No     THC No     CBD No     Flavoring No     Other No     Unknown No       I have reviewed and agree with the history as documented.     HPI    Review of Systems        Objective       ED Triage Vitals   Temperature Pulse Blood Pressure Respirations SpO2 Patient Position - Orthostatic VS   05/12/25 1253 05/12/25 1235 05/12/25 1235 05/12/25 1235 05/12/25 1235 05/12/25 1235   98.3 °F (36.8 °C) 86 (!) 174/109 16 93 % Lying      Temp Source Heart Rate Source BP Location FiO2 (%) Pain Score     05/12/25 1253 05/12/25 1235 05/12/25 1235 -- 05/12/25 1803    Oral Monitor Right arm  8      Vitals      Date and Time Temp Pulse SpO2 Resp BP Pain Score FACES Pain Rating User   05/14/25 1820 -- -- -- -- -- 7 -- QL   05/14/25 1819 -- -- -- -- 158/80 -- -- QL   05/14/25 1756 -- -- -- -- 179/97 -- -- QL   05/14/25 1513 98.5 °F (36.9 °C) 52 94 % 17 146/84 -- -- DII   05/14/25 1414 -- -- -- -- -- 7 -- QL   05/14/25 1241 -- 80 95 % -- 146/78 -- -- DII   05/14/25 1227 97.8 °F (36.6 °C) 85 97 % -- -- -- -- DII   05/14/25 0856 -- -- -- -- -- 7 -- QL   05/14/25 0630 98.2 °F (36.8 °C) 87 94 % 18 156/84 -- -- DII   05/14/25 0452 -- -- -- -- -- 10 - Worst Possible Pain -- LJ   05/13/25 2208 98.3 °F (36.8 °C) 80 95 % 17 114/66 -- -- DII   05/13/25 2103 -- 81 97 % -- 139/78 -- -- DII   05/13/25 2101 -- -- -- -- 139/78 -- -- LJ   05/13/25 1959 -- -- -- -- -- 9 -- LJ   05/13/25 1549 -- -- -- -- -- 8 -- NA   05/13/25 1449 99.6 °F (37.6 °C) 90 95 % 16 123/79 -- -- DII   05/13/25 0732 98.3 °F (36.8 °C) 87 94 % -- 166/94 -- -- DII   05/13/25 0508 -- -- -- -- -- 9 -- CHLOE   05/12/25 2211 98.4 °F (36.9 °C) 84 97 % 17 166/93 -- -- DII   05/12/25 2208 98.4 °F (36.9 °C) 48 100 % -- 166/93 -- -- DII   05/12/25 1803 -- -- -- -- -- 8 -- BF   05/12/25 1732 98.4 °F (36.9 °C) 80 95 % 20 169/94 -- -- DII   05/12/25 1730 -- -- 95 % -- -- -- -- BF   05/12/25 1702 -- -- -- -- 132/80 -- -- AM   05/12/25 1700 -- 78 95 % 13 144/66 -- -- AM   05/12/25 1600 -- 86 96 % 34 179/78 -- -- AM   05/12/25 1500 -- 78 97 % 20 148/88 -- -- AM   05/12/25 1400 -- 80 97 % 17 139/80 -- -- AM   05/12/25 1300 -- 82 94 % 21 134/87 -- -- JK   05/12/25 1253 98.3 °F (36.8 °C) -- -- -- -- -- -- KH   05/12/25 1235 -- 86 93 % 16 174/109 -- -- TW            Physical Exam    Results Reviewed       Procedure Component Value Units Date/Time    Comprehensive metabolic panel [[id number]]  (Abnormal) Collected: 05/13/25 0525    Lab Status: Final result Specimen: Blood from Arm, Right  Updated: 05/13/25 0814     Sodium 139 mmol/L      Potassium 3.5 mmol/L      Chloride 102 mmol/L      CO2 29 mmol/L      ANION GAP 8 mmol/L      BUN 12 mg/dL      Creatinine 1.01 mg/dL      Glucose 88 mg/dL      Calcium 9.0 mg/dL      AST 21 U/L      ALT 5 U/L      Alkaline Phosphatase 75 U/L      Total Protein 6.7 g/dL      Albumin 3.9 g/dL      Total Bilirubin 0.31 mg/dL      eGFR 61 ml/min/1.73sq m     Narrative:      National Kid[employer] Foundation guidelines for Chronic Kidney Disease (CKD):     Stage 1 with normal or high GFR (GFR > 90 mL/min/1.73 square meters)    Stage 2 Mild CKD (GFR = 60-89 mL/min/1.73 square meters)    Stage 3A Moderate CKD (GFR = 45-59 mL/min/1.73 square meters)    Stage 3B Moderate CKD (GFR = 30-44 mL/min/1.73 square meters)    Stage 4 Severe CKD (GFR = 15-29 mL/min/1.73 square meters)    Stage 5 End Stage CKD (GFR <15 mL/min/1.73 square meters)  Note: GFR calculation is accurate only with a steady state creatinine    Magnesium [[id number]]  (Abnormal) Collected: 05/13/25 0525    Lab Status: Final result Specimen: Blood from Arm, Right Updated: 05/13/25 0814     Magnesium 1.4 mg/dL     CBC and differential [[id number]] Collected: 05/13/25 0525    Lab Status: Final result Specimen: Blood from Arm, Right Updated: 05/13/25 0729     WBC 8.70 Thousand/uL      RBC 4.56 Million/uL      Hemoglobin 13.3 g/dL      Hematocrit 41.3 %      MCV 91 fL      MCH 29.2 pg      MCHC 32.2 g/dL      RDW 14.9 %      MPV 12.2 fL      Platelets 271 Thousands/uL      nRBC 0 /100 WBCs      Segmented % 46 %      Immature Grans % 0 %      Lymphocytes % 41 %      Monocytes % 9 %      Eosinophils Relative 3 %      Basophils Relative 1 %      Absolute Neutrophils 3.96 Thousands/µL      Absolute Immature Grans 0.02 Thousand/uL      Absolute Lymphocytes 3.56 Thousands/µL      Absolute Monocytes 0.79 Thousand/µL      Eosinophils Absolute 0.27 Thousand/µL      Basophils Absolute 0.10 Thousands/µL     Fingerstick Glucose  (POCT) [[id number]]  (Abnormal) Collected: 05/12/25 1658    Lab Status: Final result Specimen: Blood Updated: 05/12/25 1659     POC Glucose 157 mg/dl     Salicylate level [[id number]]  (Normal) Collected: 05/12/25 1322    Lab Status: Final result Specimen: Blood from Arm, Left Updated: 05/12/25 1353     Salicylate Lvl 13 mg/dL     Acetaminophen level-If concentration is detectable, please discuss with medical  on call. [[id number]]  (Abnormal) Collected: 05/12/25 1322    Lab Status: Final result Specimen: Blood from Arm, Left Updated: 05/12/25 1353     Acetaminophen Level 7 ug/mL     Ethanol [[id number]]  (Normal) Collected: 05/12/25 1322    Lab Status: Final result Specimen: Blood from Arm, Left Updated: 05/12/25 1353     Ethanol Lvl <10 mg/dL     Comprehensive metabolic panel [[id number]]  (Abnormal) Collected: 05/12/25 1315    Lab Status: Final result Specimen: Blood from Arm, Left Updated: 05/12/25 1343     Sodium 135 mmol/L      Potassium 5.5 mmol/L      Chloride 102 mmol/L      CO2 24 mmol/L      ANION GAP 9 mmol/L      BUN 18 mg/dL      Creatinine 1.31 mg/dL      Glucose 146 mg/dL      Calcium 9.2 mg/dL      AST 20 U/L      ALT 6 U/L      Alkaline Phosphatase 81 U/L      Total Protein 6.9 g/dL      Albumin 4.1 g/dL      Total Bilirubin 0.29 mg/dL      eGFR 45 ml/min/1.73sq m     Narrative:      National Kidney Disease Foundation guidelines for Chronic Kidney Disease (CKD):     Stage 1 with normal or high GFR (GFR > 90 mL/min/1.73 square meters)    Stage 2 Mild CKD (GFR = 60-89 mL/min/1.73 square meters)    Stage 3A Moderate CKD (GFR = 45-59 mL/min/1.73 square meters)    Stage 3B Moderate CKD (GFR = 30-44 mL/min/1.73 square meters)    Stage 4 Severe CKD (GFR = 15-29 mL/min/1.73 square meters)    Stage 5 End Stage CKD (GFR <15 mL/min/1.73 square meters)  Note: GFR calculation is accurate only with a steady state creatinine    CBC and differential [[id number]]  (Abnormal) Collected: 05/12/25 1315    Lab  "Status: Final result Specimen: Blood from Arm, Left Updated: 25 1325     WBC 10.64 Thousand/uL      RBC 4.59 Million/uL      Hemoglobin 13.5 g/dL      Hematocrit 41.3 %      MCV 90 fL      MCH 29.4 pg      MCHC 32.7 g/dL      RDW 14.8 %      MPV 12.4 fL      Platelets 272 Thousands/uL      nRBC 0 /100 WBCs      Segmented % 64 %      Immature Grans % 1 %      Lymphocytes % 25 %      Monocytes % 7 %      Eosinophils Relative 2 %      Basophils Relative 1 %      Absolute Neutrophils 6.96 Thousands/µL      Absolute Immature Grans 0.05 Thousand/uL      Absolute Lymphocytes 2.64 Thousands/µL      Absolute Monocytes 0.74 Thousand/µL      Eosinophils Absolute 0.16 Thousand/µL      Basophils Absolute 0.09 Thousands/µL             CT head wo contrast   Final Interpretation by E. Alec Schoenberger, MD ( [id number])      No acute intracranial abnormality.                  Workstation performed: [id number]             Procedures    ED Medication and Procedure Management   Prior to Admission Medications   Prescriptions Last Dose Informant Patient Reported? Taking?   ALPRAZolam (XANAX) 1 mg tablet   No No   Sig: TAKE 1 TABLET (1 MG TOTAL) BY MOUTH 4 (FOUR) TIMES A DAY AS NEEDED FOR ANXIETY   Alcohol Swabs (Pharmacist Choice Alcohol) PADS  Self Yes No   Si (five) times a day   BD Pen Needle Nkechi 2nd Gen 32G X 4 MM MISC  Self Yes No   BD Pen Needle Nkechi 2nd Gen 32G X 4 MM MISC  Self No No   Sig: USE WITH INSULIN 5 TIMES A DAY   BD PrecisionGlide Needle 23G X 1-1/2\" MISC  Self Yes No   Blood Glucose Monitoring Suppl (ONE TOUCH ULTRA 2) w/Device KIT  Self Yes No   Sig: Use as directed   Continuous Blood Gluc Transmit (Dexcom G6 Transmitter) MISC  Self No No   Sig: USE 4 TIMES A DAY (WITH MEALS AND AT BEDTIME)   Continuous Glucose  (FreeStyle Lydia 3 [place]) ALEKS   Yes No   Continuous Glucose Sensor (FreeStyle Lydia 3 Sensor) MISC   No No   Sig: Use 1 each every 14 (fourteen) days   Dulaglutide (Trulicity) 1.5 MG/0.5ML " SOAJ   No No   Sig: INJECT 1.5 MG UNDER THE SKIN EVERY 7 DAYS.   EPINEPHrine (EPIPEN) 0.3 mg/0.3 mL SOAJ   No No   Sig: Inject 0.3 mL (0.3 mg total) into a muscle once for 1 dose   Incontinence Supply Disposable (Depend Undergarment Ex Absorb) MISC   No No   Sig: Use 4 (four) times a day as needed (incontinence)   Lancets (OneTouch Delica Plus [id number]) MISC   No No   Sig: INJECT UNDER THE SKIN 2 (TWO) TIMES A DAY   Lantus SoloStar 100 units/mL SOPN   No No   Sig: INJECT 55 UNITS UNDER THE SKIN 2 (TWO) TIMES A DAY AT LUNCH AND AT BEDTIME.   Lidocaine 4 % PTCH   No No   Sig: Apply 1 patch topically in the morning   Myrbetriq 25 MG TB24   No No   Sig: TAKE 25 MG BY MOUTH IN THE MORNING   Nutritional Supplements (Glucerna Hunger Smart Shake) LIQD   No No   Sig: Take 1 Can by mouth 3 (three) times a day   OneTouch Ultra test strip  Self Yes No   Oral Electrolytes (PEDIALYTE SINGLES PO)   Yes No   Sig: Take 8 oz by mouth if needed   Oral Electrolytes (Pedialyte) PACK   Yes No   Sig: Take 237 mL by mouth 2 (two) times a day   Patient not taking: Reported on 1/14/2025   Pyridoxine HCl (vitamin B-6) 25 MG tablet   No No   Sig: TAKE 1 TABLET (25 MG TOTAL) BY MOUTH DAILY   Spacer/Aero-Holding Chambers (AeroChamber Plus Geoff-Vu w/Mask) MISC   No No   Sig: Inhale daily at bedtime as needed (wheezing)   albuterol (PROVENTIL HFA,VENTOLIN HFA) 90 mcg/act inhaler  Self Yes No   Sig: Inhale 2 puffs every 4 (four) hours as needed   ammonium lactate (LAC-HYDRIN) 12 % lotion   No No   Sig: APPLY TOPICALLY 2 (TWO) TIMES A DAY AS NEEDED FOR DRY SKIN   atorvastatin (LIPITOR) 10 mg tablet  Self Yes No   Sig: Take 10 mg by mouth in the morning.   busPIRone (BUSPAR) 30 MG tablet   No No   Sig: TAKE 1 TABLET BY MOUTH TWICE A DAY   carbidopa-levodopa (SINEMET)  mg per tablet   No No   Sig: TAKE 1 TABLET BY MOUTH 3 (THREE) TIMES A DAY   famotidine (PEPCID) 20 mg tablet   No No   Sig: TAKE 1 TABLET (20 MG TOTAL) BY MOUTH 2 (TWO) TIMES A  DAY AS NEEDED FOR INDIGESTION   fexofenadine (ALLEGRA) 180 MG tablet   No No   Sig: Take 1 tablet (180 mg total) by mouth daily   fluticasone (Flovent Diskus) 250 mcg/actuation diskus inhaler   No No   Sig: Inhale 1-2 puffs 2 (two) times a day Rinse mouth after use.   gabapentin (NEURONTIN) 100 mg capsule   No No   Sig: TAKE 1 CAPSULE (100 MG TOTAL) BY MOUTH DAILY AT BEDTIME TAKE ADDITIONAL 800 MG CAPSULE TO TOTAL 900 MG AT BEDTIME   gabapentin (NEURONTIN) 400 mg capsule   No No   Sig: TAKE 2 CAPSULES (400 MG TOTAL) BY MOUTH 3 (THREE) TIMES A DAY   hydrALAZINE (APRESOLINE) 50 mg tablet  Self Yes No   Sig: Take 50 mg by mouth in the morning and 50 mg in the evening and 50 mg before bedtime.   insulin lispro (HumaLOG) 100 units/mL injection pen   No No   Sig: INJECT 20 UNITS UNDER THE SKIN 3 (THREE) TIMES A DAY BEFORE MEALS INDICATIONS: TYPE 2 DIABETES MELLITUS.   leflunomide (ARAVA) 20 MG tablet   No No   Sig: Take 1 tablet (20 mg total) by mouth daily   lisinopril (ZESTRIL) 10 mg tablet   No No   Sig: Take 1 tablet (10 mg total) by mouth daily   lovastatin (MEVACOR) 10 MG tablet  Self Yes No   Sig: Take 10 mg by mouth   methocarbamol (ROBAXIN) 500 mg tablet   No No   Sig: Take 1 tablet (500 mg total) by mouth 2 (two) times a day   metoprolol succinate (TOPROL-XL) 100 mg 24 hr tablet  Self Yes No   Sig: Take 100 mg by mouth in the morning.   montelukast (SINGULAIR) 10 mg tablet   No No   Sig: TAKE 1 TABLET (10 MG TOTAL) BY MOUTH DAILY AT BEDTIME   naloxone (NARCAN) 0.4 mg/mL injection  Self Yes No   Patient not taking: Reported on 3/25/2025   nicotine (Nicotine Step 1) 21 mg/24 hr TD 24 hr patch   No No   Sig: PLACE 1 PATCH ON THE SKIN OVER 24 HOURS EVERY 24 HOURS   ondansetron (ZOFRAN-ODT) 4 mg disintegrating tablet  Self No No   Sig: TAKE 1 TABLET (4 MG TOTAL) BY MOUTH EVERY 6 (SIX) HOURS AS NEEDED FOR NAUSEA   Patient not taking: Reported on 1/21/2025   oxyCODONE (ROXICODONE) 5 immediate release tablet  Self Yes  No   Sig: 10 mg pt reports she takes this 4 times per day but is out of the medication currently   pantoprazole (PROTONIX) 40 mg tablet   No No   Sig: Take 1 tablet (40 mg total) by mouth 2 (two) times a day   pantoprazole (PROTONIX) 40 mg tablet   No No   Sig: Take 1 tablet (40 mg total) by mouth 2 (two) times a day   polyethylene glycol (GOLYTELY) 4000 mL solution   No No   Sig: Take 4,000 mL by mouth once for 1 dose   potassium chloride (K-DUR,KLOR-CON) 20 mEq tablet  Self No No   Sig: Take 1 tablet (20 mEq total) by mouth daily   potassium chloride (MICRO-K) 10 MEQ CR capsule   No No   Sig: TAKE 2 CAPSULES (20 MEQ TOTAL) BY MOUTH 2 (TWO) TIMES A DAY   prazosin (MINIPRESS) 5 mg capsule   No No   Sig: TAKE 1 CAPSULE (5 MG TOTAL) BY MOUTH DAILY AT BEDTIME   risperiDONE (RisperDAL) 3 mg tablet   No No   Sig: TAKE 1 TABLET (3 MG TOTAL) BY MOUTH 2 (TWO) TIMES A DAY   rivaroxaban (XARELTO) 20 mg tablet  Self Yes No   Sig: Take 20 mg by mouth   sertraline (ZOLOFT) 100 mg tablet   No No   Sig: TAKE 1 TABLET (100 MG TOTAL) BY MOUTH DAILY   trospium chloride (SANCTURA) 20 mg tablet   No No   Sig: Take 1 tablet (20 mg total) by mouth 2 (two) times a day   zolpidem (AMBIEN) 10 mg tablet   No No   Sig: TAKE 1 TABLET (10 MG TOTAL) BY MOUTH DAILY   zolpidem (AMBIEN) 5 mg tablet   No No   Sig: Take 1 tablet (5 mg total) by mouth daily at bedtime as needed for sleep      Facility-Administered Medications: None     Discharge Medication List as of 5/14/2025  6:24 PM        CONTINUE these medications which have CHANGED    Details   gabapentin (NEURONTIN) 100 mg capsule Take 2 capsules (200 mg total) by mouth 3 (three) times a day, Starting Wed 5/14/2025, Normal      insulin lispro (HumaLOG) 100 units/mL injection pen Inject 10 Units under the skin 3 (three) times a day with meals, Starting Wed 5/14/2025, Normal      Lantus SoloStar 100 units/mL SOPN Inject 0.3 mL (30 Units total) under the skin daily at bedtime, Starting Wed  "5/14/2025, Normal           CONTINUE these medications which have NOT CHANGED    Details   albuterol (PROVENTIL HFA,VENTOLIN HFA) 90 mcg/act inhaler Inhale 2 puffs every 4 (four) hours as needed, Starting Sat 6/4/2022, Historical Med      Alcohol Swabs (Pharmacist Choice Alcohol) PADS 5 (five) times a day, Starting Mon 10/24/2022, Historical Med      ALPRAZolam (XANAX) 1 mg tablet TAKE 1 TABLET (1 MG TOTAL) BY MOUTH 4 (FOUR) TIMES A DAY AS NEEDED FOR ANXIETY, Starting Tue 4/29/2025, Normal      ammonium lactate (LAC-HYDRIN) 12 % lotion APPLY TOPICALLY 2 (TWO) TIMES A DAY AS NEEDED FOR DRY SKIN, Starting Mon 3/25/2024, Normal      atorvastatin (LIPITOR) 10 mg tablet Take 10 mg by mouth daily, Starting Tue 8/2/2022, Until Tue 1/21/2025, Historical Med      !! BD Pen Needle Nkechi 2nd Gen 32G X 4 MM MISC USE AS DIRECTED FOR BEFORE A MEAL AND AT BEDTIME GLUCOSE INJECTION, Historical Med      !! BD Pen Needle Nkechi 2nd Gen 32G X 4 MM MISC USE WITH INSULIN 5 TIMES A DAY, Normal      BD PrecisionGlide Needle 23G X 1-1/2\" MISC USE AS DIRECTED TO ADMINISTER NALOXONE INJECTION.  MAY DISPENSE 23-25 GAUGE 1-1.5\" NEEDLE., Historical Med      Blood Glucose Monitoring Suppl (ONE TOUCH ULTRA 2) w/Device KIT Use as directed, Starting Tue 5/3/2022, Historical Med      busPIRone (BUSPAR) 30 MG tablet TAKE 1 TABLET BY MOUTH TWICE A DAY, Starting Fri 5/9/2025, Normal      carbidopa-levodopa (SINEMET)  mg per tablet TAKE 1 TABLET BY MOUTH 3 (THREE) TIMES A DAY, Starting Fri 5/9/2025, Normal      Continuous Blood Gluc Transmit (Dexcom G6 Transmitter) MISC USE 4 TIMES A DAY (WITH MEALS AND AT BEDTIME), Normal      Continuous Glucose  (FreeStyle Lydia 3 [place]) ALEKS USE 1 EACH 1 (ONE) TIME FOR 1 DOSE, Historical Med      Continuous Glucose Sensor (FreeStyle Lydia 3 Sensor) MISC Use 1 each every 14 (fourteen) days, Starting Fri 1/31/2025, Normal      Dulaglutide (Trulicity) 1.5 MG/0.5ML SOAJ INJECT 1.5 MG UNDER THE SKIN EVERY 7 " DAYS., Normal      EPINEPHrine (EPIPEN) 0.3 mg/0.3 mL SOAJ Inject 0.3 mL (0.3 mg total) into a muscle once for 1 dose, Starting Mon 2/26/2024, Normal      famotidine (PEPCID) 20 mg tablet TAKE 1 TABLET (20 MG TOTAL) BY MOUTH 2 (TWO) TIMES A DAY AS NEEDED FOR INDIGESTION, Starting Wed 4/30/2025, Normal      fexofenadine (ALLEGRA) 180 MG tablet Take 1 tablet (180 mg total) by mouth daily, Starting Tue 10/1/2024, Normal      fluticasone (Flovent Diskus) 250 mcg/actuation diskus inhaler Inhale 1-2 puffs 2 (two) times a day Rinse mouth after use., Starting Thu 2/22/2024, Normal      hydrALAZINE (APRESOLINE) 50 mg tablet Take 50 mg by mouth 3 (three) times a day, Historical Med      Incontinence Supply Disposable (Depend Undergarment Ex Absorb) MISC Use 4 (four) times a day as needed (incontinence), Starting Tue 9/17/2024, Normal      Lancets (OneTouch Delica Plus [id number]) MISC INJECT UNDER THE SKIN 2 (TWO) TIMES A DAY, Normal      leflunomide (ARAVA) 20 MG tablet Take 1 tablet (20 mg total) by mouth daily, Starting Thu 2/22/2024, Normal      Lidocaine 4 % PTCH Apply 1 patch topically in the morning, Starting Tue 1/21/2025, Normal      lisinopril (ZESTRIL) 10 mg tablet Take 1 tablet (10 mg total) by mouth daily, Starting Thu 2/22/2024, Normal      lovastatin (MEVACOR) 10 MG tablet Take 10 mg by mouth, Historical Med      metoprolol succinate (TOPROL-XL) 100 mg 24 hr tablet Take 100 mg by mouth daily, Starting Tue 8/2/2022, Historical Med      montelukast (SINGULAIR) 10 mg tablet TAKE 1 TABLET (10 MG TOTAL) BY MOUTH DAILY AT BEDTIME, Starting Tue 2/4/2025, Normal      Myrbetriq 25 MG TB24 TAKE 25 MG BY MOUTH IN THE MORNING, Starting Fri 2/14/2025, Normal      naloxone (NARCAN) 0.4 mg/mL injection Historical Med      nicotine (Nicotine Step 1) 21 mg/24 hr TD 24 hr patch PLACE 1 PATCH ON THE SKIN OVER 24 HOURS EVERY 24 HOURS, Normal      Nutritional Supplements (Glucerna Hunger Smart Shake) LIQD Take 1 Can by mouth 3  (three) times a day, Starting Wed 2/5/2025, Print      OneTouch Ultra test strip TEST TWICE DAILY OR AS DIRECTED BY MD, Historical Med      Oral Electrolytes (PEDIALYTE SINGLES PO) Take 8 oz by mouth if needed, Starting Fri 4/5/2024, Historical Med      oxyCODONE (ROXICODONE) 5 immediate release tablet 10 mg pt reports she takes this 4 times per day but is out of the medication currently, Starting Fri 7/7/2023, Historical Med      !! pantoprazole (PROTONIX) 40 mg tablet Take 1 tablet (40 mg total) by mouth 2 (two) times a day, Starting Tue 4/9/2024, Normal      !! pantoprazole (PROTONIX) 40 mg tablet Take 1 tablet (40 mg total) by mouth 2 (two) times a day, Starting Tue 10/1/2024, Normal      potassium chloride (K-DUR,KLOR-CON) 20 mEq tablet Take 1 tablet (20 mEq total) by mouth daily, Starting Mon 8/22/2022, Normal      prazosin (MINIPRESS) 5 mg capsule TAKE 1 CAPSULE (5 MG TOTAL) BY MOUTH DAILY AT BEDTIME, Starting Fri 3/7/2025, Normal      Pyridoxine HCl (vitamin B-6) 25 MG tablet TAKE 1 TABLET (25 MG TOTAL) BY MOUTH DAILY, Starting Fri 5/2/2025, Normal      risperiDONE (RisperDAL) 3 mg tablet TAKE 1 TABLET (3 MG TOTAL) BY MOUTH 2 (TWO) TIMES A DAY, Starting Tue 4/1/2025, Normal      rivaroxaban (XARELTO) 20 mg tablet Take 20 mg by mouth, Starting Wed 3/2/2022, Historical Med      sertraline (ZOLOFT) 100 mg tablet TAKE 1 TABLET (100 MG TOTAL) BY MOUTH DAILY, Starting Sun 5/11/2025, Normal      Spacer/Aero-Holding Chambers (AeroChamber Plus Geoff-Vu w/Mask) MISC Inhale daily at bedtime as needed (wheezing), Starting Thu 2/22/2024, Normal      trospium chloride (SANCTURA) 20 mg tablet Take 1 tablet (20 mg total) by mouth 2 (two) times a day, Starting Thu 2/22/2024, Normal      zolpidem (AMBIEN) 5 mg tablet Take 1 tablet (5 mg total) by mouth daily at bedtime as needed for sleep, Starting Tue 3/25/2025, Normal      metFORMIN (GLUCOPHAGE-XR) 750 mg 24 hr tablet TAKE 1 TABLET (750 MG TOTAL) BY MOUTH 2 (TWO) TIMES A DAY  WITH MEALS., Historical Med       !! - Potential duplicate medications found. Please discuss with provider.        STOP taking these medications       methocarbamol (ROBAXIN) 500 mg tablet Comments:   Reason for Stopping:         ondansetron (ZOFRAN-ODT) 4 mg disintegrating tablet Comments:   Reason for Stopping:         Oral Electrolytes (Pedialyte) PACK Comments:   Reason for Stopping:         polyethylene glycol (GOLYTELY) 4000 mL solution Comments:   Reason for Stopping:         potassium chloride (MICRO-K) 10 MEQ CR capsule Comments:   Reason for Stopping:             No discharge procedures on file.  ED SEPSIS DOCUMENTATION   Time reflects when diagnosis was documented in both MDM as applicable and the Disposition within this note       Time User Action Codes Description Comment    5/12/2025  3:13 PM Brenda Casarez Add [R41.82] Altered mental status     5/14/2025  5:25 PM Kamron Acevedo Add [E11.649,  Z79.4] Type 2 diabetes mellitus with hypoglycemia without coma, with long-term current use of insulin ([facility])                  Brenda Casarez DO  05/20/25 140

## 2025-05-12 NOTE — ED ATTENDING ATTESTATION
5/12/2025  I, Polo Meyers DO, saw and evaluated the patient. I have discussed the patient with the resident/non-physician practitioner and agree with the resident's/non-physician practitioner's findings, Plan of Care, and MDM as documented in the resident's/non-physician practitioner's note, except where noted. All available labs and Radiology studies were reviewed.  I was present for key portions of any procedure(s) performed by the resident/non-physician practitioner and I was immediately available to provide assistance.       At this point I agree with the current assessment done in the Emergency Department.  I have conducted an independent evaluation of this patient a history and physical is as follows:    Patient is a 55-year-old female with a history of tobacco use, right elbow olecranon bursitis, chronic pain, insomnia, brought in by EMS.  EMS indicates they were called because the patient was seeing pain management today for the first time, and she seemed to be perseverating about her granddaughter, making statements that she would hurt anyone who threatened or tried to hurt her granddaughter and just seemed little bit disorganized in terms of her thinking, and since pain management did not know the patient they were concerned and recommended she go to the ED.    The patient herself says that she does have chronic pain, normally is on opiates but ran out of those 2 days ago.  Says that she remembers being at the chronic pain doctors, and remembers them calling the ambulance.  She says that she lives at home with her granddaughter and is concerned about her granddaughter, patient says she has no SI or HI, no thoughts of harming anyone else, says she feels okay.     Per review of records patient was seen April 23, 2025 by orthopedics for a closed nondisplaced fifth metacarpal fracture where she was immobilized    General:  Patient is well-appearing  Head:  Atraumatic  Eyes:  Conjunctiva pink, Extraocular  muscle intact, PERRL, not pinpoint  ENT:  Mucous membranes are moist  Neck:  Supple  Cardiac:  S1-S2, without murmurs  Lungs:  Clear to auscultation bilaterally  Abdomen:  Soft, nontender, normal bowel sounds, no CVA tenderness, no tympany, no rigidity, no guarding  Extremities:  Normal range of motion, patient has a splint over her right small and ring fingers.  Neurologic:  Awake, fluent speech,  AAOx3. Cranial nerves 2-12 are intact, strength is 5/5 in the bilateral upper & lower extremities, no slurred speech, no facial droop, no deficit on finger-to-nose testing, no pronator drift.  Sensation to light touch is equal and symmetric throughout the whole body  Skin:  Pink warm and dry, no rash  Psychiatric:  Alert, pleasant, cooperative          ED Course  ED Course as of 05/12/25 1337   Mon May 12, 2025   1321 ECG 12 lead  ECG interpreted by me, sinus rhythm, rate of 85, there is some nonspecific ST changes in aVL, normal axis, no acute change from January 27, 2025 except that ECG had PACs which are not present today     CT head wo contrast   Final Result      No acute intracranial abnormality.                  Workstation performed: [id number]           Labs Reviewed   CBC AND DIFFERENTIAL - Abnormal       Result Value Ref Range Status    WBC 10.64 (*) 4.31 - 10.16 Thousand/uL Final    RBC 4.59  3.81 - 5.12 Million/uL Final    Hemoglobin 13.5  11.5 - 15.4 g/dL Final    Hematocrit 41.3  34.8 - 46.1 % Final    MCV 90  82 - 98 fL Final    MCH 29.4  26.8 - 34.3 pg Final    MCHC 32.7  31.4 - 37.4 g/dL Final    RDW 14.8  11.6 - 15.1 % Final    MPV 12.4  8.9 - 12.7 fL Final    Platelets 272  149 - 390 Thousands/uL Final    nRBC 0  /100 WBCs Final    Segmented % 64  43 - 75 % Final    Immature Grans % 1  0 - 2 % Final    Lymphocytes % 25  14 - 44 % Final    Monocytes % 7  4 - 12 % Final    Eosinophils Relative 2  0 - 6 % Final    Basophils Relative 1  0 - 1 % Final    Absolute Neutrophils 6.96  1.85 - 7.62 Thousands/µL  Final    Absolute Immature Grans 0.05  0.00 - 0.20 Thousand/uL Final    Absolute Lymphocytes 2.64  0.60 - 4.47 Thousands/µL Final    Absolute Monocytes 0.74  0.17 - 1.22 Thousand/µL Final    Eosinophils Absolute 0.16  0.00 - 0.61 Thousand/µL Final    Basophils Absolute 0.09  0.00 - 0.10 Thousands/µL Final   COMPREHENSIVE METABOLIC PANEL - Abnormal    Sodium 135  135 - 147 mmol/L Final    Potassium 5.5 (*) 3.5 - 5.3 mmol/L Final    Chloride 102  96 - 108 mmol/L Final    CO2 24  21 - 32 mmol/L Final    ANION GAP 9  4 - 13 mmol/L Final    BUN 18  5 - 25 mg/dL Final    Creatinine 1.31 (*) 0.60 - 1.30 mg/dL Final    Comment: Standardized to IDMS reference method    Glucose 146 (*) 65 - 140 mg/dL Final    Comment: If the patient is fasting, the ADA then defines impaired fasting glucose as > 100 mg/dL and diabetes as > or equal to 123 mg/dL.    Calcium 9.2  8.4 - 10.2 mg/dL Final    AST 20  13 - 39 U/L Final    ALT 6 (*) 7 - 52 U/L Final    Comment: Specimen collection should occur prior to Sulfasalazine administration due to the potential for falsely depressed results.     Alkaline Phosphatase 81  34 - 104 U/L Final    Total Protein 6.9  6.4 - 8.4 g/dL Final    Albumin 4.1  3.5 - 5.0 g/dL Final    Total Bilirubin 0.29  0.20 - 1.00 mg/dL Final    Comment: Use of this assay is not recommended for patients undergoing treatment with eltrombopag due to the potential for falsely elevated results.  N-acetyl-p-benzoquinone imine (metabolite of Acetaminophen) will generate erroneously low results in samples for patients that have taken an overdose of Acetaminophen.    eGFR 45  ml/min/1.73sq m Final    Narrative:     National Kid[employer] Foundation guidelines for Chronic Kidney Disease (CKD):     Stage 1 with normal or high GFR (GFR > 90 mL/min/1.73 square meters)    Stage 2 Mild CKD (GFR = 60-89 mL/min/1.73 square meters)    Stage 3A Moderate CKD (GFR = 45-59 mL/min/1.73 square meters)    Stage 3B Moderate CKD (GFR = 30-44  mL/min/1.73 square meters)    Stage 4 Severe CKD (GFR = 15-29 mL/min/1.73 square meters)    Stage 5 End Stage CKD (GFR <15 mL/min/1.73 square meters)  Note: GFR calculation is accurate only with a steady state creatinine   ACETAMINOPHEN LEVEL - Abnormal    Acetaminophen Level 7 (*) 10 - 20 ug/mL Final   MEDICAL ALCOHOL - Normal    Ethanol Lvl <10  <10 mg/dL Final   SALICYLATE LEVEL - Normal    Salicylate Lvl 13  5 - 20 mg/dL Final   COMA PANEL    Narrative:     The following orders were created for panel order Coma Panel.  Procedure                               Abnormality         Status                     ---------                               -----------         ------                     Ethanol[[id number]]                      Normal              Final result               Salicylate level[[id number]]             Normal              Final result               Acetaminophen level-If c...[[id number]]  Abnormal            Final result                 Please view results for these tests on the individual orders.         At times the patient seems a little confused as far as why she was in the emergency department.  Daughter was spoken with who verifies the patient does live with the patient's granddaughter who is 16, the daughter says the patient will occasionally have times where she has waxing and waning mental status, has been felt to be due to medications in the past.  The daughter is able to arrange for someone to care for the 16-year-old granddaughter, agrees with recommendations the patient be admitted until her mental status improved.    Patient has no sign of life-threatening intracranial hemorrhage, no severe metabolic abnormality requiring emergent intervention or that would easily explain her confusion.  Her potassium is 5.5 but there is no ECG, not believe this requires treatment at this point.  Case discussed with admitting medicine physician    IMPRESSIONS:  Acute delirium, hyperkalemia, history of  chronic pain    MEDICAL DECISION MAKING CODING    COLLECTION AND INTERPRETATION OF DATA  I reviewed prior external notes, including outpatient orthopedic office visit as noted above, January 27, 2025 ECG    I ordered each unique test  Tests reviewed personally by me:  ECG: See my ED course  Labs: see above  Imaging: I independently interpreted the head CT and found no acute pathology.            Critical Care Time  Procedures

## 2025-05-13 LAB
ALBUMIN SERPL BCG-MCNC: 3.9 G/DL (ref 3.5–5)
ALP SERPL-CCNC: 75 U/L (ref 34–104)
ALT SERPL W P-5'-P-CCNC: 5 U/L (ref 7–52)
ANION GAP SERPL CALCULATED.3IONS-SCNC: 8 MMOL/L (ref 4–13)
AST SERPL W P-5'-P-CCNC: 21 U/L (ref 13–39)
BASOPHILS # BLD AUTO: 0.1 THOUSANDS/ÂΜL (ref 0–0.1)
BASOPHILS NFR BLD AUTO: 1 % (ref 0–1)
BILIRUB SERPL-MCNC: 0.31 MG/DL (ref 0.2–1)
BUN SERPL-MCNC: 12 MG/DL (ref 5–25)
CALCIUM SERPL-MCNC: 9 MG/DL (ref 8.4–10.2)
CHLORIDE SERPL-SCNC: 102 MMOL/L (ref 96–108)
CO2 SERPL-SCNC: 29 MMOL/L (ref 21–32)
CREAT SERPL-MCNC: 1.01 MG/DL (ref 0.6–1.3)
EOSINOPHIL # BLD AUTO: 0.27 THOUSAND/ÂΜL (ref 0–0.61)
EOSINOPHIL NFR BLD AUTO: 3 % (ref 0–6)
ERYTHROCYTE [DISTWIDTH] IN BLOOD BY AUTOMATED COUNT: 14.9 % (ref 11.6–15.1)
GFR SERPL CREATININE-BSD FRML MDRD: 61 ML/MIN/1.73SQ M
GLUCOSE SERPL-MCNC: 242 MG/DL (ref 65–140)
GLUCOSE SERPL-MCNC: 88 MG/DL (ref 65–140)
GLUCOSE SERPL-MCNC: 93 MG/DL (ref 65–140)
GLUCOSE SERPL-MCNC: 97 MG/DL (ref 65–140)
HCT VFR BLD AUTO: 41.3 % (ref 34.8–46.1)
HGB BLD-MCNC: 13.3 G/DL (ref 11.5–15.4)
IMM GRANULOCYTES # BLD AUTO: 0.02 THOUSAND/UL (ref 0–0.2)
IMM GRANULOCYTES NFR BLD AUTO: 0 % (ref 0–2)
LYMPHOCYTES # BLD AUTO: 3.56 THOUSANDS/ÂΜL (ref 0.6–4.47)
LYMPHOCYTES NFR BLD AUTO: 41 % (ref 14–44)
MAGNESIUM SERPL-MCNC: 1.4 MG/DL (ref 1.9–2.7)
MCH RBC QN AUTO: 29.2 PG (ref 26.8–34.3)
MCHC RBC AUTO-ENTMCNC: 32.2 G/DL (ref 31.4–37.4)
MCV RBC AUTO: 91 FL (ref 82–98)
MONOCYTES # BLD AUTO: 0.79 THOUSAND/ÂΜL (ref 0.17–1.22)
MONOCYTES NFR BLD AUTO: 9 % (ref 4–12)
NEUTROPHILS # BLD AUTO: 3.96 THOUSANDS/ÂΜL (ref 1.85–7.62)
NEUTS SEG NFR BLD AUTO: 46 % (ref 43–75)
NRBC BLD AUTO-RTO: 0 /100 WBCS
PLATELET # BLD AUTO: 271 THOUSANDS/UL (ref 149–390)
PMV BLD AUTO: 12.2 FL (ref 8.9–12.7)
POTASSIUM SERPL-SCNC: 3.5 MMOL/L (ref 3.5–5.3)
PROT SERPL-MCNC: 6.7 G/DL (ref 6.4–8.4)
RBC # BLD AUTO: 4.56 MILLION/UL (ref 3.81–5.12)
SODIUM SERPL-SCNC: 139 MMOL/L (ref 135–147)
WBC # BLD AUTO: 8.7 THOUSAND/UL (ref 4.31–10.16)

## 2025-05-13 PROCEDURE — 83735 ASSAY OF MAGNESIUM: CPT

## 2025-05-13 PROCEDURE — 99232 SBSQ HOSP IP/OBS MODERATE 35: CPT | Performed by: EMERGENCY MEDICINE

## 2025-05-13 PROCEDURE — 80053 COMPREHEN METABOLIC PANEL: CPT

## 2025-05-13 PROCEDURE — 82948 REAGENT STRIP/BLOOD GLUCOSE: CPT

## 2025-05-13 PROCEDURE — 85025 COMPLETE CBC W/AUTO DIFF WBC: CPT

## 2025-05-13 PROCEDURE — 99232 SBSQ HOSP IP/OBS MODERATE 35: CPT | Performed by: INTERNAL MEDICINE

## 2025-05-13 RX ORDER — NICOTINE 21 MG/24HR
1 PATCH, TRANSDERMAL 24 HOURS TRANSDERMAL DAILY
Status: DISCONTINUED | OUTPATIENT
Start: 2025-05-13 | End: 2025-05-13

## 2025-05-13 RX ORDER — HEPARIN SODIUM 5000 [USP'U]/ML
5000 INJECTION, SOLUTION INTRAVENOUS; SUBCUTANEOUS EVERY 8 HOURS SCHEDULED
Status: DISCONTINUED | OUTPATIENT
Start: 2025-05-13 | End: 2025-05-13

## 2025-05-13 RX ORDER — DIPHENHYDRAMINE HCL 25 MG
25 TABLET ORAL ONCE
Status: COMPLETED | OUTPATIENT
Start: 2025-05-13 | End: 2025-05-13

## 2025-05-13 RX ADMIN — HYDRALAZINE HYDROCHLORIDE 50 MG: 25 TABLET ORAL at 15:49

## 2025-05-13 RX ADMIN — INSULIN LISPRO 20 UNITS: 100 INJECTION, SOLUTION INTRAVENOUS; SUBCUTANEOUS at 17:44

## 2025-05-13 RX ADMIN — CARBIDOPA AND LEVODOPA 1 TABLET: 25; 100 TABLET ORAL at 21:01

## 2025-05-13 RX ADMIN — MONTELUKAST 10 MG: 10 TABLET, FILM COATED ORAL at 21:01

## 2025-05-13 RX ADMIN — RISPERIDONE 3 MG: 3 TABLET ORAL at 17:11

## 2025-05-13 RX ADMIN — BUPRENORPHINE 5 MCG: 5 PATCH TRANSDERMAL at 11:55

## 2025-05-13 RX ADMIN — PANTOPRAZOLE SODIUM 40 MG: 40 TABLET, DELAYED RELEASE ORAL at 17:12

## 2025-05-13 RX ADMIN — INSULIN GLARGINE 55 UNITS: 100 INJECTION, SOLUTION SUBCUTANEOUS at 21:01

## 2025-05-13 RX ADMIN — PANTOPRAZOLE SODIUM 40 MG: 40 TABLET, DELAYED RELEASE ORAL at 08:44

## 2025-05-13 RX ADMIN — CARBIDOPA AND LEVODOPA 1 TABLET: 25; 100 TABLET ORAL at 08:44

## 2025-05-13 RX ADMIN — OXYCODONE HYDROCHLORIDE 5 MG: 5 TABLET ORAL at 19:59

## 2025-05-13 RX ADMIN — CARBIDOPA AND LEVODOPA 1 TABLET: 25; 100 TABLET ORAL at 15:49

## 2025-05-13 RX ADMIN — OXYCODONE HYDROCHLORIDE 5 MG: 5 TABLET ORAL at 05:08

## 2025-05-13 RX ADMIN — INSULIN LISPRO 20 UNITS: 100 INJECTION, SOLUTION INTRAVENOUS; SUBCUTANEOUS at 12:00

## 2025-05-13 RX ADMIN — INSULIN GLARGINE 55 UNITS: 100 INJECTION, SOLUTION SUBCUTANEOUS at 08:55

## 2025-05-13 RX ADMIN — METOPROLOL SUCCINATE 100 MG: 100 TABLET, EXTENDED RELEASE ORAL at 08:44

## 2025-05-13 RX ADMIN — GABAPENTIN 300 MG: 300 CAPSULE ORAL at 08:44

## 2025-05-13 RX ADMIN — NICOTINE 1 PATCH: 21 PATCH, EXTENDED RELEASE TRANSDERMAL at 08:44

## 2025-05-13 RX ADMIN — LISINOPRIL 10 MG: 10 TABLET ORAL at 08:44

## 2025-05-13 RX ADMIN — GABAPENTIN 300 MG: 300 CAPSULE ORAL at 15:49

## 2025-05-13 RX ADMIN — BUSPIRONE HYDROCHLORIDE 30 MG: 10 TABLET ORAL at 08:44

## 2025-05-13 RX ADMIN — ATORVASTATIN CALCIUM 10 MG: 10 TABLET, FILM COATED ORAL at 08:44

## 2025-05-13 RX ADMIN — RIVAROXABAN 20 MG: 20 TABLET, FILM COATED ORAL at 08:44

## 2025-05-13 RX ADMIN — RISPERIDONE 3 MG: 3 TABLET ORAL at 08:44

## 2025-05-13 RX ADMIN — GABAPENTIN 300 MG: 300 CAPSULE ORAL at 21:01

## 2025-05-13 RX ADMIN — INSULIN LISPRO 2 UNITS: 100 INJECTION, SOLUTION INTRAVENOUS; SUBCUTANEOUS at 17:12

## 2025-05-13 RX ADMIN — SERTRALINE HYDROCHLORIDE 100 MG: 100 TABLET ORAL at 21:01

## 2025-05-13 RX ADMIN — HYDRALAZINE HYDROCHLORIDE 50 MG: 25 TABLET ORAL at 08:44

## 2025-05-13 RX ADMIN — DIPHENHYDRAMINE HCL 25 MG: 25 TABLET ORAL at 17:44

## 2025-05-13 RX ADMIN — INSULIN LISPRO 20 UNITS: 100 INJECTION, SOLUTION INTRAVENOUS; SUBCUTANEOUS at 08:50

## 2025-05-13 RX ADMIN — OXYCODONE HYDROCHLORIDE 5 MG: 5 TABLET ORAL at 15:49

## 2025-05-13 RX ADMIN — BUSPIRONE HYDROCHLORIDE 30 MG: 10 TABLET ORAL at 17:11

## 2025-05-13 RX ADMIN — HYDRALAZINE HYDROCHLORIDE 50 MG: 25 TABLET ORAL at 21:01

## 2025-05-13 NOTE — PLAN OF CARE
Problem: PAIN - ADULT  Goal: Verbalizes/displays adequate comfort level or baseline comfort level  Description: Interventions:- Encourage patient to monitor pain and request assistance- Assess pain using appropriate pain scale- Administer analgesics based on type and severity of pain and evaluate response- Implement non-pharmacological measures as appropriate and evaluate response- Consider cultural and social influences on pain and pain management- Notify physician/advanced practitioner if interventions unsuccessful or patient reports new pain  Outcome: Progressing     Problem: INFECTION - ADULT  Goal: Absence or prevention of progression during hospitalization  Description: INTERVENTIONS:- Assess and monitor for signs and symptoms of infection- Monitor lab/diagnostic results- Monitor all insertion sites, i.e. indwelling lines, tubes, and drains- Monitor endotracheal if appropriate and nasal secretions for changes in amount and color- [place] appropriate cooling/warming therapies per order- Administer medications as ordered- Instruct and encourage patient and family to use good hand hygiene technique- Identify and instruct in appropriate isolation precautions for identified infection/condition  Outcome: Progressing  Goal: Absence of fever/infection during neutropenic period  Description: INTERVENTIONS:- Monitor WBC  Outcome: Progressing     Problem: SAFETY ADULT  Goal: Patient will remain free of falls  Description: INTERVENTIONS:- Educate patient/family on patient safety including physical limitations- Instruct patient to call for assistance with activity - Consult OT/PT to assist with strengthening/mobility - Keep Call bell within reach- Keep bed low and locked with side rails adjusted as appropriate- Keep care items and personal belongings within reach- Initiate and maintain comfort rounds- Make Fall Risk Sign visible to staff- Offer Toileting every  Hours, in advance of need- Initiate/Maintain alarm- Obtain  necessary fall risk management equipment: - Apply yellow socks and bracelet for high fall risk patients- Consider moving patient to room near nurses station  Outcome: Progressing  Goal: Maintain or return to baseline ADL function  Description: INTERVENTIONS:-  Assess patient's ability to carry out ADLs; assess patient's baseline for ADL function and identify physical deficits which impact ability to perform ADLs (bathing, care of mouth/teeth, toileting, grooming, dressing, etc.)- Assess/evaluate cause of self-care deficits - Assess range of motion- Assess patient's mobility; develop plan if impaired- Assess patient's need for assistive devices and provide as appropriate- Encourage maximum independence but intervene and supervise when necessary- Involve family in performance of ADLs- Assess for home care needs following discharge - Consider OT consult to assist with ADL evaluation and planning for discharge- Provide patient education as appropriate  Outcome: Progressing  Goal: Maintains/Returns to pre admission functional level  Description: INTERVENTIONS:- Perform AM-PAC 6 Click Basic Mobility/ Daily Activity assessment daily.- Set and communicate daily mobility goal to care team and patient/family/caregiver. - Collaborate with rehabilitation services on mobility goals if consulted- Perform Range of Motion  times a day.- Reposition patient every  hours.- Dangle patient  times a day- Stand patient  times a day- Ambulate patient  times a day- Out of bed to chair  times a day - Out of bed for meals times a day- Out of bed for toileting- Record patient progress and toleration of activity level   Outcome: Progressing     Problem: DISCHARGE PLANNING  Goal: Discharge to home or other facility with appropriate resources  Description: INTERVENTIONS:- Identify barriers to discharge w/patient and caregiver- Arrange for needed discharge resources and transportation as appropriate- Identify discharge learning needs (meds, wound  care, etc.)- Arrange for interpretive services to assist at discharge as needed- Refer to Case Management Department for coordinating discharge planning if the patient needs post-hospital services based on physician/advanced practitioner order or complex needs related to functional status, cognitive ability, or social support system  Outcome: Progressing     Problem: Knowledge Deficit  Goal: Patient/family/caregiver demonstrates understanding of disease process, treatment plan, medications, and discharge instructions  Description: Complete learning assessment and assess knowledge base.Interventions:- Provide teaching at level of understanding- Provide teaching via preferred learning methods  Outcome: Progressing     Problem: SAFETY,RESTRAINT: NV/NON-SELF DESTRUCTIVE BEHAVIOR  Goal: Remains free of harm/injury (restraint for non violent/non self-detsructive behavior)  Description: INTERVENTIONS:- Instruct patient/family regarding restraint use - Assess and monitor physiologic and psychological status - Provide interventions and comfort measures to meet assessed patient needs - Identify and implement measures to help patient regain control- Assess readiness for release of restraint   Outcome: Progressing  Goal: Returns to optimal restraint-free functioning  Description: INTERVENTIONS:- Assess the patient's behavior and symptoms that indicate continued need for restraint- Identify and implement measures to help patient regain control- Assess readiness for release of restraint   Outcome: Progressing     Problem: Nutrition/Hydration-ADULT  Goal: Nutrient/Hydration intake appropriate for improving, restoring or maintaining nutritional needs  Description: Monitor and assess patient's nutrition/hydration status for malnutrition. Collaborate with interdisciplinary team and initiate plan and interventions as ordered.  Monitor patient's weight and dietary intake as ordered or per policy. Utilize nutrition screening tool and  intervene as necessary. Determine patient's food preferences and provide high-protein, high-caloric foods as appropriate. INTERVENTIONS:- Monitor oral intake, urinary output, labs, and treatment plans- Assess nutrition and hydration status and recommend course of action- Evaluate amount of meals eaten- Assist patient with eating if necessary - Allow adequate time for meals- Recommend/ encourage appropriate diets, oral nutritional supplements, and vitamin/mineral supplements- Order, calculate, and assess calorie counts as needed- Recommend, monitor, and adjust tube feedings and TPN/PPN based on assessed needs- Assess need for intravenous fluids- Provide specific nutrition/hydration education as appropriate- Include patient/family/caregiver in decisions related to nutrition  Outcome: Progressing     Problem: BEHAVIOR  Goal: Pt/Family maintain appropriate behavior and adhere to behavioral management agreement, if implemented  Description: INTERVENTIONS:- Assess the family dynamic - Encourage verbalization of thoughts and concerns in a socially appropriate manner- Assess patient/family's coping skills and non-compliant behavior (including use of illegal substances).- Utilize positive, consistent limit setting strategies supporting safety of patient, staff and others- Initiate consult with Case Management, Spiritual Care or other ancillary services as appropriate- If a patient's/visitor's behavior jeopardizes the safety of the patient, staff, or others, refer to organization procedure. - Notify Security of behavior or suspected illegal substances which indicate the need for search of the patient and/or belongings- Encourage participation in the decision making process about a behavioral management agreement; implement if patient meets criteria  Outcome: Progressing

## 2025-05-13 NOTE — QUICK NOTE
Pt became aggressive punching at staff and verbally threatening. Given presentation with AMS and some lethargy, this was a new symptom.  2mg IM haldol ordered and pt placed in restraints for the time being.     With current and previous concerns for polypharmacy I did an extensive chart review on her med list.  Initially, my biggest question was the sinemet.  It appears she was seen 8/29/24 at [facility] for evaluation of a tremor that involved both hands, L>R, and that occurs when she walks. She stated then that she was requiring extra time to stand from sitting.  She also told the clinic her brother had recently been diagnosed with Parkinson's disease.  Neurology started her on sinemet and recommended MRI brain, PT/OT and follow up with PCP and then again with neurology.  It does not appear that the MRI was ever obtained.      She has been admitted for AMS/polypharmacy on multiple occasions recently. Her last PCP follow up after her last hospitalization noted the possibility of polypharmacy being the cause of her bouts of AMS and was considering slowly reducing and adjusting her medication doses.      I think it relevant to note that if there is strong suspicion that this pt does have a diagnosis of Parkinson's disease, the labile mood swings, altered mentation and ambulatory dysfunction may be part of the disease progression.  It is difficult to discern if this is the case as there is little mention of Parkinson's after the initial Neuro Clinic note, as well as she seems to have been lost to follow up/non compliant with orders/imaging/appointments.

## 2025-05-13 NOTE — ASSESSMENT & PLAN NOTE
Lab Results   Component Value Date    EGFR 61 05/13/2025    EGFR 45 05/12/2025    EGFR 45 05/12/2025    CREATININE 1.01 05/13/2025    CREATININE 1.30 05/12/2025    CREATININE 1.31 (H) 05/12/2025   POA 1.3 baseline is seems to be around 1.2-1.8  Continue to monitor

## 2025-05-13 NOTE — ASSESSMENT & PLAN NOTE
Lab Results   Component Value Date    HGBA1C 9.4 (H) 02/05/2025       Recent Labs     05/12/25  1950 05/12/25 2043 05/13/25  0732 05/13/25  1125   POCGLU 180* 213* 97 93       Blood Sugar Average: Last 72 hrs:  (P) 148Patient appears to have uncontrolled type 2 diabetes    Patient takes Trulicity, glargine 5 units twice daily at lunch and bedtime  Sliding scale

## 2025-05-13 NOTE — UTILIZATION REVIEW
Initial Clinical Review    Admission: Date/Time/Statement:   Admission Orders (From admission, onward)       Ordered        05/12/25 1618  Place in Observation  Once            05/12/25 1514  INPATIENT ADMISSION  Once,   Status:  Canceled                          Orders Placed This Encounter   Procedures    Place in Observation     Standing Status:   Standing     Number of Occurrences:   1     Level of Care:   Med Surg [16]     ED Arrival Information       Expected   5/12/2025     Arrival   5/12/2025 12:31    Acuity   Urgent              Means of arrival   Ambulance    Escorted by   [facility]    Service   Hospitalist    Admission type   Emergency              Arrival complaint   -             Chief Complaint   Patient presents with    Altered Mental Status     Per EMS patient was at her pain management appointment and they called due to her having altered mental status. Pain management has no baseline due to this being first appointment. Patient looking for her granddaughter who is not present and was not present at appointment. Patient pupils pinpoint upon assessment. Is oriented to place and self.        Initial Presentation: 55 y.o. female to ED presents for Altered mental status, confused and disoriented. Pt c/o diffuse pain. Pt does have history of chronic pain and takes multitude of medications.   PMH for T2DM,   Admit to Observation Dx; Altered mental status  Plan; UDS. Med Adjustments; Hold Prazosin. Decrease alprazolam to Tid prn.      ED Treatment-Medication Administration from 05/12/2025 1231 to 05/12/2025 1721         Date/Time Order Dose Route Action     05/12/2025 1322 sodium chloride 0.9 % bolus 1,000 mL 1,000 mL Intravenous New Bag     05/12/2025 1702 carbidopa-levodopa (SINEMET)  mg per tablet 1 tablet 1 tablet Oral Given     05/12/2025 1702 hydrALAZINE (APRESOLINE) tablet 50 mg 50 mg Oral Given     05/12/2025 1703 insulin lispro (HumALOG/ADMELOG) 100 units/mL subcutaneous injection  1-5 Units 1 Units Subcutaneous Given            Scheduled Medications:  atorvastatin, 10 mg, Oral, Daily  busPIRone, 30 mg, Oral, BID  carbidopa-levodopa, 1 tablet, Oral, TID  gabapentin, 300 mg, Oral, TID  hydrALAZINE, 50 mg, Oral, TID  insulin glargine, 55 Units, Subcutaneous, BID  insulin lispro, 1-5 Units, Subcutaneous, TID AC  insulin lispro, 20 Units, Subcutaneous, TID With Meals  lisinopril, 10 mg, Oral, Daily  metoprolol succinate, 100 mg, Oral, Daily  montelukast, 10 mg, Oral, HS  nicotine, 1 patch, Transdermal, Daily  pantoprazole, 40 mg, Oral, BID  [Held by provider] prazosin, 5 mg, Oral, HS  risperiDONE, 3 mg, Oral, BID  rivaroxaban, 20 mg, Oral, Daily With Breakfast  sertraline, 100 mg, Oral, HS  sterile water, , ,   transdermal buprenorphine, 5 mcg, Transdermal, Q7 Days      Continuous IV Infusions:     PRN Meds:  albuterol, 2 puff, Inhalation, Q4H PRN  ALPRAZolam, 1 mg, Oral, TID PRN  famotidine, 20 mg, Oral, BID PRN  oxyCODONE, 5 mg, Oral, Q4H PRN  sterile water, , ,   zolpidem, 5 mg, Oral, HS PRN      ED Triage Vitals   Temperature Pulse Respirations Blood Pressure SpO2 Pain Score   05/12/25 1253 05/12/25 1235 05/12/25 1235 05/12/25 1235 05/12/25 1235 05/12/25 1803   98.3 °F (36.8 °C) 86 16 (!) 174/109 93 % 8     Weight (last 2 days)       None            Vital Signs (last 3 days)       Date/Time Temp Pulse Resp BP MAP (mmHg) SpO2 O2 Device Patient Position - Orthostatic VS [place] Coma Scale Score Pain    05/13/25 0901 -- -- -- -- -- -- -- -- 15 --    05/13/25 07:32:21 98.3 °F (36.8 °C) 87 -- 166/94 118 94 % -- -- -- --    05/13/25 0508 -- -- -- -- -- -- -- -- -- 9    05/12/25 22:11:08 98.4 °F (36.9 °C) 84 17 166/93 117 97 % -- -- -- --    05/12/25 22:08:17 98.4 °F (36.9 °C) 48 -- 166/93 117 100 % -- -- -- --    05/12/25 2029 -- -- -- -- -- -- None (Room air) -- 15 --    05/12/25 1803 -- -- -- -- -- -- -- -- -- 8    05/12/25 17:32:53 98.4 °F (36.9 °C) 80 20 169/94 119 95 % -- -- -- --    05/12/25  1730 -- -- -- -- -- 95 % None (Room air) -- 14 --    05/12/25 1702 -- -- -- 132/80 -- -- -- -- -- --    05/12/25 1700 -- 78 13 144/66 95 95 % None (Room air) Lying -- --    05/12/25 1600 -- 86 34 179/78 112 96 % None (Room air) Lying -- --    05/12/25 1500 -- 78 20 148/88 112 97 % None (Room air) Lying -- --    05/12/25 1400 -- 80 17 139/80 105 97 % None (Room air) Lying -- --    05/12/25 1300 -- 82 21 134/87 105 94 % -- -- -- --    05/12/25 1253 98.3 °F (36.8 °C) -- -- -- -- -- -- -- -- --    05/12/25 1243 -- -- -- -- -- -- -- -- 14 --    05/12/25 1235 -- 86 16 174/109 -- 93 % None (Room air) Lying -- --              Pertinent Labs/Diagnostic Test Results:   Radiology:  CT head wo contrast   Final Interpretation by E. Alec Schoenberger, MD (05/12 1433)      No acute intracranial abnormality.                  Workstation performed: [id number]           Cardiology:  ECG 12 lead   Final Result by Ar Brito MD (05/12 5239)   Age and gender specific ECG analysis    Normal sinus rhythm   ST & T wave abnormality, consider lateral ischemia   Abnormal ECG   When compared with ECG of 27-Jan-2025 13:26,   Premature atrial complexes are no longer Present   Confirmed by Ar Brito ([id number]) on 5/12/2025 12:56:42 PM        GI:  No orders to display           Results from last 7 days   Lab Units 05/13/25  0525 05/12/25  1315   WBC Thousand/uL 8.70 10.64*   HEMOGLOBIN g/dL 13.3 13.5   HEMATOCRIT % 41.3 41.3   PLATELETS Thousands/uL 271 272   TOTAL NEUT ABS Thousands/µL 3.96 6.96         Results from last 7 days   Lab Units 05/13/25  0525 05/12/25  1841 05/12/25  1315   SODIUM mmol/L 139 138 135   POTASSIUM mmol/L 3.5 4.9 5.5*   CHLORIDE mmol/L 102 105 102   CO2 mmol/L 29 25 24   ANION GAP mmol/L 8 8 9   BUN mg/dL 12 15 18   CREATININE mg/dL 1.01 1.30 1.31*   EGFR ml/min/1.73sq m 61 45 45   CALCIUM mg/dL 9.0 8.6 9.2   MAGNESIUM mg/dL 1.4*  --   --      Results from last 7 days   Lab Units 05/13/25  0525 05/12/25  1849  "05/12/25  1315   AST U/L 21 20 20   ALT U/L 5* 4* 6*   ALK PHOS U/L 75 82 81   TOTAL PROTEIN g/dL 6.7 5.6* 6.9   ALBUMIN g/dL 3.9 3.5 4.1   TOTAL BILIRUBIN mg/dL 0.31 0.26 0.29     Results from last 7 days   Lab Units 05/13/25  0732 05/12/25  2043 05/12/25  1950 05/12/25  1658   POC GLUCOSE mg/dl 97 213* 180* 157*     Results from last 7 days   Lab Units 05/13/25  0525 05/12/25  1841 05/12/25  1315   GLUCOSE RANDOM mg/dL 88 199* 146*             No results found for: \"BETA-HYDROXYBUTYRATE\"                                                                                                       Results from last 7 days   Lab Units 05/12/25  1841 05/12/25  1322   ETHANOL LVL mg/dL <10 <10   ACETAMINOPHEN LVL ug/mL <2* 7*   SALICYLATE LVL mg/dL 8 13                                   Past Medical History:   Diagnosis Date    Abnormal ECG     Abnormal Pap smear of cervix     Allergic     Anemia     Anxiety     Asthma     Atrial fibrillation ([facility])     Bursitis of left hip 10/28/2014    Chlamydia     Chronic kidney disease     Coronary artery disease     COVID-19 12/06/2020    Depression     Diabetes mellitus (HCC)     Fibromyalgia     GERD (gastroesophageal reflux disease)     Headache(784.0)     Heart disease     Heart murmur     History of transfusion     Hypertension     Inflammatory bowel disease     Migraines     Mixed simple and mucopurulent chronic bronchitis ([facility]) 01/08/2019    Myocardial infarction (HCC)     Neuromuscular disorder (HCC)     Opioid abuse, in remission (HCC)     Otitis media     Pneumonia     Scoliosis     Seizures (HCC)     Shingles     Sick sinus syndrome (HCC)     Stroke (HCC)     Urinary tract infection     Varicella     Visual impairment      Present on Admission:   Benign essential hypertension   CKD stage 3a, GFR 45-59 ml/min ([facility])   Coronary artery disease involving native coronary artery of native heart without angina pectoris   DM (diabetes mellitus), type 2 (HCC)   GERD (gastroesophageal " reflux disease)      Admitting Diagnosis: Altered mental status [R41.82]  Age/Sex: 55 y.o. female    Network Utilization Review Department  ATTENTION: Please call with any questions or concerns to [phone] and carefully listen to the prompts so that you are directed to the right person. All voicemails are confidential.   For Discharge needs, contact Care Management DC Support Team at [phone] opt. [phone]  Send all requests for admission clinical reviews, approved or denied determinations and any other requests to dedicated fax number below belonging to the campus where the patient is receiving treatment. List of dedicated fax numbers for the Facilities:  FACILITY NAME UR FAX NUMBER   ADMISSION DENIALS (Administrative/Medical Necessity) [phone]   DISCHARGE SUPPORT TEAM (NETWORK) [phone]   PARENT CHILD HEALTH (Maternity/NICU/Pediatrics) [phone]   [facility] [phone]   [facility] [phone]   [facility] [phone]   [facility] [phone]   [facility] [phone]   [facility] [phone]   [facility] [phone]   [facility] [phone]   [facility] [phone]   [facility] [phone]   [facility] [phone]   [facility] [phone]

## 2025-05-13 NOTE — ASSESSMENT & PLAN NOTE
Patient presenting for AMS which has been present for 3 months, denies acute ingestion  [facility]  Labs significant for K 5.5, creatinine 1.3, normal AST/ALT, salicylate 13, Tylenol 7  Home meds include Xanax, Ambien, sertraline, oxycodone, risperidone, Buspar and gabapentin all of which have CNS depressant symptoms. Gabapentin was increased to 800mg TID 3 months ago. Gabapentin is renally excreted, so therapeutic levels can be toxic in the setting of poor renal function 2/2 CKD.   Patient also at risk for serotonin syndrome with both buspar and zoloft on med list, no current signs of SS    Recommendations  Medical reconcillation  Stop gabapentin in the setting of poor GFR as it is renally excreted  Abrupt gabapentin cessation can lead to withdrawal, however, this can be managed with supportive care  Provided there are no additional medical concerns, the patient may be cleared from a toxicological standpoint by the primary provider as long the patient is asymptomatic, with normal mental status and vital signs, and otherwise normal exam. Please call medical  on call immediately to discuss any changes in clinical course or laboratory abnormalities.
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
## 2025-05-13 NOTE — ASSESSMENT & PLAN NOTE
Lab Results   Component Value Date    EGFR 61 05/13/2025    EGFR 45 05/12/2025    EGFR 45 05/12/2025    CREATININE 1.01 05/13/2025    CREATININE 1.30 05/12/2025    CREATININE 1.31 (H) 05/12/2025

## 2025-05-13 NOTE — ASSESSMENT & PLAN NOTE
Lab Results   Component Value Date    EGFR 45 05/12/2025    EGFR 45 05/12/2025    EGFR 50 (L) 03/11/2025    CREATININE 1.30 05/12/2025    CREATININE 1.31 (H) 05/12/2025    CREATININE 1.26 (H) 03/11/2025

## 2025-05-13 NOTE — ASSESSMENT & PLAN NOTE
Patient presenting for AMS which has been present for 3 months, denies acute ingestion  Slow to answer questions and tired appearing, but normal vitals and physical exam. No toxidromes.  Labs significant for K 5.5, creatinine 1.3, normal AST/ALT, salicylate 13, Tylenol 7  Home meds include Xanax, Ambien, sertraline, oxycodone, risperidone, Buspar and gabapentin all of which have CNS depressant symptoms. Gabapentin was increased to 800mg TID 3 months ago. Gabapentin is renally excreted, so therapeutic levels can be toxic in the setting of poor renal function 2/2 CKD.   Patient also at risk for serotonin syndrome with both buspar and zoloft on med list, no current signs of SS    Recommendations  Repeat CMP and coma panel to ensure AST/ALT, salicylate and tylenol levels do not increase  Med reconciliation for concerns for polypharmacy. Overall cessation of gabapentin in a patient with CKD  Abrupt cessation of gabapentin can lead to withdrawal

## 2025-05-13 NOTE — PROGRESS NOTES
Progress Note - Hospitalist   Name: Dahlia Kraus 55 y.o. female I MRN: [id number]  [facility] I Date of Admission: 5/12/2025   Date of Service: 5/13/2025 I Hospital Day: 1    Assessment & Plan  AMS (altered mental status)  Patient comes here for altered mental status and was noted to be drowsy.  Hemodynamically stable  Salicylate level 13, acetaminophen 7, alcohol negative  Will get a urine drug screen  CT head shows no acute intracranial abnormality  No focal signs and symptoms of infection  Reviewed meds  Patient takes alprazolam 1 mg 4 times daily as needed  Buprenorphine patch  Buspirone 30 mg twice daily  Gabapentin 800 3 times daily  Myrbetriq 5 mg daily  Risperidone 3 mg twice daily  Sertraline 100 mg once daily  Zolpidem 10 mg once daily  Carbidopa levodopa  3 times daily  Likely in the setting of polypharmacy  Patient appears oriented however response slowly on arrival patient was more lethargic according to ED  Plan:  Will get a geriatrics consult, for tomorrow  Will continue to monitor in the inpatient setting  Salicylate level was elevated at 13, ED did speak with toxicology for recommendations.  Was able to discuss with them and they think this is likely from polypharmacy as well.  They did note that patient was taking gabapentin 800 times a day.  Will cut this down to 300 mg 3 times daily  Will hold prazosin  Will decrease alprazolam to 3 times a day as needed  DM (diabetes mellitus), type 2 (HCC)  Lab Results   Component Value Date    HGBA1C 9.4 (H) 02/05/2025       Recent Labs     05/12/25  1950 05/12/25  2043 05/13/25  0732 05/13/25  1125   POCGLU 180* 213* 97 93       Blood Sugar Average: Last 72 hrs:  (P) 148Patient appears to have uncontrolled type 2 diabetes    Patient takes Trulicity, glargine 5 units twice daily at lunch and bedtime  Sliding scale      Benign essential hypertension  Metoprolol succinate 100 mg once daily    Coronary artery disease involving native  coronary artery of native heart without angina pectoris  Atorvastatin 10 mg once daily  Metoprolol succinate 100 mg once daily    GERD (gastroesophageal reflux disease)  Pantoprazole 40 mg twice daily  CKD stage 3a, GFR 45-59 ml/min ([facility])  Lab Results   Component Value Date    EGFR 61 05/13/2025    EGFR 45 05/12/2025    EGFR 45 05/12/2025    CREATININE 1.01 05/13/2025    CREATININE 1.30 05/12/2025    CREATININE 1.31 (H) 05/12/2025   POA 1.3 baseline is seems to be around 1.2-1.8  Continue to monitor    Hyperkalemia      VTE Pharmacologic Prophylaxis: VTE Score: 6 Moderate Risk (Score 3-4) - Pharmacological DVT Prophylaxis Ordered: rivaroxaban (Xarelto).    Mobility:   -HLM Achieved: 2: Bed activities/Dependent transfer  -HLM Goal achieved. Continue to encourage appropriate mobility.    Patient Centered Rounds: I performed bedside rounds with nursing staff today.   Discussions with Specialists or Other Care Team Provider:     Education and Discussions with Family / Patient: Updated  (daughter) via phone.    Current Length of Stay: 1 day(s)  Current Patient Status: Observation   Certification Statement: The patient will continue to require additional inpatient hospital stay due to med adjustments  Discharge Plan: Anticipate discharge in 24-48 hrs to discharge location to be determined pending rehab evaluations.    Code Status: Level 1 - Full Code    Subjective   Patient denies any acute complaints, alert and oriented x3    Objective :  Temp:  [98.3 °F (36.8 °C)-99.6 °F (37.6 °C)] 99.6 °F (37.6 °C)  HR:  [48-90] 90  BP: (123-179)/(66-94) 123/79  Resp:  [13-34] 16  SpO2:  [94 %-100 %] 95 %  O2 Device: None (Room air)    There is no height or weight on file to calculate BMI.     Input and Output Summary (last 24 hours):     Intake/Output Summary (Last 24 hours) at 5/13/2025 1527  Last data filed at 5/13/2025 1244  Gross per 24 hour   Intake 1680 ml   Output --   Net 1680 ml       Physical Exam  Vitals  and nursing note reviewed.   Constitutional:       General: She is not in acute distress.     Appearance: She is well-developed. She is not toxic-appearing or diaphoretic.   HENT:      Head: Normocephalic and atraumatic.   Eyes:      General: No scleral icterus.     Conjunctiva/sclera: Conjunctivae normal.   Cardiovascular:      Rate and Rhythm: Normal rate and regular rhythm.      Heart sounds: No murmur heard.     No friction rub. No gallop.   Pulmonary:      Effort: Pulmonary effort is normal. No respiratory distress.      Breath sounds: Normal breath sounds. No stridor. No wheezing, rhonchi or rales.   Chest:      Chest wall: No tenderness.   Abdominal:      General: There is no distension.      Palpations: Abdomen is soft. There is no mass.      Tenderness: There is no abdominal tenderness. There is no guarding or rebound.      Hernia: No hernia is present.   Musculoskeletal:         General: No swelling or tenderness.      Cervical back: Neck supple.   Skin:     General: Skin is warm and dry.      Capillary Refill: Capillary refill takes less than 2 seconds.      Coloration: Skin is not jaundiced or pale.   Neurological:      Mental Status: She is alert and oriented to person, place, and time.   Psychiatric:         Mood and Affect: Mood normal.           Lines/Drains:              Lab Results: I have reviewed the following results:   Results from last 7 days   Lab Units 05/13/25  0525   WBC Thousand/uL 8.70   HEMOGLOBIN g/dL 13.3   HEMATOCRIT % 41.3   PLATELETS Thousands/uL 271   SEGS PCT % 46   LYMPHO PCT % 41   MONO PCT % 9   EOS PCT % 3     Results from last 7 days   Lab Units 05/13/25  0525   SODIUM mmol/L 139   POTASSIUM mmol/L 3.5   CHLORIDE mmol/L 102   CO2 mmol/L 29   BUN mg/dL 12   CREATININE mg/dL 1.01   ANION GAP mmol/L 8   CALCIUM mg/dL 9.0   ALBUMIN g/dL 3.9   TOTAL BILIRUBIN mg/dL 0.31   ALK PHOS U/L 75   ALT U/L 5*   AST U/L 21   GLUCOSE RANDOM mg/dL 88         Results from last 7 days   Lab  Units 05/13/25  1125 05/13/25  0732 05/12/25  2043 05/12/25  1950 05/12/25  1658   POC GLUCOSE mg/dl 93 97 213* 180* 157*               Recent Cultures (last 7 days):         Imaging Results Review: No pertinent imaging studies reviewed.  Other Study Results Review: No additional pertinent studies reviewed.    Last 24 Hours Medication List:     Current Facility-Administered Medications:     albuterol (PROVENTIL HFA,VENTOLIN HFA) inhaler 2 puff, Q4H PRN    ALPRAZolam (XANAX) tablet 1 mg, TID PRN    atorvastatin (LIPITOR) tablet 10 mg, Daily    busPIRone (BUSPAR) tablet 30 mg, BID    carbidopa-levodopa (SINEMET)  mg per tablet 1 tablet, TID    famotidine (PEPCID) tablet 20 mg, BID PRN    gabapentin (NEURONTIN) capsule 300 mg, TID    hydrALAZINE (APRESOLINE) tablet 50 mg, TID    insulin glargine (LANTUS) subcutaneous injection 55 Units 0.55 mL, BID    insulin lispro (HumALOG/ADMELOG) 100 units/mL subcutaneous injection 1-5 Units, TID AC **AND** Fingerstick Glucose (POCT), TID AC    insulin lispro (HumALOG/ADMELOG) 100 units/mL subcutaneous injection 20 Units, TID With Meals    lisinopril (ZESTRIL) tablet 10 mg, Daily    metoprolol succinate (TOPROL-XL) 24 hr tablet 100 mg, Daily    montelukast (SINGULAIR) tablet 10 mg, HS    nicotine (NICODERM CQ) 21 mg/24 hr TD 24 hr patch 1 patch, Daily    oxyCODONE (ROXICODONE) IR tablet 5 mg, Q4H PRN    pantoprazole (PROTONIX) EC tablet 40 mg, BID    [Held by provider] prazosin (MINIPRESS) capsule 5 mg, HS    risperiDONE (RisperDAL) tablet 3 mg, BID    rivaroxaban (XARELTO) tablet 20 mg, Daily With Breakfast    sertraline (ZOLOFT) tablet 100 mg, HS    transdermal buprenorphine (BUTRANS) 5 mcg/hr TD patch 5 mcg, Q7 Days    zolpidem (AMBIEN) tablet 5 mg, HS PRN    Administrative Statements   Today, Patient Was Seen By: Kamron Acevedo DO      **Please Note: This note may have been constructed using a voice recognition system.**

## 2025-05-14 VITALS
HEART RATE: 52 BPM | OXYGEN SATURATION: 94 % | DIASTOLIC BLOOD PRESSURE: 80 MMHG | RESPIRATION RATE: 17 BRPM | TEMPERATURE: 98.5 F | SYSTOLIC BLOOD PRESSURE: 158 MMHG

## 2025-05-14 LAB
ANION GAP SERPL CALCULATED.3IONS-SCNC: 11 MMOL/L (ref 4–13)
BASOPHILS # BLD AUTO: 0.08 THOUSANDS/ÂΜL (ref 0–0.1)
BASOPHILS NFR BLD AUTO: 1 % (ref 0–1)
BUN SERPL-MCNC: 14 MG/DL (ref 5–25)
CALCIUM SERPL-MCNC: 8.9 MG/DL (ref 8.4–10.2)
CHLORIDE SERPL-SCNC: 99 MMOL/L (ref 96–108)
CO2 SERPL-SCNC: 26 MMOL/L (ref 21–32)
CREAT SERPL-MCNC: 1.06 MG/DL (ref 0.6–1.3)
EOSINOPHIL # BLD AUTO: 0.34 THOUSAND/ÂΜL (ref 0–0.61)
EOSINOPHIL NFR BLD AUTO: 3 % (ref 0–6)
ERYTHROCYTE [DISTWIDTH] IN BLOOD BY AUTOMATED COUNT: 15 % (ref 11.6–15.1)
GFR SERPL CREATININE-BSD FRML MDRD: 58 ML/MIN/1.73SQ M
GLUCOSE P FAST SERPL-MCNC: 70 MG/DL (ref 65–99)
GLUCOSE SERPL-MCNC: 127 MG/DL (ref 65–140)
GLUCOSE SERPL-MCNC: 138 MG/DL (ref 65–140)
GLUCOSE SERPL-MCNC: 147 MG/DL (ref 65–140)
GLUCOSE SERPL-MCNC: 220 MG/DL (ref 65–140)
GLUCOSE SERPL-MCNC: 70 MG/DL (ref 65–140)
GLUCOSE SERPL-MCNC: 90 MG/DL (ref 65–140)
HCT VFR BLD AUTO: 42.1 % (ref 34.8–46.1)
HGB BLD-MCNC: 13.5 G/DL (ref 11.5–15.4)
IMM GRANULOCYTES # BLD AUTO: 0.03 THOUSAND/UL (ref 0–0.2)
IMM GRANULOCYTES NFR BLD AUTO: 0 % (ref 0–2)
LYMPHOCYTES # BLD AUTO: 4.49 THOUSANDS/ÂΜL (ref 0.6–4.47)
LYMPHOCYTES NFR BLD AUTO: 45 % (ref 14–44)
MCH RBC QN AUTO: 29.1 PG (ref 26.8–34.3)
MCHC RBC AUTO-ENTMCNC: 32.1 G/DL (ref 31.4–37.4)
MCV RBC AUTO: 91 FL (ref 82–98)
MONOCYTES # BLD AUTO: 1.03 THOUSAND/ÂΜL (ref 0.17–1.22)
MONOCYTES NFR BLD AUTO: 10 % (ref 4–12)
NEUTROPHILS # BLD AUTO: 4.21 THOUSANDS/ÂΜL (ref 1.85–7.62)
NEUTS SEG NFR BLD AUTO: 41 % (ref 43–75)
NRBC BLD AUTO-RTO: 0 /100 WBCS
PLATELET # BLD AUTO: 258 THOUSANDS/UL (ref 149–390)
PMV BLD AUTO: 11.4 FL (ref 8.9–12.7)
POTASSIUM SERPL-SCNC: 3.7 MMOL/L (ref 3.5–5.3)
RBC # BLD AUTO: 4.64 MILLION/UL (ref 3.81–5.12)
SODIUM SERPL-SCNC: 136 MMOL/L (ref 135–147)
WBC # BLD AUTO: 10.18 THOUSAND/UL (ref 4.31–10.16)

## 2025-05-14 PROCEDURE — 99239 HOSP IP/OBS DSCHRG MGMT >30: CPT | Performed by: INTERNAL MEDICINE

## 2025-05-14 PROCEDURE — 85025 COMPLETE CBC W/AUTO DIFF WBC: CPT | Performed by: INTERNAL MEDICINE

## 2025-05-14 PROCEDURE — 80048 BASIC METABOLIC PNL TOTAL CA: CPT | Performed by: INTERNAL MEDICINE

## 2025-05-14 PROCEDURE — 82948 REAGENT STRIP/BLOOD GLUCOSE: CPT

## 2025-05-14 RX ORDER — INSULIN LISPRO 100 [IU]/ML
10 INJECTION, SOLUTION INTRAVENOUS; SUBCUTANEOUS
Qty: 15 ML | Refills: 0 | Status: SHIPPED | OUTPATIENT
Start: 2025-05-14

## 2025-05-14 RX ORDER — GABAPENTIN 100 MG/1
200 CAPSULE ORAL 3 TIMES DAILY
Qty: 180 CAPSULE | Refills: 1 | Status: SHIPPED | OUTPATIENT
Start: 2025-05-14

## 2025-05-14 RX ORDER — DEXTROSE MONOHYDRATE 25 G/50ML
25 INJECTION, SOLUTION INTRAVENOUS ONCE
Status: DISCONTINUED | OUTPATIENT
Start: 2025-05-14 | End: 2025-05-14 | Stop reason: HOSPADM

## 2025-05-14 RX ORDER — ZOLPIDEM TARTRATE 10 MG/1
10 TABLET ORAL DAILY
Qty: 30 TABLET | Refills: 0 | Status: CANCELLED | OUTPATIENT
Start: 2025-05-14

## 2025-05-14 RX ORDER — BUSPIRONE HYDROCHLORIDE 30 MG/1
30 TABLET ORAL 2 TIMES DAILY
Qty: 60 TABLET | Refills: 5 | Status: CANCELLED | OUTPATIENT
Start: 2025-05-14

## 2025-05-14 RX ORDER — PRAZOSIN HYDROCHLORIDE 5 MG/1
5 CAPSULE ORAL
Qty: 90 CAPSULE | Refills: 1 | Status: CANCELLED | OUTPATIENT
Start: 2025-05-14

## 2025-05-14 RX ORDER — INSULIN GLARGINE 100 [IU]/ML
30 INJECTION, SOLUTION SUBCUTANEOUS
Qty: 105 ML | Refills: 0 | Status: SHIPPED | OUTPATIENT
Start: 2025-05-14

## 2025-05-14 RX ORDER — INSULIN LISPRO 100 [IU]/ML
10 INJECTION, SOLUTION INTRAVENOUS; SUBCUTANEOUS
Status: DISCONTINUED | OUTPATIENT
Start: 2025-05-14 | End: 2025-05-14 | Stop reason: HOSPADM

## 2025-05-14 RX ORDER — MAGNESIUM SULFATE HEPTAHYDRATE 40 MG/ML
2 INJECTION, SOLUTION INTRAVENOUS ONCE
Status: COMPLETED | OUTPATIENT
Start: 2025-05-14 | End: 2025-05-14

## 2025-05-14 RX ORDER — RISPERIDONE 3 MG/1
3 TABLET ORAL 2 TIMES DAILY
Qty: 180 TABLET | Refills: 0 | Status: CANCELLED | OUTPATIENT
Start: 2025-05-14

## 2025-05-14 RX ORDER — ZOLPIDEM TARTRATE 5 MG/1
5 TABLET ORAL
Qty: 30 TABLET | Refills: 0 | Status: CANCELLED | OUTPATIENT
Start: 2025-05-14

## 2025-05-14 RX ORDER — SERTRALINE HYDROCHLORIDE 100 MG/1
100 TABLET, FILM COATED ORAL DAILY
Qty: 90 TABLET | Refills: 1 | Status: CANCELLED | OUTPATIENT
Start: 2025-05-14

## 2025-05-14 RX ADMIN — GABAPENTIN 300 MG: 300 CAPSULE ORAL at 17:56

## 2025-05-14 RX ADMIN — MAGNESIUM SULFATE HEPTAHYDRATE 2 G: 40 INJECTION, SOLUTION INTRAVENOUS at 12:38

## 2025-05-14 RX ADMIN — RISPERIDONE 3 MG: 3 TABLET ORAL at 08:57

## 2025-05-14 RX ADMIN — INSULIN GLARGINE 55 UNITS: 100 INJECTION, SOLUTION SUBCUTANEOUS at 08:56

## 2025-05-14 RX ADMIN — RISPERIDONE 3 MG: 3 TABLET ORAL at 17:56

## 2025-05-14 RX ADMIN — HYDRALAZINE HYDROCHLORIDE 50 MG: 25 TABLET ORAL at 08:57

## 2025-05-14 RX ADMIN — INSULIN LISPRO 2 UNITS: 100 INJECTION, SOLUTION INTRAVENOUS; SUBCUTANEOUS at 09:01

## 2025-05-14 RX ADMIN — INSULIN LISPRO 20 UNITS: 100 INJECTION, SOLUTION INTRAVENOUS; SUBCUTANEOUS at 09:02

## 2025-05-14 RX ADMIN — ATORVASTATIN CALCIUM 10 MG: 10 TABLET, FILM COATED ORAL at 08:57

## 2025-05-14 RX ADMIN — GABAPENTIN 300 MG: 300 CAPSULE ORAL at 08:56

## 2025-05-14 RX ADMIN — NICOTINE 1 PATCH: 21 PATCH, EXTENDED RELEASE TRANSDERMAL at 08:58

## 2025-05-14 RX ADMIN — CARBIDOPA AND LEVODOPA 1 TABLET: 25; 100 TABLET ORAL at 17:56

## 2025-05-14 RX ADMIN — LISINOPRIL 10 MG: 10 TABLET ORAL at 08:57

## 2025-05-14 RX ADMIN — OXYCODONE HYDROCHLORIDE 5 MG: 5 TABLET ORAL at 14:14

## 2025-05-14 RX ADMIN — OXYCODONE HYDROCHLORIDE 5 MG: 5 TABLET ORAL at 04:52

## 2025-05-14 RX ADMIN — CARBIDOPA AND LEVODOPA 1 TABLET: 25; 100 TABLET ORAL at 08:56

## 2025-05-14 RX ADMIN — PANTOPRAZOLE SODIUM 40 MG: 40 TABLET, DELAYED RELEASE ORAL at 08:56

## 2025-05-14 RX ADMIN — HYDRALAZINE HYDROCHLORIDE 50 MG: 25 TABLET ORAL at 17:56

## 2025-05-14 RX ADMIN — RIVAROXABAN 20 MG: 20 TABLET, FILM COATED ORAL at 08:58

## 2025-05-14 RX ADMIN — OXYCODONE HYDROCHLORIDE 5 MG: 5 TABLET ORAL at 08:56

## 2025-05-14 RX ADMIN — BUSPIRONE HYDROCHLORIDE 30 MG: 10 TABLET ORAL at 08:56

## 2025-05-14 RX ADMIN — PANTOPRAZOLE SODIUM 40 MG: 40 TABLET, DELAYED RELEASE ORAL at 17:56

## 2025-05-14 RX ADMIN — BUSPIRONE HYDROCHLORIDE 30 MG: 10 TABLET ORAL at 17:56

## 2025-05-14 RX ADMIN — METOPROLOL SUCCINATE 100 MG: 100 TABLET, EXTENDED RELEASE ORAL at 08:57

## 2025-05-14 RX ADMIN — OXYCODONE HYDROCHLORIDE 5 MG: 5 TABLET ORAL at 18:20

## 2025-05-14 NOTE — ASSESSMENT & PLAN NOTE
Lab Results   Component Value Date    EGFR 58 05/14/2025    EGFR 61 05/13/2025    EGFR 45 05/12/2025    CREATININE 1.06 05/14/2025    CREATININE 1.01 05/13/2025    CREATININE 1.30 05/12/2025   POA 1.3 baseline is seems to be around 1.2-1.8  Continue to monitor

## 2025-05-14 NOTE — ASSESSMENT & PLAN NOTE
Lab Results   Component Value Date    HGBA1C 9.4 (H) 02/05/2025       Recent Labs     05/14/25  1115 05/14/25  1236 05/14/25  1335 05/14/25  1751   POCGLU 138 90 127 147*       Blood Sugar Average: Last 72 hrs:  (P) [id number] appears to have uncontrolled type 2 diabetes    Patient takes Trulicity, glargine 5 units twice daily at lunch and bedtime  Sliding scale

## 2025-05-14 NOTE — CASE MANAGEMENT
Case Management Assessment & Discharge Planning Note    Patient name Dahlia rKaus  Location [facility] MRN [id number]  : 1970 Date 2025       Current Admission Date: 2025  Current Admission Diagnosis:AMS (altered mental status)   Patient Active Problem List    Diagnosis Date Noted    AMS (altered mental status) 2025    Hyperkalemia 2025    Thyroid nodule 2024    CKD stage 3a, GFR 45-59 ml/min ([facility]) 10/15/2024    Tremor 2024    Peripheral neuropathy 2024    Mass of hip region, right 2023    Vaginal atrophy 2023    XU (obstructive sleep apnea)     Chronic pain disorder 2022    Coronary artery disease involving native coronary artery of native heart without angina pectoris 2022    Intermittent explosive disorder 2022    DM (diabetes mellitus), type 2 ([facility]) 2022    PTSD (post-traumatic stress disorder) 2022    Anxiety 2022    Atrial flutter ([facility]) 2022    Bipolar affective ([facility]) 2022    Diabetic neuropathy associated with type 2 diabetes mellitus ([facility]) 2022    IBS (irritable bowel syndrome) 2022    Mixed hyperlipidemia 2022    Migraine 2022    Paroxysmal A-fib ([facility]) 2022    RA (rheumatoid arthritis) ([facility]) 2022    Sick sinus syndrome ([facility]) 2022    Financial difficulties 2022    Asthma 2022    Cardiac conduction disorder 2022    Cerebrovascular accident (CVA) ([facility]) 2022    Left carpal tunnel syndrome 2022    Chronic fatigue 2021    Osteoarthritis of spine with radiculopathy, lumbar region 2021    Lumbar disc disease with radiculopathy 2021    Chronic, continuous use of opioids 2021    Chronic idiopathic urticaria 2021    Chronic bronchitis (HCC) 2021    Current every day smoker 2021    Overactive bladder 04/15/2021    Treatment-emergent central sleep apnea 2019    Dry eye  syndrome, bilateral 09/12/2018    Bilateral fibrocystic breast disease (FCBD) 09/06/2017    Mixed stress and urge urinary incontinence 09/06/2017    Seasonal allergic rhinitis due to pollen 09/06/2017    Psychophysiological insomnia 08/11/2017    Primary osteoarthritis involving multiple joints 07/20/2016    Hemochromatosis 06/27/2016    Chronic bacterial endocarditis 02/03/2016    Seizure (HCC) 04/01/2015    Seborrhea 03/11/2015    GERD (gastroesophageal reflux disease) 11/12/2014    Lactose intolerance 11/12/2014    Cervical spondylosis 10/28/2014    Discogenic low back pain 10/28/2014    Facet syndrome, lumbar 10/28/2014    Lumbar degenerative disc disease 10/28/2014    Myofascial pain syndrome 10/28/2014    Benign essential hypertension 09/18/2014    Numbness and tingling of both lower extremities 09/18/2014      LOS (days): 1  Geometric Mean LOS (GMLOS) (days):   Days to GMLOS:     OBJECTIVE:    Risk of Unplanned Readmission Score: 24.36     Current admission status: Observation       Preferred Pharmacy:   [facility] [place] - [address]  [address]  [facility] [place] [address]  Phone: [phone] Fax: [phone]    [employer] [facility]  [address] [facility] [address]  Phone: [phone] Fax: [phone]    Primary Care Provider: Ravi Chowdhury MD    Primary Insurance: [facility]  Secondary Insurance:     ASSESSMENT:  Active Health Care Proxies       [facility] Representative - Daughter   Primary Phone: [phone] (Mobile)                 Readmission Root Cause  30 Day Readmission: No    Patient Information  Admitted from:: Home  Mental Status: Alert  During Assessment patient was accompanied by: Not accompanied during assessment  Assessment information provided by:: Patient  Primary Caregiver: Self  Support Systems: Self, Children, Family members, Friend  County of Residence: [facility]  do you live in?: [place]  Home entry access options. Select all that apply.: Stairs  Number of steps to enter home.: One Flight  Do the steps have railings?: Yes  Type of Current Residence: Apartment  Floor Level: 3  Upon entering residence, is there a bedroom on the main floor (no further steps)?: Yes  Upon entering residence, is there a bathroom on the main floor (no further steps)?: Yes  Living Arrangements: Lives w/ Family members  Is patient a ?: No    Activities of Daily Living Prior to Admission  Functional Status: Independent  Completes ADLs independently?: Yes  Ambulates independently?: Yes  Does patient use assisted devices?: Yes  Assisted Devices (DME) used: Rollator, Bedside Commode, Other (Comment) (Toilet bars)  Does patient currently own DME?: Yes  Does patient have a history of Outpatient Therapy (PT/OT)?: No  Does the patient have a history of Short-Term Rehab?: No  Does patient currently have HHC?: No    Patient Information Continued  Income Source: SSI/SSD  Does patient have prescription coverage?: Yes  Can the patient afford their medications and any related supplies (such as glucometers or test strips)?: Yes  Does patient receive dialysis treatments?: No  Does patient have a history of substance abuse?: No  Does patient have a history of Mental Health Diagnosis?: Yes  Is patient receiving treatment for mental health?: Yes  Has patient received inpatient treatment related to mental health in the last 2 years?: No    Means of Transportation  Means of Transport to Appts:: Moe Ramirez  Social Determinants of Health (SDOH)      Flowsheet Row Most Recent Value   Housing Stability    In the last 12 months, was there a time when you were not able to pay the mortgage or rent on time? N   At any time in the past 12 months, were you homeless or living in a shelter (including now)? N   Transportation Needs    In the past 12 months, has lack of transportation kept you from medical appointments or from  getting medications? no   In the past 12 months, has lack of transportation kept you from meetings, work, or from getting things needed for daily living? No   Food Insecurity    Within the past 12 months, you worried that your food would run out before you got the money to buy more. Never true   Within the past 12 months, the food you bought just didn't last and you didn't have money to get more. Never true   Utilities    In the past 12 months has the electric, gas, oil, or water company threatened to shut off services in your home? No            DISCHARGE DETAILS:    Discharge planning discussed with:: Patient  Freedom of Choice: Yes  Comments - Freedom of Choice: Discussed FOC  CM contacted family/caregiver?: No- see comments  CM reviewed d/c planning process including the following: identifying help at home, patient preference for d/c planning needs, Discharge Lounge, [facility] Meds to Bed program, availability of treatment team to discuss questions or concerns patient and/or family may have regarding understanding medications and recognizing signs and symptoms once discharged.  CM also encouraged patient to follow up with all recommended appointments after discharge. Patient advised of importance for patient and family to participate in managing patient’s medical well being.     This CM introduced self and role to pt at bedside. Pt lives with grand daughter in an apartment,3rd level, [address]. Pt reports she is independent at baseline. Pt owns Rollator, BSC and walker. Pt is transported by family, friends, and [facility]. Pt receives social security benefits.

## 2025-05-14 NOTE — PLAN OF CARE
Problem: PAIN - ADULT  Goal: Verbalizes/displays adequate comfort level or baseline comfort level  Description: Interventions:- Encourage patient to monitor pain and request assistance- Assess pain using appropriate pain scale- Administer analgesics based on type and severity of pain and evaluate response- Implement non-pharmacological measures as appropriate and evaluate response- Consider cultural and social influences on pain and pain management- Notify physician/advanced practitioner if interventions unsuccessful or patient reports new pain  Outcome: Progressing     Problem: INFECTION - ADULT  Goal: Absence or prevention of progression during hospitalization  Description: INTERVENTIONS:- Assess and monitor for signs and symptoms of infection- Monitor lab/diagnostic results- Monitor all insertion sites, i.e. indwelling lines, tubes, and drains- Monitor endotracheal if appropriate and nasal secretions for changes in amount and color- [place] appropriate cooling/warming therapies per order- Administer medications as ordered- Instruct and encourage patient and family to use good hand hygiene technique- Identify and instruct in appropriate isolation precautions for identified infection/condition  Outcome: Progressing     Problem: SAFETY ADULT  Goal: Patient will remain free of falls  Description: INTERVENTIONS:- Educate patient/family on patient safety including physical limitations- Instruct patient to call for assistance with activity - Consult OT/PT to assist with strengthening/mobility - Keep Call bell within reach- Keep bed low and locked with side rails adjusted as appropriate- Keep care items and personal belongings within reach- Initiate and maintain comfort rounds- Make Fall Risk Sign visible to staff- Offer Toileting every 2 Hours, in advance of need- Initiate/Maintain bed alarm- Obtain necessary fall risk management equipment: bed alarm - Apply yellow socks and bracelet for high fall risk patients-  Consider moving patient to room near nurses station  Outcome: Progressing  Goal: Maintain or return to baseline ADL function  Description: INTERVENTIONS:-  Assess patient's ability to carry out ADLs; assess patient's baseline for ADL function and identify physical deficits which impact ability to perform ADLs (bathing, care of mouth/teeth, toileting, grooming, dressing, etc.)- Assess/evaluate cause of self-care deficits - Assess range of motion- Assess patient's mobility; develop plan if impaired- Assess patient's need for assistive devices and provide as appropriate- Encourage maximum independence but intervene and supervise when necessary- Involve family in performance of ADLs- Assess for home care needs following discharge - Consider OT consult to assist with ADL evaluation and planning for discharge- Provide patient education as appropriate  Outcome: Progressing  Goal: Maintains/Returns to pre admission functional level  Description: INTERVENTIONS:- Perform AM-PAC 6 Click Basic Mobility/ Daily Activity assessment daily.- Set and communicate daily mobility goal to care team and patient/family/caregiver. - Collaborate with rehabilitation services on mobility goals if consulted- Perform Range of Motion 3 times a day.- Reposition patient every 2 hours.- Dangle patient 3 times a day- Stand patient 3 times a day- Ambulate patient 3 times a day- Out of bed to chair 3 times a day - Out of bed for meals 3 times a day- Out of bed for toileting- Record patient progress and toleration of activity level   Outcome: Progressing     Problem: DISCHARGE PLANNING  Goal: Discharge to home or other facility with appropriate resources  Description: INTERVENTIONS:- Identify barriers to discharge w/patient and caregiver- Arrange for needed discharge resources and transportation as appropriate- Identify discharge learning needs (meds, wound care, etc.)- Arrange for interpretive services to assist at discharge as needed- Refer to Case  Management Department for coordinating discharge planning if the patient needs post-hospital services based on physician/advanced practitioner order or complex needs related to functional status, cognitive ability, or social support system  Outcome: Progressing     Problem: Knowledge Deficit  Goal: Patient/family/caregiver demonstrates understanding of disease process, treatment plan, medications, and discharge instructions  Description: Complete learning assessment and assess knowledge base.Interventions:- Provide teaching at level of understanding- Provide teaching via preferred learning methods  Outcome: Progressing     Problem: SAFETY,RESTRAINT: NV/NON-SELF DESTRUCTIVE BEHAVIOR  Goal: Remains free of harm/injury (restraint for non violent/non self-detsructive behavior)  Description: INTERVENTIONS:- Instruct patient/family regarding restraint use - Assess and monitor physiologic and psychological status - Provide interventions and comfort measures to meet assessed patient needs - Identify and implement measures to help patient regain control- Assess readiness for release of restraint   Outcome: Progressing  Goal: Returns to optimal restraint-free functioning  Description: INTERVENTIONS:- Assess the patient's behavior and symptoms that indicate continued need for restraint- Identify and implement measures to help patient regain control- Assess readiness for release of restraint   Outcome: Progressing     Problem: Nutrition/Hydration-ADULT  Goal: Nutrient/Hydration intake appropriate for improving, restoring or maintaining nutritional needs  Description: Monitor and assess patient's nutrition/hydration status for malnutrition. Collaborate with interdisciplinary team and initiate plan and interventions as ordered.  Monitor patient's weight and dietary intake as ordered or per policy. Utilize nutrition screening tool and intervene as necessary. Determine patient's food preferences and provide high-protein,  high-caloric foods as appropriate. INTERVENTIONS:- Monitor oral intake, urinary output, labs, and treatment plans- Assess nutrition and hydration status and recommend course of action- Evaluate amount of meals eaten- Assist patient with eating if necessary - Allow adequate time for meals- Recommend/ encourage appropriate diets, oral nutritional supplements, and vitamin/mineral supplements- Order, calculate, and assess calorie counts as needed- Recommend, monitor, and adjust tube feedings and TPN/PPN based on assessed needs- Assess need for intravenous fluids- Provide specific nutrition/hydration education as appropriate- Include patient/family/caregiver in decisions related to nutrition  Outcome: Progressing     Problem: BEHAVIOR  Goal: Pt/Family maintain appropriate behavior and adhere to behavioral management agreement, if implemented  Description: INTERVENTIONS:- Assess the family dynamic - Encourage verbalization of thoughts and concerns in a socially appropriate manner- Assess patient/family's coping skills and non-compliant behavior (including use of illegal substances).- Utilize positive, consistent limit setting strategies supporting safety of patient, staff and others- Initiate consult with Case Management, Spiritual Care or other ancillary services as appropriate- If a patient's/visitor's behavior jeopardizes the safety of the patient, staff, or others, refer to organization procedure. - Notify Security of behavior or suspected illegal substances which indicate the need for search of the patient and/or belongings- Encourage participation in the decision making process about a behavioral management agreement; implement if patient meets criteria  Outcome: Progressing

## 2025-05-14 NOTE — ASSESSMENT & PLAN NOTE
Patient comes here for altered mental status and was noted to be drowsy.  Hemodynamically stable  Salicylate level 13, acetaminophen 7, alcohol negative  Will get a urine drug screen  CT head shows no acute intracranial abnormality  No focal signs and symptoms of infection  Reviewed meds  Patient takes alprazolam 1 mg 4 times daily as needed  Buprenorphine patch  Buspirone 30 mg twice daily  Gabapentin 800 3 times daily  Myrbetriq 5 mg daily  Risperidone 3 mg twice daily  Sertraline 100 mg once daily  Zolpidem 10 mg once daily  Carbidopa levodopa  3 times daily  Likely in the setting of polypharmacy  Patient appears oriented however response slowly on arrival patient was more lethargic according to ED  Plan:  Patient mental status muchj iomrpoved with adjustment of home medications  Will continue on dc as well as decrease insulin dosing

## 2025-05-14 NOTE — DISCHARGE SUMMARY
Discharge Summary - Hospitalist   Name: Dahlia Kraus 55 y.o. female I MRN: [id number]  [facility] Date of Admission: 5/12/2025   Date of Service: 5/14/2025 I Hospital Day: 1     Assessment & Plan  AMS (altered mental status)  Patient comes here for altered mental status and was noted to be drowsy.  Hemodynamically stable  Salicylate level 13, acetaminophen 7, alcohol negative  Will get a urine drug screen  CT head shows no acute intracranial abnormality  No focal signs and symptoms of infection  Reviewed meds  Patient takes alprazolam 1 mg 4 times daily as needed  Buprenorphine patch  Buspirone 30 mg twice daily  Gabapentin 800 3 times daily  Myrbetriq 5 mg daily  Risperidone 3 mg twice daily  Sertraline 100 mg once daily  Zolpidem 10 mg once daily  Carbidopa levodopa  3 times daily  Likely in the setting of polypharmacy  Patient appears oriented however response slowly on arrival patient was more lethargic according to ED  Plan:  Patient mental status muchj iomrpoved with adjustment of home medications  Will continue on dc as well as decrease insulin dosing  DM (diabetes mellitus), type 2 ([facility])  Lab Results   Component Value Date    HGBA1C 9.4 (H) 02/05/2025       Recent Labs     05/14/25  1115 05/14/25  1236 05/14/25  1335 05/14/25  1751   POCGLU 138 90 127 147*       Blood Sugar Average: Last 72 hrs:  (P) [id number] appears to have uncontrolled type 2 diabetes    Patient takes Trulicity, glargine 5 units twice daily at lunch and bedtime  Sliding scale      Benign essential hypertension  Metoprolol succinate 100 mg once daily    Coronary artery disease involving native coronary artery of native heart without angina pectoris  Atorvastatin 10 mg once daily  Metoprolol succinate 100 mg once daily    GERD (gastroesophageal reflux disease)  Pantoprazole 40 mg twice daily  CKD stage 3a, GFR 45-59 ml/min ([facility])  Lab Results   Component Value Date    EGFR 58 05/14/2025    EGFR 61  05/13/2025    EGFR 45 05/12/2025    CREATININE 1.06 05/14/2025    CREATININE 1.01 05/13/2025    CREATININE 1.30 05/12/2025   POA 1.3 baseline is seems to be around 1.2-1.8  Continue to monitor    Hyperkalemia       Medical Problems       Resolved Problems  Date Reviewed: 5/14/2025   None       Discharging Physician / Practitioner: Kamron Acevedo DO  PCP: Ravi Chowdhury MD  Admission Date:   Admission Orders (From admission, onward)       Ordered        05/12/25 1618  Place in Observation  Once            05/12/25 1514  INPATIENT ADMISSION  Once,   Status:  Canceled                          Discharge Date: 05/14/25        Reason for Admission:     Hospital Course:   Dahlia Kraus is a 55 y.o. female patient who originally presented to the hospital on 5/12/2025 due to altered mental status. This was suspected to be due to polypharmacy. She improved with adjustment of home meds and was dsicharged with outpatient followup.          Please see above list of diagnoses and related plan for additional information.     Condition at Discharge: stable    Discharge Day Visit / Exam:   Subjective:  patient denies any acute complaints  Vitals: Blood Pressure: 158/80 (05/14/25 1819)  Pulse: (!) 52 (05/14/25 1513)  Temperature: 98.5 °F (36.9 °C) (05/14/25 1513)  Temp Source: Oral (05/12/25 1253)  Respirations: 17 (05/14/25 1513)  SpO2: 94 % (05/14/25 1513)  Physical Exam  Vitals and nursing note reviewed.   Constitutional:       General: She is not in acute distress.     Appearance: She is well-developed. She is not toxic-appearing or diaphoretic.   HENT:      Head: Normocephalic and atraumatic.     Eyes:      General: No scleral icterus.     Conjunctiva/sclera: Conjunctivae normal.       Cardiovascular:      Rate and Rhythm: Normal rate and regular rhythm.      Heart sounds: No murmur heard.     No friction rub. No gallop.   Pulmonary:      Effort: Pulmonary effort is normal. No respiratory distress.      Breath sounds:  Normal breath sounds. No stridor. No wheezing, rhonchi or rales.   Chest:      Chest wall: No tenderness.   Abdominal:      General: There is no distension.      Palpations: Abdomen is soft. There is no mass.      Tenderness: There is no abdominal tenderness. There is no guarding or rebound.      Hernia: No hernia is present.     Musculoskeletal:         General: No swelling or tenderness.      Cervical back: Neck supple.     Skin:     General: Skin is warm and dry.      Capillary Refill: Capillary refill takes less than 2 seconds.     Neurological:      Mental Status: She is alert and oriented to person, place, and time.     Psychiatric:         Mood and Affect: Mood normal.          Discussion with Family: Updated  (daughter) via phone.    Discharge instructions/Information to patient and family:   See after visit summary for information provided to patient and family.      Provisions for Follow-Up Care:  See after visit summary for information related to follow-up care and any pertinent home health orders.      Mobility at time of Discharge:   Basic Mobility Inpatient Raw Score: 23  JH-HLM Goal: 7: Walk 25 feet or more  JH-HLM Achieved: 6: Walk 10 steps or more  HLM Goal achieved. Continue to encourage appropriate mobility.     Disposition:   Home    Planned Readmission: no    Discharge Medications:  See after visit summary for reconciled discharge medications provided to patient and/or family.      Administrative Statements   Discharge Statement:  I have spent a total time of  minutes in caring for this patient on the day of the visit/encounter. .    **Please Note: This note may have been constructed using a voice recognition system**

## 2025-05-15 ENCOUNTER — OFFICE VISIT (OUTPATIENT)
Dept: PSYCHIATRY | Facility: CLINIC | Age: 55
End: 2025-05-15

## 2025-05-15 ENCOUNTER — TRANSITIONAL CARE MANAGEMENT (OUTPATIENT)
Age: 55
End: 2025-05-15

## 2025-05-15 DIAGNOSIS — F43.10 PTSD (POST-TRAUMATIC STRESS DISORDER): ICD-10-CM

## 2025-05-15 DIAGNOSIS — G47.00 INSOMNIA, UNSPECIFIED TYPE: ICD-10-CM

## 2025-05-15 DIAGNOSIS — F63.81 INTERMITTENT EXPLOSIVE DISORDER: ICD-10-CM

## 2025-05-15 PROCEDURE — NOSHOW: Performed by: STUDENT IN AN ORGANIZED HEALTH CARE EDUCATION/TRAINING PROGRAM

## 2025-05-16 ENCOUNTER — TELEPHONE (OUTPATIENT)
Dept: PSYCHIATRY | Facility: CLINIC | Age: 55
End: 2025-05-16

## 2025-05-16 NOTE — LETTER
25     Dahlia Kraus   : 1970   [address]   [address]  [address]  [facility] [address]       It is the policy of [facility] to monitor and manage appointments that have been no-showed or cancelled with less than 48-hour notice. This is necessary to ensure that we are able to provide timely access for all patients to our providers. Undue numbers of unutilized appointments delays necessary medical care for all patients.      Dear Dahlia Kraus:      We are sorry that you missed your appointment with Kellie Mccord MD on 5/15/2025. Your health and follow-up care are important to us. As this was a New Patient appointment, you will need to contact the office at [phone] if you would like to reschedule.    Please be aware that our office policy states that if you 'no show' two or more Medication Management  appointments without prior notice of cancellation within in a calendar year, you may be discharged from Medication Management  treatment.  We want to bring this to your attention now to prevent an interruption of your care.  If you have any questions about this policy, please call us at the number above.     If we do not hear from you within 10 business days to make a follow up appointment, we will assume you are no longer interested in care here.    Thank you in advance for your cooperation and assistance.       Sincerely,      [facility] Support Staff

## 2025-05-16 NOTE — TELEPHONE ENCOUNTER
NO-SHOW LETTER MAILED TO Dahlia Kraus.  ADDRESS: [address] [place] [address] [address] [place] [id number]  SENT VIA [employer]

## 2025-05-20 ENCOUNTER — OFFICE VISIT (OUTPATIENT)
Age: 55
End: 2025-05-20
Payer: COMMERCIAL

## 2025-05-20 VITALS
OXYGEN SATURATION: 90 % | WEIGHT: 162 LBS | SYSTOLIC BLOOD PRESSURE: 110 MMHG | BODY MASS INDEX: 26.99 KG/M2 | HEIGHT: 65 IN | RESPIRATION RATE: 18 BRPM | HEART RATE: 90 BPM | DIASTOLIC BLOOD PRESSURE: 78 MMHG

## 2025-05-20 DIAGNOSIS — K21.9 GASTROESOPHAGEAL REFLUX DISEASE WITHOUT ESOPHAGITIS: ICD-10-CM

## 2025-05-20 DIAGNOSIS — N18.31 CKD STAGE 3A, GFR 45-59 ML/MIN (HCC): ICD-10-CM

## 2025-05-20 DIAGNOSIS — E11.649 TYPE 2 DIABETES MELLITUS WITH HYPOGLYCEMIA WITHOUT COMA, WITH LONG-TERM CURRENT USE OF INSULIN (HCC): ICD-10-CM

## 2025-05-20 DIAGNOSIS — R41.82 ALTERED MENTAL STATUS, UNSPECIFIED ALTERED MENTAL STATUS TYPE: Primary | ICD-10-CM

## 2025-05-20 DIAGNOSIS — Z79.4 TYPE 2 DIABETES MELLITUS WITH HYPOGLYCEMIA WITHOUT COMA, WITH LONG-TERM CURRENT USE OF INSULIN (HCC): ICD-10-CM

## 2025-05-20 DIAGNOSIS — I10 BENIGN ESSENTIAL HYPERTENSION: ICD-10-CM

## 2025-05-20 DIAGNOSIS — E11.49 OTHER DIABETIC NEUROLOGICAL COMPLICATION ASSOCIATED WITH TYPE 2 DIABETES MELLITUS (HCC): ICD-10-CM

## 2025-05-20 DIAGNOSIS — I25.10 CORONARY ARTERY DISEASE INVOLVING NATIVE CORONARY ARTERY OF NATIVE HEART WITHOUT ANGINA PECTORIS: ICD-10-CM

## 2025-05-20 DIAGNOSIS — E83.110 HEREDITARY HEMOCHROMATOSIS (HCC): ICD-10-CM

## 2025-05-20 PROCEDURE — 99496 TRANSJ CARE MGMT HIGH F2F 7D: CPT

## 2025-05-20 NOTE — ASSESSMENT & PLAN NOTE
Lab Results   Component Value Date    EGFR 58 05/14/2025    EGFR 61 05/13/2025    EGFR 45 05/12/2025    CREATININE 1.06 05/14/2025    CREATININE 1.01 05/13/2025    CREATININE 1.30 05/12/2025

## 2025-05-20 NOTE — PROGRESS NOTES
Transition of Care Visit:  Name: Dahlia Kraus      : 1970      MRN: [id number]  Encounter Provider: María Waters PA-C  Encounter Date: 2025   Encounter department: [facility] INTERNAL [facility]ICINE [facility] [address]    Assessment & Plan  Altered mental status, unspecified altered mental status type  Patient is a 55 year old female presenting for TCM. Was in the hospital from - for AMS. Likely due to polypharmacy. Had medication regimen adjusted and symptoms clinically improved. Will continue to work on decreasing medications-specifically working on decreasing gabapentin, alprazolam, risperidone, and possibly buspar over time.   Patient currently taking   -gabapentin 200 mg TID  -risperidone 3 mg BID  -alprazolam 1 mg 4 times a day PRN  -buspar 30 mg BID    -eventual plan to get to 100 mg TID with gabapentin and possibly 2 mg BID for risperidone  -recommended patient get down to alprazolam max 3 times per day  -will follow up in 4-6 weeks and decrease meds as we go       Other diabetic neurological complication associated with type 2 diabetes mellitus (HCC)    Lab Results   Component Value Date    HGBA1C 9.4 (H) 2025            Type 2 diabetes mellitus with hypoglycemia without coma, with long-term current use of insulin (HCC)    Lab Results   Component Value Date    HGBA1C 9.4 (H) 2025     A1C not controlled. Will start 500 mg BID as patient was only on 750 q24hrs. Eventual plan to get to 1000 mg BID. Will repeat A1C prior to next appt to trend as last A1C was 25. Also taking insulin lispro 10 units with meals, insulin glargine 30 units at bed, and trulicity 1.5 mg daily.  Orders:    metFORMIN (GLUCOPHAGE) 500 mg tablet; Take 1 tablet (500 mg total) by mouth 2 (two) times a day with meals    Hemoglobin A1C    Benign essential hypertension  /78 in office. Stable on current regimen       Coronary artery disease involving native coronary artery of native heart  without angina pectoris  On atorvastatin 10 mg and metoprolol 100 mg       Gastroesophageal reflux disease without esophagitis  Stable on protonix       CKD stage 3a, GFR 45-59 ml/min ([facility])  Lab Results   Component Value Date    EGFR 58 05/14/2025    EGFR 61 05/13/2025    EGFR 45 05/12/2025    CREATININE 1.06 05/14/2025    CREATININE 1.01 05/13/2025    CREATININE 1.30 05/12/2025            Hereditary hemochromatosis (HCC)  Patient has history of HH. Had iron checked back in January-will repeat prior to 1 month follow up.   Orders:    Iron Panel (Includes Ferritin, Iron Sat%, Iron, and TIBC)         History of Present Illness     Transitional Care Management Review:   Dahlia Kraus is a 55 y.o. female here for TCM follow up.     During the TCM phone call patient stated:  TCM Call (since 5/6/2025)       Date and time call was made  5/15/2025 10:14 AM    Hospital care reviewed  Other diagnostic studies pending    Patient was hospitial[facility] at  [facility]    Date of Admission  05/10/25    Date of discharge  05/12/25    Diagnosis  AMS (altered mental status)    Disposition  Home    Were the patients medications reviewed and updated  Yes          TCM Call (since 5/6/2025)       Post hospital issues  Reduced activity    Scheduled for follow up?  Yes    Did you obtain your prescribed medications  Yes    Do you need help managing your prescriptions or medications  No    I have advised the patient to call PCP with any new or worsening symptoms  marcelino newman cma    Living Arrangements  Alone    Are you recieving home care services  No          HPI  Review of Systems   Constitutional:  Negative for chills, fatigue and fever.   HENT:  Negative for ear discharge, ear pain, postnasal drip, rhinorrhea, sinus pressure, sinus pain, sore throat, tinnitus and trouble swallowing.    Eyes:  Negative for pain, discharge and itching.   Respiratory:  Negative for cough, shortness of breath and wheezing.    Cardiovascular:   "Negative for chest pain, palpitations and leg swelling.   Gastrointestinal:  Negative for abdominal pain, constipation, diarrhea, nausea and vomiting.   Endocrine: Negative for polydipsia, polyphagia and polyuria.   Genitourinary:  Negative for difficulty urinating, frequency, hematuria and urgency.   Musculoskeletal:  Negative for arthralgias, joint swelling and myalgias.   Skin:  Negative for color change.   Allergic/Immunologic: Negative for environmental allergies.   Neurological:  Negative for dizziness, weakness, light-headedness, numbness and headaches.   Hematological:  Negative for adenopathy.   Psychiatric/Behavioral:  Negative for decreased concentration and sleep disturbance. The patient is not nervous/anxious.      Objective   /78 (BP Location: Left arm)   Pulse 90   Resp 18   Ht 5' 5\" (1.651 m)   Wt 73.5 kg (162 lb)   SpO2 90%   BMI 26.96 kg/m²     Physical Exam  Constitutional:       Appearance: Normal appearance.   HENT:      Head: Normocephalic.     Neurological:      Mental Status: She is alert and oriented to person, place, and time.     Psychiatric:         Mood and Affect: Mood normal.         Behavior: Behavior normal.         Thought Content: Thought content normal.         Judgment: Judgment normal.       Medications have been reviewed by provider in current encounter      "

## 2025-05-20 NOTE — ASSESSMENT & PLAN NOTE
Patient is a 55 year old female presenting for TCM. Was in the hospital from 5/12-5/14 for AMS. Likely due to polypharmacy. Had medication regimen adjusted and symptoms clinically improved. Will continue to work on decreasing medications-specifically working on decreasing gabapentin, alprazolam, risperidone, and possibly buspar over time.   Patient currently taking   -gabapentin 200 mg TID  -risperidone 3 mg BID  -alprazolam 1 mg 4 times a day PRN  -buspar 30 mg BID    -eventual plan to get to 100 mg TID with gabapentin and possibly 2 mg BID for risperidone  -recommended patient get down to alprazolam max 3 times per day  -will follow up in 4-6 weeks and decrease meds as we go

## 2025-05-20 NOTE — ASSESSMENT & PLAN NOTE
Lab Results   Component Value Date    HGBA1C 9.4 (H) 02/05/2025     A1C not controlled. Will start 500 mg BID as patient was only on 750 q24hrs. Eventual plan to get to 1000 mg BID. Will repeat A1C prior to next appt to trend as last A1C was 2/5/25. Also taking insulin lispro 10 units with meals, insulin glargine 30 units at bed, and trulicity 1.5 mg daily.  Orders:    metFORMIN (GLUCOPHAGE) 500 mg tablet; Take 1 tablet (500 mg total) by mouth 2 (two) times a day with meals    Hemoglobin A1C

## 2025-05-20 NOTE — ASSESSMENT & PLAN NOTE
Patient has history of HH. Had iron checked back in January-will repeat prior to 1 month follow up.   Orders:    Iron Panel (Includes Ferritin, Iron Sat%, Iron, and TIBC)

## 2025-05-27 DIAGNOSIS — E53.1 VITAMIN B6 DEFICIENCY: ICD-10-CM

## 2025-05-28 ENCOUNTER — DOCUMENTATION (OUTPATIENT)
Dept: ADMINISTRATIVE | Facility: OTHER | Age: 55
End: 2025-05-28

## 2025-05-28 RX ORDER — DIPHENHYDRAMINE HYDROCHLORIDE 25 MG/1
25 CAPSULE ORAL DAILY
Qty: 30 TABLET | Refills: 0 | Status: SHIPPED | OUTPATIENT
Start: 2025-05-28

## 2025-05-28 NOTE — PROGRESS NOTES
05/28/25 12:11 PM     DM A1c outreach is not required, SEEN WITH IN 3 MONTHS WITH PCP     Thank you.  Agnieszka Luevano MA  [facility]

## 2025-05-31 DIAGNOSIS — F43.10 PTSD (POST-TRAUMATIC STRESS DISORDER): ICD-10-CM

## 2025-06-01 RX ORDER — PRAZOSIN HYDROCHLORIDE 5 MG/1
5 CAPSULE ORAL
Qty: 90 CAPSULE | Refills: 1 | Status: SHIPPED | OUTPATIENT
Start: 2025-06-01

## 2025-06-04 DIAGNOSIS — F41.9 ANXIETY: ICD-10-CM

## 2025-06-05 RX ORDER — ALPRAZOLAM 1 MG/1
1 TABLET ORAL 3 TIMES DAILY PRN
Qty: 90 TABLET | Refills: 0 | Status: SHIPPED | OUTPATIENT
Start: 2025-06-05

## 2025-06-09 ENCOUNTER — TELEPHONE (OUTPATIENT)
Age: 55
End: 2025-06-09

## 2025-06-09 NOTE — TELEPHONE ENCOUNTER
Samantha from [facility] was not sure what the patients proper amount of medication is supposed to be for the metformin because we prescribed her the 500 mg tablets but then the patient was prescribed 750 mg tablets from endocrinology. Samantha requesting proper dose so she can prepare the patients 90 day blister packets.   Please call back Samantha at: [phone], once we can confirm what the patient is supposed to take for the Metformin

## 2025-06-09 NOTE — TELEPHONE ENCOUNTER
Mom wanted to know if there was any sooner apts before Daryl's scheduled apt 3/17/20 at 4:30 because she said she is having issues with Daryl and wanted to come in to see you sooner and I seen there was a 3:00 slot open for today so I put Luana Dowd in for that time is that ok? Pharmacy called to verify Metformin dose    Warm transfer to the office

## 2025-06-10 DIAGNOSIS — F43.10 PTSD (POST-TRAUMATIC STRESS DISORDER): ICD-10-CM

## 2025-06-10 DIAGNOSIS — E53.1 VITAMIN B6 DEFICIENCY: ICD-10-CM

## 2025-06-10 RX ORDER — DIPHENHYDRAMINE HYDROCHLORIDE 25 MG/1
25 CAPSULE ORAL DAILY
Qty: 30 TABLET | Refills: 0 | OUTPATIENT
Start: 2025-06-10

## 2025-06-10 NOTE — TELEPHONE ENCOUNTER
Contacted Samantha at [facility] and informed her as per the instructions from María

## 2025-06-11 RX ORDER — RISPERIDONE 3 MG/1
3 TABLET ORAL 2 TIMES DAILY
Qty: 180 TABLET | Refills: 0 | Status: SHIPPED | OUTPATIENT
Start: 2025-06-11

## 2025-06-18 DIAGNOSIS — N39.41 URGENCY INCONTINENCE: ICD-10-CM

## 2025-06-18 RX ORDER — MIRABEGRON 25 MG/1
25 TABLET, FILM COATED, EXTENDED RELEASE ORAL DAILY
Qty: 90 TABLET | Refills: 1 | Status: SHIPPED | OUTPATIENT
Start: 2025-06-18

## 2025-06-20 DIAGNOSIS — R25.1 TREMOR: Primary | ICD-10-CM

## 2025-06-20 RX ORDER — CARBIDOPA AND LEVODOPA 25; 100 MG/1; MG/1
1 TABLET ORAL 3 TIMES DAILY
Qty: 90 TABLET | Refills: 0 | Status: SHIPPED | OUTPATIENT
Start: 2025-06-20

## 2025-06-23 DIAGNOSIS — E53.1 VITAMIN B6 DEFICIENCY: ICD-10-CM

## 2025-06-23 RX ORDER — DIPHENHYDRAMINE HYDROCHLORIDE 25 MG/1
25 CAPSULE ORAL DAILY
Qty: 30 TABLET | Refills: 0 | Status: SHIPPED | OUTPATIENT
Start: 2025-06-23

## 2025-06-24 ENCOUNTER — TELEPHONE (OUTPATIENT)
Age: 55
End: 2025-06-24

## 2025-06-24 NOTE — TELEPHONE ENCOUNTER
Patient contacted the office to schedule a follow up visit with provider. Patient is now scheduled for 10/9/25  at 4pm in office.

## 2025-06-25 DIAGNOSIS — F41.9 ANXIETY: ICD-10-CM

## 2025-06-25 DIAGNOSIS — Z72.0 TOBACCO USE: ICD-10-CM

## 2025-06-25 RX ORDER — OXYCODONE HYDROCHLORIDE 5 MG/1
10 TABLET ORAL
Refills: 0 | OUTPATIENT
Start: 2025-06-25

## 2025-06-25 RX ORDER — ALPRAZOLAM 1 MG/1
1 TABLET ORAL 3 TIMES DAILY PRN
Qty: 90 TABLET | Refills: 0 | OUTPATIENT
Start: 2025-06-25

## 2025-06-25 RX ORDER — NICOTINE 21 MG/24HR
PATCH, TRANSDERMAL 24 HOURS TRANSDERMAL
Qty: 30 PATCH | Refills: 2 | Status: SHIPPED | OUTPATIENT
Start: 2025-06-25 | End: 2025-07-25

## 2025-06-26 ENCOUNTER — NURSE TRIAGE (OUTPATIENT)
Age: 55
End: 2025-06-26

## 2025-06-26 NOTE — TELEPHONE ENCOUNTER
Patient reports she has tried to go to Spine/pain drs for her chronic back pain from an old fx. Patient keeps getting canceled by these facilities. Patient states her pain hasn't gotten better with all her treatments and needs to see the provider. Triage nurse scheduled patient for 6/27.  PCP please further advise.    yes

## 2025-06-26 NOTE — TELEPHONE ENCOUNTER
Regarding: back pain  ----- Message from Linda STATON sent at 6/26/2025  1:05 PM EDT -----  Patient calling in stating she has multiple fractures in her back, and she was supposed to see spine and pain management but she was there from 11 am and never got seen and now is in worse pain. Patient is asking if the provider could send in pain meds

## 2025-06-27 ENCOUNTER — TELEMEDICINE (OUTPATIENT)
Age: 55
End: 2025-06-27
Payer: COMMERCIAL

## 2025-06-27 DIAGNOSIS — S32.001D CLOSED BURST FRACTURE OF LUMBAR VERTEBRA WITH ROUTINE HEALING, SUBSEQUENT ENCOUNTER: ICD-10-CM

## 2025-06-27 DIAGNOSIS — S32.020D TRAUMATIC COMPRESSION FRACTURE OF L2 LUMBAR VERTEBRA WITH ROUTINE HEALING, SUBSEQUENT ENCOUNTER: Primary | ICD-10-CM

## 2025-06-27 DIAGNOSIS — G47.00 INSOMNIA, UNSPECIFIED TYPE: ICD-10-CM

## 2025-06-27 DIAGNOSIS — Z78.0 POSTMENOPAUSAL: ICD-10-CM

## 2025-06-27 PROCEDURE — 99215 OFFICE O/P EST HI 40 MIN: CPT

## 2025-06-27 RX ORDER — ZOLPIDEM TARTRATE 5 MG/1
5 TABLET ORAL
Qty: 30 TABLET | Refills: 0 | Status: SHIPPED | OUTPATIENT
Start: 2025-06-27

## 2025-06-27 NOTE — PROGRESS NOTES
Virtual Regular Visit  Name: Dahlia Kraus      : 1970      MRN: [id number]  Encounter Provider: Rekha Bryan PA-C  Encounter Date: 2025   Encounter department: [facility]  :  Assessment & Plan  Traumatic compression fracture of L2 lumbar vertebra with routine healing, subsequent encounter  MRI lumbar spine completed 2025 revealed a subacute compression fracture of the inferior L2 vertebral body and subacute compression fracture of the anterior superior L5 vertebral bodies.  See plan below.       Postmenopausal  Due to multiple spinal compression fractures, recommended obtaining DEXA scan to confirm suspected osteoporosis.  Will plan to initiate treatment once DEXA scan results.  Orders:    DXA bone density spine hip and pelvis; Future    Closed burst fracture of lumbar vertebra with routine healing, subsequent encounter  Lumbar spine MRI revealed a burst fracture at L3 that extends into anterior pedicles bilaterally without significant retropulsion.  She had left AMA before she was evaluated by ortho spine in the hospital.  Discussed the potential risks if this burst fracture retropulses including paralysis.  She does admit to worsening urinary incontinence and is unable to ambulate at this time.  Recommended calling an ambulance for further evaluation in the ER by Ortho spine surgery.  She was hesitant, but stated she will get a ride today or tomorrow.  Discussed ED protocols.       Insomnia, unspecified type  Refill needed.  Orders:    zolpidem (AMBIEN) 5 mg tablet; Take 1 tablet (5 mg total) by mouth daily at bedtime as needed for sleep                   History of Present Illness     Patient is a 55-year-old female that presents today virtually for back pain. She fell around a week ago and since then has been struggling to walk.  She denies any fevers, chills, numbness, tingling, saddle anesthesia, bowel incontinence.  She does admit to lower  extremity weakness and increasing urinary incontinence.  She admits to severe pain.        Review of Systems   Constitutional:  Negative for chills and fever.   Gastrointestinal:  Negative for constipation and diarrhea.   Genitourinary:         Urinary incontinence   Musculoskeletal:  Positive for back pain.   Neurological:  Negative for numbness.       Objective   There were no vitals taken for this visit.    Physical Exam  Constitutional:       General: She is awake.      Appearance: Normal appearance. She is well-developed.   HENT:      Head: Normocephalic and atraumatic.     Neurological:      Mental Status: She is alert.     Psychiatric:         Attention and Perception: Attention and perception normal.         Mood and Affect: Mood and affect normal.         Speech: Speech normal.         Behavior: Behavior normal. Behavior is cooperative.         Cognition and Memory: Cognition and memory normal.         Judgment: Judgment normal.         Administrative Statements   Encounter provider Rekha Bryan PA-C    The Patient is located at Home and in the following state in which I hold an active license [place].    The patient was identified by name and date of birth. Dahlai BeebeKeanu was informed that this is a telemedicine visit and that the visit is being conducted through the Epic Embedded platform. She agrees to proceed..  My office door was closed. No one else was in the room.  She acknowledged consent and understanding of privacy and security of the video platform. The patient has agreed to participate and understands they can discontinue the visit at any time.    I have spent a total time of 20 minutes in caring for this patient on the day of the visit/encounter including Diagnostic results, Prognosis, Risks and benefits of tx options, and Instructions for management, not including the time spent for establishing the audio/video connection.

## 2025-06-30 DIAGNOSIS — F41.9 ANXIETY: ICD-10-CM

## 2025-07-01 RX ORDER — ALPRAZOLAM 1 MG/1
1 TABLET ORAL 3 TIMES DAILY PRN
Qty: 90 TABLET | Refills: 0 | Status: SHIPPED | OUTPATIENT
Start: 2025-07-01

## 2025-07-05 DIAGNOSIS — K21.9 GASTROESOPHAGEAL REFLUX DISEASE WITHOUT ESOPHAGITIS: ICD-10-CM

## 2025-07-05 DIAGNOSIS — E11.649 TYPE 2 DIABETES MELLITUS WITH HYPOGLYCEMIA WITHOUT COMA, WITH LONG-TERM CURRENT USE OF INSULIN (HCC): ICD-10-CM

## 2025-07-05 DIAGNOSIS — Z79.4 TYPE 2 DIABETES MELLITUS WITH HYPOGLYCEMIA WITHOUT COMA, WITH LONG-TERM CURRENT USE OF INSULIN (HCC): ICD-10-CM

## 2025-07-07 RX ORDER — GABAPENTIN 100 MG/1
200 CAPSULE ORAL 3 TIMES DAILY
Qty: 180 CAPSULE | Refills: 1 | Status: SHIPPED | OUTPATIENT
Start: 2025-07-07

## 2025-07-08 ENCOUNTER — HOSPITAL ENCOUNTER (OUTPATIENT)
Age: 55
Discharge: HOME | End: 2025-07-08
Payer: COMMERCIAL

## 2025-07-08 ENCOUNTER — APPOINTMENT (OUTPATIENT)
Age: 55
End: 2025-07-08
Payer: COMMERCIAL

## 2025-07-08 ENCOUNTER — TELEPHONE (OUTPATIENT)
Dept: PSYCHIATRY | Facility: CLINIC | Age: 55
End: 2025-07-08

## 2025-07-08 VITALS — BODY MASS INDEX: 27.81 KG/M2 | HEIGHT: 64 IN

## 2025-07-08 DIAGNOSIS — Z78.0 POSTMENOPAUSAL: ICD-10-CM

## 2025-07-08 LAB
EST. AVERAGE GLUCOSE BLD GHB EST-MCNC: 232 MG/DL
FERRITIN SERPL-MCNC: 15 NG/ML (ref 30–307)
HBA1C MFR BLD: 9.7 %
IRON SATN MFR SERPL: 7 % (ref 15–50)
IRON SERPL-MCNC: 25 UG/DL (ref 50–212)
TIBC SERPL-MCNC: 357 UG/DL (ref 250–450)
TRANSFERRIN SERPL-MCNC: 255 MG/DL (ref 203–362)
UIBC SERPL-MCNC: 332 UG/DL (ref 155–355)

## 2025-07-08 PROCEDURE — 77080 DXA BONE DENSITY AXIAL: CPT

## 2025-07-08 RX ORDER — PANTOPRAZOLE SODIUM 40 MG/1
40 TABLET, DELAYED RELEASE ORAL 2 TIMES DAILY
Qty: 180 TABLET | Refills: 1 | Status: SHIPPED | OUTPATIENT
Start: 2025-07-08

## 2025-07-08 NOTE — TELEPHONE ENCOUNTER
Called and LVM for patient to notify that provider has had a change in schedule and we will need to cancel & r/s 10/9/25 appt.     Please assist with r/s upon returned call.

## 2025-07-10 ENCOUNTER — TELEMEDICINE (OUTPATIENT)
Age: 55
End: 2025-07-10
Payer: COMMERCIAL

## 2025-07-10 DIAGNOSIS — E11.649 TYPE 2 DIABETES MELLITUS WITH HYPOGLYCEMIA WITHOUT COMA, WITH LONG-TERM CURRENT USE OF INSULIN (HCC): ICD-10-CM

## 2025-07-10 DIAGNOSIS — Z79.4 TYPE 2 DIABETES MELLITUS WITH HYPOGLYCEMIA WITHOUT COMA, WITH LONG-TERM CURRENT USE OF INSULIN (HCC): ICD-10-CM

## 2025-07-10 DIAGNOSIS — S32.020D TRAUMATIC COMPRESSION FRACTURE OF L2 LUMBAR VERTEBRA WITH ROUTINE HEALING, SUBSEQUENT ENCOUNTER: ICD-10-CM

## 2025-07-10 DIAGNOSIS — E83.110 HEREDITARY HEMOCHROMATOSIS (HCC): ICD-10-CM

## 2025-07-10 DIAGNOSIS — S32.001D CLOSED BURST FRACTURE OF LUMBAR VERTEBRA WITH ROUTINE HEALING, SUBSEQUENT ENCOUNTER: ICD-10-CM

## 2025-07-10 DIAGNOSIS — S32.020D TRAUMATIC COMPRESSION FRACTURE OF L2 LUMBAR VERTEBRA WITH ROUTINE HEALING, SUBSEQUENT ENCOUNTER: Primary | ICD-10-CM

## 2025-07-10 PROCEDURE — 99214 OFFICE O/P EST MOD 30 MIN: CPT

## 2025-07-10 RX ORDER — TRAMADOL HYDROCHLORIDE 50 MG/1
50 TABLET ORAL EVERY 6 HOURS PRN
Qty: 30 TABLET | Refills: 0 | Status: SHIPPED | OUTPATIENT
Start: 2025-07-10 | End: 2025-07-10 | Stop reason: SDUPTHER

## 2025-07-10 RX ORDER — TRAMADOL HYDROCHLORIDE 50 MG/1
50 TABLET ORAL EVERY 6 HOURS
Qty: 20 TABLET | Refills: 0 | Status: SHIPPED | OUTPATIENT
Start: 2025-07-10 | End: 2025-07-15

## 2025-07-10 NOTE — PROGRESS NOTES
Virtual Regular Visit  Name: Dahlia Kraus      : 1970      MRN: [id number]  Encounter Provider: Rekha Bryan PA-C  Encounter Date: 7/10/2025   [facility]  :  Assessment & Plan  Traumatic compression fracture of L2 lumbar vertebra with routine healing, subsequent encounter  MRI lumbar spine completed 2025 revealed a subacute compression fracture of the inferior L2 vertebral body and subacute compression fracture of the anterior superior L5 vertebral bodies. See plan below.   Orders:    traMADol (Ultram) 50 mg tablet; Take 1 tablet (50 mg total) by mouth every 6 (six) hours as needed for moderate pain    Closed burst fracture of lumbar vertebra with routine healing, subsequent encounter  Lumbar spine MRI revealed a burst fracture at L3 that extends into anterior pedicles bilaterally without significant retropulsion.  She had left AMA before she was evaluated by ortho spine in the hospital.  After last telemedicine visit, recommended patient be evaluated in the ER.  She was fitted with an LSO and then left AMA.  She continues to have moderate to severe pain.  She is scheduling an appointment with advanced spine through [facility].  Discussed pathophysiology, risk, and benefits of tramadol.  ED protocols discussed.  Orders:    traMADol (Ultram) 50 mg tablet; Take 1 tablet (50 mg total) by mouth every 6 (six) hours as needed for moderate pain    Type 2 diabetes mellitus with hypoglycemia without coma, with long-term current use of insulin ([facility])  A1c is 9.7. She is currently on lantus 25 units prior to bed, Trulicity 1.5 mg daily, metformin 500 mg twice daily, and humalog 50 units 3 times daily.  Discussed that she is supposed to be on Humalog 10 units 3 times daily with meals.  Fasting sugars have been averaging around 200.  Recommended increasing Lantus to 30 units prior to bed.  Referral to endocrinologist placed.  Reviewed a low sugar and  carb diet.  Lab Results   Component Value Date    HGBA1C 9.7 (H) 07/08/2025       Orders:    Ambulatory Referral to Endocrinology; Future    Hereditary hemochromatosis (HCC)  History of heterozygous HFE H63D mutation in 2015.  Recent ferritin was 15, but H/H were elevated on recent CBC.  [facility] At this visit, they discussed that the change of developing iron overload with this mutation is slim. E-consult placed to hem/onc for management recommendations.   Orders:    AMB E-CONSULT TO HEMATOLOGY                 History of Present Illness     Patient is a 55-year-old female that presents today for an ER follow-up.  She presented to the ER 6/29 for intractable back pain.      Review of Systems   Musculoskeletal:  Positive for arthralgias and back pain.       Objective   There were no vitals taken for this visit.    Physical Exam  Constitutional:       General: She is awake.      Appearance: Normal appearance. She is well-developed.   HENT:      Head: Normocephalic and atraumatic.     Neurological:      Mental Status: She is alert.     Psychiatric:         Attention and Perception: Attention and perception normal.         Mood and Affect: Mood and affect normal.         Speech: Speech normal.         Behavior: Behavior normal. Behavior is cooperative.         Cognition and Memory: Cognition and memory normal.         Judgment: Judgment normal.         Administrative Statements   Encounter provider Rekha Bryan PA-C    The Patient is located at Home and in the following state in which I hold an active license [place].    The patient was identified by name and date of birth. Dahlia Efra was informed that this is a telemedicine visit and that the visit is being conducted through the Epic Embedded platform. She agrees to proceed..  My office door was closed. No one else was in the room.  She acknowledged consent and understanding of privacy and security of the  video platform. The patient has agreed to participate and understands they can discontinue the visit at any time.    I have spent a total time of 20 minutes in caring for this patient on the day of the visit/encounter including Diagnostic results, Prognosis, Risks and benefits of tx options, and Instructions for management, not including the time spent for establishing the audio/video connection.

## 2025-07-10 NOTE — TELEPHONE ENCOUNTER
Can we call the pharmacy to see with going on?  She was on oxycodone, alprazolam, and zolpidem with no problems in the past.

## 2025-07-10 NOTE — ASSESSMENT & PLAN NOTE
History of heterozygous HFE H63D mutation in 2015.  Recent ferritin was 15, but H/H were elevated on recent CBC.  [facility] At this visit, they discussed that the change of developing iron overload with this mutation is slim. E-consult placed to hem/onc for management recommendations.   Orders:    AMB E-CONSULT TO HEMATOLOGY

## 2025-07-10 NOTE — TELEPHONE ENCOUNTER
Pharmacy called and stated that new script is also not covered by insurance, new medication or alternative should be sent.    Please advise

## 2025-07-10 NOTE — ASSESSMENT & PLAN NOTE
A1c is 9.7. She is currently on lantus 25 units prior to bed, Trulicity 1.5 mg daily, metformin 500 mg twice daily, and humalog 50 units 3 times daily.  Discussed that she is supposed to be on Humalog 10 units 3 times daily with meals.  Fasting sugars have been averaging around 200.  Recommended increasing Lantus to 30 units prior to bed.  Referral to endocrinologist placed.  Reviewed a low sugar and carb diet.  Lab Results   Component Value Date    HGBA1C 9.7 (H) 07/08/2025       Orders:    Ambulatory Referral to Endocrinology; Future

## 2025-07-10 NOTE — TELEPHONE ENCOUNTER
The patient called stating after speaking with her pharmacy they advised Tramadol 50 mg is not covered.     Called [facility] asking if the medication requires Prior Auth. They stated it doesn't say that however, it does mention the patient being on Alprazolam and Zolpidem which could be the reason.     The patient asked if Rekha Bryan could call her back to figure the problem out.

## 2025-07-11 ENCOUNTER — TELEPHONE (OUTPATIENT)
Age: 55
End: 2025-07-11

## 2025-07-11 ENCOUNTER — E-CONSULT (OUTPATIENT)
Dept: HEMATOLOGY ONCOLOGY | Facility: CLINIC | Age: 55
End: 2025-07-11
Payer: COMMERCIAL

## 2025-07-11 DIAGNOSIS — D75.1 ERYTHROCYTOSIS: ICD-10-CM

## 2025-07-11 DIAGNOSIS — Z12.11 SCREENING FOR COLORECTAL CANCER: Primary | ICD-10-CM

## 2025-07-11 DIAGNOSIS — S32.020D TRAUMATIC COMPRESSION FRACTURE OF L2 LUMBAR VERTEBRA WITH ROUTINE HEALING, SUBSEQUENT ENCOUNTER: Primary | ICD-10-CM

## 2025-07-11 DIAGNOSIS — Z12.12 SCREENING FOR COLORECTAL CANCER: Primary | ICD-10-CM

## 2025-07-11 DIAGNOSIS — E83.110 HEREDITARY HEMOCHROMATOSIS (HCC): Primary | ICD-10-CM

## 2025-07-11 DIAGNOSIS — R79.0 LOW FERRITIN: ICD-10-CM

## 2025-07-11 DIAGNOSIS — S32.001D CLOSED BURST FRACTURE OF LUMBAR VERTEBRA WITH ROUTINE HEALING, SUBSEQUENT ENCOUNTER: ICD-10-CM

## 2025-07-11 DIAGNOSIS — D50.9 IRON DEFICIENCY ANEMIA, UNSPECIFIED IRON DEFICIENCY ANEMIA TYPE: ICD-10-CM

## 2025-07-11 PROCEDURE — 99451 NTRPROF PH1/NTRNET/EHR 5/>: CPT | Performed by: PHYSICIAN ASSISTANT

## 2025-07-11 RX ORDER — OXYCODONE HYDROCHLORIDE 5 MG/1
5 TABLET ORAL EVERY 6 HOURS PRN
Qty: 28 TABLET | Refills: 0 | Status: SHIPPED | OUTPATIENT
Start: 2025-07-11

## 2025-07-11 NOTE — PROGRESS NOTES
E-Consult  Dahlia Kraus 55 y.o. female MRN: [id number]  Encounter Date: 07/11/25      Reason for Consult / Principal Problem: hx of heterozygouse HFE H63D mutation per records from [place]    Consulting Provider: Kandice Roland PA-C    Requesting Provider: Rekha Bryan PA-C       ASSESSMENT:          RECOMMENDATIONS:  Elevated hematocrit is mild.  This likely is representing a secondary cause of elevation however most recent levels are not clinically significant for elevation.    Definition of erythrocytosis   -Hgb >16.5 g/dL in men or >16.0 g/dL (10.0 mmol/L) in women  -Hct >49 percent in men or >48 percent in women    Secondary causes   -Dehydration (early AM lab draw, vomiting/diarrhea, diuretics)  -Hypoxia   Chronic/continued tobacco abuse, COPD, sleep apnea   -SGLT2 inhibitors (Jardiance, Invokana, Farxiga, etc.)  -EPO-producing tumors   - order erythropoietin level    - if erythropoietin level elevated, perform CT imaging C/A/P      Patient has iron deficiency with a ferritin of 15.  Cause of iron deficiency should be investigated.    Iron deficiency is not a primary hematological process.  Causes of iron deficiency to include 3 major categories:   1. Blood loss (GI bleed, menorrhagia, frequent blood donation, frequent epistaxis)  2. Malabsorption (Chris-en Y or sleeve gastrectomy, active or recent H. pylori, chronic prolonged PPI use)  3. Insufficient dietary intake      Previous labs do not indicate patient having issues with iron overload in the setting of hemochromatosis.  It is ideal for patients with hemochromatosis to have a ferritin maintained below 100.    If patient is symptomatic from low ferritin, recommend treating with oral or IV iron.  Monitor closely.  Once ferritin level approaches 100, discontinue.    Total time spent >5 min, >50% time spent reviewing/analyzing record, written report will be generated in the EMR. .

## 2025-07-14 NOTE — TELEPHONE ENCOUNTER
PA for OXYCODONE 5MG SUBMITTED        via    [x]SS    [x]PA sent as URGENT    All office notes, labs and other pertaining documents and studies sent. Clinical questions answered. Awaiting determination from insurance company.     Turnaround time for your insurance to make a decision on your Prior Authorization can take 7-21 business days.

## 2025-07-14 NOTE — TELEPHONE ENCOUNTER
PA for OXYCODONE 5MG  NOT REQUIRED     Reason (screenshot if applicable)          Patient advised by          [x] MyChart Message  [] Phone call   []LMOM  []L/M to call office as no active Communication consent on file  []Unable to leave detailed message as VM not approved on Communication consent       Pharmacy advised by    [x]Fax  []Phone call

## 2025-07-15 DIAGNOSIS — R25.1 TREMOR: ICD-10-CM

## 2025-07-15 RX ORDER — CARBIDOPA AND LEVODOPA 25; 100 MG/1; MG/1
1 TABLET ORAL 3 TIMES DAILY
Qty: 90 TABLET | Refills: 1 | Status: SHIPPED | OUTPATIENT
Start: 2025-07-15

## 2025-07-17 ENCOUNTER — TELEPHONE (OUTPATIENT)
Age: 55
End: 2025-07-17

## 2025-07-17 NOTE — TELEPHONE ENCOUNTER
Patient calling for refill for yeast infection medication she didn't know the name,    It would go to [facility] phone [phone]

## 2025-07-18 DIAGNOSIS — B37.9 YEAST INFECTION: Primary | ICD-10-CM

## 2025-07-18 DIAGNOSIS — E53.1 VITAMIN B6 DEFICIENCY: ICD-10-CM

## 2025-07-18 RX ORDER — FLUCONAZOLE 150 MG/1
150 TABLET ORAL ONCE
Qty: 1 TABLET | Refills: 0 | Status: SHIPPED | OUTPATIENT
Start: 2025-07-18 | End: 2025-07-18

## 2025-07-21 ENCOUNTER — OFFICE VISIT (OUTPATIENT)
Age: 55
End: 2025-07-21
Payer: COMMERCIAL

## 2025-07-21 VITALS
SYSTOLIC BLOOD PRESSURE: 120 MMHG | HEIGHT: 64 IN | BODY MASS INDEX: 25.1 KG/M2 | DIASTOLIC BLOOD PRESSURE: 70 MMHG | OXYGEN SATURATION: 97 % | HEART RATE: 73 BPM | WEIGHT: 147 LBS

## 2025-07-21 DIAGNOSIS — S32.020D TRAUMATIC COMPRESSION FRACTURE OF L2 LUMBAR VERTEBRA WITH ROUTINE HEALING, SUBSEQUENT ENCOUNTER: ICD-10-CM

## 2025-07-21 DIAGNOSIS — S32.001D CLOSED BURST FRACTURE OF LUMBAR VERTEBRA WITH ROUTINE HEALING, SUBSEQUENT ENCOUNTER: ICD-10-CM

## 2025-07-21 PROCEDURE — 99214 OFFICE O/P EST MOD 30 MIN: CPT

## 2025-07-21 RX ORDER — FLUCONAZOLE 150 MG/1
1 TABLET ORAL DAILY
COMMUNITY
Start: 2025-07-18

## 2025-07-21 RX ORDER — OXYCODONE HYDROCHLORIDE 10 MG/1
10 TABLET ORAL EVERY 8 HOURS
Qty: 90 TABLET | Refills: 0 | Status: SHIPPED | OUTPATIENT
Start: 2025-07-21

## 2025-07-21 NOTE — PROGRESS NOTES
"Name: Dahlia Kraus      : 1970      MRN: [id number]  Encounter Provider: Rekha Bryan PA-C  Encounter Date: 2025   Encounter [facility]  :  Assessment & Plan  Traumatic compression fracture of L2 lumbar vertebra with routine healing, subsequent encounter  MRI lumbar spine completed 2025 revealed a subacute compression fracture of the inferior L2 vertebral body and subacute compression fracture of the anterior superior L5 vertebral bodies. See plan below.   Orders:    oxyCODONE (ROXICODONE) 10 MG TABS; Take 1 tablet (10 mg total) by mouth every 8 (eight) hours Max Daily Amount: 30 mg    Closed burst fracture of lumbar vertebra with routine healing, subsequent encounter  Lumbar spine MRI revealed a burst fracture at L3 that extends into anterior pedicles bilaterally without significant retropulsion. She had left AMA before she was evaluated by ortho spine in the hospital. She was fitted with an LSO and then left AMA a second time. She continues to have severe pain on daily basis.  She struggles to leave her house more than once a week.  She was evaluated by advanced spine through [facility] who recommended she have a kyphoplasty that is pending insurance approval.  She cannot transport herself to PT and is on a wait list for pain management. Discussed pathophysiology, risk, and benefits of oxycodone as tramadol was not covered by insurance.  Recommended planning for preoperative clearance once surgery date scheduled.  Orders:    oxyCODONE (ROXICODONE) 10 MG TABS; Take 1 tablet (10 mg total) by mouth every 8 (eight) hours Max Daily Amount: 30 mg           History of Present Illness   Patient is a 55-year-old female that presents today for follow-up.      Review of Systems   Musculoskeletal:  Positive for back pain.   Neurological:  Positive for weakness.       Objective   /70   Pulse 73   Ht 5' 4\" (1.626 m)   Wt 66.7 kg (147 lb)   " SpO2 97%   BMI 25.23 kg/m²      Physical Exam  Vitals and nursing note reviewed.   Constitutional:       General: She is awake. She is not in acute distress.     Appearance: Normal appearance. She is well-developed, well-groomed and overweight.   HENT:      Head: Normocephalic and atraumatic.      Right Ear: Hearing and external ear normal.      Left Ear: Hearing and external ear normal.      Nose: Nose normal.      Mouth/Throat:      Lips: Pink.      Mouth: Mucous membranes are moist.     Eyes:      General: Lids are normal. Vision grossly intact. Gaze aligned appropriately.      Conjunctiva/sclera: Conjunctivae normal.     Neck:      Vascular: No carotid bruit.      Trachea: Trachea and phonation normal.   Pulmonary:      Effort: Pulmonary effort is normal. No respiratory distress.   Abdominal:      General: Abdomen is protuberant.     Musculoskeletal:         General: No swelling.      Cervical back: Neck supple.     Skin:     General: Skin is warm.      Capillary Refill: Capillary refill takes less than 2 seconds.     Neurological:      Mental Status: She is alert.     Psychiatric:         Attention and Perception: Attention and perception normal.         Mood and Affect: Mood and affect normal.         Speech: Speech normal.         Behavior: Behavior normal. Behavior is cooperative.         Thought Content: Thought content normal.         Cognition and Memory: Cognition and memory normal.         Judgment: Judgment normal.

## 2025-07-22 DIAGNOSIS — G47.00 INSOMNIA, UNSPECIFIED TYPE: ICD-10-CM

## 2025-07-22 RX ORDER — ZOLPIDEM TARTRATE 5 MG/1
5 TABLET ORAL
Qty: 30 TABLET | Refills: 0 | Status: SHIPPED | OUTPATIENT
Start: 2025-07-22

## 2025-07-24 DIAGNOSIS — Z79.4 TYPE 2 DIABETES MELLITUS WITH HYPOGLYCEMIA WITHOUT COMA, WITH LONG-TERM CURRENT USE OF INSULIN (HCC): ICD-10-CM

## 2025-07-24 DIAGNOSIS — E11.649 TYPE 2 DIABETES MELLITUS WITH HYPOGLYCEMIA WITHOUT COMA, WITH LONG-TERM CURRENT USE OF INSULIN (HCC): ICD-10-CM

## 2025-07-25 RX ORDER — DULAGLUTIDE 1.5 MG/.5ML
INJECTION, SOLUTION SUBCUTANEOUS
Qty: 6 ML | Refills: 1 | Status: SHIPPED | OUTPATIENT
Start: 2025-07-25

## 2025-07-25 RX ORDER — AMMONIUM LACTATE 12 G/100G
LOTION TOPICAL 2 TIMES DAILY PRN
Qty: 227 G | Refills: 1 | Status: SHIPPED | OUTPATIENT
Start: 2025-07-25

## 2025-07-29 DIAGNOSIS — T78.40XS ALLERGY, SEQUELA: ICD-10-CM

## 2025-07-29 DIAGNOSIS — K21.9 GASTROESOPHAGEAL REFLUX DISEASE WITHOUT ESOPHAGITIS: ICD-10-CM

## 2025-07-29 RX ORDER — DIPHENHYDRAMINE HYDROCHLORIDE 25 MG/1
25 CAPSULE ORAL DAILY
Qty: 30 TABLET | Refills: 0 | Status: SHIPPED | OUTPATIENT
Start: 2025-07-29

## 2025-07-29 RX ORDER — FAMOTIDINE 20 MG/1
20 TABLET, FILM COATED ORAL 2 TIMES DAILY PRN
Qty: 100 TABLET | Refills: 1 | Status: SHIPPED | OUTPATIENT
Start: 2025-07-29

## 2025-07-30 RX ORDER — FEXOFENADINE HCL 180 MG/1
180 TABLET ORAL DAILY
Qty: 90 TABLET | Refills: 1 | Status: SHIPPED | OUTPATIENT
Start: 2025-07-30 | End: 2025-08-08 | Stop reason: SDUPTHER

## 2025-07-31 DIAGNOSIS — Z79.4 TYPE 2 DIABETES MELLITUS WITH HYPOGLYCEMIA WITHOUT COMA, WITH LONG-TERM CURRENT USE OF INSULIN (HCC): ICD-10-CM

## 2025-07-31 DIAGNOSIS — E11.649 TYPE 2 DIABETES MELLITUS WITH HYPOGLYCEMIA WITHOUT COMA, WITH LONG-TERM CURRENT USE OF INSULIN (HCC): ICD-10-CM

## 2025-07-31 RX ORDER — GABAPENTIN 100 MG/1
200 CAPSULE ORAL 3 TIMES DAILY
Qty: 180 CAPSULE | Refills: 1 | OUTPATIENT
Start: 2025-07-31

## 2025-08-06 DIAGNOSIS — F41.9 ANXIETY: ICD-10-CM

## 2025-08-07 DIAGNOSIS — T78.40XS ALLERGY, SEQUELA: ICD-10-CM

## 2025-08-07 DIAGNOSIS — E53.1 VITAMIN B6 DEFICIENCY: ICD-10-CM

## 2025-08-07 RX ORDER — ALPRAZOLAM 1 MG/1
1 TABLET ORAL 3 TIMES DAILY PRN
Qty: 90 TABLET | Refills: 0 | Status: SHIPPED | OUTPATIENT
Start: 2025-08-07

## 2025-08-07 RX ORDER — DIPHENHYDRAMINE HYDROCHLORIDE 25 MG/1
25 CAPSULE ORAL DAILY
Qty: 30 TABLET | Refills: 0 | OUTPATIENT
Start: 2025-08-07

## 2025-08-08 ENCOUNTER — CONSULT (OUTPATIENT)
Age: 55
End: 2025-08-08
Payer: COMMERCIAL

## 2025-08-08 VITALS
HEART RATE: 114 BPM | BODY MASS INDEX: 25.44 KG/M2 | DIASTOLIC BLOOD PRESSURE: 84 MMHG | SYSTOLIC BLOOD PRESSURE: 140 MMHG | OXYGEN SATURATION: 95 % | WEIGHT: 149 LBS | HEIGHT: 64 IN

## 2025-08-08 DIAGNOSIS — J45.909 UNCOMPLICATED ASTHMA, UNSPECIFIED ASTHMA SEVERITY, UNSPECIFIED WHETHER PERSISTENT: ICD-10-CM

## 2025-08-08 DIAGNOSIS — S32.001D CLOSED BURST FRACTURE OF LUMBAR VERTEBRA WITH ROUTINE HEALING, SUBSEQUENT ENCOUNTER: ICD-10-CM

## 2025-08-08 DIAGNOSIS — Z01.818 PRE-OP EVALUATION: Primary | ICD-10-CM

## 2025-08-08 DIAGNOSIS — R21 RASH: ICD-10-CM

## 2025-08-08 DIAGNOSIS — R25.1 TREMOR: ICD-10-CM

## 2025-08-08 DIAGNOSIS — T78.40XS ALLERGY, SEQUELA: ICD-10-CM

## 2025-08-08 DIAGNOSIS — S32.020D TRAUMATIC COMPRESSION FRACTURE OF L2 LUMBAR VERTEBRA WITH ROUTINE HEALING, SUBSEQUENT ENCOUNTER: ICD-10-CM

## 2025-08-08 PROCEDURE — 99214 OFFICE O/P EST MOD 30 MIN: CPT

## 2025-08-08 RX ORDER — CYCLOBENZAPRINE HCL 10 MG
10 TABLET ORAL 3 TIMES DAILY PRN
Qty: 30 TABLET | Refills: 0 | Status: SHIPPED | OUTPATIENT
Start: 2025-08-08 | End: 2025-08-15

## 2025-08-08 RX ORDER — TRIAMCINOLONE ACETONIDE 1 MG/G
CREAM TOPICAL 2 TIMES DAILY
Qty: 453.6 G | Refills: 2 | Status: SHIPPED | OUTPATIENT
Start: 2025-08-08

## 2025-08-08 RX ORDER — MONTELUKAST SODIUM 10 MG/1
10 TABLET ORAL
Qty: 100 TABLET | Refills: 1 | Status: SHIPPED | OUTPATIENT
Start: 2025-08-08

## 2025-08-08 RX ORDER — OXYCODONE HYDROCHLORIDE 10 MG/1
10 TABLET ORAL EVERY 8 HOURS
Qty: 90 TABLET | Refills: 0 | Status: SHIPPED | OUTPATIENT
Start: 2025-08-08

## 2025-08-08 RX ORDER — FEXOFENADINE HCL 180 MG/1
180 TABLET ORAL DAILY
Qty: 90 TABLET | Refills: 1 | Status: SHIPPED | OUTPATIENT
Start: 2025-08-08

## 2025-08-08 RX ORDER — ALBUTEROL SULFATE 0.83 MG/ML
2.5 SOLUTION RESPIRATORY (INHALATION) EVERY 6 HOURS PRN
Qty: 125 ML | Refills: 3 | Status: SHIPPED | OUTPATIENT
Start: 2025-08-08 | End: 2025-09-07

## 2025-08-08 RX ORDER — CARBIDOPA AND LEVODOPA 25; 100 MG/1; MG/1
1 TABLET ORAL 3 TIMES DAILY
Qty: 90 TABLET | Refills: 0 | Status: SHIPPED | OUTPATIENT
Start: 2025-08-08

## 2025-08-11 ENCOUNTER — TELEPHONE (OUTPATIENT)
Age: 55
End: 2025-08-11

## 2025-08-20 DIAGNOSIS — S32.001D CLOSED BURST FRACTURE OF LUMBAR VERTEBRA WITH ROUTINE HEALING, SUBSEQUENT ENCOUNTER: ICD-10-CM

## 2025-08-20 DIAGNOSIS — S32.020D TRAUMATIC COMPRESSION FRACTURE OF L2 LUMBAR VERTEBRA WITH ROUTINE HEALING, SUBSEQUENT ENCOUNTER: ICD-10-CM

## 2025-08-20 RX ORDER — CYCLOBENZAPRINE HCL 10 MG
10 TABLET ORAL 3 TIMES DAILY PRN
Qty: 30 TABLET | Refills: 0 | OUTPATIENT
Start: 2025-08-20

## 2025-08-22 ENCOUNTER — HOSPITAL ENCOUNTER (EMERGENCY)
Facility: HOSPITAL | Age: 55
Discharge: HOME | End: 2025-08-22
Attending: SURGERY | Admitting: SURGERY
Payer: COMMERCIAL

## 2025-08-22 ENCOUNTER — APPOINTMENT (EMERGENCY)
Dept: CT IMAGING | Facility: HOSPITAL | Age: 55
End: 2025-08-22
Payer: COMMERCIAL

## 2025-08-22 ENCOUNTER — APPOINTMENT (EMERGENCY)
Dept: RADIOLOGY | Facility: HOSPITAL | Age: 55
End: 2025-08-22
Payer: COMMERCIAL

## 2025-08-22 VITALS
TEMPERATURE: 99 F | HEART RATE: 86 BPM | SYSTOLIC BLOOD PRESSURE: 170 MMHG | RESPIRATION RATE: 18 BRPM | WEIGHT: 153 LBS | OXYGEN SATURATION: 94 % | DIASTOLIC BLOOD PRESSURE: 98 MMHG
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
## 2025-08-22 PROBLEM — M54.50 LOW BACK PAIN: Status: ACTIVE | Noted: 2025-08-22

## 2025-08-22 PROBLEM — R40.0 SOMNOLENCE: Status: ACTIVE | Noted: 2025-08-22

## 2025-08-22 LAB
ALBUMIN SERPL BCG-MCNC: 3.6 G/DL (ref 3.5–5)
ALP SERPL-CCNC: 98 U/L (ref 34–104)
ALT SERPL W P-5'-P-CCNC: 7 U/L (ref 7–52)
AMMONIA PLAS-SCNC: 33 UMOL/L (ref 18–72)
ANION GAP SERPL CALCULATED.3IONS-SCNC: 7 MMOL/L (ref 4–13)
AST SERPL W P-5'-P-CCNC: 19 U/L (ref 13–39)
BASE EXCESS BLDA CALC-SCNC: 3 MMOL/L (ref -2–3)
BASOPHILS # BLD AUTO: 0.05 THOUSANDS/ÂΜL (ref 0–0.1)
BASOPHILS NFR BLD AUTO: 1 % (ref 0–1)
BILIRUB SERPL-MCNC: 0.41 MG/DL (ref 0.2–1)
BUN SERPL-MCNC: 15 MG/DL (ref 5–25)
CA-I BLD-SCNC: 1.16 MMOL/L (ref 1.12–1.32)
CALCIUM SERPL-MCNC: 9.1 MG/DL (ref 8.4–10.2)
CHLORIDE SERPL-SCNC: 101 MMOL/L (ref 96–108)
CO2 SERPL-SCNC: 28 MMOL/L (ref 21–32)
CREAT SERPL-MCNC: 1.15 MG/DL (ref 0.6–1.3)
EOSINOPHIL # BLD AUTO: 0.13 THOUSAND/ÂΜL (ref 0–0.61)
EOSINOPHIL NFR BLD AUTO: 1 % (ref 0–6)
ERYTHROCYTE [DISTWIDTH] IN BLOOD BY AUTOMATED COUNT: 15 % (ref 11.6–15.1)
GFR SERPL CREATININE-BSD FRML MDRD: 52 ML/MIN/1.73SQ M
GLUCOSE SERPL-MCNC: 243 MG/DL (ref 65–140)
GLUCOSE SERPL-MCNC: 273 MG/DL (ref 65–140)
HCO3 BLDA-SCNC: 28 MMOL/L (ref 24–30)
HCT VFR BLD AUTO: 41.3 % (ref 34.8–46.1)
HCT VFR BLD CALC: 38 % (ref 34.8–46.1)
HGB BLD-MCNC: 13.2 G/DL (ref 11.5–15.4)
HGB BLDA-MCNC: 12.9 G/DL (ref 11.5–15.4)
IMM GRANULOCYTES # BLD AUTO: 0.11 THOUSAND/UL (ref 0–0.2)
IMM GRANULOCYTES NFR BLD AUTO: 1 % (ref 0–2)
LYMPHOCYTES # BLD AUTO: 2.58 THOUSANDS/ÂΜL (ref 0.6–4.47)
LYMPHOCYTES NFR BLD AUTO: 24 % (ref 14–44)
MAGNESIUM SERPL-MCNC: 1.3 MG/DL (ref 1.9–2.7)
MCH RBC QN AUTO: 28.8 PG (ref 26.8–34.3)
MCHC RBC AUTO-ENTMCNC: 32 G/DL (ref 31.4–37.4)
MCV RBC AUTO: 90 FL (ref 82–98)
MONOCYTES # BLD AUTO: 0.9 THOUSAND/ÂΜL (ref 0.17–1.22)
MONOCYTES NFR BLD AUTO: 8 % (ref 4–12)
NEUTROPHILS # BLD AUTO: 6.97 THOUSANDS/ÂΜL (ref 1.85–7.62)
NEUTS SEG NFR BLD AUTO: 65 % (ref 43–75)
NRBC BLD AUTO-RTO: 0 /100 WBCS
PCO2 BLD: 29 MMOL/L (ref 21–32)
PCO2 BLD: 43.7 MM HG (ref 42–50)
PH BLD: 7.42 [PH] (ref 7.3–7.4)
PHOSPHATE SERPL-MCNC: 3.5 MG/DL (ref 2.7–4.5)
PLATELET # BLD AUTO: 261 THOUSANDS/UL (ref 149–390)
PMV BLD AUTO: 12.2 FL (ref 8.9–12.7)
PO2 BLD: 48 MM HG (ref 35–45)
POTASSIUM BLD-SCNC: 5.2 MMOL/L (ref 3.5–5.3)
POTASSIUM SERPL-SCNC: 4.4 MMOL/L (ref 3.5–5.3)
PROT SERPL-MCNC: 6.7 G/DL (ref 6.4–8.4)
RBC # BLD AUTO: 4.58 MILLION/UL (ref 3.81–5.12)
SAO2 % BLD FROM PO2: 84 % (ref 60–85)
SODIUM BLD-SCNC: 136 MMOL/L (ref 136–145)
SODIUM SERPL-SCNC: 136 MMOL/L (ref 135–147)
SPECIMEN SOURCE: ABNORMAL
WBC # BLD AUTO: 10.74 THOUSAND/UL (ref 4.31–10.16)

## 2025-08-22 PROCEDURE — 84132 ASSAY OF SERUM POTASSIUM: CPT

## 2025-08-22 PROCEDURE — 36415 COLL VENOUS BLD VENIPUNCTURE: CPT | Performed by: PHYSICIAN ASSISTANT

## 2025-08-22 PROCEDURE — 84100 ASSAY OF PHOSPHORUS: CPT | Performed by: PHYSICIAN ASSISTANT

## 2025-08-22 PROCEDURE — 71250 CT THORAX DX C-: CPT

## 2025-08-22 PROCEDURE — 82140 ASSAY OF AMMONIA: CPT | Performed by: PHYSICIAN ASSISTANT

## 2025-08-22 PROCEDURE — 84295 ASSAY OF SERUM SODIUM: CPT

## 2025-08-22 PROCEDURE — 74176 CT ABD & PELVIS W/O CONTRAST: CPT

## 2025-08-22 PROCEDURE — 72125 CT NECK SPINE W/O DYE: CPT

## 2025-08-22 PROCEDURE — 82947 ASSAY GLUCOSE BLOOD QUANT: CPT

## 2025-08-22 PROCEDURE — 99284 EMERGENCY DEPT VISIT MOD MDM: CPT

## 2025-08-22 PROCEDURE — 80053 COMPREHEN METABOLIC PANEL: CPT | Performed by: PHYSICIAN ASSISTANT

## 2025-08-22 PROCEDURE — 82330 ASSAY OF CALCIUM: CPT

## 2025-08-22 PROCEDURE — 85025 COMPLETE CBC W/AUTO DIFF WBC: CPT | Performed by: PHYSICIAN ASSISTANT

## 2025-08-22 PROCEDURE — 71045 X-RAY EXAM CHEST 1 VIEW: CPT

## 2025-08-22 PROCEDURE — 82803 BLOOD GASES ANY COMBINATION: CPT

## 2025-08-22 PROCEDURE — 90715 TDAP VACCINE 7 YRS/> IM: CPT | Performed by: PHYSICIAN ASSISTANT

## 2025-08-22 PROCEDURE — 85014 HEMATOCRIT: CPT

## 2025-08-22 PROCEDURE — 70450 CT HEAD/BRAIN W/O DYE: CPT

## 2025-08-22 PROCEDURE — 90471 IMMUNIZATION ADMIN: CPT

## 2025-08-22 PROCEDURE — 83735 ASSAY OF MAGNESIUM: CPT | Performed by: PHYSICIAN ASSISTANT

## 2025-08-22 RX ORDER — ONDANSETRON 4 MG/1
4 TABLET, ORALLY DISINTEGRATING ORAL EVERY 6 HOURS PRN
Status: DISCONTINUED | OUTPATIENT
Start: 2025-08-22 | End: 2025-08-22 | Stop reason: HOSPADM

## 2025-08-22 RX ORDER — ACETAMINOPHEN 10 MG/ML
1000 INJECTION, SOLUTION INTRAVENOUS ONCE
Status: DISCONTINUED | OUTPATIENT
Start: 2025-08-22 | End: 2025-08-22 | Stop reason: HOSPADM

## 2025-08-22 RX ORDER — LIDOCAINE 50 MG/G
2 PATCH TOPICAL DAILY
Status: DISCONTINUED | OUTPATIENT
Start: 2025-08-22 | End: 2025-08-22 | Stop reason: HOSPADM

## 2025-08-22 RX ADMIN — Medication 2.5 MG: at 16:34

## 2025-08-22 RX ADMIN — TETANUS TOXOID, REDUCED DIPHTHERIA TOXOID AND ACELLULAR PERTUSSIS VACCINE, ADSORBED 0.5 ML: 5; 2.5; 8; 8; 2.5 SUSPENSION INTRAMUSCULAR at 16:18

## 2025-08-22 RX ADMIN — LIDOCAINE 2 PATCH: 700 PATCH TOPICAL at 16:23

## 2025-08-22 RX ADMIN — ONDANSETRON 4 MG: 4 TABLET, ORALLY DISINTEGRATING ORAL at 17:48
